# Patient Record
Sex: FEMALE | Race: WHITE | Employment: OTHER | URBAN - METROPOLITAN AREA
[De-identification: names, ages, dates, MRNs, and addresses within clinical notes are randomized per-mention and may not be internally consistent; named-entity substitution may affect disease eponyms.]

---

## 2017-01-12 ENCOUNTER — TELEPHONE (OUTPATIENT)
Dept: INTERNAL MEDICINE | Age: 77
End: 2017-01-12

## 2017-01-12 DIAGNOSIS — I05.9 MITRAL VALVE DISORDER: ICD-10-CM

## 2017-01-12 DIAGNOSIS — Z00.00 WELL ADULT EXAM: Primary | ICD-10-CM

## 2017-01-12 DIAGNOSIS — I48.91 ATRIAL FIBRILLATION, UNSPECIFIED TYPE (HCC): ICD-10-CM

## 2017-01-17 LAB
A/G RATIO: 1.8 (ref 1.1–2.2)
ALBUMIN SERPL-MCNC: 4.3 G/DL (ref 3.4–5)
ALP BLD-CCNC: 88 U/L (ref 40–129)
ALT SERPL-CCNC: 12 U/L (ref 10–40)
ANION GAP SERPL CALCULATED.3IONS-SCNC: 15 MMOL/L (ref 3–16)
AST SERPL-CCNC: 24 U/L (ref 15–37)
BASOPHILS ABSOLUTE: 0 K/UL (ref 0–0.2)
BASOPHILS RELATIVE PERCENT: 0.8 %
BILIRUB SERPL-MCNC: 1 MG/DL (ref 0–1)
BILIRUBIN URINE: NEGATIVE
BLOOD, URINE: NEGATIVE
BUN BLDV-MCNC: 11 MG/DL (ref 7–20)
CALCIUM SERPL-MCNC: 9.7 MG/DL (ref 8.3–10.6)
CHLORIDE BLD-SCNC: 96 MMOL/L (ref 99–110)
CHOLESTEROL, TOTAL: 224 MG/DL (ref 0–199)
CLARITY: CLEAR
CO2: 29 MMOL/L (ref 21–32)
COLOR: YELLOW
CREAT SERPL-MCNC: 0.5 MG/DL (ref 0.6–1.2)
EOSINOPHILS ABSOLUTE: 0.1 K/UL (ref 0–0.6)
EOSINOPHILS RELATIVE PERCENT: 1.4 %
EPITHELIAL CELLS, UA: 0 /HPF (ref 0–5)
GFR AFRICAN AMERICAN: >60
GFR NON-AFRICAN AMERICAN: >60
GLOBULIN: 2.4 G/DL
GLUCOSE BLD-MCNC: 83 MG/DL (ref 70–99)
GLUCOSE URINE: NEGATIVE MG/DL
HCT VFR BLD CALC: 42.3 % (ref 36–48)
HDLC SERPL-MCNC: 107 MG/DL (ref 40–60)
HEMOGLOBIN: 14.5 G/DL (ref 12–16)
HYALINE CASTS: 0 /HPF (ref 0–8)
INR BLD: 1.77 (ref 0.85–1.16)
KETONES, URINE: NEGATIVE MG/DL
LDL CHOLESTEROL CALCULATED: 98 MG/DL
LEUKOCYTE ESTERASE, URINE: ABNORMAL
LYMPHOCYTES ABSOLUTE: 1.4 K/UL (ref 1–5.1)
LYMPHOCYTES RELATIVE PERCENT: 24.4 %
MCH RBC QN AUTO: 32.8 PG (ref 26–34)
MCHC RBC AUTO-ENTMCNC: 34.3 G/DL (ref 31–36)
MCV RBC AUTO: 95.8 FL (ref 80–100)
MICROSCOPIC EXAMINATION: YES
MONOCYTES ABSOLUTE: 0.5 K/UL (ref 0–1.3)
MONOCYTES RELATIVE PERCENT: 8.8 %
NEUTROPHILS ABSOLUTE: 3.7 K/UL (ref 1.7–7.7)
NEUTROPHILS RELATIVE PERCENT: 64.6 %
NITRITE, URINE: NEGATIVE
PDW BLD-RTO: 14.5 % (ref 12.4–15.4)
PH UA: 7
PLATELET # BLD: 127 K/UL (ref 135–450)
PMV BLD AUTO: 10.5 FL (ref 5–10.5)
POTASSIUM SERPL-SCNC: 3.7 MMOL/L (ref 3.5–5.1)
PROTEIN UA: NEGATIVE MG/DL
PROTHROMBIN TIME: 20.4 SEC (ref 9.8–13)
RBC # BLD: 4.41 M/UL (ref 4–5.2)
RBC UA: 2 /HPF (ref 0–4)
SODIUM BLD-SCNC: 140 MMOL/L (ref 136–145)
SPECIFIC GRAVITY UA: 1.01
TOTAL PROTEIN: 6.7 G/DL (ref 6.4–8.2)
TRIGL SERPL-MCNC: 96 MG/DL (ref 0–150)
TSH SERPL DL<=0.05 MIU/L-ACNC: 2.28 UIU/ML (ref 0.27–4.2)
URINE TYPE: ABNORMAL
UROBILINOGEN, URINE: 0.2 E.U./DL
VITAMIN D 25-HYDROXY: 45.5 NG/ML
VLDLC SERPL CALC-MCNC: 19 MG/DL
WBC # BLD: 5.7 K/UL (ref 4–11)
WBC UA: 1 /HPF (ref 0–5)

## 2017-01-19 ENCOUNTER — ANTI-COAG VISIT (OUTPATIENT)
Dept: INTERNAL MEDICINE | Age: 77
End: 2017-01-19

## 2017-01-20 ENCOUNTER — TELEPHONE (OUTPATIENT)
Dept: INTERNAL MEDICINE | Age: 77
End: 2017-01-20

## 2017-01-20 RX ORDER — DOXYCYCLINE HYCLATE 100 MG
100 TABLET ORAL 2 TIMES DAILY
Qty: 14 TABLET | Refills: 0 | Status: SHIPPED | OUTPATIENT
Start: 2017-01-20 | End: 2017-01-27

## 2017-01-24 ENCOUNTER — OFFICE VISIT (OUTPATIENT)
Dept: INTERNAL MEDICINE | Age: 77
End: 2017-01-24

## 2017-01-24 VITALS
WEIGHT: 148 LBS | BODY MASS INDEX: 23.78 KG/M2 | HEIGHT: 66 IN | DIASTOLIC BLOOD PRESSURE: 82 MMHG | SYSTOLIC BLOOD PRESSURE: 120 MMHG

## 2017-01-24 DIAGNOSIS — I05.9 MITRAL VALVE DISORDER: ICD-10-CM

## 2017-01-24 DIAGNOSIS — I48.91 ATRIAL FIBRILLATION, UNSPECIFIED TYPE (HCC): ICD-10-CM

## 2017-01-24 DIAGNOSIS — F34.1 DYSTHYMIC DISORDER: ICD-10-CM

## 2017-01-24 DIAGNOSIS — E55.9 VITAMIN D DEFICIENCY: ICD-10-CM

## 2017-01-24 DIAGNOSIS — I83.90 ASYMPTOMATIC VARICOSE VEINS, UNSPECIFIED LATERALITY: ICD-10-CM

## 2017-01-24 DIAGNOSIS — E78.00 PURE HYPERCHOLESTEROLEMIA: ICD-10-CM

## 2017-01-24 DIAGNOSIS — R53.83 FATIGUE, UNSPECIFIED TYPE: ICD-10-CM

## 2017-01-24 DIAGNOSIS — Z00.00 WELL ADULT EXAM: Primary | ICD-10-CM

## 2017-01-24 PROCEDURE — 93000 ELECTROCARDIOGRAM COMPLETE: CPT | Performed by: INTERNAL MEDICINE

## 2017-01-24 PROCEDURE — G0439 PPPS, SUBSEQ VISIT: HCPCS | Performed by: INTERNAL MEDICINE

## 2017-01-24 RX ORDER — MULTIVITAMIN/IRON/FOLIC ACID 18MG-0.4MG
1 TABLET ORAL DAILY
Status: ON HOLD | COMMUNITY
End: 2020-05-13 | Stop reason: HOSPADM

## 2017-01-24 RX ORDER — LOSARTAN POTASSIUM 50 MG/1
50 TABLET ORAL DAILY
Qty: 30 TABLET | Refills: 3 | Status: SHIPPED | OUTPATIENT
Start: 2017-01-24 | End: 2017-02-27 | Stop reason: SINTOL

## 2017-01-24 RX ORDER — LUTEIN 6 MG
TABLET ORAL
COMMUNITY
End: 2018-01-26 | Stop reason: CLARIF

## 2017-01-24 ASSESSMENT — PATIENT HEALTH QUESTIONNAIRE - PHQ9
SUM OF ALL RESPONSES TO PHQ9 QUESTIONS 1 & 2: 0
1. LITTLE INTEREST OR PLEASURE IN DOING THINGS: 0
2. FEELING DOWN, DEPRESSED OR HOPELESS: 0
SUM OF ALL RESPONSES TO PHQ QUESTIONS 1-9: 0

## 2017-01-27 DIAGNOSIS — I48.91 ATRIAL FIBRILLATION, UNSPECIFIED TYPE (HCC): Primary | ICD-10-CM

## 2017-01-27 DIAGNOSIS — Z79.01 LONG-TERM (CURRENT) USE OF ANTICOAGULANTS: ICD-10-CM

## 2017-02-13 DIAGNOSIS — I48.91 ATRIAL FIBRILLATION, UNSPECIFIED TYPE (HCC): ICD-10-CM

## 2017-02-13 DIAGNOSIS — Z79.01 LONG-TERM (CURRENT) USE OF ANTICOAGULANTS: ICD-10-CM

## 2017-02-13 LAB
BASOPHILS ABSOLUTE: 0 K/UL (ref 0–0.2)
BASOPHILS RELATIVE PERCENT: 0.7 %
EOSINOPHILS ABSOLUTE: 0.1 K/UL (ref 0–0.6)
EOSINOPHILS RELATIVE PERCENT: 1.7 %
HCT VFR BLD CALC: 40.2 % (ref 36–48)
HEMOGLOBIN: 13.6 G/DL (ref 12–16)
INR BLD: 1.53 (ref 0.85–1.16)
LYMPHOCYTES ABSOLUTE: 1 K/UL (ref 1–5.1)
LYMPHOCYTES RELATIVE PERCENT: 20.8 %
MCH RBC QN AUTO: 32.4 PG (ref 26–34)
MCHC RBC AUTO-ENTMCNC: 33.9 G/DL (ref 31–36)
MCV RBC AUTO: 95.7 FL (ref 80–100)
MONOCYTES ABSOLUTE: 0.5 K/UL (ref 0–1.3)
MONOCYTES RELATIVE PERCENT: 9.1 %
NEUTROPHILS ABSOLUTE: 3.4 K/UL (ref 1.7–7.7)
NEUTROPHILS RELATIVE PERCENT: 67.7 %
PDW BLD-RTO: 14.8 % (ref 12.4–15.4)
PLATELET # BLD: 138 K/UL (ref 135–450)
PMV BLD AUTO: 10.7 FL (ref 5–10.5)
PROTHROMBIN TIME: 17.6 SEC (ref 9.8–13)
RBC # BLD: 4.2 M/UL (ref 4–5.2)
WBC # BLD: 5 K/UL (ref 4–11)

## 2017-02-17 ENCOUNTER — ANTI-COAG VISIT (OUTPATIENT)
Dept: INTERNAL MEDICINE | Age: 77
End: 2017-02-17

## 2017-02-21 RX ORDER — WARFARIN SODIUM 2.5 MG/1
TABLET ORAL
Qty: 60 TABLET | Refills: 5 | Status: SHIPPED | OUTPATIENT
Start: 2017-02-21 | End: 2017-09-27 | Stop reason: SDUPTHER

## 2017-02-24 ENCOUNTER — TELEPHONE (OUTPATIENT)
Dept: INTERNAL MEDICINE | Age: 77
End: 2017-02-24

## 2017-02-27 RX ORDER — SPIRONOLACTONE AND HYDROCHLOROTHIAZIDE 25; 25 MG/1; MG/1
TABLET ORAL
Qty: 60 TABLET | Refills: 5 | COMMUNITY
Start: 2017-02-27 | End: 2017-03-21 | Stop reason: SDUPTHER

## 2017-03-07 DIAGNOSIS — I48.91 ATRIAL FIBRILLATION, UNSPECIFIED TYPE (HCC): ICD-10-CM

## 2017-03-07 DIAGNOSIS — Z79.01 LONG-TERM (CURRENT) USE OF ANTICOAGULANTS: ICD-10-CM

## 2017-03-07 LAB
INR BLD: 2.45 (ref 0.85–1.15)
PROTHROMBIN TIME: 27.7 SEC (ref 9.6–13)

## 2017-03-09 ENCOUNTER — ANTI-COAG VISIT (OUTPATIENT)
Dept: INTERNAL MEDICINE | Age: 77
End: 2017-03-09

## 2017-03-21 RX ORDER — SPIRONOLACTONE AND HYDROCHLOROTHIAZIDE 25; 25 MG/1; MG/1
TABLET ORAL
Qty: 60 TABLET | Refills: 4 | Status: SHIPPED | OUTPATIENT
Start: 2017-03-21 | End: 2017-07-26 | Stop reason: SDUPTHER

## 2017-03-21 RX ORDER — DIGOXIN 250 MCG
TABLET ORAL
Qty: 15 TABLET | Refills: 4 | Status: SHIPPED | OUTPATIENT
Start: 2017-03-21 | End: 2017-07-26 | Stop reason: SDUPTHER

## 2017-04-14 DIAGNOSIS — Z79.01 LONG-TERM (CURRENT) USE OF ANTICOAGULANTS: ICD-10-CM

## 2017-04-14 DIAGNOSIS — I48.91 ATRIAL FIBRILLATION, UNSPECIFIED TYPE (HCC): ICD-10-CM

## 2017-04-14 LAB
INR BLD: 3.32 (ref 0.85–1.15)
PROTHROMBIN TIME: 37.5 SEC (ref 9.6–13)

## 2017-04-18 ENCOUNTER — ANTI-COAG VISIT (OUTPATIENT)
Dept: INTERNAL MEDICINE | Age: 77
End: 2017-04-18

## 2017-05-24 ENCOUNTER — TELEPHONE (OUTPATIENT)
Dept: INTERNAL MEDICINE | Age: 77
End: 2017-05-24

## 2017-05-24 ENCOUNTER — ANTI-COAG VISIT (OUTPATIENT)
Dept: INTERNAL MEDICINE | Age: 77
End: 2017-05-24

## 2017-05-24 DIAGNOSIS — Z79.01 LONG-TERM (CURRENT) USE OF ANTICOAGULANTS: ICD-10-CM

## 2017-05-24 DIAGNOSIS — I48.91 ATRIAL FIBRILLATION, UNSPECIFIED TYPE (HCC): ICD-10-CM

## 2017-05-24 LAB
INR BLD: 5.07 (ref 0.85–1.15)
PROTHROMBIN TIME: 57.3 SEC (ref 9.6–13)

## 2017-06-08 DIAGNOSIS — I48.91 ATRIAL FIBRILLATION, UNSPECIFIED TYPE (HCC): ICD-10-CM

## 2017-06-08 DIAGNOSIS — Z79.01 LONG-TERM (CURRENT) USE OF ANTICOAGULANTS: ICD-10-CM

## 2017-06-08 LAB
INR BLD: 1.09 (ref 0.85–1.15)
PROTHROMBIN TIME: 12.3 SEC (ref 9.6–13)

## 2017-06-09 ENCOUNTER — ANTI-COAG VISIT (OUTPATIENT)
Dept: INTERNAL MEDICINE | Age: 77
End: 2017-06-09

## 2017-06-21 ENCOUNTER — ANTI-COAG VISIT (OUTPATIENT)
Dept: INTERNAL MEDICINE | Age: 77
End: 2017-06-21

## 2017-06-21 DIAGNOSIS — Z79.01 LONG-TERM (CURRENT) USE OF ANTICOAGULANTS: ICD-10-CM

## 2017-06-21 DIAGNOSIS — I48.91 ATRIAL FIBRILLATION, UNSPECIFIED TYPE (HCC): ICD-10-CM

## 2017-06-21 LAB
INR BLD: 2.53 (ref 0.85–1.15)
PROTHROMBIN TIME: 28.6 SEC (ref 9.6–13)

## 2017-07-21 ENCOUNTER — ANTI-COAG VISIT (OUTPATIENT)
Dept: INTERNAL MEDICINE | Age: 77
End: 2017-07-21

## 2017-07-21 ENCOUNTER — TELEPHONE (OUTPATIENT)
Dept: INTERNAL MEDICINE | Age: 77
End: 2017-07-21

## 2017-07-21 DIAGNOSIS — I05.9 MITRAL VALVE DISORDER: ICD-10-CM

## 2017-07-21 DIAGNOSIS — F34.1 DYSTHYMIC DISORDER: ICD-10-CM

## 2017-07-21 DIAGNOSIS — E78.00 ELEVATED CHOLESTEROL: ICD-10-CM

## 2017-07-21 DIAGNOSIS — Z79.01 LONG-TERM (CURRENT) USE OF ANTICOAGULANTS: ICD-10-CM

## 2017-07-21 DIAGNOSIS — I83.90 ASYMPTOMATIC VARICOSE VEINS, UNSPECIFIED LATERALITY: ICD-10-CM

## 2017-07-21 DIAGNOSIS — I48.91 ATRIAL FIBRILLATION, UNSPECIFIED TYPE (HCC): Primary | ICD-10-CM

## 2017-07-21 LAB
INR BLD: 2.78 (ref 0.85–1.15)
PROTHROMBIN TIME: 31.4 SEC (ref 9.6–13)

## 2017-07-25 ENCOUNTER — ANTI-COAG VISIT (OUTPATIENT)
Dept: INTERNAL MEDICINE | Age: 77
End: 2017-07-25

## 2017-07-25 DIAGNOSIS — I83.90 ASYMPTOMATIC VARICOSE VEINS, UNSPECIFIED LATERALITY: ICD-10-CM

## 2017-07-25 DIAGNOSIS — I05.9 MITRAL VALVE DISORDER: ICD-10-CM

## 2017-07-25 DIAGNOSIS — Z79.01 LONG-TERM (CURRENT) USE OF ANTICOAGULANTS: ICD-10-CM

## 2017-07-25 DIAGNOSIS — I48.91 ATRIAL FIBRILLATION, UNSPECIFIED TYPE (HCC): ICD-10-CM

## 2017-07-25 DIAGNOSIS — F34.1 DYSTHYMIC DISORDER: ICD-10-CM

## 2017-07-25 DIAGNOSIS — E78.00 ELEVATED CHOLESTEROL: ICD-10-CM

## 2017-07-25 LAB
A/G RATIO: 1.6 (ref 1.1–2.2)
ALBUMIN SERPL-MCNC: 4.2 G/DL (ref 3.4–5)
ALP BLD-CCNC: 91 U/L (ref 40–129)
ALT SERPL-CCNC: 10 U/L (ref 10–40)
ANION GAP SERPL CALCULATED.3IONS-SCNC: 16 MMOL/L (ref 3–16)
AST SERPL-CCNC: 24 U/L (ref 15–37)
BASOPHILS ABSOLUTE: 0.1 K/UL (ref 0–0.2)
BASOPHILS RELATIVE PERCENT: 1.7 %
BILIRUB SERPL-MCNC: 0.7 MG/DL (ref 0–1)
BUN BLDV-MCNC: 11 MG/DL (ref 7–20)
CALCIUM SERPL-MCNC: 9.7 MG/DL (ref 8.3–10.6)
CHLORIDE BLD-SCNC: 98 MMOL/L (ref 99–110)
CHOLESTEROL, TOTAL: 237 MG/DL (ref 0–199)
CO2: 28 MMOL/L (ref 21–32)
CREAT SERPL-MCNC: 0.6 MG/DL (ref 0.6–1.2)
EOSINOPHILS ABSOLUTE: 0.1 K/UL (ref 0–0.6)
EOSINOPHILS RELATIVE PERCENT: 2.2 %
GFR AFRICAN AMERICAN: >60
GFR NON-AFRICAN AMERICAN: >60
GLOBULIN: 2.7 G/DL
GLUCOSE BLD-MCNC: 90 MG/DL (ref 70–99)
HCT VFR BLD CALC: 42.5 % (ref 36–48)
HDLC SERPL-MCNC: 111 MG/DL (ref 40–60)
HEMOGLOBIN: 14.2 G/DL (ref 12–16)
INR BLD: 2.26 (ref 0.85–1.15)
LDL CHOLESTEROL CALCULATED: 111 MG/DL
LYMPHOCYTES ABSOLUTE: 1.2 K/UL (ref 1–5.1)
LYMPHOCYTES RELATIVE PERCENT: 26.9 %
MCH RBC QN AUTO: 32.4 PG (ref 26–34)
MCHC RBC AUTO-ENTMCNC: 33.4 G/DL (ref 31–36)
MCV RBC AUTO: 97.3 FL (ref 80–100)
MONOCYTES ABSOLUTE: 0.4 K/UL (ref 0–1.3)
MONOCYTES RELATIVE PERCENT: 10.3 %
NEUTROPHILS ABSOLUTE: 2.5 K/UL (ref 1.7–7.7)
NEUTROPHILS RELATIVE PERCENT: 58.9 %
PDW BLD-RTO: 14 % (ref 12.4–15.4)
PLATELET # BLD: 158 K/UL (ref 135–450)
PMV BLD AUTO: 10.3 FL (ref 5–10.5)
POTASSIUM SERPL-SCNC: 3.4 MMOL/L (ref 3.5–5.1)
PROTHROMBIN TIME: 25.5 SEC (ref 9.6–13)
RBC # BLD: 4.37 M/UL (ref 4–5.2)
SODIUM BLD-SCNC: 142 MMOL/L (ref 136–145)
TOTAL PROTEIN: 6.9 G/DL (ref 6.4–8.2)
TRIGL SERPL-MCNC: 73 MG/DL (ref 0–150)
VLDLC SERPL CALC-MCNC: 15 MG/DL
WBC # BLD: 4.3 K/UL (ref 4–11)

## 2017-07-26 ENCOUNTER — OFFICE VISIT (OUTPATIENT)
Dept: INTERNAL MEDICINE | Age: 77
End: 2017-07-26

## 2017-07-26 VITALS
DIASTOLIC BLOOD PRESSURE: 82 MMHG | SYSTOLIC BLOOD PRESSURE: 120 MMHG | HEIGHT: 66 IN | BODY MASS INDEX: 24.43 KG/M2 | WEIGHT: 152 LBS

## 2017-07-26 DIAGNOSIS — I05.9 MITRAL VALVE DISORDER: ICD-10-CM

## 2017-07-26 DIAGNOSIS — I48.91 ATRIAL FIBRILLATION, UNSPECIFIED TYPE (HCC): ICD-10-CM

## 2017-07-26 DIAGNOSIS — I87.9 VEIN DISORDER: Primary | ICD-10-CM

## 2017-07-26 PROCEDURE — G8400 PT W/DXA NO RESULTS DOC: HCPCS | Performed by: INTERNAL MEDICINE

## 2017-07-26 PROCEDURE — G8420 CALC BMI NORM PARAMETERS: HCPCS | Performed by: INTERNAL MEDICINE

## 2017-07-26 PROCEDURE — 3017F COLORECTAL CA SCREEN DOC REV: CPT | Performed by: INTERNAL MEDICINE

## 2017-07-26 PROCEDURE — 1090F PRES/ABSN URINE INCON ASSESS: CPT | Performed by: INTERNAL MEDICINE

## 2017-07-26 PROCEDURE — 4040F PNEUMOC VAC/ADMIN/RCVD: CPT | Performed by: INTERNAL MEDICINE

## 2017-07-26 PROCEDURE — 1123F ACP DISCUSS/DSCN MKR DOCD: CPT | Performed by: INTERNAL MEDICINE

## 2017-07-26 PROCEDURE — G8427 DOCREV CUR MEDS BY ELIG CLIN: HCPCS | Performed by: INTERNAL MEDICINE

## 2017-07-26 PROCEDURE — 99213 OFFICE O/P EST LOW 20 MIN: CPT | Performed by: INTERNAL MEDICINE

## 2017-07-26 PROCEDURE — 1036F TOBACCO NON-USER: CPT | Performed by: INTERNAL MEDICINE

## 2017-07-26 RX ORDER — DIGOXIN 250 MCG
TABLET ORAL
Qty: 31 TABLET | Refills: 4 | Status: SHIPPED | OUTPATIENT
Start: 2017-07-26 | End: 2018-04-25 | Stop reason: SDUPTHER

## 2017-07-26 RX ORDER — SPIRONOLACTONE AND HYDROCHLOROTHIAZIDE 25; 25 MG/1; MG/1
TABLET ORAL
Qty: 60 TABLET | Refills: 4 | Status: SHIPPED | OUTPATIENT
Start: 2017-07-26 | End: 2017-12-22 | Stop reason: SDUPTHER

## 2017-08-24 ENCOUNTER — ANTI-COAG VISIT (OUTPATIENT)
Dept: INTERNAL MEDICINE | Age: 77
End: 2017-08-24

## 2017-08-24 DIAGNOSIS — I05.9 MITRAL VALVE DISORDER: ICD-10-CM

## 2017-08-24 DIAGNOSIS — Z79.01 LONG-TERM (CURRENT) USE OF ANTICOAGULANTS: ICD-10-CM

## 2017-08-24 DIAGNOSIS — I48.91 ATRIAL FIBRILLATION, UNSPECIFIED TYPE (HCC): ICD-10-CM

## 2017-08-24 LAB
INR BLD: 1.88 (ref 0.85–1.15)
PROTHROMBIN TIME: 21.2 SEC (ref 9.6–13)

## 2017-08-30 ENCOUNTER — NURSE ONLY (OUTPATIENT)
Dept: INTERNAL MEDICINE | Age: 77
End: 2017-08-30

## 2017-08-30 DIAGNOSIS — Z23 NEED FOR INFLUENZA VACCINATION: Primary | ICD-10-CM

## 2017-08-30 PROCEDURE — 90662 IIV NO PRSV INCREASED AG IM: CPT | Performed by: INTERNAL MEDICINE

## 2017-08-30 PROCEDURE — G0008 ADMIN INFLUENZA VIRUS VAC: HCPCS | Performed by: INTERNAL MEDICINE

## 2017-09-28 RX ORDER — WARFARIN SODIUM 2.5 MG/1
TABLET ORAL
Qty: 60 TABLET | Refills: 4 | Status: SHIPPED | OUTPATIENT
Start: 2017-09-28 | End: 2018-01-15 | Stop reason: SDUPTHER

## 2017-10-17 DIAGNOSIS — I48.91 ATRIAL FIBRILLATION, UNSPECIFIED TYPE (HCC): ICD-10-CM

## 2017-10-17 DIAGNOSIS — Z79.01 LONG-TERM (CURRENT) USE OF ANTICOAGULANTS: ICD-10-CM

## 2017-10-17 DIAGNOSIS — I05.9 MITRAL VALVE DISORDER: ICD-10-CM

## 2017-10-17 LAB
INR BLD: 3.69 (ref 0.85–1.15)
PROTHROMBIN TIME: 41.7 SEC (ref 9.6–13)

## 2017-10-18 ENCOUNTER — ANTI-COAG VISIT (OUTPATIENT)
Dept: INTERNAL MEDICINE | Age: 77
End: 2017-10-18

## 2017-10-26 ENCOUNTER — TELEPHONE (OUTPATIENT)
Dept: INTERNAL MEDICINE | Age: 77
End: 2017-10-26

## 2017-11-13 ENCOUNTER — ANTI-COAG VISIT (OUTPATIENT)
Dept: INTERNAL MEDICINE | Age: 77
End: 2017-11-13

## 2017-11-13 DIAGNOSIS — Z79.01 LONG-TERM (CURRENT) USE OF ANTICOAGULANTS: ICD-10-CM

## 2017-11-13 DIAGNOSIS — I48.91 ATRIAL FIBRILLATION, UNSPECIFIED TYPE (HCC): ICD-10-CM

## 2017-11-13 DIAGNOSIS — I05.9 MITRAL VALVE DISORDER: ICD-10-CM

## 2017-11-13 LAB
INR BLD: 3.34 (ref 0.85–1.15)
PROTHROMBIN TIME: 37.7 SEC (ref 9.6–13)

## 2017-12-14 ENCOUNTER — TELEPHONE (OUTPATIENT)
Dept: INTERNAL MEDICINE | Age: 77
End: 2017-12-14

## 2017-12-14 DIAGNOSIS — I48.91 ATRIAL FIBRILLATION, UNSPECIFIED TYPE (HCC): ICD-10-CM

## 2017-12-14 DIAGNOSIS — Z79.01 LONG-TERM (CURRENT) USE OF ANTICOAGULANTS: ICD-10-CM

## 2017-12-14 DIAGNOSIS — I05.9 MITRAL VALVE DISORDER: ICD-10-CM

## 2017-12-14 LAB
INR BLD: 5.2 (ref 0.85–1.15)
PROTHROMBIN TIME: 58.8 SEC (ref 9.6–13)

## 2017-12-14 NOTE — TELEPHONE ENCOUNTER
Anirudh from [facility] calling with a critical INR lab    INR-5.2    Fax to come as well- Please be advised

## 2017-12-21 ENCOUNTER — TELEPHONE (OUTPATIENT)
Dept: INTERNAL MEDICINE | Age: 77
End: 2017-12-21

## 2017-12-21 DIAGNOSIS — I48.91 ATRIAL FIBRILLATION, UNSPECIFIED TYPE (HCC): ICD-10-CM

## 2017-12-21 DIAGNOSIS — Z00.00 WELL ADULT EXAM: Primary | ICD-10-CM

## 2017-12-22 DIAGNOSIS — I48.91 ATRIAL FIBRILLATION, UNSPECIFIED TYPE (HCC): ICD-10-CM

## 2017-12-22 DIAGNOSIS — I05.9 MITRAL VALVE DISORDER: ICD-10-CM

## 2017-12-22 DIAGNOSIS — Z79.01 LONG-TERM (CURRENT) USE OF ANTICOAGULANTS: ICD-10-CM

## 2017-12-22 LAB
INR BLD: 1.39 (ref 0.85–1.15)
PROTHROMBIN TIME: 15.7 SEC (ref 9.6–13)

## 2017-12-26 RX ORDER — SPIRONOLACTONE AND HYDROCHLOROTHIAZIDE 25; 25 MG/1; MG/1
TABLET ORAL
Qty: 60 TABLET | Refills: 4 | Status: SHIPPED | OUTPATIENT
Start: 2017-12-26 | End: 2018-06-20 | Stop reason: SDUPTHER

## 2017-12-27 ENCOUNTER — ANTI-COAG VISIT (OUTPATIENT)
Dept: INTERNAL MEDICINE | Age: 77
End: 2017-12-27

## 2018-01-10 DIAGNOSIS — Z79.01 LONG-TERM (CURRENT) USE OF ANTICOAGULANTS: ICD-10-CM

## 2018-01-10 DIAGNOSIS — I05.9 MITRAL VALVE DISORDER: ICD-10-CM

## 2018-01-10 DIAGNOSIS — I48.91 ATRIAL FIBRILLATION, UNSPECIFIED TYPE (HCC): ICD-10-CM

## 2018-01-10 LAB
INR BLD: 2.11
INR BLD: 2.11 (ref 0.85–1.15)
PROTHROMBIN TIME: 23.8 SEC (ref 9.6–13)

## 2018-01-11 ENCOUNTER — ANTI-COAG VISIT (OUTPATIENT)
Dept: INTERNAL MEDICINE | Age: 78
End: 2018-01-11

## 2018-01-12 ENCOUNTER — TELEPHONE (OUTPATIENT)
Dept: INTERNAL MEDICINE | Age: 78
End: 2018-01-12

## 2018-01-12 NOTE — TELEPHONE ENCOUNTER
Patient has been informed of inr results. Inr results are ok. Can continue same dose. Recheck in one month.

## 2018-01-15 RX ORDER — WARFARIN SODIUM 2.5 MG/1
TABLET ORAL
Qty: 30 TABLET | Refills: 0 | Status: SHIPPED | OUTPATIENT
Start: 2018-01-15 | End: 2018-02-19 | Stop reason: SDUPTHER

## 2018-01-16 ENCOUNTER — TELEPHONE (OUTPATIENT)
Dept: INTERNAL MEDICINE | Age: 78
End: 2018-01-16

## 2018-01-22 LAB
A/G RATIO: 2.3 (ref 1.1–2.2)
ALBUMIN SERPL-MCNC: 4.3 G/DL (ref 3.4–5)
ALP BLD-CCNC: 75 U/L (ref 40–129)
ALT SERPL-CCNC: 11 U/L (ref 10–40)
ANION GAP SERPL CALCULATED.3IONS-SCNC: 17 MMOL/L (ref 3–16)
AST SERPL-CCNC: 22 U/L (ref 15–37)
BASOPHILS ABSOLUTE: 0 K/UL (ref 0–0.2)
BASOPHILS RELATIVE PERCENT: 1.1 %
BILIRUB SERPL-MCNC: 0.8 MG/DL (ref 0–1)
BILIRUBIN URINE: NEGATIVE
BLOOD, URINE: NEGATIVE
BUN BLDV-MCNC: 11 MG/DL (ref 7–20)
CALCIUM SERPL-MCNC: 9.7 MG/DL (ref 8.3–10.6)
CHLORIDE BLD-SCNC: 97 MMOL/L (ref 99–110)
CHOLESTEROL, TOTAL: 220 MG/DL (ref 0–199)
CLARITY: CLEAR
CO2: 27 MMOL/L (ref 21–32)
COLOR: YELLOW
CREAT SERPL-MCNC: 0.6 MG/DL (ref 0.6–1.2)
EOSINOPHILS ABSOLUTE: 0.1 K/UL (ref 0–0.6)
EOSINOPHILS RELATIVE PERCENT: 2 %
GFR AFRICAN AMERICAN: >60
GFR NON-AFRICAN AMERICAN: >60
GLOBULIN: 1.9 G/DL
GLUCOSE BLD-MCNC: 90 MG/DL (ref 70–99)
GLUCOSE URINE: NEGATIVE MG/DL
HCT VFR BLD CALC: 41.3 % (ref 36–48)
HDLC SERPL-MCNC: 83 MG/DL (ref 40–60)
HEMOGLOBIN: 13.9 G/DL (ref 12–16)
KETONES, URINE: NEGATIVE MG/DL
LDL CHOLESTEROL CALCULATED: 115 MG/DL
LEUKOCYTE ESTERASE, URINE: NEGATIVE
LYMPHOCYTES ABSOLUTE: 1 K/UL (ref 1–5.1)
LYMPHOCYTES RELATIVE PERCENT: 28.6 %
MCH RBC QN AUTO: 32.1 PG (ref 26–34)
MCHC RBC AUTO-ENTMCNC: 33.7 G/DL (ref 31–36)
MCV RBC AUTO: 95.3 FL (ref 80–100)
MICROSCOPIC EXAMINATION: NORMAL
MONOCYTES ABSOLUTE: 0.4 K/UL (ref 0–1.3)
MONOCYTES RELATIVE PERCENT: 11.8 %
NEUTROPHILS ABSOLUTE: 2.1 K/UL (ref 1.7–7.7)
NEUTROPHILS RELATIVE PERCENT: 56.5 %
NITRITE, URINE: NEGATIVE
PDW BLD-RTO: 14.2 % (ref 12.4–15.4)
PH UA: 6.5
PLATELET # BLD: 175 K/UL (ref 135–450)
PMV BLD AUTO: 11.1 FL (ref 5–10.5)
POTASSIUM SERPL-SCNC: 3.3 MMOL/L (ref 3.5–5.1)
PROTEIN UA: NEGATIVE MG/DL
RBC # BLD: 4.33 M/UL (ref 4–5.2)
SODIUM BLD-SCNC: 141 MMOL/L (ref 136–145)
SPECIFIC GRAVITY UA: 1.01
TOTAL PROTEIN: 6.2 G/DL (ref 6.4–8.2)
TRIGL SERPL-MCNC: 111 MG/DL (ref 0–150)
TSH SERPL DL<=0.05 MIU/L-ACNC: 3.46 UIU/ML (ref 0.27–4.2)
URINE TYPE: NORMAL
UROBILINOGEN, URINE: 0.2 E.U./DL
VITAMIN D 25-HYDROXY: 44.8 NG/ML
VLDLC SERPL CALC-MCNC: 22 MG/DL
WBC # BLD: 3.6 K/UL (ref 4–11)

## 2018-01-26 ENCOUNTER — OFFICE VISIT (OUTPATIENT)
Dept: INTERNAL MEDICINE | Age: 78
End: 2018-01-26

## 2018-01-26 VITALS
WEIGHT: 148 LBS | BODY MASS INDEX: 23.78 KG/M2 | HEIGHT: 66 IN | DIASTOLIC BLOOD PRESSURE: 80 MMHG | SYSTOLIC BLOOD PRESSURE: 128 MMHG

## 2018-01-26 DIAGNOSIS — F34.1 DYSTHYMIC DISORDER: ICD-10-CM

## 2018-01-26 DIAGNOSIS — I05.9 MITRAL VALVE DISORDER: ICD-10-CM

## 2018-01-26 DIAGNOSIS — I48.91 ATRIAL FIBRILLATION, UNSPECIFIED TYPE (HCC): ICD-10-CM

## 2018-01-26 DIAGNOSIS — Z00.00 WELL ADULT EXAM: Primary | ICD-10-CM

## 2018-01-26 PROBLEM — I87.9 VEIN DISORDER: Status: RESOLVED | Noted: 2017-07-26 | Resolved: 2018-01-26

## 2018-01-26 PROCEDURE — G0439 PPPS, SUBSEQ VISIT: HCPCS | Performed by: INTERNAL MEDICINE

## 2018-01-26 PROCEDURE — 93000 ELECTROCARDIOGRAM COMPLETE: CPT | Performed by: INTERNAL MEDICINE

## 2018-01-26 ASSESSMENT — PATIENT HEALTH QUESTIONNAIRE - PHQ9
SUM OF ALL RESPONSES TO PHQ QUESTIONS 1-9: 0
1. LITTLE INTEREST OR PLEASURE IN DOING THINGS: 0
SUM OF ALL RESPONSES TO PHQ9 QUESTIONS 1 & 2: 0
2. FEELING DOWN, DEPRESSED OR HOPELESS: 0

## 2018-02-19 RX ORDER — WARFARIN SODIUM 2.5 MG/1
TABLET ORAL
Qty: 60 TABLET | Refills: 3 | Status: SHIPPED | OUTPATIENT
Start: 2018-02-19 | End: 2018-07-24 | Stop reason: SDUPTHER

## 2018-02-26 DIAGNOSIS — I48.91 ATRIAL FIBRILLATION, UNSPECIFIED TYPE (HCC): ICD-10-CM

## 2018-02-26 DIAGNOSIS — I05.9 MITRAL VALVE DISORDER: ICD-10-CM

## 2018-02-26 DIAGNOSIS — Z79.01 LONG-TERM (CURRENT) USE OF ANTICOAGULANTS: ICD-10-CM

## 2018-02-26 LAB
INR BLD: 1.62 (ref 0.85–1.15)
PROTHROMBIN TIME: 18.3 SEC (ref 9.6–13)

## 2018-02-27 ENCOUNTER — ANTI-COAG VISIT (OUTPATIENT)
Dept: INTERNAL MEDICINE | Age: 78
End: 2018-02-27

## 2018-03-21 DIAGNOSIS — I48.91 ATRIAL FIBRILLATION, UNSPECIFIED TYPE (HCC): ICD-10-CM

## 2018-03-21 DIAGNOSIS — I05.9 MITRAL VALVE DISORDER: ICD-10-CM

## 2018-03-21 DIAGNOSIS — Z79.01 LONG-TERM (CURRENT) USE OF ANTICOAGULANTS: ICD-10-CM

## 2018-03-21 LAB
INR BLD: 2.13 (ref 0.85–1.15)
PROTHROMBIN TIME: 24.1 SEC (ref 9.6–13)

## 2018-03-22 ENCOUNTER — ANTI-COAG VISIT (OUTPATIENT)
Dept: INTERNAL MEDICINE | Age: 78
End: 2018-03-22

## 2018-04-20 DIAGNOSIS — Z79.01 LONG-TERM (CURRENT) USE OF ANTICOAGULANTS: ICD-10-CM

## 2018-04-20 DIAGNOSIS — I05.9 MITRAL VALVE DISORDER: ICD-10-CM

## 2018-04-20 DIAGNOSIS — I48.91 ATRIAL FIBRILLATION, UNSPECIFIED TYPE (HCC): ICD-10-CM

## 2018-04-20 LAB
INR BLD: 2.26 (ref 0.85–1.15)
PROTHROMBIN TIME: 25.5 SEC (ref 9.6–13)

## 2018-04-24 ENCOUNTER — ANTI-COAG VISIT (OUTPATIENT)
Dept: INTERNAL MEDICINE | Age: 78
End: 2018-04-24

## 2018-04-25 RX ORDER — DIGOXIN 250 MCG
TABLET ORAL
Qty: 31 TABLET | Refills: 2 | Status: SHIPPED | OUTPATIENT
Start: 2018-04-25 | End: 2018-12-28 | Stop reason: SDUPTHER

## 2018-06-21 RX ORDER — SPIRONOLACTONE AND HYDROCHLOROTHIAZIDE 25; 25 MG/1; MG/1
TABLET ORAL
Qty: 60 TABLET | Refills: 4 | Status: SHIPPED | OUTPATIENT
Start: 2018-06-21 | End: 2018-10-18 | Stop reason: SDUPTHER

## 2018-07-26 RX ORDER — WARFARIN SODIUM 2.5 MG/1
TABLET ORAL
Qty: 60 TABLET | Refills: 4 | Status: SHIPPED | OUTPATIENT
Start: 2018-07-26 | End: 2019-02-25 | Stop reason: SDUPTHER

## 2018-08-01 ENCOUNTER — OFFICE VISIT (OUTPATIENT)
Dept: INTERNAL MEDICINE | Age: 78
End: 2018-08-01

## 2018-08-01 VITALS
WEIGHT: 154 LBS | SYSTOLIC BLOOD PRESSURE: 120 MMHG | BODY MASS INDEX: 24.75 KG/M2 | DIASTOLIC BLOOD PRESSURE: 80 MMHG | HEIGHT: 66 IN

## 2018-08-01 DIAGNOSIS — I48.21 PERMANENT ATRIAL FIBRILLATION (HCC): Primary | ICD-10-CM

## 2018-08-01 DIAGNOSIS — I83.90 ASYMPTOMATIC VARICOSE VEINS: ICD-10-CM

## 2018-08-01 DIAGNOSIS — I48.21 PERMANENT ATRIAL FIBRILLATION (HCC): ICD-10-CM

## 2018-08-01 DIAGNOSIS — E78.00 PURE HYPERCHOLESTEROLEMIA: ICD-10-CM

## 2018-08-01 DIAGNOSIS — I05.9 MITRAL VALVE DISORDER: ICD-10-CM

## 2018-08-01 LAB
A/G RATIO: 1.9 (ref 1.1–2.2)
ALBUMIN SERPL-MCNC: 4.7 G/DL (ref 3.4–5)
ALP BLD-CCNC: 87 U/L (ref 40–129)
ALT SERPL-CCNC: 14 U/L (ref 10–40)
ANION GAP SERPL CALCULATED.3IONS-SCNC: 13 MMOL/L (ref 3–16)
AST SERPL-CCNC: 26 U/L (ref 15–37)
BASOPHILS ABSOLUTE: 0 K/UL (ref 0–0.2)
BASOPHILS RELATIVE PERCENT: 0.7 %
BILIRUB SERPL-MCNC: 0.7 MG/DL (ref 0–1)
BUN BLDV-MCNC: 16 MG/DL (ref 7–20)
CALCIUM SERPL-MCNC: 10.3 MG/DL (ref 8.3–10.6)
CHLORIDE BLD-SCNC: 98 MMOL/L (ref 99–110)
CHOLESTEROL, TOTAL: 271 MG/DL (ref 0–199)
CO2: 28 MMOL/L (ref 21–32)
CREAT SERPL-MCNC: 0.7 MG/DL (ref 0.6–1.2)
EOSINOPHILS ABSOLUTE: 0.1 K/UL (ref 0–0.6)
EOSINOPHILS RELATIVE PERCENT: 1.1 %
GFR AFRICAN AMERICAN: >60
GFR NON-AFRICAN AMERICAN: >60
GLOBULIN: 2.5 G/DL
GLUCOSE BLD-MCNC: 101 MG/DL (ref 70–99)
HCT VFR BLD CALC: 44.6 % (ref 36–48)
HDLC SERPL-MCNC: 119 MG/DL (ref 40–60)
HEMOGLOBIN: 15 G/DL (ref 12–16)
INR BLD: 1.76 (ref 0.86–1.14)
LDL CHOLESTEROL CALCULATED: 131 MG/DL
LYMPHOCYTES ABSOLUTE: 1.1 K/UL (ref 1–5.1)
LYMPHOCYTES RELATIVE PERCENT: 19.1 %
MCH RBC QN AUTO: 32.4 PG (ref 26–34)
MCHC RBC AUTO-ENTMCNC: 33.6 G/DL (ref 31–36)
MCV RBC AUTO: 96.5 FL (ref 80–100)
MONOCYTES ABSOLUTE: 0.5 K/UL (ref 0–1.3)
MONOCYTES RELATIVE PERCENT: 9.2 %
NEUTROPHILS ABSOLUTE: 3.9 K/UL (ref 1.7–7.7)
NEUTROPHILS RELATIVE PERCENT: 69.9 %
PDW BLD-RTO: 15 % (ref 12.4–15.4)
PLATELET # BLD: 177 K/UL (ref 135–450)
PMV BLD AUTO: 9.8 FL (ref 5–10.5)
POTASSIUM SERPL-SCNC: 3.6 MMOL/L (ref 3.5–5.1)
PROTHROMBIN TIME: 20.1 SEC (ref 9.8–13)
RBC # BLD: 4.62 M/UL (ref 4–5.2)
SODIUM BLD-SCNC: 139 MMOL/L (ref 136–145)
TOTAL PROTEIN: 7.2 G/DL (ref 6.4–8.2)
TRIGL SERPL-MCNC: 106 MG/DL (ref 0–150)
VLDLC SERPL CALC-MCNC: 21 MG/DL
WBC # BLD: 5.6 K/UL (ref 4–11)

## 2018-08-01 PROCEDURE — G0439 PPPS, SUBSEQ VISIT: HCPCS | Performed by: INTERNAL MEDICINE

## 2018-08-01 PROCEDURE — 4040F PNEUMOC VAC/ADMIN/RCVD: CPT | Performed by: INTERNAL MEDICINE

## 2018-08-01 ASSESSMENT — PATIENT HEALTH QUESTIONNAIRE - PHQ9: SUM OF ALL RESPONSES TO PHQ QUESTIONS 1-9: 1

## 2018-08-01 ASSESSMENT — ANXIETY QUESTIONNAIRES: GAD7 TOTAL SCORE: 3

## 2018-08-03 ENCOUNTER — ANTI-COAG VISIT (OUTPATIENT)
Dept: INTERNAL MEDICINE | Age: 78
End: 2018-08-03

## 2018-08-31 DIAGNOSIS — Z79.01 LONG TERM CURRENT USE OF ANTICOAGULANT THERAPY: Primary | ICD-10-CM

## 2018-08-31 LAB
INR BLD: 1.54 (ref 0.86–1.14)
PROTHROMBIN TIME: 17.6 SEC (ref 9.8–13)

## 2018-09-04 ENCOUNTER — ANTI-COAG VISIT (OUTPATIENT)
Dept: INTERNAL MEDICINE | Age: 78
End: 2018-09-04

## 2018-09-18 ENCOUNTER — NURSE ONLY (OUTPATIENT)
Dept: INTERNAL MEDICINE | Age: 78
End: 2018-09-18

## 2018-09-18 DIAGNOSIS — Z23 NEED FOR INFLUENZA VACCINATION: Primary | ICD-10-CM

## 2018-09-18 PROCEDURE — G0008 ADMIN INFLUENZA VIRUS VAC: HCPCS | Performed by: INTERNAL MEDICINE

## 2018-09-18 PROCEDURE — 90662 IIV NO PRSV INCREASED AG IM: CPT | Performed by: INTERNAL MEDICINE

## 2018-09-18 NOTE — PROGRESS NOTES
Vaccine Information Sheet, \"Influenza - Inactivated\"  given to Stephy Gerard, or parent/legal guardian of  Stephy Gerard and verbalized understanding. Patient responses:    Have you ever had a reaction to a flu vaccine? No  Are you able to eat eggs without adverse effects? Yes  Do you have any current illness? No  Have you ever had [place] Syndrome? No    Flu vaccine given per order. Please see immunization tab.

## 2018-09-26 ENCOUNTER — ANTI-COAG VISIT (OUTPATIENT)
Dept: INTERNAL MEDICINE CLINIC | Age: 78
End: 2018-09-26

## 2018-10-18 RX ORDER — SPIRONOLACTONE AND HYDROCHLOROTHIAZIDE 25; 25 MG/1; MG/1
TABLET ORAL
Qty: 60 TABLET | Refills: 1 | Status: SHIPPED | OUTPATIENT
Start: 2018-10-18 | End: 2018-12-28 | Stop reason: SDUPTHER

## 2018-10-26 ENCOUNTER — TELEPHONE (OUTPATIENT)
Dept: INTERNAL MEDICINE CLINIC | Age: 78
End: 2018-10-26

## 2018-10-26 DIAGNOSIS — Z79.01 LONG TERM CURRENT USE OF ANTICOAGULANT THERAPY: ICD-10-CM

## 2018-10-26 LAB
INR BLD: 5.29 (ref 0.86–1.14)
PROTHROMBIN TIME: 60.3 SEC (ref 9.8–13)

## 2018-10-29 NOTE — TELEPHONE ENCOUNTER
Spoke with patient. She is calling to follow up with Dr. Hall as instructed by Dr. Amie Ayala.  Patient awaiting call from Dr. Hall or MA

## 2018-10-30 ENCOUNTER — ANTI-COAG VISIT (OUTPATIENT)
Dept: INTERNAL MEDICINE CLINIC | Age: 78
End: 2018-10-30

## 2018-10-30 DIAGNOSIS — Z79.01 LONG TERM CURRENT USE OF ANTICOAGULANT THERAPY: ICD-10-CM

## 2018-10-30 LAB
INR BLD: 1.64 (ref 0.86–1.14)
PROTHROMBIN TIME: 18.7 SEC (ref 9.8–13)

## 2018-11-06 DIAGNOSIS — Z79.01 LONG TERM CURRENT USE OF ANTICOAGULANT THERAPY: ICD-10-CM

## 2018-11-06 LAB
INR BLD: 1.59 (ref 0.86–1.14)
PROTHROMBIN TIME: 18.1 SEC (ref 9.8–13)

## 2018-11-07 ENCOUNTER — ANTI-COAG VISIT (OUTPATIENT)
Dept: INTERNAL MEDICINE CLINIC | Age: 78
End: 2018-11-07

## 2018-11-27 DIAGNOSIS — Z79.01 LONG TERM CURRENT USE OF ANTICOAGULANT THERAPY: ICD-10-CM

## 2018-11-27 LAB
INR BLD: 2.27 (ref 0.86–1.14)
PROTHROMBIN TIME: 25.9 SEC (ref 9.8–13)

## 2018-11-30 ENCOUNTER — TELEPHONE (OUTPATIENT)
Dept: INTERNAL MEDICINE CLINIC | Age: 78
End: 2018-11-30

## 2018-11-30 ENCOUNTER — ANTI-COAG VISIT (OUTPATIENT)
Dept: INTERNAL MEDICINE CLINIC | Age: 78
End: 2018-11-30

## 2018-12-03 ENCOUNTER — TELEPHONE (OUTPATIENT)
Dept: INTERNAL MEDICINE CLINIC | Age: 78
End: 2018-12-03

## 2018-12-28 RX ORDER — SPIRONOLACTONE AND HYDROCHLOROTHIAZIDE 25; 25 MG/1; MG/1
TABLET ORAL
Qty: 60 TABLET | Refills: 0 | Status: SHIPPED | OUTPATIENT
Start: 2018-12-28 | End: 2019-01-26 | Stop reason: SDUPTHER

## 2018-12-28 RX ORDER — DIGOXIN 250 MCG
TABLET ORAL
Qty: 31 TABLET | Refills: 0 | Status: SHIPPED | OUTPATIENT
Start: 2018-12-28 | End: 2019-02-25 | Stop reason: SDUPTHER

## 2019-01-17 ENCOUNTER — TELEPHONE (OUTPATIENT)
Dept: INTERNAL MEDICINE CLINIC | Age: 79
End: 2019-01-17

## 2019-01-17 DIAGNOSIS — I05.9 MITRAL VALVE DISORDER: ICD-10-CM

## 2019-01-17 DIAGNOSIS — I83.90 ASYMPTOMATIC VARICOSE VEINS: ICD-10-CM

## 2019-01-17 DIAGNOSIS — F34.1 DYSTHYMIC DISORDER: ICD-10-CM

## 2019-01-17 DIAGNOSIS — I48.21 PERMANENT ATRIAL FIBRILLATION (HCC): Primary | ICD-10-CM

## 2019-01-17 DIAGNOSIS — E78.5 HYPERLIPIDEMIA, UNSPECIFIED HYPERLIPIDEMIA TYPE: ICD-10-CM

## 2019-01-23 DIAGNOSIS — Z79.01 LONG TERM CURRENT USE OF ANTICOAGULANT THERAPY: ICD-10-CM

## 2019-01-23 DIAGNOSIS — I83.90 ASYMPTOMATIC VARICOSE VEINS: ICD-10-CM

## 2019-01-23 DIAGNOSIS — I05.9 MITRAL VALVE DISORDER: ICD-10-CM

## 2019-01-23 DIAGNOSIS — F34.1 DYSTHYMIC DISORDER: ICD-10-CM

## 2019-01-23 DIAGNOSIS — E78.5 HYPERLIPIDEMIA, UNSPECIFIED HYPERLIPIDEMIA TYPE: ICD-10-CM

## 2019-01-23 DIAGNOSIS — I48.21 PERMANENT ATRIAL FIBRILLATION (HCC): ICD-10-CM

## 2019-01-23 LAB
A/G RATIO: 1.9 (ref 1.1–2.2)
ALBUMIN SERPL-MCNC: 4.6 G/DL (ref 3.4–5)
ALP BLD-CCNC: 76 U/L (ref 40–129)
ALT SERPL-CCNC: 13 U/L (ref 10–40)
ANION GAP SERPL CALCULATED.3IONS-SCNC: 16 MMOL/L (ref 3–16)
AST SERPL-CCNC: 27 U/L (ref 15–37)
BASOPHILS ABSOLUTE: 0 K/UL (ref 0–0.2)
BASOPHILS RELATIVE PERCENT: 0.9 %
BILIRUB SERPL-MCNC: 0.9 MG/DL (ref 0–1)
BUN BLDV-MCNC: 13 MG/DL (ref 7–20)
CALCIUM SERPL-MCNC: 9.9 MG/DL (ref 8.3–10.6)
CHLORIDE BLD-SCNC: 97 MMOL/L (ref 99–110)
CHOLESTEROL, TOTAL: 267 MG/DL (ref 0–199)
CO2: 27 MMOL/L (ref 21–32)
CREAT SERPL-MCNC: 0.7 MG/DL (ref 0.6–1.2)
EOSINOPHILS ABSOLUTE: 0.1 K/UL (ref 0–0.6)
EOSINOPHILS RELATIVE PERCENT: 1.2 %
GFR AFRICAN AMERICAN: >60
GFR NON-AFRICAN AMERICAN: >60
GLOBULIN: 2.4 G/DL
GLUCOSE BLD-MCNC: 91 MG/DL (ref 70–99)
HCT VFR BLD CALC: 42.7 % (ref 36–48)
HDLC SERPL-MCNC: 102 MG/DL (ref 40–60)
HEMOGLOBIN: 14.6 G/DL (ref 12–16)
INR BLD: 2.07 (ref 0.86–1.14)
LDL CHOLESTEROL CALCULATED: 149 MG/DL
LYMPHOCYTES ABSOLUTE: 0.9 K/UL (ref 1–5.1)
LYMPHOCYTES RELATIVE PERCENT: 19.8 %
MCH RBC QN AUTO: 32.4 PG (ref 26–34)
MCHC RBC AUTO-ENTMCNC: 34.1 G/DL (ref 31–36)
MCV RBC AUTO: 95 FL (ref 80–100)
MONOCYTES ABSOLUTE: 0.5 K/UL (ref 0–1.3)
MONOCYTES RELATIVE PERCENT: 10.1 %
NEUTROPHILS ABSOLUTE: 3.1 K/UL (ref 1.7–7.7)
NEUTROPHILS RELATIVE PERCENT: 68 %
PDW BLD-RTO: 14.7 % (ref 12.4–15.4)
PLATELET # BLD: 155 K/UL (ref 135–450)
PMV BLD AUTO: 9.8 FL (ref 5–10.5)
POTASSIUM SERPL-SCNC: 4.1 MMOL/L (ref 3.5–5.1)
PROTHROMBIN TIME: 23.6 SEC (ref 9.8–13)
RBC # BLD: 4.5 M/UL (ref 4–5.2)
SODIUM BLD-SCNC: 140 MMOL/L (ref 136–145)
TOTAL PROTEIN: 7 G/DL (ref 6.4–8.2)
TRIGL SERPL-MCNC: 82 MG/DL (ref 0–150)
TSH SERPL DL<=0.05 MIU/L-ACNC: 1.96 UIU/ML (ref 0.27–4.2)
VLDLC SERPL CALC-MCNC: 16 MG/DL
WBC # BLD: 4.5 K/UL (ref 4–11)

## 2019-01-28 ENCOUNTER — ANTI-COAG VISIT (OUTPATIENT)
Dept: INTERNAL MEDICINE CLINIC | Age: 79
End: 2019-01-28

## 2019-01-29 ENCOUNTER — OFFICE VISIT (OUTPATIENT)
Dept: INTERNAL MEDICINE CLINIC | Age: 79
End: 2019-01-29
Payer: MEDICARE

## 2019-01-29 VITALS
DIASTOLIC BLOOD PRESSURE: 76 MMHG | SYSTOLIC BLOOD PRESSURE: 132 MMHG | WEIGHT: 147 LBS | BODY MASS INDEX: 23.63 KG/M2 | HEIGHT: 66 IN

## 2019-01-29 DIAGNOSIS — I48.21 PERMANENT ATRIAL FIBRILLATION (HCC): Primary | ICD-10-CM

## 2019-01-29 DIAGNOSIS — I83.90 ASYMPTOMATIC VARICOSE VEINS: ICD-10-CM

## 2019-01-29 DIAGNOSIS — I05.9 MITRAL VALVE DISORDER: ICD-10-CM

## 2019-01-29 DIAGNOSIS — D12.6 ADENOMATOUS POLYP OF COLON, UNSPECIFIED PART OF COLON: ICD-10-CM

## 2019-01-29 PROCEDURE — 1123F ACP DISCUSS/DSCN MKR DOCD: CPT | Performed by: INTERNAL MEDICINE

## 2019-01-29 PROCEDURE — 4040F PNEUMOC VAC/ADMIN/RCVD: CPT | Performed by: INTERNAL MEDICINE

## 2019-01-29 PROCEDURE — G8400 PT W/DXA NO RESULTS DOC: HCPCS | Performed by: INTERNAL MEDICINE

## 2019-01-29 PROCEDURE — 1101F PT FALLS ASSESS-DOCD LE1/YR: CPT | Performed by: INTERNAL MEDICINE

## 2019-01-29 PROCEDURE — G8510 SCR DEP NEG, NO PLAN REQD: HCPCS | Performed by: INTERNAL MEDICINE

## 2019-01-29 PROCEDURE — 99214 OFFICE O/P EST MOD 30 MIN: CPT | Performed by: INTERNAL MEDICINE

## 2019-01-29 PROCEDURE — 93000 ELECTROCARDIOGRAM COMPLETE: CPT | Performed by: INTERNAL MEDICINE

## 2019-01-29 PROCEDURE — 1036F TOBACCO NON-USER: CPT | Performed by: INTERNAL MEDICINE

## 2019-01-29 PROCEDURE — 1090F PRES/ABSN URINE INCON ASSESS: CPT | Performed by: INTERNAL MEDICINE

## 2019-01-29 PROCEDURE — G8420 CALC BMI NORM PARAMETERS: HCPCS | Performed by: INTERNAL MEDICINE

## 2019-01-29 PROCEDURE — G8427 DOCREV CUR MEDS BY ELIG CLIN: HCPCS | Performed by: INTERNAL MEDICINE

## 2019-01-29 PROCEDURE — G8482 FLU IMMUNIZE ORDER/ADMIN: HCPCS | Performed by: INTERNAL MEDICINE

## 2019-01-29 RX ORDER — SPIRONOLACTONE AND HYDROCHLOROTHIAZIDE 25; 25 MG/1; MG/1
TABLET ORAL
Qty: 60 TABLET | Refills: 3 | Status: SHIPPED | OUTPATIENT
Start: 2019-01-29 | End: 2019-05-30 | Stop reason: SDUPTHER

## 2019-01-29 ASSESSMENT — PATIENT HEALTH QUESTIONNAIRE - PHQ9
SUM OF ALL RESPONSES TO PHQ QUESTIONS 1-9: 0
SUM OF ALL RESPONSES TO PHQ QUESTIONS 1-9: 0
SUM OF ALL RESPONSES TO PHQ9 QUESTIONS 1 & 2: 0
1. LITTLE INTEREST OR PLEASURE IN DOING THINGS: 0
2. FEELING DOWN, DEPRESSED OR HOPELESS: 0

## 2019-02-25 RX ORDER — DIGOXIN 250 MCG
TABLET ORAL
Qty: 30 TABLET | Refills: 1 | Status: SHIPPED | OUTPATIENT
Start: 2019-02-25 | End: 2019-06-12 | Stop reason: SDUPTHER

## 2019-02-25 RX ORDER — WARFARIN SODIUM 2.5 MG/1
TABLET ORAL
Qty: 60 TABLET | Refills: 3 | Status: SHIPPED | OUTPATIENT
Start: 2019-02-25 | End: 2019-07-22 | Stop reason: SDUPTHER

## 2019-02-26 DIAGNOSIS — Z79.01 LONG TERM CURRENT USE OF ANTICOAGULANT THERAPY: ICD-10-CM

## 2019-02-26 LAB
INR BLD: 2.81 (ref 0.86–1.14)
PROTHROMBIN TIME: 32 SEC (ref 9.8–13)

## 2019-03-01 ENCOUNTER — ANTI-COAG VISIT (OUTPATIENT)
Dept: INTERNAL MEDICINE CLINIC | Age: 79
End: 2019-03-01

## 2019-05-30 RX ORDER — SPIRONOLACTONE AND HYDROCHLOROTHIAZIDE 25; 25 MG/1; MG/1
TABLET ORAL
Qty: 60 TABLET | Refills: 3 | Status: SHIPPED | OUTPATIENT
Start: 2019-05-30 | End: 2019-09-30 | Stop reason: SDUPTHER

## 2019-06-12 RX ORDER — DIGOXIN 250 MCG
TABLET ORAL
Qty: 30 TABLET | Refills: 2 | Status: SHIPPED | OUTPATIENT
Start: 2019-06-12 | End: 2019-11-27 | Stop reason: SDUPTHER

## 2019-06-14 ENCOUNTER — TELEPHONE (OUTPATIENT)
Dept: INTERNAL MEDICINE CLINIC | Age: 79
End: 2019-06-14

## 2019-06-14 NOTE — TELEPHONE ENCOUNTER
Patient called to give the name of the vein specialists :      Sherry veination     Dr. Connie Murcia

## 2019-06-17 ENCOUNTER — ANTI-COAG VISIT (OUTPATIENT)
Dept: INTERNAL MEDICINE CLINIC | Age: 79
End: 2019-06-17

## 2019-06-17 ENCOUNTER — OFFICE VISIT (OUTPATIENT)
Dept: INTERNAL MEDICINE CLINIC | Age: 79
End: 2019-06-17
Payer: MEDICARE

## 2019-06-17 ENCOUNTER — TELEPHONE (OUTPATIENT)
Dept: INTERNAL MEDICINE CLINIC | Age: 79
End: 2019-06-17

## 2019-06-17 VITALS
HEART RATE: 120 BPM | WEIGHT: 156 LBS | BODY MASS INDEX: 25.18 KG/M2 | SYSTOLIC BLOOD PRESSURE: 138 MMHG | DIASTOLIC BLOOD PRESSURE: 62 MMHG | OXYGEN SATURATION: 96 %

## 2019-06-17 DIAGNOSIS — H11.32 SUBCONJUNCTIVAL HEMORRHAGE OF LEFT EYE: Primary | ICD-10-CM

## 2019-06-17 DIAGNOSIS — Z91.81 AT HIGH RISK FOR FALLS: ICD-10-CM

## 2019-06-17 PROCEDURE — 1036F TOBACCO NON-USER: CPT | Performed by: INTERNAL MEDICINE

## 2019-06-17 PROCEDURE — 99213 OFFICE O/P EST LOW 20 MIN: CPT | Performed by: INTERNAL MEDICINE

## 2019-06-17 PROCEDURE — 1123F ACP DISCUSS/DSCN MKR DOCD: CPT | Performed by: INTERNAL MEDICINE

## 2019-06-17 PROCEDURE — G8427 DOCREV CUR MEDS BY ELIG CLIN: HCPCS | Performed by: INTERNAL MEDICINE

## 2019-06-17 PROCEDURE — 1090F PRES/ABSN URINE INCON ASSESS: CPT | Performed by: INTERNAL MEDICINE

## 2019-06-17 PROCEDURE — G8400 PT W/DXA NO RESULTS DOC: HCPCS | Performed by: INTERNAL MEDICINE

## 2019-06-17 PROCEDURE — 99173 VISUAL ACUITY SCREEN: CPT | Performed by: INTERNAL MEDICINE

## 2019-06-17 PROCEDURE — 4040F PNEUMOC VAC/ADMIN/RCVD: CPT | Performed by: INTERNAL MEDICINE

## 2019-06-17 PROCEDURE — G8419 CALC BMI OUT NRM PARAM NOF/U: HCPCS | Performed by: INTERNAL MEDICINE

## 2019-06-17 ASSESSMENT — ENCOUNTER SYMPTOMS
EYE REDNESS: 1
EYE DISCHARGE: 0
EYE PAIN: 0

## 2019-06-17 NOTE — TELEPHONE ENCOUNTER
Pt called in stated her right eye is full of blood. She has questions on her medications as well. Requesting a appointment.

## 2019-06-17 NOTE — PROGRESS NOTES
Subjective:      Patient ID: Nisa Carty is a 70 y.o. female with PMHx significant for AFib on long-term AC here for redness of her eye. Redness of Eye:   - patient has felt pressure behind the eyeball the past 2-3 weeks  - two days ago, awoke with redness along medial aspect of her eye  - the day before redness appeared, had electrical stimulation on her face including her upper eyelid  - denies any vision changes, trauma to the eye or foreign body, no coughing or vomiting  - denies any prior episodes  - no recent change in diet      Review of Systems   Eyes: Positive for redness. Negative for pain, discharge and visual disturbance. Objective:   Physical Exam   Eyes: Pupils are equal, round, and reactive to light. EOM are normal. Right eye exhibits no chemosis, no discharge and no hordeolum. No foreign body present in the right eye. Left eye exhibits no chemosis and no discharge. Right conjunctiva is not injected. Right conjunctiva has a hemorrhage. Left conjunctiva is not injected. Left conjunctiva has no hemorrhage. Vision Exam: 20/20 with glasses    Assessment & Plan:        Subconjunctival Hemorrhage  - Most likely etiology is trauma from electrical stimulation vs excessive anticoagulation  - Not concerned 2/2 Malignant Hypertension as SBP is 138 today  - little concern for conjunctival abrasion or foreign body    Plan:  - PT/INR  - Patient to return should this    Gwen Unger MD  On the basis of positive falls risk screening, assessment and plan is as follows: home safety tips provided.

## 2019-06-18 ENCOUNTER — ANTI-COAG VISIT (OUTPATIENT)
Dept: INTERNAL MEDICINE CLINIC | Age: 79
End: 2019-06-18

## 2019-06-18 DIAGNOSIS — H11.32 SUBCONJUNCTIVAL HEMORRHAGE OF LEFT EYE: ICD-10-CM

## 2019-06-18 DIAGNOSIS — Z91.81 AT HIGH RISK FOR FALLS: ICD-10-CM

## 2019-06-18 LAB
INR BLD: 2.01 (ref 0.86–1.14)
PROTHROMBIN TIME: 22.9 SEC (ref 9.8–13)

## 2019-07-01 DIAGNOSIS — Z79.01 LONG TERM CURRENT USE OF ANTICOAGULANT THERAPY: ICD-10-CM

## 2019-07-01 LAB
INR BLD: 3.4 (ref 0.86–1.14)
PROTHROMBIN TIME: 38.8 SEC (ref 9.8–13)

## 2019-07-05 ENCOUNTER — ANTI-COAG VISIT (OUTPATIENT)
Dept: INTERNAL MEDICINE CLINIC | Age: 79
End: 2019-07-05

## 2019-07-22 RX ORDER — WARFARIN SODIUM 2.5 MG/1
TABLET ORAL
Qty: 60 TABLET | Refills: 1 | Status: SHIPPED | OUTPATIENT
Start: 2019-07-22 | End: 2019-09-30 | Stop reason: SDUPTHER

## 2019-07-26 DIAGNOSIS — Z79.01 LONG TERM CURRENT USE OF ANTICOAGULANT THERAPY: ICD-10-CM

## 2019-07-26 LAB
INR BLD: 2.24 (ref 0.86–1.14)
PROTHROMBIN TIME: 25.5 SEC (ref 9.8–13)

## 2019-07-29 ENCOUNTER — ANTI-COAG VISIT (OUTPATIENT)
Dept: INTERNAL MEDICINE CLINIC | Age: 79
End: 2019-07-29

## 2019-07-29 ENCOUNTER — OFFICE VISIT (OUTPATIENT)
Dept: INTERNAL MEDICINE CLINIC | Age: 79
End: 2019-07-29
Payer: MEDICARE

## 2019-07-29 VITALS
DIASTOLIC BLOOD PRESSURE: 68 MMHG | WEIGHT: 156 LBS | HEIGHT: 66 IN | BODY MASS INDEX: 25.07 KG/M2 | SYSTOLIC BLOOD PRESSURE: 128 MMHG

## 2019-07-29 DIAGNOSIS — F34.1 DYSTHYMIC DISORDER: ICD-10-CM

## 2019-07-29 DIAGNOSIS — Z00.00 PE (PHYSICAL EXAM), ANNUAL: Primary | ICD-10-CM

## 2019-07-29 DIAGNOSIS — L72.3 SEBACEOUS CYST: ICD-10-CM

## 2019-07-29 DIAGNOSIS — Z78.0 POST-MENOPAUSAL: ICD-10-CM

## 2019-07-29 DIAGNOSIS — M25.511 ACUTE PAIN OF RIGHT SHOULDER: ICD-10-CM

## 2019-07-29 DIAGNOSIS — E55.9 VITAMIN D DEFICIENCY: ICD-10-CM

## 2019-07-29 DIAGNOSIS — D12.6 ADENOMATOUS POLYP OF COLON, UNSPECIFIED PART OF COLON: ICD-10-CM

## 2019-07-29 DIAGNOSIS — I48.21 PERMANENT ATRIAL FIBRILLATION (HCC): ICD-10-CM

## 2019-07-29 DIAGNOSIS — I05.9 MITRAL VALVE DISORDER: ICD-10-CM

## 2019-07-29 PROCEDURE — 93000 ELECTROCARDIOGRAM COMPLETE: CPT | Performed by: INTERNAL MEDICINE

## 2019-07-29 PROCEDURE — G0439 PPPS, SUBSEQ VISIT: HCPCS | Performed by: INTERNAL MEDICINE

## 2019-07-29 PROCEDURE — 4040F PNEUMOC VAC/ADMIN/RCVD: CPT | Performed by: INTERNAL MEDICINE

## 2019-07-29 PROCEDURE — 1123F ACP DISCUSS/DSCN MKR DOCD: CPT | Performed by: INTERNAL MEDICINE

## 2019-07-29 ASSESSMENT — LIFESTYLE VARIABLES
HOW OFTEN DO YOU HAVE SIX OR MORE DRINKS ON ONE OCCASION: 0
HOW OFTEN DURING THE LAST YEAR HAVE YOU NEEDED AN ALCOHOLIC DRINK FIRST THING IN THE MORNING TO GET YOURSELF GOING AFTER A NIGHT OF HEAVY DRINKING: 0
HOW OFTEN DURING THE LAST YEAR HAVE YOU BEEN UNABLE TO REMEMBER WHAT HAPPENED THE NIGHT BEFORE BECAUSE YOU HAD BEEN DRINKING: 0
HOW OFTEN DURING THE LAST YEAR HAVE YOU HAD A FEELING OF GUILT OR REMORSE AFTER DRINKING: 0
HAVE YOU OR SOMEONE ELSE BEEN INJURED AS A RESULT OF YOUR DRINKING: 0
HOW MANY STANDARD DRINKS CONTAINING ALCOHOL DO YOU HAVE ON A TYPICAL DAY: 0
HOW OFTEN DO YOU HAVE SIX OR MORE DRINKS ON ONE OCCASION: 0
AUDIT-C TOTAL SCORE: 1
AUDIT TOTAL SCORE: 1
HAS A RELATIVE, FRIEND, DOCTOR, OR ANOTHER HEALTH PROFESSIONAL EXPRESSED CONCERN ABOUT YOUR DRINKING OR SUGGESTED YOU CUT DOWN: 0
HOW OFTEN DURING THE LAST YEAR HAVE YOU BEEN UNABLE TO REMEMBER WHAT HAPPENED THE NIGHT BEFORE BECAUSE YOU HAD BEEN DRINKING: 0
HAS A RELATIVE, FRIEND, DOCTOR, OR ANOTHER HEALTH PROFESSIONAL EXPRESSED CONCERN ABOUT YOUR DRINKING OR SUGGESTED YOU CUT DOWN: 0
HOW OFTEN DURING THE LAST YEAR HAVE YOU FOUND THAT YOU WERE NOT ABLE TO STOP DRINKING ONCE YOU HAD STARTED: 0
HAVE YOU OR SOMEONE ELSE BEEN INJURED AS A RESULT OF YOUR DRINKING: 0
HOW OFTEN DURING THE LAST YEAR HAVE YOU FOUND THAT YOU WERE NOT ABLE TO STOP DRINKING ONCE YOU HAD STARTED: 0
HOW OFTEN DURING THE LAST YEAR HAVE YOU HAD A FEELING OF GUILT OR REMORSE AFTER DRINKING: 0
HOW OFTEN DURING THE LAST YEAR HAVE YOU FAILED TO DO WHAT WAS NORMALLY EXPECTED FROM YOU BECAUSE OF DRINKING: 0
HOW OFTEN DO YOU HAVE A DRINK CONTAINING ALCOHOL: 1
AUDIT TOTAL SCORE: 1
HOW OFTEN DO YOU HAVE A DRINK CONTAINING ALCOHOL: 1
HOW OFTEN DURING THE LAST YEAR HAVE YOU NEEDED AN ALCOHOLIC DRINK FIRST THING IN THE MORNING TO GET YOURSELF GOING AFTER A NIGHT OF HEAVY DRINKING: 0
AUDIT-C TOTAL SCORE: 1
HOW OFTEN DURING THE LAST YEAR HAVE YOU FAILED TO DO WHAT WAS NORMALLY EXPECTED FROM YOU BECAUSE OF DRINKING: 0
HOW MANY STANDARD DRINKS CONTAINING ALCOHOL DO YOU HAVE ON A TYPICAL DAY: 0

## 2019-07-29 ASSESSMENT — PATIENT HEALTH QUESTIONNAIRE - PHQ9
SUM OF ALL RESPONSES TO PHQ QUESTIONS 1-9: 1
SUM OF ALL RESPONSES TO PHQ QUESTIONS 1-9: 0
SUM OF ALL RESPONSES TO PHQ QUESTIONS 1-9: 0
SUM OF ALL RESPONSES TO PHQ QUESTIONS 1-9: 1

## 2019-09-30 RX ORDER — WARFARIN SODIUM 2.5 MG/1
TABLET ORAL
Qty: 60 TABLET | Refills: 3 | Status: ON HOLD | OUTPATIENT
Start: 2019-09-30 | End: 2020-03-17 | Stop reason: SDUPTHER

## 2019-09-30 RX ORDER — SPIRONOLACTONE AND HYDROCHLOROTHIAZIDE 25; 25 MG/1; MG/1
TABLET ORAL
Qty: 60 TABLET | Refills: 0 | Status: SHIPPED | OUTPATIENT
Start: 2019-09-30 | End: 2019-11-04 | Stop reason: SDUPTHER

## 2019-11-07 ENCOUNTER — NURSE ONLY (OUTPATIENT)
Dept: INTERNAL MEDICINE CLINIC | Age: 79
End: 2019-11-07
Payer: MEDICARE

## 2019-11-07 DIAGNOSIS — Z23 NEED FOR INFLUENZA VACCINATION: Primary | ICD-10-CM

## 2019-11-07 PROCEDURE — G0008 ADMIN INFLUENZA VIRUS VAC: HCPCS | Performed by: INTERNAL MEDICINE

## 2019-11-07 PROCEDURE — 90653 IIV ADJUVANT VACCINE IM: CPT | Performed by: INTERNAL MEDICINE

## 2019-11-26 ENCOUNTER — TELEPHONE (OUTPATIENT)
Dept: INTERNAL MEDICINE CLINIC | Age: 79
End: 2019-11-26

## 2019-11-26 RX ORDER — SPIRONOLACTONE AND HYDROCHLOROTHIAZIDE 25; 25 MG/1; MG/1
TABLET ORAL
Qty: 60 TABLET | Refills: 0 | Status: SHIPPED | OUTPATIENT
Start: 2019-11-26 | End: 2020-01-14 | Stop reason: CLARIF

## 2019-11-27 RX ORDER — DIGOXIN 125 MCG
TABLET ORAL
Qty: 90 TABLET | Refills: 1 | Status: SHIPPED | OUTPATIENT
Start: 2019-11-27 | End: 2020-02-10

## 2019-12-16 ENCOUNTER — TELEPHONE (OUTPATIENT)
Dept: INTERNAL MEDICINE CLINIC | Age: 79
End: 2019-12-16

## 2019-12-17 ENCOUNTER — TELEPHONE (OUTPATIENT)
Dept: PHARMACY | Age: 79
End: 2019-12-17

## 2019-12-17 ENCOUNTER — OFFICE VISIT (OUTPATIENT)
Dept: INTERNAL MEDICINE CLINIC | Age: 79
End: 2019-12-17
Payer: MEDICARE

## 2019-12-17 VITALS
BODY MASS INDEX: 24.91 KG/M2 | HEIGHT: 66 IN | SYSTOLIC BLOOD PRESSURE: 126 MMHG | WEIGHT: 155 LBS | DIASTOLIC BLOOD PRESSURE: 68 MMHG
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
## 2019-12-17 DIAGNOSIS — I48.11 LONGSTANDING PERSISTENT ATRIAL FIBRILLATION (HCC): Primary | ICD-10-CM

## 2019-12-17 DIAGNOSIS — M25.552 LEFT HIP PAIN: ICD-10-CM

## 2019-12-17 DIAGNOSIS — Z79.01 LONG TERM CURRENT USE OF ANTICOAGULANT THERAPY: ICD-10-CM

## 2019-12-17 DIAGNOSIS — K59.09 OTHER CONSTIPATION: ICD-10-CM

## 2019-12-17 PROCEDURE — 99213 OFFICE O/P EST LOW 20 MIN: CPT | Performed by: INTERNAL MEDICINE

## 2019-12-17 PROCEDURE — G8400 PT W/DXA NO RESULTS DOC: HCPCS | Performed by: INTERNAL MEDICINE

## 2019-12-17 PROCEDURE — 4040F PNEUMOC VAC/ADMIN/RCVD: CPT | Performed by: INTERNAL MEDICINE

## 2019-12-17 PROCEDURE — G8417 CALC BMI ABV UP PARAM F/U: HCPCS | Performed by: INTERNAL MEDICINE

## 2019-12-17 PROCEDURE — 1036F TOBACCO NON-USER: CPT | Performed by: INTERNAL MEDICINE

## 2019-12-17 PROCEDURE — 1090F PRES/ABSN URINE INCON ASSESS: CPT | Performed by: INTERNAL MEDICINE

## 2019-12-17 PROCEDURE — G8482 FLU IMMUNIZE ORDER/ADMIN: HCPCS | Performed by: INTERNAL MEDICINE

## 2019-12-17 PROCEDURE — G8427 DOCREV CUR MEDS BY ELIG CLIN: HCPCS | Performed by: INTERNAL MEDICINE

## 2019-12-17 PROCEDURE — 1123F ACP DISCUSS/DSCN MKR DOCD: CPT | Performed by: INTERNAL MEDICINE

## 2019-12-17 RX ORDER — CELECOXIB 200 MG/1
200 CAPSULE ORAL DAILY PRN
Qty: 30 CAPSULE | Refills: 0 | Status: ON HOLD
Start: 2019-12-17 | End: 2020-05-07 | Stop reason: HOSPADM

## 2019-12-17 SDOH — ECONOMIC STABILITY: TRANSPORTATION INSECURITY
IN THE PAST 12 MONTHS, HAS THE LACK OF TRANSPORTATION KEPT YOU FROM MEDICAL APPOINTMENTS OR FROM GETTING MEDICATIONS?: NO

## 2019-12-17 SDOH — ECONOMIC STABILITY: INCOME INSECURITY: HOW HARD IS IT FOR YOU TO PAY FOR THE VERY BASICS LIKE FOOD, HOUSING, MEDICAL CARE, AND HEATING?: NOT HARD AT ALL

## 2019-12-17 SDOH — ECONOMIC STABILITY: FOOD INSECURITY: WITHIN THE PAST 12 MONTHS, YOU WORRIED THAT YOUR FOOD WOULD RUN OUT BEFORE YOU GOT MONEY TO BUY MORE.: NEVER TRUE

## 2019-12-17 SDOH — ECONOMIC STABILITY: FOOD INSECURITY: WITHIN THE PAST 12 MONTHS, THE FOOD YOU BOUGHT JUST DIDN'T LAST AND YOU DIDN'T HAVE MONEY TO GET MORE.: NEVER TRUE

## 2019-12-17 SDOH — ECONOMIC STABILITY: TRANSPORTATION INSECURITY
IN THE PAST 12 MONTHS, HAS LACK OF TRANSPORTATION KEPT YOU FROM MEETINGS, WORK, OR FROM GETTING THINGS NEEDED FOR DAILY LIVING?: NO

## 2019-12-17 NOTE — TELEPHONE ENCOUNTER
Received referral from Dr. Mario Vasquez to manage warfarin therapy. Attempted to call patient in order to schedule an appointment. Left a voicemail with instructions for patient to contact the clinic in order to schedule an appointment.     Pernell Hurley, PharmD  [facility]   [facility] Ph: [phone]  [place] Ph: [phone]  12/17/2019 4:05 PM

## 2019-12-19 NOTE — TELEPHONE ENCOUNTER
LVM #2 to enroll patient in clinic. Valerie Roberts.  Dari Peterson, PharmD, BCPS  [facility]  Ph: [phone]  12/19/2019 3:56 PM

## 2019-12-21 ENCOUNTER — HOSPITAL ENCOUNTER (EMERGENCY)
Age: 79
Discharge: HOME | End: 2019-12-21
Attending: EMERGENCY MEDICINE
Payer: MEDICARE

## 2019-12-21 ENCOUNTER — APPOINTMENT (OUTPATIENT)
Dept: CT IMAGING | Age: 79
End: 2019-12-21
Payer: MEDICARE

## 2019-12-21 VITALS
TEMPERATURE: 97.8 F | DIASTOLIC BLOOD PRESSURE: 68 MMHG | RESPIRATION RATE: 16 BRPM | WEIGHT: 155 LBS | BODY MASS INDEX: 24.91 KG/M2 | OXYGEN SATURATION: 100 % | SYSTOLIC BLOOD PRESSURE: 134 MMHG | HEART RATE: 66 BPM | HEIGHT: 66 IN

## 2019-12-21 DIAGNOSIS — R79.1 SUPRATHERAPEUTIC INR: ICD-10-CM

## 2019-12-21 DIAGNOSIS — S09.90XA INJURY OF HEAD, INITIAL ENCOUNTER: Primary | ICD-10-CM

## 2019-12-21 LAB
INR BLD: 3.09 (ref 0.86–1.14)
PROTHROMBIN TIME: 36.3 SEC (ref 10–13.2)

## 2019-12-21 PROCEDURE — 70450 CT HEAD/BRAIN W/O DYE: CPT

## 2019-12-21 PROCEDURE — 99283 EMERGENCY DEPT VISIT LOW MDM: CPT

## 2019-12-21 PROCEDURE — 85610 PROTHROMBIN TIME: CPT

## 2019-12-24 NOTE — TELEPHONE ENCOUNTER
Spoke to patient. She is overwhelmed with referrals, appts, etc, and cannot schedule a visit at this time due to the holidays. Provided pt to with phone number to the clinic and asked her to contact us asap after holiday to schedule a visit. Of note, pt in ED s/p head injury on 12/21 with INR of 3.09. No ICH. Pt was instructed by ED physician to hold warfarin x 1 then resume normal dose.      Alanis Agustin, PharmD, [facility]  Medication Management Clinic   [facility] [address] Ph: [phone]  [facility] Ph: [phone]  12/24/2019 1:07 PM

## 2020-01-10 ENCOUNTER — TELEPHONE (OUTPATIENT)
Dept: INTERNAL MEDICINE CLINIC | Age: 80
End: 2020-01-10

## 2020-01-13 ENCOUNTER — TELEPHONE (OUTPATIENT)
Dept: INTERNAL MEDICINE CLINIC | Age: 80
End: 2020-01-13

## 2020-01-14 ENCOUNTER — OFFICE VISIT (OUTPATIENT)
Dept: INTERNAL MEDICINE CLINIC | Age: 80
End: 2020-01-14
Payer: MEDICARE

## 2020-01-14 ENCOUNTER — HOSPITAL ENCOUNTER (OUTPATIENT)
Dept: GENERAL RADIOLOGY | Age: 80
Discharge: HOME | End: 2020-01-14
Payer: MEDICARE

## 2020-01-14 ENCOUNTER — HOSPITAL ENCOUNTER (OUTPATIENT)
Age: 80
Discharge: HOME | End: 2020-01-14
Payer: MEDICARE

## 2020-01-14 VITALS
HEIGHT: 66 IN | SYSTOLIC BLOOD PRESSURE: 120 MMHG | WEIGHT: 150 LBS | BODY MASS INDEX: 24.11 KG/M2 | DIASTOLIC BLOOD PRESSURE: 72 MMHG

## 2020-01-14 PROCEDURE — 99213 OFFICE O/P EST LOW 20 MIN: CPT | Performed by: INTERNAL MEDICINE

## 2020-01-14 PROCEDURE — 4040F PNEUMOC VAC/ADMIN/RCVD: CPT | Performed by: INTERNAL MEDICINE

## 2020-01-14 PROCEDURE — G8482 FLU IMMUNIZE ORDER/ADMIN: HCPCS | Performed by: INTERNAL MEDICINE

## 2020-01-14 PROCEDURE — 1036F TOBACCO NON-USER: CPT | Performed by: INTERNAL MEDICINE

## 2020-01-14 PROCEDURE — 73030 X-RAY EXAM OF SHOULDER: CPT

## 2020-01-14 PROCEDURE — G8400 PT W/DXA NO RESULTS DOC: HCPCS | Performed by: INTERNAL MEDICINE

## 2020-01-14 PROCEDURE — G8420 CALC BMI NORM PARAMETERS: HCPCS | Performed by: INTERNAL MEDICINE

## 2020-01-14 PROCEDURE — G8427 DOCREV CUR MEDS BY ELIG CLIN: HCPCS | Performed by: INTERNAL MEDICINE

## 2020-01-14 PROCEDURE — 1090F PRES/ABSN URINE INCON ASSESS: CPT | Performed by: INTERNAL MEDICINE

## 2020-01-14 PROCEDURE — 1123F ACP DISCUSS/DSCN MKR DOCD: CPT | Performed by: INTERNAL MEDICINE

## 2020-01-14 NOTE — PROGRESS NOTES
studies reviewed. (See chart for details)  X-ray of the left shoulder is pending    1. Acute pain of left shoulder  Rule out hemarthrosis will get x-ray and start physical therapy if this gets worse will send to orthopedics  - XR SHOULDER LEFT (MIN 2 VIEWS);  Future  - External Referral To Physical Therapy

## 2020-01-16 ENCOUNTER — OFFICE VISIT (OUTPATIENT)
Dept: ORTHOPEDIC SURGERY | Age: 80
End: 2020-01-16
Payer: MEDICARE

## 2020-01-16 VITALS
HEART RATE: 63 BPM | DIASTOLIC BLOOD PRESSURE: 74 MMHG | BODY MASS INDEX: 24.09 KG/M2 | HEIGHT: 66 IN | SYSTOLIC BLOOD PRESSURE: 135 MMHG | WEIGHT: 149.91 LBS

## 2020-01-16 PROCEDURE — G8482 FLU IMMUNIZE ORDER/ADMIN: HCPCS | Performed by: ORTHOPAEDIC SURGERY

## 2020-01-16 PROCEDURE — G8427 DOCREV CUR MEDS BY ELIG CLIN: HCPCS | Performed by: ORTHOPAEDIC SURGERY

## 2020-01-16 PROCEDURE — 1090F PRES/ABSN URINE INCON ASSESS: CPT | Performed by: ORTHOPAEDIC SURGERY

## 2020-01-16 PROCEDURE — 1123F ACP DISCUSS/DSCN MKR DOCD: CPT | Performed by: ORTHOPAEDIC SURGERY

## 2020-01-16 PROCEDURE — 4040F PNEUMOC VAC/ADMIN/RCVD: CPT | Performed by: ORTHOPAEDIC SURGERY

## 2020-01-16 PROCEDURE — L3670 SO ACRO/CLAV CAN WEB PRE OTS: HCPCS | Performed by: ORTHOPAEDIC SURGERY

## 2020-01-16 PROCEDURE — 99203 OFFICE O/P NEW LOW 30 MIN: CPT | Performed by: ORTHOPAEDIC SURGERY

## 2020-01-16 PROCEDURE — G8420 CALC BMI NORM PARAMETERS: HCPCS | Performed by: ORTHOPAEDIC SURGERY

## 2020-01-16 PROCEDURE — 1036F TOBACCO NON-USER: CPT | Performed by: ORTHOPAEDIC SURGERY

## 2020-01-16 PROCEDURE — G8400 PT W/DXA NO RESULTS DOC: HCPCS | Performed by: ORTHOPAEDIC SURGERY

## 2020-01-16 NOTE — PROGRESS NOTES
[address] [facility]  Office Visit  Left Shoulder Pain  Date:  2020    Name:  Rios Brooks  Address:  [address] [address]    :  1940      Age:   70 y.o.    SSN:  [id number]      Medical Record Number:  <[id number]>    Chief Complaint:    Left Shoulder Pain    HPI:   Genesis Cook is a 70 y.o. female here today for shoulder pain. Five days ago she had a trip and fall in her home and landed onto her left shoulder. She had significant pain and difficulty using her arm. She also had significant ecchymosis that accompanied. Her pain has been gradually improving. She denies numbness, tingling, fevers, chills, chest pain, shortness of breath or any neck pain or other injury. Of note she is on Coumadin for A. Fib. Pain Assessment  Location of Pain: Shoulder  Location Modifiers: Left  Severity of Pain: 0  Duration of Pain: A few minutes  Frequency of Pain: Intermittent  Aggravating Factors: (lifting, reaching)  Limiting Behavior: Yes  Relieving Factors: Nsaids, Ice  Result of Injury: Yes  Work-Related Injury: No  Are there other pain locations you wish to document?: No    Past History:  Past Medical History:   Diagnosis Date    Adenomatous polyp of colon 2019    Asymptomatic varicose veins     Atrial fibrillation (HCC)     Dysthymic disorder     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)     Screening mammogram 2009    (Left)Benign       Past Surgical History:   Procedure Laterality Date    COLONOSCOPY  2008    HERNIA REPAIR      Left Femoral       Social History     Tobacco Use    Smoking status: Never Smoker    Smokeless tobacco: Never Used   Substance Use Topics    Alcohol use: No    Drug use: Not on file        Family History:  family history includes Heart Attack in her father; Heart Disease in her father; Stroke in her mother.          Current Outpatient Medications:     celecoxib (CELEBREX) 200 MG capsule, Take 1 capsule by female with left shoulder:  1. Left distal clavicle fracture    Impression:  Encounter Diagnosis   Name Primary?  Closed displaced fracture of acromial end of left clavicle, initial encounter Yes       Office Procedures:  Orders Placed This Encounter   Procedures    Breg Sling Shot 3 Shoulder Sling     Patient was prescribed a Breg Sling Shot III Shoulder Brace. The left shoulder will require stabilization / immobilization from this orthosis. The orthosis will assist in protecting the affected area, provide functional support and facilitate healing. The device was ordered and fit on 01/16/20  . The patient was educated and fit by a healthcare professional with expert knowledge and specialization in brace application while under the direct supervision of the treating physician. Verbal and written instructions for the use of and application of this item were provided. They were instructed to contact the office immediately should the brace result in increased pain, decreased sensation, increased swelling or worsening of the condition. Plan:   Pertinent imaging was reviewed. The etiology, natural history, and treatment options for the disorder were discussed. The roles of activity modifications, medications, physical therapy, and surgical interventions were all described to the patient and questions were answered. The alignment is maintained reasonably. I think this will do very well with nonoperative management in a sling. Was provided for her today. Keep her in this full-time for couple weeks and then will start some gentle range of motion Codman's etc.  He was counseled regarding nutrition particularly vitamin D and calcium supplementation. She does follow with her primary care. Her last DEXA scan was 15 years ago but she is scheduled for another 1 currently  We discussed fall prevention around the home. All questions were answered. Pain is well controlled.   She can use over-the-counter medications as needed. We will see her back in 2 weeks with a repeat x-ray to make sure that the alignment has not changed. Janie Cherry MD  [address]     01/16/20  12:20 PM    The encounter with Laya Cotton was supervised by Dr Aislinn Huggins who personally examined the patient and reviewed the plan. This dictation was performed with a verbal recognition program (DRAGON) and it was checked for errors. It is possible that there are still dictated errors within this office note. If so, please bring any errors to my attention for an addendum. All efforts were made to ensure that this office note is accurate.   _______________  I was physically present and personally supervised the Orthopaedic Sports Medicine Fellow in the evaluation and development of a treatment plan for this patient. I personally interviewed the patient and performed a physical examination. In addition, I discussed the patient's condition and treatment options with them. I have also reviewed and agree with the past medical, family and social history unless otherwise noted. All of the patient's questions were answered. Crystal Huggins MD, PhD  1/16/2020

## 2020-01-17 ENCOUNTER — TELEPHONE (OUTPATIENT)
Dept: ORTHOPEDIC SURGERY | Age: 80
End: 2020-01-17

## 2020-01-17 ENCOUNTER — TELEPHONE (OUTPATIENT)
Dept: PHARMACY | Age: 80
End: 2020-01-17

## 2020-01-20 ENCOUNTER — TELEPHONE (OUTPATIENT)
Dept: ORTHOPEDIC SURGERY | Age: 80
End: 2020-01-20

## 2020-01-20 NOTE — TELEPHONE ENCOUNTER
Left message that we will check with dr Finesse Pickens when he is back in [facility] on weds.

## 2020-01-22 ENCOUNTER — ANTI-COAG VISIT (OUTPATIENT)
Dept: PHARMACY | Age: 80
End: 2020-01-22
Payer: MEDICARE

## 2020-01-22 LAB — INTERNATIONAL NORMALIZATION RATIO, POC: 6.4

## 2020-01-22 PROCEDURE — 85610 PROTHROMBIN TIME: CPT

## 2020-01-22 PROCEDURE — 99213 OFFICE O/P EST LOW 20 MIN: CPT

## 2020-01-22 NOTE — PROGRESS NOTES
bruise easier and small cuts may take longer to clot than usual.  Seek medical attention for any cuts that won't stop bleeding or falls involving head injury. Any blood in the urine or stool should be reported immediately. · Effect of alcohol and tobacco on INR  · Call the clinic with planned surgeries or procedures  · An information pamphlet \"A Guide to Your Warfarin Therapy\" was provided to the patient    Patient understands dosing directions and information discussed. Dosing schedule and follow up appointment given to patient. Progress note routed to referring physician's office. Patient acknowledges working in consult agreement with pharmacist as referred by his/her physician. Next Clinic Appointment:  1/27    Please call [facility]ication [facility] at [phone] with any questions. Thanks! Sidney Love.  eBau Newton, PharmD, [facility]  [facility]  Ph: [phone]  1/22/2020 2:59 PM

## 2020-01-22 NOTE — TELEPHONE ENCOUNTER
Called to r/s initial appointment and spoke with patient's . He took a message and will have patient call back to r/s.      Benny Robles.  Karyna Resendiz, PharmD, BCPS  [facility]  Ph: [phone]  1/22/2020 9:11 AM

## 2020-01-27 ENCOUNTER — ANTI-COAG VISIT (OUTPATIENT)
Dept: PHARMACY | Age: 80
End: 2020-01-27
Payer: MEDICARE

## 2020-01-27 LAB — INTERNATIONAL NORMALIZATION RATIO, POC: 2.6

## 2020-01-27 PROCEDURE — 85610 PROTHROMBIN TIME: CPT

## 2020-01-27 PROCEDURE — 99211 OFF/OP EST MAY X REQ PHY/QHP: CPT

## 2020-01-27 NOTE — PROGRESS NOTES
Management Clinic at [phone] with any questions. Thanks! Kamar Santos.  Spencer Pitt, PharmD, BCPS  [facility]  Ph: [phone]  1/27/2020 3:23 PM

## 2020-01-31 ENCOUNTER — ANTI-COAG VISIT (OUTPATIENT)
Dept: PHARMACY | Age: 80
End: 2020-01-31
Payer: MEDICARE

## 2020-01-31 LAB — INTERNATIONAL NORMALIZATION RATIO, POC: 3.1

## 2020-01-31 PROCEDURE — 85610 PROTHROMBIN TIME: CPT

## 2020-01-31 PROCEDURE — 99212 OFFICE O/P EST SF 10 MIN: CPT

## 2020-01-31 NOTE — PROGRESS NOTES
Joey Larose is a 70 y.o. female with PMHx significant for A-fib, MV disorder, HTN who presents to clinic 1/31/2020 for anticoagulation monitoring and adjustment. Patient has been taking warfarin for ~30 years.       Anticoagulation Indication(s):  Afib    Referring Physician:  Dr. Gissel Bey  Goal INR Range:  2-3  Duration of Anticoagulation Therapy:  Indefinite  Time of day dose taken:  PM  Product patient has at home:  warfarin 2.5 mg (green)    Recent INR Results:  Lab Results   Component Value Date    INR 2.6 01/27/2020    INR 6.4 01/22/2020    INR 3.09 (H) 12/21/2019    INR 2.24 (H) 07/26/2019       INR Summary                            Warfarin regimen (mg)  Date INR   A/P    Sun Mon Tue Wed Thu Fri Sat Mg/wk  1/31 3.1 Above goal, decrease  2.5 5 2.5 5 2.5 2.5 2.5 22.5  1/27 2.6 At goal, no change  2.5 5 2.5 5 2.5 5 2.5 25  1/22 6.4 Above goal, hold x 2  2.5 5 2.5 0/5 0/2.5 5 2.5 25    Last CBC:  Lab Results   Component Value Date    RBC 4.50 01/23/2019    HGB 14.6 01/23/2019    HCT 42.7 01/23/2019    MCV 95.0 01/23/2019    MCH 32.4 01/23/2019    MPV 9.8 01/23/2019    RDW 14.7 01/23/2019     01/23/2019       Patient History:  Recent hospitalizations/HC visits 12/21 [place] [address] ED for fall with head injury: head CT negative; INR=3.09   Recent medication changes Med list reviewed at first visit   Medications taken regularly that may interact with warfarin or alter INR [facility]   Warfarin dose taken as prescribed Yes - uses multiple pillboxes   Signs/symptoms of bleeding No h/o major bleeding reported   Vitamin K intake Normally has ~0 servings of green, leafy vegetables per week: likes asparagus, broccoli, brussels sprouts, kale, and noreen, but avoids mostly due to taking warfarin   Recent vomiting/diarrhea/fever, changes in weight or activity level Reports significantly decreased appetite recently    Tobacco or alcohol use Patient quit smoking in her twenties  Patient reports having

## 2020-02-04 ENCOUNTER — TELEPHONE (OUTPATIENT)
Dept: INTERNAL MEDICINE CLINIC | Age: 80
End: 2020-02-04

## 2020-02-05 ENCOUNTER — OFFICE VISIT (OUTPATIENT)
Dept: ORTHOPEDIC SURGERY | Age: 80
End: 2020-02-05
Payer: MEDICARE

## 2020-02-05 VITALS
HEIGHT: 66 IN | BODY MASS INDEX: 24.09 KG/M2 | DIASTOLIC BLOOD PRESSURE: 74 MMHG | HEART RATE: 63 BPM | WEIGHT: 149.91 LBS | SYSTOLIC BLOOD PRESSURE: 135 MMHG

## 2020-02-05 PROCEDURE — 4040F PNEUMOC VAC/ADMIN/RCVD: CPT | Performed by: ORTHOPAEDIC SURGERY

## 2020-02-05 PROCEDURE — G8427 DOCREV CUR MEDS BY ELIG CLIN: HCPCS | Performed by: ORTHOPAEDIC SURGERY

## 2020-02-05 PROCEDURE — 1090F PRES/ABSN URINE INCON ASSESS: CPT | Performed by: ORTHOPAEDIC SURGERY

## 2020-02-05 PROCEDURE — 99213 OFFICE O/P EST LOW 20 MIN: CPT | Performed by: ORTHOPAEDIC SURGERY

## 2020-02-05 PROCEDURE — G8400 PT W/DXA NO RESULTS DOC: HCPCS | Performed by: ORTHOPAEDIC SURGERY

## 2020-02-05 PROCEDURE — 1036F TOBACCO NON-USER: CPT | Performed by: ORTHOPAEDIC SURGERY

## 2020-02-05 PROCEDURE — G8482 FLU IMMUNIZE ORDER/ADMIN: HCPCS | Performed by: ORTHOPAEDIC SURGERY

## 2020-02-05 PROCEDURE — G8420 CALC BMI NORM PARAMETERS: HCPCS | Performed by: ORTHOPAEDIC SURGERY

## 2020-02-05 PROCEDURE — 1123F ACP DISCUSS/DSCN MKR DOCD: CPT | Performed by: ORTHOPAEDIC SURGERY

## 2020-02-05 NOTE — TELEPHONE ENCOUNTER
Call returned to , discussed order for standing Protime. Patient does not want to go to the coumadin clinic. Request to cancel that appointment. Order placed for standing protime.

## 2020-02-05 NOTE — PROGRESS NOTES
Chief Complaint    Follow-up (left clavicle)      History of Present Illness: Jluis Perez is a pleasant, 70 y.o., female, here today for follow up of of a distal third clavicle fracture. She is 4 months post injury. She reports no new injuries or setbacks. She has no pain at rest, less than 1/10. She has continued with shoulder shurgs and shoulder blade pinches to maintain mobility and good posture. She has no pain at night. She has no limitations with daily activities. Medical History:  Patient's medications, allergies, past medical, surgical, social and family histories were reviewed and updated as appropriate. Review of Systems    Last updated on 1/16/20    Review of Systems  A 14 point review of systems was completed by the patient on 1/16/2020 and is available in the media section of the scanned medical record and was reviewed on 2/5/2020. The review is negative with the exception of those things mentioned in the HPI and Past Medical History    Vital Signs:  Vitals:    02/05/20 1214   BP: 135/74   Pulse: 63       General/Appearance: Alert and oriented and in no apparent distress. Skin:  There are no skin lesions, cellulitis, or extreme edema. The patient has warm and well-perfused Bilateral upper extremities with brisk capillary refill. Left Shoulder Exam:  Inspection: No gross deformities, no signs of infection. Palpation: Tenderness at the distal clavicle is pretty minimal. No pain at the rotator cuff or biceps tendon. Active Range of Motion: Forward Elevation 175, Abduction 175, External Rotation 60, Internal Rotation T7    Passive Range of Motion: SAME    Strength:  External Rotation 5/5, Internal Rotation 5/5, Supraspinatus 5/5, Champagne Toast 5/5    Special Tests:  No Guille muscle deformity.     Neurovascular: Sensation to light touch is intact, no motor deficits, palpable radial pulses 2+    Radiology:     Plain radiographs of the left  Clavicle comprising 2  Views ( AP and performed with a verbal recognition program (DRAGON) and it was checked for errors. It is possible that there are still dictated errors within this office note. If so, please bring any errors to my attention for an addendum. All efforts were made to ensure that this office note is accurate.   ____________________  I was physically present and personally supervised the Orthopaedic Sports Medicine Fellow in the evaluation and development of a treatment plan for this patient. I personally interviewed the patient and performed a physical examination. In addition, I discussed the patient's condition and treatment options with them. I have also reviewed and agree with the past medical, family and social history unless otherwise noted. All of the patient's questions were answered. Crystal Cortes MD, PhD  2/5/2020

## 2020-02-05 NOTE — LETTER
Physical Therapy Rehabilitation Referral    Patient Name:  Angelia Darnell      YOB: 1940    Diagnosis:    1. Closed displaced fracture of acromial end of left clavicle, initial encounter    2. Arthritis of shoulder region, left        Precautions: Weight bearing and range of motion as tolerated. [x] Evaluate and Treat for mild gleno humeral OA, and healed distal third clavicle fracture. For a shoulder flexibility and cuff conditioning program.    Post Op Instructions:  [] Continuous passive motion (CPM) [] Elbow ROM  [x] Exercise in plane of scapula  []  Strengthening     [] Pulley and instruction   [x] Home exercise program (copy to patient)   [] Sling when arm at risk  [] Sling or brace at all times   [] AAROM: Forward elevation to  140            [] AAROM: External rotation  To  40    [] Isometric external rotator strengthening [] AAROM: internal rotation: up the back  [] Isometric abductor strengthening  [] AAROM: Internal abduction   [] Isometric internal rotator strengthening [] AAROM: cross-body adduction             Stretching:     Strengthening:  [x] Four quadrant (FE, ER, IR, CBA)  [x] Rotator cuff (ER, IR, Abd)  [] Forward Elevation    [] External Rotators     [] External Rotation    [] Internal Rotators  [] Internal Rotation: up/back   [] Abductors     [] Internal Rotation: supine in abduction  [] Sleeper Stretch    [] Flexors  [] Cross-body abduction    [] Extensors  [] Pendulum (FE, Abd/Add, cw/ccw)  [x] Scapular Stabilizers   [] Wall-walking (FE, Abd)        [x] Shoulder shrugs     [] Table slides (FE)                [x] Rhomboid pinch  [] Elbow (flex, ext, pron, sup)        [] Lat.  Pull downs     [] Medial epicondylitis program       [] Forward punch   [] Lateral epicondylitis program       [] Internal rotators     [] Progressive resistive exercises  [] Bench Press        [] Bench press plus  Activities:     [] Lateral pull-downs [] Rowing     [] Progressive two-hand supine press  [] Stepper/Exercise bike   [] Biceps: curls/supination  [] Swimming  [] Water exercises    Modalities:     Return to Sport:  [x] Of Choice      [] Plyometrics  [] Ultrasound     [] Rhythmic stabilization  [] Iontophoresis    [] Core strengthening   [] Moist heat     [] Sports specific program:   [] Massage         [x] Cryotherapy      [] Electrical stimulation     [] Paraffin  [] Whirlpool  [] TENS    [x] Home exercise program (copy to patient). Perform exercises for:   15     minutes    3      times/day  [x] Supervised physical therapy  Frequency: []  1x week  [x] 2x week  [] 3x week  [] Other:   Duration: [] 2 weeks   [] 4 weeks  [x] 6 weeks  [] Other:     Additional Instructions:       Deann Chowdhury  [address]   Email: [email or web address]  Cell: [phone]

## 2020-02-07 DIAGNOSIS — Z79.01 LONG TERM CURRENT USE OF ANTICOAGULANT THERAPY: ICD-10-CM

## 2020-02-07 DIAGNOSIS — I48.11 LONGSTANDING PERSISTENT ATRIAL FIBRILLATION (HCC): ICD-10-CM

## 2020-02-07 LAB
INR BLD: 3.56 (ref 0.86–1.14)
PROTHROMBIN TIME: 41.8 SEC (ref 10–13.2)

## 2020-02-10 ENCOUNTER — ANTI-COAG VISIT (OUTPATIENT)
Dept: INTERNAL MEDICINE CLINIC | Age: 80
End: 2020-02-10

## 2020-02-10 RX ORDER — DIGOXIN 125 MCG
TABLET ORAL
Qty: 90 TABLET | Refills: 1 | Status: ON HOLD
Start: 2020-02-10 | End: 2020-05-07 | Stop reason: HOSPADM

## 2020-02-13 ENCOUNTER — TELEPHONE (OUTPATIENT)
Dept: INTERNAL MEDICINE CLINIC | Age: 80
End: 2020-02-13

## 2020-02-13 ENCOUNTER — HOSPITAL ENCOUNTER (OUTPATIENT)
Dept: PHYSICAL THERAPY | Age: 80
Setting detail: THERAPIES SERIES
Discharge: HOME | End: 2020-02-13
Payer: MEDICARE

## 2020-02-13 PROCEDURE — 97161 PT EVAL LOW COMPLEX 20 MIN: CPT | Performed by: PHYSICAL THERAPIST

## 2020-02-13 PROCEDURE — 97110 THERAPEUTIC EXERCISES: CPT | Performed by: PHYSICAL THERAPIST

## 2020-02-13 NOTE — FLOWSHEET NOTE
activities related to improving balance, coordination, kinesthetic sense, posture, motor skill, proprioception and motor activation to allow for proper function of scapular, scapulothoracic and UE control with self care, carrying, lifting, driving/computer work. Home Exercise Program:   Tonya Pineda access code: Asaf Emanuel   Initiated 2/13/20. Printed hand out given. Pt demonstrated proper form of each exercise and expressed verbal understanding of frequency and duration.      [x] (34553) Reviewed/Progressed HEP activities related to strengthening, flexibility, endurance, ROM of scapular, scapulothoracic and UE control with self care, reaching, carrying, lifting, house/yardwork, driving/computer work  [] (07899) Reviewed/Progressed HEP activities related to improving balance, coordination, kinesthetic sense, posture, motor skill, proprioception of scapular, scapulothoracic and UE control with self care, reaching, carrying, lifting, house/yardwork, driving/computer work      Manual Treatments:    [] (11763) Provided manual therapy to mobilize soft tissue/joints of cervical/CT, scapular GHJ and UE for the purpose of modulating pain, promoting relaxation,  increasing ROM, reducing/eliminating soft tissue swelling/inflammation/restriction, improving soft tissue extensibility and allowing for proper ROM for normal function with self care, reaching, carrying, lifting, house/yardwork, driving/computer work    Modalities:  None    Charges:  Timed Code Treatment Minutes: 23   Total Treatment Minutes: 45   Time in: 2:45  Time out: 3:36    [x] EVAL (LOW) 28468 (typically 20 minutes face-to-face)  [] EVAL (MOD) 99507 (typically 30 minutes face-to-face)  [] EVAL (HIGH) 96959 (typically 45 minutes face-to-face)  [] RE-EVAL     [x] FS(72001) x 2    [] IONTO  [] NMR (72344) x     [] VASO  [] Manual (20371) x      [] Other:  [] TA x      [] Mech Traction (64095)  [] ES(attended) (26374)      [] ES (un) (82308):     GOALS:  Patient

## 2020-02-13 NOTE — PLAN OF CARE
and IADLs without relative difficulty. Pt is not taking any pain medication. She has not felt the need to ice. Pt states that she is not aware of pain often. Pt denies neck pain. She gets occasional discomfort/pain into the shoulder blade area. Pt does note that bra straps fall off her shoulder. Pt is RHD. + night pain, can wake her up, only on occasion, often with rolling over, does not last long    Goals: \"be able to happily do all the things I did before. \"    Relevant Medical History: see intake form  Functional Disability Index:  UEFI 73/80=91.25% (8.75% deficit)    Pain Scale: 1-3/10  Easing factors: rest  Provocative factors: nothing specific, feels prolonged heavier activity would probably bother her    Type: []Constant   [x]Intermittent  []Radiating []Localized []other:     Numbness/Tingling: No    Occupation/School: Retired    Living Status/Prior Level of Function: Independent with ADLs and IADLs. OBJECTIVE:     ROM PROM AROM  Comment    L R L R    Flexion   148 160    Abduction   165 180    ER   80 78    IR   38 50        Strength L R Comment   Flexion 4-/5 4/5    Abduction 4-/5 4/5    ER 4/5 4/5    IR 4/5 4/5    Upper Trap 4/5 4/5    Lower Trap 4-/5 4-/5    Mid Trap 4-/5 4-/5    Biceps 4/5 4/5    Triceps 4/5 4/5      Special Tests Results/Comment   Juvenal-Devaughn -   Neers -   Speeds -   OBriens -   Apprehension  Not assessed   Load & Shift  Not assessed   Apley's OH: T3 B  BB: T 10 B           Reflexes/Sensation:               [x]Dermatomes/Myotomes intact               []Reflexes equal and normal bilaterally, not assessed               []Other:     Joint mobility:               []Normal               [x]Hypo              []Hyper     Palpation: WNL     Functional Mobility/Transfers: Independent     Posture: WFL                       [x] Patient history, allergies, meds reviewed. Medical chart reviewed. See intake form.       Review Of Systems (ROS):  [x]Performed Review of systems (Integumentary, CardioPulmonary, Neurological) by intake and observation. Intake form has been scanned into medical record. Patient has been instructed to contact their primary care physician regarding ROS issues if not already being addressed at this time. Co-morbidities/Complexities (which will affect course of rehabilitation):   [x]None              Arthritic conditions   []Rheumatoid arthritis (M05.9)  []Osteoarthritis (M19.91)    Cardiovascular conditions   []Hypertension (I10)  []Hyperlipidemia (E78.5)  []Angina pectoris (I20)  []Atherosclerosis (I70)    Musculoskeletal conditions   []Disc pathology   []Congenital spine pathologies   []Prior surgical intervention  []Osteoporosis (M81.8)  []Osteopenia (M85.8)   Endocrine conditions   []Hypothyroid (E03.9)  []Hyperthyroid Gastrointestinal conditions   []Constipation (U61.45)    Metabolic conditions   []Morbid obesity (E66.01)  []Diabetes type 1(E10.65) or 2 (E11.65)   []Neuropathy (G60.9)      Pulmonary conditions   []Asthma (J45)  []Coughing   []COPD (J44.9)    Psychological Disorders  []Anxiety (F41.9)  []Depression (F32.9)   []Other:    []Other:            Barriers to/and or personal factors that will affect rehab potential:              []Age  []Sex              [x]Motivation/Lack of Motivation                        []Co-Morbidities              []Cognitive Function, education/learning barriers              []Environmental, home barriers              []profession/work barriers  []past PT/medical experience  []other:  Justification: Pt is motivated to get better     Falls Risk Assessment (30 days):   [x] Falls Risk assessed and no intervention required.   [] Falls Risk assessed and Patient requires intervention due to being higher risk   TUG score (>12s at risk):     [] Falls education provided, including      ASSESSMENT:   Functional Impairments              []Noted spinal or UE joint hypomobility              []Noted spinal or UE joint hypermobility dysfunction                         []Signs/symptoms consistent with Glenohumeral IR Deficit - <45 degrees              []Signs/symptoms consistent with facet dysfunction of cervical/thoracic spine                         []Signs/symptoms consistent with pathology which may benefit from Dry needling                [x]other: clavicle fracture     Prognosis/Rehab Potential:                                       []Excellent              [x]Good                 []Fair              []Poor     Tolerance of evaluation/treatment:               []Excellent              [x]Good                 []Fair              []Poor     Physical Therapy Evaluation Complexity Justification  [x] A history of present problem with:  [x] no personal factors and/or comorbidities that impact the plan of care;  []1-2 personal factors and/or comorbidities that impact the plan of care  []3 personal factors and/or comorbidities that impact the plan of care  [x] An examination of body systems using standardized tests and measures addressing any of the following: body structures and functions (impairments), activity limitations, and/or participation restrictions;:  [x] a total of 1-2 or more elements   [] a total of 3 or more elements   [] a total of 4 or more elements   [x] A clinical presentation with:  [x] stable and/or uncomplicated characteristics   [] evolving clinical presentation with changing characteristics  [] unstable and unpredictable characteristics;   [x] Clinical decision making of [x] low, [] moderate, [] high complexity using standardized patient assessment instrument and/or measurable assessment of functional outcome.      [x] EVAL (LOW) 30440 (typically 20 minutes face-to-face)  [] EVAL (MOD) 17211 (typically 30 minutes face-to-face)  [] EVAL (HIGH) 47062 (typically 45 minutes face-to-face)  [] RE-EVAL         PLAN:  Frequency/Duration:  2 days per week for 4-6 Weeks:  INTERVENTIONS:  [x] Therapeutic exercise including: strength training, ROM, for Upper extremity and core   [x]  NMR activation and proprioception for UE, scap and Core   [x] Manual therapy as indicated for shoulder, scapula and spine to include: Dry Needling/IASTM, STM, PROM, Gr I-IV mobilizations, manipulation. [x] Modalities as needed that may include: thermal agents, E-stim, Biofeedback, US, iontophoresis as indicated  [x] Patient education on joint protection, postural re-education, activity modification, progression of HEP. GOALS:   Patient stated goal: get back to normal tasks, have same ROM as R shoulder    [] Progressing: [] Met: [] Not Met: [] Adjusted    Therapist goals for Patient:   Short Term Goals: To be achieved in: 2 weeks 2/27/20  1. Independent in HEP and progression per patient tolerance, in order to prevent re-injury. [] Progressing: [] Met: [] Not Met: [] Adjusted   2. Patient will have a decrease in pain to facilitate improvement in movement, function, and ADLs as indicated by Functional Deficits. [] Progressing: [] Met: [] Not Met: [] Adjusted     Long Term Goals: To be achieved in: 4-6 weeks 3/12/20-3/26/20  1. Disability index score of 5% or less for the UEFS to assist with reaching prior level of function. [] Progressing: [] Met: [] Not Met: [] Adjusted  2. Patient will demonstrate increased AROM equal to R shoulder to allow for proper joint functioning as indicated by patients Functional Deficits. [] Progressing: [] Met: [] Not Met: [] Adjusted  3. Patient will demonstrate an increase in LUE strength to 4+/5 or greater to allow for proper functional mobility as indicated by patients Functional Deficits. [] Progressing: [] Met: [] Not Met: [] Adjusted  4. Patient will return to ADLs, IADLs and functional activities without increased symptoms or restriction. [] Progressing: [] Met: [] Not Met: [] Adjusted    Electronically signed by:  Jean Carlos Crook PT, DPT  Physical Therapist  [id number]  [email or web address]    Note:

## 2020-02-17 ENCOUNTER — ANTI-COAG VISIT (OUTPATIENT)
Dept: INTERNAL MEDICINE CLINIC | Age: 80
End: 2020-02-17

## 2020-02-17 DIAGNOSIS — Z79.01 LONG TERM CURRENT USE OF ANTICOAGULANT THERAPY: ICD-10-CM

## 2020-02-17 DIAGNOSIS — I48.11 LONGSTANDING PERSISTENT ATRIAL FIBRILLATION (HCC): ICD-10-CM

## 2020-02-17 LAB
INR BLD: 2 (ref 0.86–1.14)
PROTHROMBIN TIME: 23.4 SEC (ref 10–13.2)

## 2020-02-19 ENCOUNTER — HOSPITAL ENCOUNTER (OUTPATIENT)
Dept: PHYSICAL THERAPY | Age: 80
Setting detail: THERAPIES SERIES
Discharge: HOME | End: 2020-02-19
Payer: MEDICARE

## 2020-02-19 PROCEDURE — 97110 THERAPEUTIC EXERCISES: CPT | Performed by: PHYSICAL THERAPIST

## 2020-02-19 NOTE — FLOWSHEET NOTE
The [address] [place] [address] [facility]    Physical Therapy Daily Treatment Note  Date:  2020    Patient Name:  Angela Allen    :  1940  MRN: [id number]  Restrictions/Precautions:    Medical/Treatment Diagnosis Information:  · Diagnosis: S42.032A (ICD-10-CM) - Closed displaced fracture of acromial end of left clavicle, initial encounter  · Treatment Diagnosis: M25.612, M25.512, D92.9  Insurance/Certification information:  PT Insurance Information: PT BENEFITS  FACILITY/ MEDICARE PRIMARY/ PAYS 80%/ NO VISIT LIMIT MED [facility]/ C SECONDARY/ 20 PAG  Physician Information:  Referring Practitioner: Bita Trevino MD  Has the plan of care been signed (Y/N):        []  Yes  [x]  No     Date of Patient follow up with Physician: 3/18/20      Is this a Progress Report:     []  Yes  [x]  No        If Yes:  Date Range for reporting period:  Beginning  Ending    Progress report will be due (10 Rx or 30 days whichever is less): 3/20/08      Recertification will be due (POC Duration  / 90 days whichever is less): 3/26/20         Visit # Insurance Allowable Auth Required   2 MEDICARE []  Yes [x]  No        Functional Scale:    Date assessed:   UEFI 73/80=91.25% (8.75% deficit)  20    Latex Allergy:  [x]NO      []YES  Preferred Language for Healthcare:   [x]English       []other:    Pain level:  0/10     SUBJECTIVE:  Pt states that she only has pain at night when sleeping if she is laying on her L shoulder.     OBJECTIVE:              ROM PROM AROM  Comment     L R L R     Flexion     148 160     Abduction     165 180     ER     80 78     IR     38 50           Strength L R Comment   Flexion 4-/5 4/5     Abduction 4-/5 4/5     ER 4/5 4/5     IR 4/5 4/5     Upper Trap 4/5 4/5     Lower Trap 4-/5 4-/5     Mid Trap 4-/5 4-/5     Biceps 4/5 4/5     Triceps 4/5 4/5        Special Tests Results/Comment   Juvenal-Devaughn -   Nekatie -   Speeds -   OBriens -   Apprehension  Not assessed Load & Shift  Not assessed   Apley's OH: T3 B  BB: T 10 B               Reflexes/Sensation:               [x]? Dermatomes/Myotomes intact               []? Reflexes equal and normal bilaterally, not assessed               []? Other:     Joint mobility:               []? Normal               [x]? Hypo              []? Hyper     Palpation: WNL     Functional Mobility/Transfers: Independent     Posture: WFL    RESTRICTIONS/PRECAUTIONS:     Exercises/Interventions:   Exercises:  Exercise/Equipment Resistance/Repetitions Other comments   Stretching/PROM     Wand     Wall Slides 10x10\" flex/ABD    UE [place]     Pulleys     Pendulum          Isometrics     Retraction          Weight shift     Flexion     Abduction     External Rotation     Internal Rotation     Biceps     Triceps          PRE's     Flexion     Abduction     External Rotation 3x10 1# Sidelying   Internal Rotation     Shrugs     EXT     Reverse Flys     Serratus 3x10 0# wand    Horizontal Abd with ER     Biceps 3x10    Triceps     Retraction          Cable Column/Theraband     External Rotation     Internal Rotation 3x10 green    Shrugs     Lats     Ext 3x10 green    Flex     Scapular Retraction 3x10 blue    BIC     TRIC     PNF     W ER 3x10 green         Dynamic Stability          [place]          Manual interventions                 Plan for next session: serratus punches    Therapeutic Exercise and NMR EXR  [x] (91709) Provided verbal/tactile cueing for activities related to strengthening, flexibility, endurance, ROM  for improvements in scapular, scapulothoracic and UE control with self care, reaching, carrying, lifting, house/yardwork, driving/computer work.    [] (07867) Provided verbal/tactile cueing for activities related to improving balance, coordination, kinesthetic sense, posture, motor skill, proprioception  to assist with  scapular, scapulothoracic and UE control with self care, reaching, carrying, lifting, house/yardwork, driving/computer Poor      Patient requires continued skilled intervention: [x] Yes  [] No    Treatment/Activity Tolerance:  [x] Patient able to complete treatment  [] Patient limited by fatigue  [] Patient limited by pain     [] Patient limited by other medical complications  [] Other: Pt had difficulty with shoulder blade squeeze during TB ext, required both verbal and tactile cues to pinch shoulder blades but continued to have poor trap activation. Required VCs for proper form with TB IR to focus on humeral rotation and avoid pulling arm across body. Pt noted some mild pressure at superior shoulder following serratus punches, but otherwise pain free with full exercise program. Pt requires PT follow up to address strength and functional mobility deficits. PLAN: See eval  [x] Continue per plan of care [] Alter current plan (see comments above)  [] Plan of care initiated [] Hold pending MD visit [] Discharge    Electronically signed by:  Jean Carlos Crook, PT, DPT  Physical Therapist  [id number]  [email or web address]    Note: If patient does not return for scheduled/ recommended follow up visits, this note will serve as a discharge from care along with most recent update on progress.

## 2020-02-21 ENCOUNTER — TELEPHONE (OUTPATIENT)
Dept: INTERNAL MEDICINE CLINIC | Age: 80
End: 2020-02-21

## 2020-02-21 ENCOUNTER — HOSPITAL ENCOUNTER (OUTPATIENT)
Dept: PHYSICAL THERAPY | Age: 80
Setting detail: THERAPIES SERIES
Discharge: HOME | End: 2020-02-21
Payer: MEDICARE

## 2020-02-21 PROCEDURE — 97110 THERAPEUTIC EXERCISES: CPT | Performed by: PHYSICAL THERAPIST

## 2020-02-21 NOTE — TELEPHONE ENCOUNTER
Per spouse stated pt had her last bld draw on 2/17 and would like to know when is she due to have the bld draw done again ? This Monday? Or the following week?  He would like a call back regarding this matter pls advise

## 2020-02-21 NOTE — FLOWSHEET NOTE
The [address] [place] [address] [facility]    Physical Therapy Daily Treatment Note  Date:  2020    Patient Name:  Jazmin Trotter    :  1940  MRN: [id number]  Restrictions/Precautions:    Medical/Treatment Diagnosis Information:  · Diagnosis: S42.032A (ICD-10-CM) - Closed displaced fracture of acromial end of left clavicle, initial encounter  · Treatment Diagnosis: M25.612, M25.512, J24.9  Insurance/Certification information:  PT Insurance Information: PT BENEFITS  FACILITY/ MEDICARE PRIMARY/ PAYS 80%/ NO VISIT LIMIT MED [facility]/ [employer] SECONDARY/ 20 PAG  Physician Information:  Referring Practitioner: Denisa Beach MD  Has the plan of care been signed (Y/N):        []  Yes  [x]  No     Date of Patient follow up with Physician: 3/18/20      Is this a Progress Report:     []  Yes  [x]  No        If Yes:  Date Range for reporting period:  Beginning  Ending    Progress report will be due (10 Rx or 30 days whichever is less): 4/15/73      Recertification will be due (POC Duration  / 90 days whichever is less): 3/26/20         Visit # Insurance Allowable Auth Required   3 MEDICARE []  Yes [x]  No        Functional Scale:    Date assessed:   UEFI =91.25% (8.75% deficit)  20    Latex Allergy:  [x]NO      []YES  Preferred Language for Healthcare:   [x]English       []other:    Pain level:  0/10     SUBJECTIVE:  Pt states that she only has pain at night when sleeping if she is laying on her L shoulder.     OBJECTIVE:              ROM PROM AROM  Comment     L R L R     Flexion     148 160     Abduction     165 180     ER     80 78     IR     38 50           Strength L R Comment   Flexion 4-/5 4/5     Abduction 4-/5 4/5     ER 4/5 4/5     IR 4/5 4/5     Upper Trap 4/5 4/5     Lower Trap 4-/5 4-/5     Mid Trap 4-/5 4-/5     Biceps 4/5 4/5     Triceps 4/5 4/5        Special Tests Results/Comment   Juvenal-Devaughn -   Nekatie -   Speeds -   OBriens -   Apprehension  Not assessed IONTO  [] NMR (75284) x     [] VASO  [] Manual (25342) x      [] Other:  [] TA x      [] Mech Traction (10346)  [] ES(attended) (53453)      [] ES (un) (00976):     GOALS:  Patient stated goal: get back to normal tasks, have same ROM as R shoulder    []? Progressing: []? Met: []? Not Met: []? Adjusted     Therapist goals for Patient:   Short Term Goals: To be achieved in: 2 weeks 2/27/20  1. Independent in HEP and progression per patient tolerance, in order to prevent re-injury. []? Progressing: []? Met: []? Not Met: []? Adjusted   2. Patient will have a decrease in pain to facilitate improvement in movement, function, and ADLs as indicated by Functional Deficits. []? Progressing: []? Met: []? Not Met: []? Adjusted      Long Term Goals: To be achieved in: 4-6 weeks 3/12/20-3/26/20  1. Disability index score of 5% or less for the UEFS to assist with reaching prior level of function. []? Progressing: []? Met: []? Not Met: []? Adjusted  2. Patient will demonstrate increased AROM equal to R shoulder to allow for proper joint functioning as indicated by patients Functional Deficits. []? Progressing: []? Met: []? Not Met: []? Adjusted  3. Patient will demonstrate an increase in LUE strength to 4+/5 or greater to allow for proper functional mobility as indicated by patients Functional Deficits. []? Progressing: []? Met: []? Not Met: []? Adjusted  4. Patient will return to ADLs, IADLs and functional activities without increased symptoms or restriction. Overall Progression Towards Functional goals/ Treatment Progress Update:  [] Patient is progressing as expected towards functional goals listed. [] Progression is slowed due to complexities/Impairments listed. [] Progression has been slowed due to co-morbidities.   [x] Plan just implemented, too soon to assess goals progression <30days   [] Goals require adjustment due to lack of progress  [] Patient is not progressing as expected and requires additional follow up with physician  [] Other    Prognosis for POC: [x] Good [] Fair  [] Poor      Patient requires continued skilled intervention: [x] Yes  [] No    Treatment/Activity Tolerance:  [x] Patient able to complete treatment  [] Patient limited by fatigue  [] Patient limited by pain     [] Patient limited by other medical complications  [] Other: Pt requires reminders how reps/sets and initiation of each exercise, but she is then able to complete correctly. Requires tactile cueing every few reps during TB ext to ensure pt is getting proper scap movement/trap activation. Pt tolerated session well and without pain. Pt requires PT follow up to address strength and functional mobility deficits. PLAN: See eval  [x] Continue per plan of care [] Alter current plan (see comments above)  [] Plan of care initiated [] Hold pending MD visit [] Discharge    Electronically signed by:  Jaziel Mac PT, DPT  Physical Therapist  [id number]  [email or web address]    Note: If patient does not return for scheduled/ recommended follow up visits, this note will serve as a discharge from care along with most recent update on progress.

## 2020-02-24 ENCOUNTER — TELEPHONE (OUTPATIENT)
Dept: INTERNAL MEDICINE CLINIC | Age: 80
End: 2020-02-24

## 2020-02-24 DIAGNOSIS — I48.11 LONGSTANDING PERSISTENT ATRIAL FIBRILLATION (HCC): ICD-10-CM

## 2020-02-24 LAB
A/G RATIO: 2.1 (ref 1.1–2.2)
ALBUMIN SERPL-MCNC: 4.2 G/DL (ref 3.4–5)
ALP BLD-CCNC: 96 U/L (ref 40–129)
ALT SERPL-CCNC: 10 U/L (ref 10–40)
ANION GAP SERPL CALCULATED.3IONS-SCNC: 17 MMOL/L (ref 3–16)
AST SERPL-CCNC: 23 U/L (ref 15–37)
BASOPHILS ABSOLUTE: 0 K/UL (ref 0–0.2)
BASOPHILS RELATIVE PERCENT: 0.7 %
BILIRUB SERPL-MCNC: 0.5 MG/DL (ref 0–1)
BUN BLDV-MCNC: 14 MG/DL (ref 7–20)
CALCIUM SERPL-MCNC: 9.8 MG/DL (ref 8.3–10.6)
CHLORIDE BLD-SCNC: 93 MMOL/L (ref 99–110)
CO2: 26 MMOL/L (ref 21–32)
CREAT SERPL-MCNC: 0.7 MG/DL (ref 0.6–1.2)
EOSINOPHILS ABSOLUTE: 0.1 K/UL (ref 0–0.6)
EOSINOPHILS RELATIVE PERCENT: 1.4 %
GFR AFRICAN AMERICAN: >60
GFR NON-AFRICAN AMERICAN: >60
GLOBULIN: 2 G/DL
GLUCOSE BLD-MCNC: 93 MG/DL (ref 70–99)
HCT VFR BLD CALC: 42 % (ref 36–48)
HEMOGLOBIN: 14.3 G/DL (ref 12–16)
LYMPHOCYTES ABSOLUTE: 1 K/UL (ref 1–5.1)
LYMPHOCYTES RELATIVE PERCENT: 22 %
MCH RBC QN AUTO: 32.1 PG (ref 26–34)
MCHC RBC AUTO-ENTMCNC: 34 G/DL (ref 31–36)
MCV RBC AUTO: 94.5 FL (ref 80–100)
MONOCYTES ABSOLUTE: 0.4 K/UL (ref 0–1.3)
MONOCYTES RELATIVE PERCENT: 9.3 %
NEUTROPHILS ABSOLUTE: 3.1 K/UL (ref 1.7–7.7)
NEUTROPHILS RELATIVE PERCENT: 66.6 %
PDW BLD-RTO: 16.3 % (ref 12.4–15.4)
PLATELET # BLD: 152 K/UL (ref 135–450)
PMV BLD AUTO: 10.1 FL (ref 5–10.5)
POTASSIUM SERPL-SCNC: 3.6 MMOL/L (ref 3.5–5.1)
RBC # BLD: 4.45 M/UL (ref 4–5.2)
SODIUM BLD-SCNC: 136 MMOL/L (ref 136–145)
TOTAL PROTEIN: 6.2 G/DL (ref 6.4–8.2)
WBC # BLD: 4.6 K/UL (ref 4–11)

## 2020-02-25 ENCOUNTER — HOSPITAL ENCOUNTER (OUTPATIENT)
Dept: PHYSICAL THERAPY | Age: 80
Setting detail: THERAPIES SERIES
Discharge: HOME | End: 2020-02-25
Payer: MEDICARE

## 2020-02-25 NOTE — FLOWSHEET NOTE
The [facility]    Physical Therapy  Cancellation/No-show Note  Patient Name:  Rylee Beal  :  1940   Date:  2020  Cancelled visits to date: 1  No-shows to date: 0    For today's appointment patient:  [x]  Cancelled  []  Rescheduled appointment  []  No-show     Reason given by patient:  []  Patient ill  []  Conflicting appointment   []  No transportation    []  Conflict with work  [x]  No reason given  []  Other:     Comments:      Electronically signed by:  Flavio Ruiz PT, DPT  Physical Therapist  [id number]  [email or web address]

## 2020-02-28 ENCOUNTER — HOSPITAL ENCOUNTER (OUTPATIENT)
Dept: PHYSICAL THERAPY | Age: 80
Setting detail: THERAPIES SERIES
Discharge: HOME | End: 2020-02-28
Payer: MEDICARE

## 2020-02-28 PROCEDURE — 97110 THERAPEUTIC EXERCISES: CPT | Performed by: PHYSICAL THERAPIST

## 2020-03-02 ENCOUNTER — ANTI-COAG VISIT (OUTPATIENT)
Dept: INTERNAL MEDICINE CLINIC | Age: 80
End: 2020-03-02

## 2020-03-02 DIAGNOSIS — I48.11 LONGSTANDING PERSISTENT ATRIAL FIBRILLATION (HCC): ICD-10-CM

## 2020-03-02 DIAGNOSIS — Z79.01 LONG TERM CURRENT USE OF ANTICOAGULANT THERAPY: ICD-10-CM

## 2020-03-02 LAB
INR BLD: 1.16 (ref 0.86–1.14)
PROTHROMBIN TIME: 13.5 SEC (ref 10–13.2)

## 2020-03-03 ENCOUNTER — HOSPITAL ENCOUNTER (OUTPATIENT)
Dept: PHYSICAL THERAPY | Age: 80
Setting detail: THERAPIES SERIES
Discharge: HOME | End: 2020-03-03
Payer: MEDICARE

## 2020-03-03 PROCEDURE — 97110 THERAPEUTIC EXERCISES: CPT | Performed by: PHYSICAL THERAPIST

## 2020-03-03 NOTE — FLOWSHEET NOTE
The [address] [place] [address] [facility], Blayne Trujillo    Physical Therapy Daily Treatment Note  Date:  3/3/2020    Patient Name:  Laya Cotton    :  1940  MRN: [id number]  Restrictions/Precautions:    Medical/Treatment Diagnosis Information:  · Diagnosis: S42.032A (ICD-10-CM) - Closed displaced fracture of acromial end of left clavicle, initial encounter  · Treatment Diagnosis: M25.612, M25.512, C82.9  Insurance/Certification information:  PT Insurance Information: PT BENEFITS  FACILITY/ MEDICARE PRIMARY/ PAYS 80%/ NO VISIT LIMIT MED NEC/ [employer] SECONDARY/ 20 PAG  Physician Information:  Referring Practitioner: Chris Shi MD  Has the plan of care been signed (Y/N):        []  Yes  [x]  No     Date of Patient follow up with Physician: 3/18/20      Is this a Progress Report:     []  Yes  [x]  No        If Yes:  Date Range for reporting period:  Beginning  Ending    Progress report will be due (10 Rx or 30 days whichever is less): 3/81/02      Recertification will be due (POC Duration  / 90 days whichever is less): 3/26/20         Visit # Insurance Allowable Auth Required   5 MEDICARE []  Yes [x]  No        Functional Scale:    Date assessed:   UEFI 73/80=91.25% (8.75% deficit)  20    Latex Allergy:  [x]NO      []YES  Preferred Language for Healthcare:   [x]English       []other:    Pain level:  0/10     SUBJECTIVE:  Pt states her shoulder feels good, no complaints.      OBJECTIVE:              ROM PROM AROM  Comment     L R L R     Flexion     148 160     Abduction     165 180     ER     80 78     IR     38 50           Strength L R Comment   Flexion 4-/5 4/5     Abduction 4-/5 4/5     ER 4/5 4/5     IR 4/5 4/5     Upper Trap 4/5 4/5     Lower Trap 4-/5 4-/5     Mid Trap 4-/5 4-/5     Biceps 4/5 4/5     Triceps 4/5 4/5        Special Tests Results/Comment   Juvenal-Devaughn -   N[place] -   Speeds -   [place]ri[place] -   Apprehension  Not assessed   Load & Shift  Not assessed   Apley[place]: T3 B  BB: T 10 B               Reflexes/Sensation:               [x]? Dermatomes/Myotomes intact               []? Reflexes equal and normal bilaterally, not assessed               []? Other:     Joint mobility:               []? Normal               [x]? Hypo              []? Hyper     Palpation: WNL     Functional Mobility/Transfers: Independent     Posture: WFL    RESTRICTIONS/PRECAUTIONS:     Exercises/Interventions:   Exercises:  Exercise/Equipment Resistance/Repetitions Other comments   Stretching/PROM     Wand     Wall Slides 10x10\" flex/ABD    UE [place]     Pulleys     Pendulum          Isometrics     Retraction          Weight shift     Flexion     Abduction     External Rotation     Internal Rotation     Biceps     Triceps          PRE's     Flexion     Abduction     External Rotation 3x10 1# Sidelying   Internal Rotation     Shrugs     EXT     Reverse Flys     Serratus 3x10 2# wand    Horizontal Abd with ER     Biceps 3x10 3#    Triceps     Retraction 3x10         Cable Column/Theraband     External Rotation     Internal Rotation 3x15 green    Shrugs     Lats     Ext 3x10 green Requires tactile cueing for proper scap activation throughout exercise   Flex     Scapular Retraction 3x10 blue    BIC     TRIC 3x10 blue    PNF     W ER 3x15 green         Dynamic Stability          Plyoback          Manual interventions                 Plan for next session: progress as tolerated    Therapeutic Exercise and NMR EXR  [x] (09916) Provided verbal/tactile cueing for activities related to strengthening, flexibility, endurance, ROM  for improvements in scapular, scapulothoracic and UE control with self care, reaching, carrying, lifting, house/yardwork, driving/computer work.    [] (30722) Provided verbal/tactile cueing for activities related to improving balance, coordination, kinesthetic sense, posture, motor skill, proprioception  to assist with  scapular, scapulothoracic and UE control with self care, reaching, carrying, lifting, house/yardwork, driving/computer work. Therapeutic Activities:    [] (69594 or 45940) Provided verbal/tactile cueing for activities related to improving balance, coordination, kinesthetic sense, posture, motor skill, proprioception and motor activation to allow for proper function of scapular, scapulothoracic and UE control with self care, carrying, lifting, driving/computer work. Home Exercise Program:   [place] [facility]   Initiated 2/13/20. Printed hand out given. Pt demonstrated proper form of each exercise and expressed verbal understanding of frequency and duration. Updated 3/3/20. Printed hand out given.   [x] (19363) Reviewed/Progressed HEP activities related to strengthening, flexibility, endurance, ROM of scapular, scapulothoracic and UE control with self care, reaching, carrying, lifting, house/yardwork, driving/computer work  [] (13309) Reviewed/Progressed HEP activities related to improving balance, coordination, kinesthetic sense, posture, motor skill, proprioception of scapular, scapulothoracic and UE control with self care, reaching, carrying, lifting, house/yardwork, driving/computer work      Manual Treatments:    [] (48639) Provided manual therapy to mobilize soft tissue/joints of cervical/CT, scapular GHJ and UE for the purpose of modulating pain, promoting relaxation,  increasing ROM, reducing/eliminating soft tissue swelling/inflammation/restriction, improving soft tissue extensibility and allowing for proper ROM for normal function with self care, reaching, carrying, lifting, house/yardwork, driving/computer work    Modalities:  None    Charges:  Timed Code Treatment Minutes: 38   Total Treatment Minutes: 38   Time in: 10:30  Time out: 11:08    [] EVAL (LOW) 28220 (typically 20 minutes face-to-face)  [] EVAL (MOD) 13704 (typically 30 minutes face-to-face)  [] EVAL (HIGH) 03904 (typically 45 minutes face-to-face)  [] RE-EVAL     [x] IG(25364) x 3    []

## 2020-03-05 ENCOUNTER — HOSPITAL ENCOUNTER (OUTPATIENT)
Dept: PHYSICAL THERAPY | Age: 80
Setting detail: THERAPIES SERIES
Discharge: HOME | End: 2020-03-05
Payer: MEDICARE

## 2020-03-05 PROCEDURE — 97110 THERAPEUTIC EXERCISES: CPT | Performed by: PHYSICAL THERAPIST

## 2020-03-05 NOTE — FLOWSHEET NOTE
B  BB: T 10 B               Reflexes/Sensation:               [x]? Dermatomes/Myotomes intact               []? Reflexes equal and normal bilaterally, not assessed               []? Other:     Joint mobility:               []? Normal               [x]? Hypo              []? Hyper     Palpation: WNL     Functional Mobility/Transfers: Independent     Posture: WFL    RESTRICTIONS/PRECAUTIONS:     Exercises/Interventions:   Exercises:  Exercise/Equipment Resistance/Repetitions Other comments   Stretching/PROM     Wand     Wall Slides 10x10\" flex/ABD    UE [place]     Pulleys     Pendulum          Isometrics     Retraction          Weight shift     Flexion     Abduction     External Rotation     Internal Rotation     Biceps     Triceps          PRE's     Flexion     Abduction     External Rotation 3x10 1# Sidelying   Internal Rotation     Shrugs     EXT     Reverse Flys     Serratus 3x10 2# wand    Horizontal Abd with ER     Biceps 3x10 3#    Triceps     Retraction 3x10         Cable Column/Theraband     External Rotation     Internal Rotation 3x15 green    Shrugs     Lats     Ext 3x10 green Requires tactile cueing for proper scap activation throughout exercise   Flex     Scapular Retraction 3x15 blue    BIC     TRIC 3x10 blue    PNF     W ER 3x15 green         Dynamic Stability          Plyoback          Manual interventions                 Plan for next session: progress as tolerated    Therapeutic Exercise and NMR EXR  [x] (30385) Provided verbal/tactile cueing for activities related to strengthening, flexibility, endurance, ROM  for improvements in scapular, scapulothoracic and UE control with self care, reaching, carrying, lifting, house/yardwork, driving/computer work.    [] (22141) Provided verbal/tactile cueing for activities related to improving balance, coordination, kinesthetic sense, posture, motor skill, proprioception  to assist with  scapular, scapulothoracic and UE control with self care, reaching, carrying, lifting, house/yardwork, driving/computer work. Therapeutic Activities:    [] (98968 or 65037) Provided verbal/tactile cueing for activities related to improving balance, coordination, kinesthetic sense, posture, motor skill, proprioception and motor activation to allow for proper function of scapular, scapulothoracic and UE control with self care, carrying, lifting, driving/computer work. Home Exercise Program:   Kevin Liz access code: [facility]   Initiated 2/13/20. Printed hand out given. Pt demonstrated proper form of each exercise and expressed verbal understanding of frequency and duration. Updated 3/3/20. Printed hand out given.   [x] (15281) Reviewed/Progressed HEP activities related to strengthening, flexibility, endurance, ROM of scapular, scapulothoracic and UE control with self care, reaching, carrying, lifting, house/yardwork, driving/computer work  [] (41725) Reviewed/Progressed HEP activities related to improving balance, coordination, kinesthetic sense, posture, motor skill, proprioception of scapular, scapulothoracic and UE control with self care, reaching, carrying, lifting, house/yardwork, driving/computer work      Manual Treatments:    [] (26675) Provided manual therapy to mobilize soft tissue/joints of cervical/CT, scapular GHJ and UE for the purpose of modulating pain, promoting relaxation,  increasing ROM, reducing/eliminating soft tissue swelling/inflammation/restriction, improving soft tissue extensibility and allowing for proper ROM for normal function with self care, reaching, carrying, lifting, house/yardwork, driving/computer work    Modalities:  None    Charges:  Timed Code Treatment Minutes: 23   Total Treatment Minutes: 23   Time in: 11:00  Time out: 11:36    [] EVAL (LOW) 61676 (typically 20 minutes face-to-face)  [] EVAL (MOD) 95924 (typically 30 minutes face-to-face)  [] EVAL (HIGH) 50847 (typically 45 minutes face-to-face)  [] RE-EVAL     [x] AVILES(80628) x 2    [] IONTO  [] with physician  [] Other    Prognosis for POC: [x] Good [] Fair  [] Poor      Patient requires continued skilled intervention: [x] Yes  [] No    Treatment/Activity Tolerance:  [x] Patient able to complete treatment  [] Patient limited by fatigue  [] Patient limited by pain     [] Patient limited by other medical complications  [] Other: Pt tolerates session well and without pain, notes fatigue with exercises. Requires reminders on rep/set counts and VC for initial exercise set up. Pt requires PT follow up to address strength and functional mobility deficits. PLAN: See eval  [x] Continue per plan of care [] Alter current plan (see comments above)  [] Plan of care initiated [] Hold pending MD visit [] Discharge    Electronically signed by:  May Anderson PT, DPT  Physical Therapist  [id number]  [email or web address]    Note: If patient does not return for scheduled/ recommended follow up visits, this note will serve as a discharge from care along with most recent update on progress.

## 2020-03-09 ENCOUNTER — HOSPITAL ENCOUNTER (OUTPATIENT)
Dept: PHYSICAL THERAPY | Age: 80
Setting detail: THERAPIES SERIES
Discharge: HOME | End: 2020-03-09
Payer: MEDICARE

## 2020-03-09 ENCOUNTER — ANTI-COAG VISIT (OUTPATIENT)
Dept: INTERNAL MEDICINE CLINIC | Age: 80
End: 2020-03-09

## 2020-03-09 DIAGNOSIS — I48.11 LONGSTANDING PERSISTENT ATRIAL FIBRILLATION (HCC): ICD-10-CM

## 2020-03-09 DIAGNOSIS — Z79.01 LONG TERM CURRENT USE OF ANTICOAGULANT THERAPY: ICD-10-CM

## 2020-03-09 LAB
INR BLD: 1.98 (ref 0.86–1.14)
PROTHROMBIN TIME: 23.1 SEC (ref 10–13.2)

## 2020-03-09 PROCEDURE — 97110 THERAPEUTIC EXERCISES: CPT | Performed by: PHYSICAL THERAPIST

## 2020-03-09 NOTE — FLOWSHEET NOTE
reaching, carrying, lifting, house/yardwork, driving/computer work. Therapeutic Activities:    [] (30407 or 28333) Provided verbal/tactile cueing for activities related to improving balance, coordination, kinesthetic sense, posture, motor skill, proprioception and motor activation to allow for proper function of scapular, scapulothoracic and UE control with self care, carrying, lifting, driving/computer work. Home Exercise Program:   [facility]   Initiated 2/13/20. Printed hand out given. Pt demonstrated proper form of each exercise and expressed verbal understanding of frequency and duration. Updated 3/3/20. Printed hand out given.   [x] (68456) Reviewed/Progressed HEP activities related to strengthening, flexibility, endurance, ROM of scapular, scapulothoracic and UE control with self care, reaching, carrying, lifting, house/yardwork, driving/computer work  [] (10365) Reviewed/Progressed HEP activities related to improving balance, coordination, kinesthetic sense, posture, motor skill, proprioception of scapular, scapulothoracic and UE control with self care, reaching, carrying, lifting, house/yardwork, driving/computer work      Manual Treatments:    [] (04772) Provided manual therapy to mobilize soft tissue/joints of cervical/CT, scapular GHJ and UE for the purpose of modulating pain, promoting relaxation,  increasing ROM, reducing/eliminating soft tissue swelling/inflammation/restriction, improving soft tissue extensibility and allowing for proper ROM for normal function with self care, reaching, carrying, lifting, house/yardwork, driving/computer work    Modalities:  None    Charges:  Timed Code Treatment Minutes: 30   Total Treatment Minutes: 30   Time in: 1:00  Time out: 1:48    [] EVAL (LOW) 38837 (typically 20 minutes face-to-face)  [] EVAL (MOD) 15023 (typically 30 minutes face-to-face)  [] EVAL (HIGH) 55524 (typically 45 minutes face-to-face)  [] RE-EVAL     [x] JX(52891) x 2 [] IONTO  [] NMR (41468) x     [] VASO  [] Manual (85554) x      [] Other:  [] TA x      [] Mech Traction (99274)  [] ES(attended) (13686)      [] ES (un) (00167):     GOALS:  Patient stated goal: get back to normal tasks, have same ROM as R shoulder    []? Progressing: []? Met: []? Not Met: []? Adjusted     Therapist goals for Patient:   Short Term Goals: To be achieved in: 2 weeks 2/27/20  1. Independent in HEP and progression per patient tolerance, in order to prevent re-injury. []? Progressing: []? Met: []? Not Met: []? Adjusted   2. Patient will have a decrease in pain to facilitate improvement in movement, function, and ADLs as indicated by Functional Deficits. []? Progressing: []? Met: []? Not Met: []? Adjusted      Long Term Goals: To be achieved in: 4-6 weeks 3/12/20-3/26/20  1. Disability index score of 5% or less for the UEFS to assist with reaching prior level of function. []? Progressing: []? Met: []? Not Met: []? Adjusted  2. Patient will demonstrate increased AROM equal to R shoulder to allow for proper joint functioning as indicated by patients Functional Deficits. []? Progressing: []? Met: []? Not Met: []? Adjusted  3. Patient will demonstrate an increase in LUE strength to 4+/5 or greater to allow for proper functional mobility as indicated by patients Functional Deficits. []? Progressing: []? Met: []? Not Met: []? Adjusted  4. Patient will return to ADLs, IADLs and functional activities without increased symptoms or restriction. Overall Progression Towards Functional goals/ Treatment Progress Update:  [] Patient is progressing as expected towards functional goals listed. [] Progression is slowed due to complexities/Impairments listed. [] Progression has been slowed due to co-morbidities.   [x] Plan just implemented, too soon to assess goals progression <30days   [] Goals require adjustment due to lack of progress  [] Patient is not progressing as expected and requires additional follow up with physician  [] Other    Prognosis for POC: [x] Good [] Fair  [] Poor      Patient requires continued skilled intervention: [x] Yes  [] No    Treatment/Activity Tolerance:  [x] Patient able to complete treatment  [] Patient limited by fatigue  [] Patient limited by pain     [] Patient limited by other medical complications  [] Other: Pt tolerates session well and without pain, notes fatigue with exercises. Tolerated strengthening progressions with VC for scapular control and to maintain elbow flexion during ER exercises. Requires reminders on rep/set counts and VC for initial exercise set up. Pt requires PT follow up to address strength and functional mobility deficits. PLAN: See eval  [x] Continue per plan of care [] Alter current plan (see comments above)  [] Plan of care initiated [] Hold pending MD visit [] Discharge    Electronically signed by:  Marcelo Naik, PT, DPT      Note: If patient does not return for scheduled/ recommended follow up visits, this note will serve as a discharge from care along with most recent update on progress.

## 2020-03-10 ENCOUNTER — TELEPHONE (OUTPATIENT)
Dept: INTERNAL MEDICINE CLINIC | Age: 80
End: 2020-03-10

## 2020-03-10 NOTE — TELEPHONE ENCOUNTER
The patient's  was in to get lab results and indicated it effects her meds. Please advise if any change in meds, can leave a message.  Please advise

## 2020-03-11 ENCOUNTER — ANTI-COAG VISIT (OUTPATIENT)
Dept: INTERNAL MEDICINE CLINIC | Age: 80
End: 2020-03-11

## 2020-03-12 ENCOUNTER — OFFICE VISIT (OUTPATIENT)
Dept: INTERNAL MEDICINE CLINIC | Age: 80
End: 2020-03-12
Payer: MEDICARE

## 2020-03-12 ENCOUNTER — HOSPITAL ENCOUNTER (OUTPATIENT)
Dept: CT IMAGING | Age: 80
Discharge: HOME | DRG: 545 | End: 2020-03-12
Payer: MEDICARE

## 2020-03-12 ENCOUNTER — APPOINTMENT (OUTPATIENT)
Dept: PHYSICAL THERAPY | Age: 80
End: 2020-03-12
Payer: MEDICARE

## 2020-03-12 VITALS
BODY MASS INDEX: 23.78 KG/M2 | WEIGHT: 148 LBS | HEIGHT: 66 IN | SYSTOLIC BLOOD PRESSURE: 136 MMHG | DIASTOLIC BLOOD PRESSURE: 70 MMHG

## 2020-03-12 DIAGNOSIS — Z86.59 MENTAL STATUS CHANGE RESOLVED: ICD-10-CM

## 2020-03-12 LAB
A/G RATIO: 1.8 (ref 1.1–2.2)
ALBUMIN SERPL-MCNC: 4.4 G/DL (ref 3.4–5)
ALP BLD-CCNC: 98 U/L (ref 40–129)
ALT SERPL-CCNC: 13 U/L (ref 10–40)
AMPHETAMINE SCREEN, URINE: NORMAL
ANION GAP SERPL CALCULATED.3IONS-SCNC: 16 MMOL/L (ref 3–16)
AST SERPL-CCNC: 27 U/L (ref 15–37)
BARBITURATE SCREEN URINE: NORMAL
BASOPHILS ABSOLUTE: 0 K/UL (ref 0–0.2)
BASOPHILS RELATIVE PERCENT: 0.8 %
BENZODIAZEPINE SCREEN, URINE: NORMAL
BILIRUB SERPL-MCNC: 0.5 MG/DL (ref 0–1)
BILIRUBIN URINE: NEGATIVE
BLOOD, URINE: NEGATIVE
BUN BLDV-MCNC: 14 MG/DL (ref 7–20)
CALCIUM SERPL-MCNC: 10 MG/DL (ref 8.3–10.6)
CANNABINOID SCREEN URINE: NORMAL
CHLORIDE BLD-SCNC: 98 MMOL/L (ref 99–110)
CLARITY: CLEAR
CO2: 27 MMOL/L (ref 21–32)
COCAINE METABOLITE SCREEN URINE: NORMAL
COLOR: YELLOW
CREAT SERPL-MCNC: 0.7 MG/DL (ref 0.6–1.2)
EOSINOPHILS ABSOLUTE: 0 K/UL (ref 0–0.6)
EOSINOPHILS RELATIVE PERCENT: 0.7 %
GFR AFRICAN AMERICAN: >60
GFR NON-AFRICAN AMERICAN: >60
GLOBULIN: 2.4 G/DL
GLUCOSE BLD-MCNC: 96 MG/DL (ref 70–99)
GLUCOSE URINE: NEGATIVE MG/DL
HCT VFR BLD CALC: 42 % (ref 36–48)
HEMOGLOBIN: 14.1 G/DL (ref 12–16)
KETONES, URINE: NEGATIVE MG/DL
LEUKOCYTE ESTERASE, URINE: NEGATIVE
LYMPHOCYTES ABSOLUTE: 1.1 K/UL (ref 1–5.1)
LYMPHOCYTES RELATIVE PERCENT: 22.5 %
Lab: NORMAL
MCH RBC QN AUTO: 32.1 PG (ref 26–34)
MCHC RBC AUTO-ENTMCNC: 33.5 G/DL (ref 31–36)
MCV RBC AUTO: 95.7 FL (ref 80–100)
METHADONE SCREEN, URINE: NORMAL
MICROSCOPIC EXAMINATION: NORMAL
MONOCYTES ABSOLUTE: 0.4 K/UL (ref 0–1.3)
MONOCYTES RELATIVE PERCENT: 8.6 %
NEUTROPHILS ABSOLUTE: 3.4 K/UL (ref 1.7–7.7)
NEUTROPHILS RELATIVE PERCENT: 67.4 %
NITRITE, URINE: NEGATIVE
OPIATE SCREEN URINE: NORMAL
OXYCODONE URINE: NORMAL
PDW BLD-RTO: 15.9 % (ref 12.4–15.4)
PH UA: 6.5
PH UA: 6.5 (ref 5–8)
PHENCYCLIDINE SCREEN URINE: NORMAL
PLATELET # BLD: 174 K/UL (ref 135–450)
PMV BLD AUTO: 10.1 FL (ref 5–10.5)
POTASSIUM SERPL-SCNC: 3.6 MMOL/L (ref 3.5–5.1)
PROPOXYPHENE SCREEN: NORMAL
PROTEIN UA: NEGATIVE MG/DL
RBC # BLD: 4.39 M/UL (ref 4–5.2)
SODIUM BLD-SCNC: 141 MMOL/L (ref 136–145)
SPECIFIC GRAVITY UA: 1.01 (ref 1–1.03)
TOTAL PROTEIN: 6.8 G/DL (ref 6.4–8.2)
TSH SERPL DL<=0.05 MIU/L-ACNC: 2.51 UIU/ML (ref 0.27–4.2)
URINE TYPE: NORMAL
UROBILINOGEN, URINE: 0.2 E.U./DL
WBC # BLD: 5 K/UL (ref 4–11)

## 2020-03-12 PROCEDURE — 1090F PRES/ABSN URINE INCON ASSESS: CPT | Performed by: INTERNAL MEDICINE

## 2020-03-12 PROCEDURE — G8400 PT W/DXA NO RESULTS DOC: HCPCS | Performed by: INTERNAL MEDICINE

## 2020-03-12 PROCEDURE — 99214 OFFICE O/P EST MOD 30 MIN: CPT | Performed by: INTERNAL MEDICINE

## 2020-03-12 PROCEDURE — 1036F TOBACCO NON-USER: CPT | Performed by: INTERNAL MEDICINE

## 2020-03-12 PROCEDURE — 4040F PNEUMOC VAC/ADMIN/RCVD: CPT | Performed by: INTERNAL MEDICINE

## 2020-03-12 PROCEDURE — G8420 CALC BMI NORM PARAMETERS: HCPCS | Performed by: INTERNAL MEDICINE

## 2020-03-12 PROCEDURE — 70450 CT HEAD/BRAIN W/O DYE: CPT

## 2020-03-12 PROCEDURE — G8482 FLU IMMUNIZE ORDER/ADMIN: HCPCS | Performed by: INTERNAL MEDICINE

## 2020-03-12 PROCEDURE — 1123F ACP DISCUSS/DSCN MKR DOCD: CPT | Performed by: INTERNAL MEDICINE

## 2020-03-12 PROCEDURE — G8427 DOCREV CUR MEDS BY ELIG CLIN: HCPCS | Performed by: INTERNAL MEDICINE

## 2020-03-12 ASSESSMENT — ENCOUNTER SYMPTOMS
COUGH: 0
SHORTNESS OF BREATH: 0
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
## 2020-03-13 ENCOUNTER — HOSPITAL ENCOUNTER (OUTPATIENT)
Dept: PHYSICAL THERAPY | Age: 80
Setting detail: THERAPIES SERIES
Discharge: HOME | End: 2020-03-13
Payer: MEDICARE

## 2020-03-13 NOTE — FLOWSHEET NOTE
[facility]    Physical Therapy  Cancellation/No-show Note  Patient Name:  Eugene Leigh  :  1940   Date:  3/13/2020  Cancelled visits to date: 1  No-shows to date: 0    For today's appointment patient:  [x]  Cancelled  []  Rescheduled appointment  []  No-show     Reason given by patient:  []  Patient ill  []  Conflicting appointment   []  No transportation    []  Conflict with work  [x]  No reason given  []  Other:     Comments:      Electronically signed by:  Dereje Moraes PT

## 2020-03-15 ENCOUNTER — HOSPITAL ENCOUNTER (INPATIENT)
Age: 80
LOS: 3 days | Discharge: NURSING, REHAB OR LONG-TERM CARE | DRG: 545 | End: 2020-03-18
Attending: EMERGENCY MEDICINE | Admitting: INTERNAL MEDICINE
Payer: MEDICARE

## 2020-03-15 ENCOUNTER — APPOINTMENT (OUTPATIENT)
Dept: GENERAL RADIOLOGY | Age: 80
DRG: 545 | End: 2020-03-15
Payer: MEDICARE

## 2020-03-15 PROBLEM — R41.82 ALTERED MENTAL STATUS: Status: ACTIVE | Noted: 2020-03-15

## 2020-03-15 LAB
A/G RATIO: 1.4 (ref 1.1–2.2)
ALBUMIN SERPL-MCNC: 4.2 G/DL (ref 3.4–5)
ALP BLD-CCNC: 89 U/L (ref 40–129)
ALT SERPL-CCNC: 12 U/L (ref 10–40)
ANION GAP SERPL CALCULATED.3IONS-SCNC: 15 MMOL/L (ref 3–16)
APTT: 46.3 SEC (ref 24.2–36.2)
AST SERPL-CCNC: 38 U/L (ref 15–37)
BASOPHILS ABSOLUTE: 0.1 K/UL (ref 0–0.2)
BASOPHILS RELATIVE PERCENT: 1.2 %
BILIRUB SERPL-MCNC: 0.7 MG/DL (ref 0–1)
BILIRUBIN URINE: NEGATIVE
BLOOD, URINE: NEGATIVE
BUN BLDV-MCNC: 16 MG/DL (ref 7–20)
CALCIUM SERPL-MCNC: 10 MG/DL (ref 8.3–10.6)
CHLORIDE BLD-SCNC: 96 MMOL/L (ref 99–110)
CLARITY: CLEAR
CO2: 26 MMOL/L (ref 21–32)
COLOR: YELLOW
CREAT SERPL-MCNC: 0.6 MG/DL (ref 0.6–1.2)
DIGOXIN LEVEL: 1.3 NG/ML (ref 0.8–2)
EOSINOPHILS ABSOLUTE: 0 K/UL (ref 0–0.6)
EOSINOPHILS RELATIVE PERCENT: 0.3 %
GFR AFRICAN AMERICAN: >60
GFR NON-AFRICAN AMERICAN: >60
GLOBULIN: 3 G/DL
GLUCOSE BLD-MCNC: 109 MG/DL (ref 70–99)
GLUCOSE URINE: NEGATIVE MG/DL
HCT VFR BLD CALC: 43.2 % (ref 36–48)
HEMOGLOBIN: 14.5 G/DL (ref 12–16)
INR BLD: 4.51 (ref 0.86–1.14)
KETONES, URINE: NEGATIVE MG/DL
LEUKOCYTE ESTERASE, URINE: NEGATIVE
LYMPHOCYTES ABSOLUTE: 0.8 K/UL (ref 1–5.1)
LYMPHOCYTES RELATIVE PERCENT: 18.1 %
MCH RBC QN AUTO: 32.1 PG (ref 26–34)
MCHC RBC AUTO-ENTMCNC: 33.5 G/DL (ref 31–36)
MCV RBC AUTO: 95.9 FL (ref 80–100)
MICROSCOPIC EXAMINATION: NORMAL
MONOCYTES ABSOLUTE: 0.5 K/UL (ref 0–1.3)
MONOCYTES RELATIVE PERCENT: 10.3 %
NEUTROPHILS ABSOLUTE: 3.2 K/UL (ref 1.7–7.7)
NEUTROPHILS RELATIVE PERCENT: 70.1 %
NITRITE, URINE: NEGATIVE
PDW BLD-RTO: 15.7 % (ref 12.4–15.4)
PH UA: 7 (ref 5–8)
PLATELET # BLD: 191 K/UL (ref 135–450)
PMV BLD AUTO: 10.4 FL (ref 5–10.5)
POTASSIUM REFLEX MAGNESIUM: 3.9 MMOL/L (ref 3.5–5.1)
POTASSIUM SERPL-SCNC: 3.6 MMOL/L (ref 3.5–5.1)
PROTEIN UA: NEGATIVE MG/DL
PROTHROMBIN TIME: 53.1 SEC (ref 10–13.2)
RBC # BLD: 4.51 M/UL (ref 4–5.2)
SODIUM BLD-SCNC: 137 MMOL/L (ref 136–145)
SPECIFIC GRAVITY UA: 1.01 (ref 1–1.03)
TOTAL PROTEIN: 7.2 G/DL (ref 6.4–8.2)
URINE TYPE: NORMAL
UROBILINOGEN, URINE: 0.2 E.U./DL
WBC # BLD: 4.6 K/UL (ref 4–11)

## 2020-03-15 PROCEDURE — 87086 URINE CULTURE/COLONY COUNT: CPT

## 2020-03-15 PROCEDURE — 85730 THROMBOPLASTIN TIME PARTIAL: CPT

## 2020-03-15 PROCEDURE — 99285 EMERGENCY DEPT VISIT HI MDM: CPT

## 2020-03-15 PROCEDURE — 84132 ASSAY OF SERUM POTASSIUM: CPT

## 2020-03-15 PROCEDURE — 36415 COLL VENOUS BLD VENIPUNCTURE: CPT

## 2020-03-15 PROCEDURE — 85610 PROTHROMBIN TIME: CPT

## 2020-03-15 PROCEDURE — 81003 URINALYSIS AUTO W/O SCOPE: CPT

## 2020-03-15 PROCEDURE — 93005 ELECTROCARDIOGRAM TRACING: CPT | Performed by: PHYSICIAN ASSISTANT

## 2020-03-15 PROCEDURE — 71046 X-RAY EXAM CHEST 2 VIEWS: CPT

## 2020-03-15 PROCEDURE — 80053 COMPREHEN METABOLIC PANEL: CPT

## 2020-03-15 PROCEDURE — 80162 ASSAY OF DIGOXIN TOTAL: CPT

## 2020-03-15 PROCEDURE — 1200000000 HC SEMI PRIVATE

## 2020-03-15 PROCEDURE — 85025 COMPLETE CBC W/AUTO DIFF WBC: CPT

## 2020-03-15 PROCEDURE — 6370000000 HC RX 637 (ALT 250 FOR IP): Performed by: STUDENT IN AN ORGANIZED HEALTH CARE EDUCATION/TRAINING PROGRAM

## 2020-03-15 PROCEDURE — 2580000003 HC RX 258: Performed by: STUDENT IN AN ORGANIZED HEALTH CARE EDUCATION/TRAINING PROGRAM

## 2020-03-15 RX ORDER — PROMETHAZINE HYDROCHLORIDE 12.5 MG/1
12.5 TABLET ORAL EVERY 6 HOURS PRN
Status: DISCONTINUED | OUTPATIENT
Start: 2020-03-15 | End: 2020-03-18 | Stop reason: HOSPADM

## 2020-03-15 RX ORDER — SODIUM CHLORIDE 0.9 % (FLUSH) 0.9 %
10 SYRINGE (ML) INJECTION PRN
Status: DISCONTINUED | OUTPATIENT
Start: 2020-03-15 | End: 2020-03-18 | Stop reason: HOSPADM

## 2020-03-15 RX ORDER — ACETAMINOPHEN 325 MG/1
650 TABLET ORAL EVERY 6 HOURS PRN
Status: DISCONTINUED | OUTPATIENT
Start: 2020-03-15 | End: 2020-03-18 | Stop reason: HOSPADM

## 2020-03-15 RX ORDER — CELECOXIB 100 MG/1
200 CAPSULE ORAL DAILY PRN
Status: DISCONTINUED | OUTPATIENT
Start: 2020-03-15 | End: 2020-03-18 | Stop reason: HOSPADM

## 2020-03-15 RX ORDER — DIGOXIN 125 MCG
125 TABLET ORAL DAILY
Status: DISCONTINUED | OUTPATIENT
Start: 2020-03-16 | End: 2020-03-18 | Stop reason: HOSPADM

## 2020-03-15 RX ORDER — QUETIAPINE FUMARATE 25 MG/1
25 TABLET, FILM COATED ORAL ONCE
Status: COMPLETED | OUTPATIENT
Start: 2020-03-15 | End: 2020-03-15

## 2020-03-15 RX ORDER — HYDROCHLOROTHIAZIDE 25 MG/1
25 TABLET ORAL DAILY
Status: DISCONTINUED | OUTPATIENT
Start: 2020-03-16 | End: 2020-03-18 | Stop reason: HOSPADM

## 2020-03-15 RX ORDER — POLYETHYLENE GLYCOL 3350 17 G/17G
17 POWDER, FOR SOLUTION ORAL DAILY PRN
Status: DISCONTINUED | OUTPATIENT
Start: 2020-03-15 | End: 2020-03-18 | Stop reason: HOSPADM

## 2020-03-15 RX ORDER — ONDANSETRON 2 MG/ML
4 INJECTION INTRAMUSCULAR; INTRAVENOUS EVERY 6 HOURS PRN
Status: DISCONTINUED | OUTPATIENT
Start: 2020-03-15 | End: 2020-03-18 | Stop reason: HOSPADM

## 2020-03-15 RX ORDER — SPIRONOLACTONE AND HYDROCHLOROTHIAZIDE 25; 25 MG/1; MG/1
1 TABLET ORAL DAILY
Status: DISCONTINUED | OUTPATIENT
Start: 2020-03-15 | End: 2020-03-15 | Stop reason: CLARIF

## 2020-03-15 RX ORDER — SODIUM CHLORIDE 0.9 % (FLUSH) 0.9 %
10 SYRINGE (ML) INJECTION EVERY 12 HOURS SCHEDULED
Status: DISCONTINUED | OUTPATIENT
Start: 2020-03-15 | End: 2020-03-18 | Stop reason: HOSPADM

## 2020-03-15 RX ORDER — SPIRONOLACTONE 25 MG/1
25 TABLET ORAL DAILY
Status: DISCONTINUED | OUTPATIENT
Start: 2020-03-16 | End: 2020-03-18 | Stop reason: HOSPADM

## 2020-03-15 RX ORDER — ACETAMINOPHEN 650 MG/1
650 SUPPOSITORY RECTAL EVERY 6 HOURS PRN
Status: DISCONTINUED | OUTPATIENT
Start: 2020-03-15 | End: 2020-03-18 | Stop reason: HOSPADM

## 2020-03-15 RX ADMIN — Medication 10 ML: at 20:20

## 2020-03-15 RX ADMIN — QUETIAPINE FUMARATE 25 MG: 25 TABLET ORAL at 20:20

## 2020-03-15 ASSESSMENT — ENCOUNTER SYMPTOMS
BACK PAIN: 0
VOMITING: 0
COUGH: 0
SORE THROAT: 0
NAUSEA: 0
EYE DISCHARGE: 0
DIARRHEA: 0
SHORTNESS OF BREATH: 0
STRIDOR: 0
WHEEZING: 0
EYE REDNESS: 0

## 2020-03-15 ASSESSMENT — PAIN SCALES - GENERAL
PAINLEVEL_OUTOF10: 0

## 2020-03-15 NOTE — H&P
(CELEBREX) 200 MG capsule, Take 1 capsule by mouth daily as needed for Pain  · spironolactone-hydrochlorothiazide (ALDACTAZIDE) 25-25 MG per tablet, TAKE 2 TABLETS BY MOUTH ONCE DAILY  · warfarin (COUMADIN) 2.5 MG tablet, TAKE 2 TABLETS BY MOUTH ONCE DAILY OR AS DIRECTED BY THE PHYSICIAN. *NO ASPIRIN WITHOUT PHYSICIAN CONSENT  · Multiple Vitamins-Minerals (CENTRUM ADULTS) TABS, Take 1 tablet by mouth daily     Allergies:  Beta adrenergic blockers    Social History:   · TOBACCO:   reports that she has never smoked. She has never used smokeless tobacco.  · ETOH:   reports no history of alcohol use. · DRUGS : Denies  · Patient currently lives with   ·   Family History:       Problem Relation Age of Onset    Stroke Mother     Heart Attack Father     Heart Disease Father    ·     Review of Systems    ROS: A 10 point review of systems was conducted, significant findings as noted in HPI. Physical Exam  HENT:      Head: Normocephalic. Mouth/Throat:      Mouth: Mucous membranes are moist.   Eyes:      Extraocular Movements: Extraocular movements intact. Pupils: Pupils are equal, round, and reactive to light. Cardiovascular:      Rate and Rhythm: Normal rate and regular rhythm. Pulses: Normal pulses. Heart sounds: Normal heart sounds. Pulmonary:      Effort: Pulmonary effort is normal.      Breath sounds: Normal breath sounds. Abdominal:      General: Abdomen is flat. Bowel sounds are normal.      Palpations: Abdomen is soft. Skin:     General: Skin is warm. Neurological:      Mental Status: She is alert and oriented to person, place, and time. Psychiatric:         Mood and Affect: Mood is anxious. Affect is tearful. Speech: Speech is tangential.         Thought Content: Thought content is delusional.         Cognition and Memory: Memory is impaired. Comments: Appears emotionally distressed, tearing several times throughout the encounter.           Vitals:    03/15/20

## 2020-03-15 NOTE — PROGRESS NOTES
4 Eyes Admission Assessment     I agree as the admission nurse that 2 RN's have performed a thorough Head to Toe Skin Assessment on the patient. ALL assessment sites listed below have been assessed on admission. Areas assessed by both nurses:   [x]   Head, Face, and Ears   [x]   Shoulders, Back, and Chest  [x]   Arms, Elbows, and Hands   [x]   Coccyx, Sacrum, and Ischum  [x]   Legs, Feet, and Heels        Does the Patient have Skin Breakdown? She has blanchable redness to her buttocks and redness to her bLE.          Joseph Prevention initiated:  yes  Wound Care Orders initiated:  na      [facility] nurse consulted for Pressure Injury (Stage 3,4, Unstageable, DTI, NWPT, and Complex wounds):  NA      Nurse 1 eSignature: Electronically signed by Shayne Friend RN on 3/15/20 at 5:41 PM EDT    **SHARE this note so that the co-signing nurse is able to place an eSignature**    Nurse 2 eSignature: Electronically signed by Khalida Ruiz RN on 3/16/20 at 4:40 AM EDT
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
## 2020-03-16 ENCOUNTER — APPOINTMENT (OUTPATIENT)
Dept: MRI IMAGING | Age: 80
DRG: 545 | End: 2020-03-16
Payer: MEDICARE

## 2020-03-16 LAB
AMMONIA: 20 UMOL/L (ref 11–51)
ANION GAP SERPL CALCULATED.3IONS-SCNC: 14 MMOL/L (ref 3–16)
BUN BLDV-MCNC: 10 MG/DL (ref 7–20)
C-REACTIVE PROTEIN: 2 MG/L (ref 0–5.1)
CALCIUM SERPL-MCNC: 10.1 MG/DL (ref 8.3–10.6)
CHLORIDE BLD-SCNC: 97 MMOL/L (ref 99–110)
CO2: 27 MMOL/L (ref 21–32)
CREAT SERPL-MCNC: 0.6 MG/DL (ref 0.6–1.2)
EKG ATRIAL RATE: 72 BPM
EKG DIAGNOSIS: NORMAL
EKG Q-T INTERVAL: 370 MS
EKG QRS DURATION: 96 MS
EKG QTC CALCULATION (BAZETT): 440 MS
EKG R AXIS: -27 DEGREES
EKG T AXIS: 220 DEGREES
EKG VENTRICULAR RATE: 85 BPM
FOLATE: >20 NG/ML (ref 4.78–24.2)
GFR AFRICAN AMERICAN: >60
GFR NON-AFRICAN AMERICAN: >60
GLUCOSE BLD-MCNC: 91 MG/DL (ref 70–99)
HCT VFR BLD CALC: 44.3 % (ref 36–48)
HEMOGLOBIN: 15.2 G/DL (ref 12–16)
INR BLD: 4.82 (ref 0.86–1.14)
MAGNESIUM: 1.8 MG/DL (ref 1.8–2.4)
MCH RBC QN AUTO: 32.7 PG (ref 26–34)
MCHC RBC AUTO-ENTMCNC: 34.3 G/DL (ref 31–36)
MCV RBC AUTO: 95.2 FL (ref 80–100)
PDW BLD-RTO: 15.9 % (ref 12.4–15.4)
PLATELET # BLD: 165 K/UL (ref 135–450)
PMV BLD AUTO: 9.6 FL (ref 5–10.5)
POTASSIUM REFLEX MAGNESIUM: 3.5 MMOL/L (ref 3.5–5.1)
PROTHROMBIN TIME: 56.8 SEC (ref 10–13.2)
RBC # BLD: 4.65 M/UL (ref 4–5.2)
SEDIMENTATION RATE, ERYTHROCYTE: 11 MM/HR (ref 0–30)
SODIUM BLD-SCNC: 138 MMOL/L (ref 136–145)
URINE CULTURE, ROUTINE: NORMAL
VITAMIN B-12: 928 PG/ML (ref 211–911)
WBC # BLD: 4.1 K/UL (ref 4–11)

## 2020-03-16 PROCEDURE — 95819 EEG AWAKE AND ASLEEP: CPT

## 2020-03-16 PROCEDURE — 6370000000 HC RX 637 (ALT 250 FOR IP): Performed by: STUDENT IN AN ORGANIZED HEALTH CARE EDUCATION/TRAINING PROGRAM

## 2020-03-16 PROCEDURE — 80048 BASIC METABOLIC PNL TOTAL CA: CPT

## 2020-03-16 PROCEDURE — 36415 COLL VENOUS BLD VENIPUNCTURE: CPT

## 2020-03-16 PROCEDURE — 84425 ASSAY OF VITAMIN B-1: CPT

## 2020-03-16 PROCEDURE — 83735 ASSAY OF MAGNESIUM: CPT

## 2020-03-16 PROCEDURE — 0065U SYFLS TST NONTREPONEMAL ANTB: CPT

## 2020-03-16 PROCEDURE — 82746 ASSAY OF FOLIC ACID SERUM: CPT

## 2020-03-16 PROCEDURE — 85027 COMPLETE CBC AUTOMATED: CPT

## 2020-03-16 PROCEDURE — 86140 C-REACTIVE PROTEIN: CPT

## 2020-03-16 PROCEDURE — 82607 VITAMIN B-12: CPT

## 2020-03-16 PROCEDURE — 70553 MRI BRAIN STEM W/O & W/DYE: CPT

## 2020-03-16 PROCEDURE — 2580000003 HC RX 258: Performed by: STUDENT IN AN ORGANIZED HEALTH CARE EDUCATION/TRAINING PROGRAM

## 2020-03-16 PROCEDURE — 86780 TREPONEMA PALLIDUM: CPT

## 2020-03-16 PROCEDURE — 85652 RBC SED RATE AUTOMATED: CPT

## 2020-03-16 PROCEDURE — 82140 ASSAY OF AMMONIA: CPT

## 2020-03-16 PROCEDURE — 99222 1ST HOSP IP/OBS MODERATE 55: CPT | Performed by: INTERNAL MEDICINE

## 2020-03-16 PROCEDURE — 6360000004 HC RX CONTRAST MEDICATION: Performed by: STUDENT IN AN ORGANIZED HEALTH CARE EDUCATION/TRAINING PROGRAM

## 2020-03-16 PROCEDURE — A9576 INJ PROHANCE MULTIPACK: HCPCS | Performed by: STUDENT IN AN ORGANIZED HEALTH CARE EDUCATION/TRAINING PROGRAM

## 2020-03-16 PROCEDURE — 1200000000 HC SEMI PRIVATE

## 2020-03-16 PROCEDURE — 85610 PROTHROMBIN TIME: CPT

## 2020-03-16 RX ORDER — PHYTONADIONE 5 MG/1
2.5 TABLET ORAL ONCE
Status: COMPLETED | OUTPATIENT
Start: 2020-03-16 | End: 2020-03-16

## 2020-03-16 RX ADMIN — PHYTONADIONE 2.5 MG: 5 TABLET ORAL at 11:41

## 2020-03-16 RX ADMIN — DIGOXIN 125 MCG: 125 TABLET ORAL at 08:55

## 2020-03-16 RX ADMIN — Medication 10 ML: at 08:56

## 2020-03-16 RX ADMIN — HYDROCHLOROTHIAZIDE 25 MG: 25 TABLET ORAL at 08:55

## 2020-03-16 RX ADMIN — SPIRONOLACTONE 25 MG: 25 TABLET ORAL at 08:55

## 2020-03-16 RX ADMIN — GADOTERIDOL 13 ML: 279.3 INJECTION, SOLUTION INTRAVENOUS at 14:15

## 2020-03-16 RX ADMIN — Medication 10 ML: at 19:45

## 2020-03-16 ASSESSMENT — PAIN SCALES - GENERAL
PAINLEVEL_OUTOF10: 0
PAINLEVEL_OUTOF10: 0

## 2020-03-16 NOTE — PROCEDURES
If  seizures remain a clinical concern, then a repeat EEG may be of further  benefit. Clinical correlation is advised. An incidental note is made  of an irregular cardiac rhythm. Again, clinically correlate for  possible atrial fibrillation.         King Snyder DO    D: 03/16/2020 17:23:27       T: 03/16/2020 17:44:21     [id number]  Job#: [id number]     Doc#: [id number]    CC:

## 2020-03-16 NOTE — PROGRESS NOTES
RBC 4.65 03/16/2020    HGB 15.2 03/16/2020    HCT 44.3 03/16/2020    MCV 95.2 03/16/2020    MCH 32.7 03/16/2020    MCHC 34.3 03/16/2020    RDW 15.9 03/16/2020     03/16/2020    MPV 9.6 03/16/2020     BMP:    Lab Results   Component Value Date     03/16/2020    K 3.5 03/16/2020    CL 97 03/16/2020    CO2 27 03/16/2020    BUN 10 03/16/2020    LABALBU 4.2 03/15/2020    CREATININE 0.6 03/16/2020    CALCIUM 10.1 03/16/2020    GFRAA >60 03/16/2020    GFRAA >60 05/30/2013    LABGLOM >60 03/16/2020    GLUCOSE 91 03/16/2020       Hepatic:   Recent Labs     03/15/20  1240   AST 38*   ALT 12   BILITOT 0.7   ALKPHOS 89     INR:   Recent Labs     03/15/20  1240 03/16/20  0657   INR 4.51* 4.82*       Studies:  XR CHEST STANDARD (2 VW)   Final Result      No acute pulmonary pathology                  MRI BRAIN W WO CONTRAST    (Results Pending)             Scheduled Meds:   digoxin  125 mcg Oral Daily    sodium chloride flush  10 mL Intravenous 2 times per day    warfarin (COUMADIN) daily dosing (placeholder)   Other See Admin Instructions    spironolactone  25 mg Oral Daily    And    hydroCHLOROthiazide  25 mg Oral Daily        Impression:  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 Appropriate to consider possibility of digoxin toxicity.      Not enough evidence yet to point to inflammatory (or even neurodegenerative) disorder, but would consider. For example, Lewy body disease often features psychotic symptoms early in course.         Recommendations:   - MRI brain with and without contrast pending  - EEG pending  -Check Vitamin  B12, B1 and folate, RPR, Sed rate and CRP, ammonia   -May trial use of low dose seroquel, 25mg qHS. -Dependent on above tests may require LP for CSF analysis and would consider sending 14-3-3 protein, QuIC(quaking -induced conversion) and Phoslo, Total & Beta 42 TAU  -Psychiatry Consult pending.      A copy of this note was provided for MD Yan Bear, APRN-CNP  [address] [place] [address] [address] Neuroscience  [phone]  Evenings, weekends, and off weeks please discuss with the covering neurologist.

## 2020-03-16 NOTE — CARE COORDINATION
Case Management Assessment           Initial Evaluation                Date / Time of Evaluation: 3/16/2020 12:27 PM                 Assessment Completed by: Eliel Chaudhari    Patient Name: Naveen Buckley     YOB: 1940  Diagnosis: Altered mental status [R41.82]  Altered mental status [R41.82]     Date / Time: 3/15/2020 11:37 AM    Patient Admission Status: Inpatient    If patient is discharged prior to next notation, then this note serves as note for discharge by case management. Current PCP: Farhad Banegas MD  Clinic Patient: No    Chart Reviewed: Yes  Patient/ Family Interviewed: Yes    Initial assessment completed at bedside with:     Hospitalization in the last 30 days: No    Emergency Contacts:  Extended Emergency Contact Information  Primary Emergency Contact: Matthew Donnelly  Address: [address] [place] [address] [id number]           [facility], [address] Phone: [phone]  Mobile Phone: [phone]  Relation: Spouse  Secondary Emergency Contact: Matthew Donnelly  Address: [address] [place] [address] Phone: [phone]  Relation: Spouse    Advance Directives:   Code Status: [address]: No    Financial  Payor: Darwin Hart / Plan: MEDICARE PART A AND B / Product Type: *No Product type* /     Pre-cert required for SNF: No    Pharmacy    [address] [place] [address] [place] [address]  Phone: [phone] Fax: [phone]      Potential assistance Purchasing Medications: Potential Assistance Purchasing Medications: No  Does Patient want to participate in local refill/ meds to beds program?: No    Meds To Beds General Rules:  1. Can ONLY be done Monday- Friday between 8:30am-5pm  2. Prescription(s) must be in pharmacy by 3pm to be filled same day  3. Copy of patient's insurance/ prescription drug card and patient becomes episodically confused and cannot make decisions in the future.  sated they do not have such a document and told  that we can provide him with a packet to look over for reference and discussion with patient. Patient having many diagnostic work ups: CT, MRI, possible CSF analysis, labs,EEG, and psych eval ordered at this time. CM will continue to follow patient until discharge. Electronically signed by Allyn Winters RN on 3/16/2020 at 12:55 PM       The Plan for Transition of Care is related to the following treatment goals of Altered mental status [R41.82]  Altered mental status [R41.82]    The Patient and/or patient representative Oksana Reyes and her family were provided with a choice of provider and agrees with the discharge plan Yes    Freedom of choice list was provided with basic dialogue that supports the patient's individualized plan of care/goals and shares the quality data associated with the providers.  Yes      Care Transition patient: No    Allyn Winters, RN  [facility]  Case Management Department  Ph: [phone]

## 2020-03-16 NOTE — PROGRESS NOTES
4.82. Will order a one time dose of Vitamin K.  - Pharmacy to dose warfarin  - Continue home digoxin  - Vitamin K 5mg once     HTN - Blood pressure stable throughout admission.  - Continue home Aldactazide    Code Status:Full Code   FEN: DIET GENERAL;  PPx: Held d/t supratherapeutic INR  Dispo:  Wards    Patient will be discussed with senior resident and attending, Hal Gottron, MD    Electronically Signed:  RAMAKRISHNA Mancini  3/16/2020 8:41 AM

## 2020-03-16 NOTE — PLAN OF CARE
Problem: Falls - Risk of:  Goal: Will remain free from falls  Description: Will remain free from falls  Outcome: Ongoing  Note: Pt remains free of falls this shift. Pt oriented to self and intermittently oriented to place, time, and situation. Pt ambulates in room with steady gait but d/t recent falls she has bed alarm and dima camera in room. Pt bed is in low, locked position, side rails up x3. Will continue to monitor. Problem: Confusion - Acute:  Goal: Absence of continued neurological deterioration signs and symptoms  Description: Absence of continued neurological deterioration signs and symptoms  Outcome: Ongoing  Note: Pt oriented to self and intermittently oriented to place, time, and situation. Pt is calm, cooperative and follows commands. Will continue to monitor.

## 2020-03-17 ENCOUNTER — TELEPHONE (OUTPATIENT)
Dept: INTERNAL MEDICINE CLINIC | Age: 80
End: 2020-03-17

## 2020-03-17 LAB
ANION GAP SERPL CALCULATED.3IONS-SCNC: 14 MMOL/L (ref 3–16)
BUN BLDV-MCNC: 17 MG/DL (ref 7–20)
CALCIUM SERPL-MCNC: 10 MG/DL (ref 8.3–10.6)
CHLORIDE BLD-SCNC: 98 MMOL/L (ref 99–110)
CO2: 26 MMOL/L (ref 21–32)
CREAT SERPL-MCNC: 0.7 MG/DL (ref 0.6–1.2)
GFR AFRICAN AMERICAN: >60
GFR NON-AFRICAN AMERICAN: >60
GLUCOSE BLD-MCNC: 101 MG/DL (ref 70–99)
HCT VFR BLD CALC: 46 % (ref 36–48)
HEMOGLOBIN: 15.7 G/DL (ref 12–16)
INR BLD: 3.29 (ref 0.86–1.14)
MAGNESIUM: 1.8 MG/DL (ref 1.8–2.4)
MCH RBC QN AUTO: 32.5 PG (ref 26–34)
MCHC RBC AUTO-ENTMCNC: 34.2 G/DL (ref 31–36)
MCV RBC AUTO: 94.8 FL (ref 80–100)
PDW BLD-RTO: 15.5 % (ref 12.4–15.4)
PLATELET # BLD: 178 K/UL (ref 135–450)
PMV BLD AUTO: 9.4 FL (ref 5–10.5)
POTASSIUM REFLEX MAGNESIUM: 3.5 MMOL/L (ref 3.5–5.1)
PROTHROMBIN TIME: 38.6 SEC (ref 10–13.2)
RBC # BLD: 4.85 M/UL (ref 4–5.2)
SODIUM BLD-SCNC: 138 MMOL/L (ref 136–145)
TOTAL SYPHILLIS IGG/IGM: NORMAL
WBC # BLD: 4.6 K/UL (ref 4–11)

## 2020-03-17 PROCEDURE — 6370000000 HC RX 637 (ALT 250 FOR IP): Performed by: STUDENT IN AN ORGANIZED HEALTH CARE EDUCATION/TRAINING PROGRAM

## 2020-03-17 PROCEDURE — 83735 ASSAY OF MAGNESIUM: CPT

## 2020-03-17 PROCEDURE — 85027 COMPLETE CBC AUTOMATED: CPT

## 2020-03-17 PROCEDURE — 2580000003 HC RX 258: Performed by: STUDENT IN AN ORGANIZED HEALTH CARE EDUCATION/TRAINING PROGRAM

## 2020-03-17 PROCEDURE — 36415 COLL VENOUS BLD VENIPUNCTURE: CPT

## 2020-03-17 PROCEDURE — 85610 PROTHROMBIN TIME: CPT

## 2020-03-17 PROCEDURE — 1200000000 HC SEMI PRIVATE

## 2020-03-17 PROCEDURE — 80048 BASIC METABOLIC PNL TOTAL CA: CPT

## 2020-03-17 RX ORDER — WARFARIN SODIUM 2.5 MG/1
2.5 TABLET ORAL DAILY
Qty: 60 TABLET | Refills: 3 | Status: ON HOLD | OUTPATIENT
Start: 2020-03-19 | End: 2020-03-23 | Stop reason: SDUPTHER

## 2020-03-17 RX ORDER — QUETIAPINE FUMARATE 25 MG/1
25 TABLET, FILM COATED ORAL NIGHTLY
Qty: 120 TABLET | Refills: 2 | Status: ON HOLD | OUTPATIENT
Start: 2020-03-17 | End: 2020-03-23 | Stop reason: HOSPADM

## 2020-03-17 RX ADMIN — Medication 10 ML: at 23:08

## 2020-03-17 RX ADMIN — SPIRONOLACTONE 25 MG: 25 TABLET ORAL at 08:10

## 2020-03-17 RX ADMIN — Medication 10 ML: at 08:11

## 2020-03-17 RX ADMIN — HYDROCHLOROTHIAZIDE 25 MG: 25 TABLET ORAL at 08:10

## 2020-03-17 RX ADMIN — DIGOXIN 125 MCG: 125 TABLET ORAL at 08:10

## 2020-03-17 ASSESSMENT — PAIN SCALES - GENERAL
PAINLEVEL_OUTOF10: 0

## 2020-03-17 NOTE — CONSULTS
Clinical Pharmacy Consult Note    Admit date: 3/15/2020    Subjective/Objective:  71 yo F with PMH including HTN and Afib (on warfarin) who was sent by Dr Ilene Younger for evaluation of AMS. 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 MRI to confirm. Pharmacy is consulted to dose warfarin per Dr. Latrell Block anticoagulation regimen:  Warfarin 2.5 mg daily  Goal INR: 2 - 3    Labs:  Date INR Warfarin Dose   3/15 4.51 hold                      Recent Labs     03/15/20  1240 03/15/20  1438     --    K 3.9 3.6   CL 96*  --    CO2 26  --    BUN 16  --    CREATININE 0.6  --    GLUCOSE 109*  --        Estimated Creatinine Clearance: 74 mL/min (based on SCr of 0.6 mg/dL). Lab Results   Component Value Date    WBC 4.6 03/15/2020    HGB 14.5 03/15/2020    HCT 43.2 03/15/2020    MCV 95.9 03/15/2020     03/15/2020       Lab Results   Component Value Date    PROTIME 53.1 (H) 03/15/2020    INR 4.51 (HH) 03/15/2020       Height:  5' 6\" (167.6 cm)  Weight:  148 lb (67.1 kg)        Assessment/Plan:  1. Anticoagulation: Afib  · INR 4.51 is above therapeutic range. Per nurse Ru ROMAN, patient took more than 1 dose of warfarin today. · Will follow INR and resume home regimen when appropriate. · Medication profile reviewed for potential drug interactions. No significant drug interactions with warfarin noted. · Daily INR will be monitored and dose adjustments made as needed. Please call with any questions. Thanks for consulting pharmacy!   Jose Soliz RPh 3/15/2020, 5:10 PM
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 Brooke Wick, and not clear they can yet accept due to hospital issues. Please [facility] to [facility] would be my next choice).
posture, full range of motion. Motor: normal strength. no pronator drift. Tone: normal.   Coordination: normal.  I don't appreciate any limitation of ROM at the (?injured) L shoulder. Reflexes: normal. toes downgoing. Sensory: no abnormalities identified. Gait: not tested due to condition. Lab and Imaging Data:    CT head: atrophy, white matter disease. No acute hemorrhage. Midbrain hypointensity of uncertain significance, apparently new since Dec 2019. 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 Appropriate to consider possibility of digoxin toxicity. Not enough evidence yet to point to inflammatory (or even neurodegenerative) disorder, but would consider. For example, Lewy body disease often features psychotic symptoms early in course. Recommendations:    Agree with MRI brain with and without contrast (already ordered). Appropriate to get EEG, ordered. Check digoxin level, ordered. Agree with use of low dose seroquel, 25mg qHS. Although delirium is not apparent, would use similar precautions. Evaluation of L shoulder per appropriate clinician(s).     Based on data and clinical course, may consider LP.    ___  Maddi Atwood MD PhD

## 2020-03-17 NOTE — PROGRESS NOTES
Clinical Pharmacy Consult Note    Admit date: 3/15/2020    Subjective/Objective:  71 yo F with PMH including HTN and Afib (on warfarin) who was sent by Dr La Nena Augustin for evaluation of AMS. 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 MRI to confirm. INR was supratherapeutic on presentation (4.51). Patient stated that she forgot she took her warfarin so she took a second dose. Pharmacy is consulted to dose warfarin per Dr. Valle Adjutant anticoagulation regimen:  Warfarin 2.5 mg daily  Goal INR: 2 - 3    Labs:  Date INR Warfarin Dose   3/15 4.51 hold   3/16 4.82   Vit K 2.5 mg given Hold   3/17 3.29             Recent Labs     03/16/20  0657 03/17/20  0700    138   K 3.5 3.5   CL 97* 98*   CO2 27 26   BUN 10 17   CREATININE 0.6 0.7   GLUCOSE 91 101*       Estimated Creatinine Clearance: 63 mL/min (based on SCr of 0.7 mg/dL). Lab Results   Component Value Date    WBC 4.6 03/17/2020    HGB 15.7 03/17/2020    HCT 46.0 03/17/2020    MCV 94.8 03/17/2020     03/17/2020       Lab Results   Component Value Date    PROTIME 38.6 (H) 03/17/2020    INR 3.29 (H) 03/17/2020       Height:  5' 6\" (167.6 cm)  Weight:  148 lb (67.1 kg)        Assessment/Plan:  1. Anticoagulation: Afib  · Goal INR: 2.0-3.0  · Vitamin K 2.5 mg PO x1 given @1140  · INR today: 3.29 (Supratherapeutic)  · Continue to hold warfarin today. · Will follow INR and resume home regimen when appropriate. · Medication profile reviewed for potential drug interactions. No significant drug interactions with warfarin noted. · Daily INR will be monitored and dose adjustments made as needed. · Upon discharge, recommend restarting home regimen warfarin on 3/18 and recommend INR check in the next 2-3 days. Please call with any questions. Thanks for consulting pharmacy! Charly Barrientos PharmD.  Candidate 2020  3/17/2020 11:21 AM

## 2020-03-17 NOTE — PROGRESS NOTES
RBC 4.85 03/17/2020    HGB 15.7 03/17/2020    HCT 46.0 03/17/2020    MCV 94.8 03/17/2020    MCH 32.5 03/17/2020    MCHC 34.2 03/17/2020    RDW 15.5 03/17/2020     03/17/2020    MPV 9.4 03/17/2020     BMP:    Lab Results   Component Value Date     03/17/2020    K 3.5 03/17/2020    CL 98 03/17/2020    CO2 26 03/17/2020    BUN 17 03/17/2020    LABALBU 4.2 03/15/2020    CREATININE 0.7 03/17/2020    CALCIUM 10.0 03/17/2020    GFRAA >60 03/17/2020    GFRAA >60 05/30/2013    LABGLOM >60 03/17/2020    GLUCOSE 101 03/17/2020        Ref. Range 3/16/2020 06:57 3/16/2020 11:40   CRP Latest Ref Range: 0.0 - 5.1 mg/L 2.0    Ammonia Latest Ref Range: 11 - 51 umol/L  20      Ref. Range 3/16/2020 06:57 3/16/2020 11:40   Folate Latest Ref Range: 4.78 - 24.20 ng/mL >20.00    Vitamin B-12 Latest Ref Range: 211 - 911 pg/mL 928 (H)    Sed Rate Latest Ref Range: 0 - 30 mm/Hr  11     RPR- non reactive. Hepatic:   Recent Labs     03/15/20  1240   AST 38*   ALT 12   BILITOT 0.7   ALKPHOS 89     INR:   Recent Labs     03/15/20  1240 03/16/20  0657 03/17/20  0700   INR 4.51* 4.82* 3.29*       Studies:  MRI BRAIN W WO CONTRAST   Final Result      1. No acute intracranial abnormality. 2. Scattered numerous punctate susceptibility artifacts throughout both cerebral hemispheres consistent with amyloid angiopathy. 3. Extensive nonspecific periventricular white matter signal abnormality with area of encephalomalacia in the right temporal lobe. Findings consistent with extensive small vessel ischemic disease.          XR CHEST STANDARD (2 VW)   Final Result      No acute pulmonary pathology                            Scheduled Meds:   digoxin  125 mcg Oral Daily    sodium chloride flush  10 mL Intravenous 2 times per day    warfarin (COUMADIN) daily dosing (placeholder)   Other See Admin Instructions    spironolactone  25 mg Oral Daily    And    hydroCHLOROthiazide  25 mg Oral Daily        Impression:  Subacutely

## 2020-03-17 NOTE — TELEPHONE ENCOUNTER
Mr. Sherren Neu called stating he spoke to Dr. Anjel Oakes office to make an appointment. They stated that they need a referral for you to make and appointment and also that the next available appointment is not for 7 months and he is not sure that she should wait this long. Can you call them and see if you can get her in any sooner? Please call to advise.

## 2020-03-17 NOTE — DISCHARGE INSTR - COC
Continuity of Care Form    Patient Name: Cathy Prasad   :  1940  MRN:  [id number]    Admit date:  3/15/2020  Discharge date:  2020    Code Status Order: Full Code   Advance Directives:   [facility] Documentation     Date/Time Healthcare Directive Type of Healthcare Directive Copy in [address] Agent's Name Healthcare Agent's Phone Number    03/15/20 5280  No, patient does not have an advance directive for healthcare treatment -- -- -- -- --          Admitting Physician:  No admitting provider for patient encounter. PCP: Tavo Coronado MD    Discharging Nurse: Ramiro Foreman.   [address] Unit/Room#: 0060/8106-07  Discharging Unit Phone Number: [phone]    Emergency Contact:   Extended Emergency Contact Information  Primary Emergency Contact: Matthew Donnelly  Address: [address] [place] [address] [address], [address] Phone: [phone]  Mobile Phone: [phone]  Relation: Spouse  Secondary Emergency Contact: AndrzejMatthew  Address: [address] [place] [address] [id number]           [facility], [address] Phone: [phone]  Relation: Spouse    Past Surgical History:  Past Surgical History:   Procedure Laterality Date    COLONOSCOPY  2008    HERNIA REPAIR      Left Femoral       Immunization History:   Immunization History   Administered Date(s) Administered    Influenza 2010    Influenza Whole 2009    Influenza, High Dose (Fluzone 65 yrs and older) 10/27/2011, 10/18/2012, 2013, 10/16/2014, 10/08/2015, 2016, 2017, 2018    Influenza, Triv, inactivated, subunit, adjuvanted, IM (Fluad 65 yrs and older) 2019    Pneumococcal Conjugate 13-valent (Yaogppx96) 2016    Pneumococcal Polysaccharide (Szcersvcq92) 10/15/2007    Td, unspecified formulation 2010    Tdap (Boostrix, Adacel) 2012, 2013    Zoster Live (Zostavax) 2008 Active Problems:  Patient Active Problem List   Diagnosis Code    Atrial fibrillation (HCC) I48.91    Long term current use of anticoagulant therapy Z79.01    Mitral valve disorder I05.9    Dysthymic disorder F34.1    Asymptomatic varicose veins I83.90    Adenomatous polyp of colon D12.6    Altered mental status R41.82       Isolation/Infection:   Isolation          No Isolation        Patient Infection Status     None to display          Nurse Assessment:  Last Vital Signs: BP (!) 151/70   Pulse 94   Temp 98.6 °F (37 °C) (Oral)   Resp 15   Ht 5' 6\" (1.676 m)   Wt 148 lb (67.1 kg)   SpO2 96%   BMI 23.89 kg/m²     Last documented pain score (0-10 scale): Pain Level: 0  Last Weight:   Wt Readings from Last 1 Encounters:   03/15/20 148 lb (67.1 kg)     Mental Status:  alert    IV Access:  - None    Nursing Mobility/ADLs:  Walking   Assisted  Transfer  Assisted  Bathing  Assisted  Dressing  Assisted  Toileting  Assisted  Feeding  Assisted  Med Admin  Assisted  Med Delivery   whole    Wound Care Documentation and Therapy:        Elimination:  Continence:   · Bowel: Yes  · Bladder: Yes  Urinary Catheter: None   Colostomy/Ileostomy/Ileal Conduit: No       Date of Last BM: 03/17/2020    Intake/Output Summary (Last 24 hours) at 3/17/2020 1120  Last data filed at 3/17/2020 1014  Gross per 24 hour   Intake 480 ml   Output --   Net 480 ml     I/O last 3 completed shifts: In: 480 [P.O.:480]  Out: -     Safety Concerns:     None    Impairments/Disabilities:      None    Nutrition Therapy:  Current Nutrition Therapy:   - Oral Diet:  General    Routes of Feeding: Oral  Liquids: Thin Liquids  Daily Fluid Restriction: no  Last Modified Barium Swallow with Video (Video Swallowing Test): not done    Treatments at the Time of Hospital Discharge:   Respiratory Treatments: NA  Oxygen Therapy:  is not on home oxygen therapy.   Ventilator:    - No ventilator support    Rehab Therapies: Physical Therapy and Occupational Therapy  Weight Bearing Status/Restrictions: No weight bearing restirctions  Other Medical Equipment (for information only, NOT a DME order):  None  Other Treatments: NA    Patient's personal belongings (please select all that are sent with patient):  Glasses    RN SIGNATURE:  Electronically signed by Christian Gates RN on 3/18/20 at 1:00 PM EDT    CASE MANAGEMENT/SOCIAL WORK SECTION    Inpatient Status Date: 03/15/2020    Readmission Risk Assessment Score:  Readmission Risk              Risk of Unplanned Readmission:        7           Discharging to Facility/ Agency   · Name:   · Address:  · Phone:  · Fax:    Dialysis Facility (if applicable)   · Name:  · Address:  · Dialysis Schedule:  · Phone:  · Fax:    / signature: {Esignature:[id number]}    PHYSICIAN SECTION    Prognosis: {Prognosis:8588239695}    Condition at Discharge: [address] Patient Condition:331289791}    Rehab Potential (if transferring to Rehab): {Prognosis:9131048451}    Recommended Labs or Other Treatments After Discharge: ***    Physician Certification: I certify the above information and transfer of Dania Gibson  is necessary for the continuing treatment of the diagnosis listed and that she requires {Admit to Appropriate Level of Care:33801} for {GREATER/LESS:452102956} 30 days.      Update Admission H&P: {CHP DME Changes in SARJV:941116933}    PHYSICIAN SIGNATURE:  {Esignature:[id number]}

## 2020-03-17 NOTE — CARE COORDINATION
Update:     CM  Noted  Psych eval/rec:    CM  Will follow up with  Helder and Franco Pierre tomorrow as  Suggested:  CM spoke with  Ismael at the  [facility] and she will reach  to  [facility]  As rec :  By Dr Jamil Stock: And  After  That any other  Geripsych that may be availalbe,  Pink slip/ 72 hour hold  Form is on the chart  , needs  Completed on day of  D/C. Will follow up in AM <  CM  Spoke with Pt    Sergio Vargas, [place]:  [phone]   And aware  And agreeable to  D/c plan . Electronically signed by Antonio Jackman RN on 3/17/2020 at 4:58 PM              Case Management Assessment           Daily Note                 Date/ Time of Note: 3/17/2020 2:08 PM         Note completed by: Antonio Jackman    Patient Name: Dania Gibson  YOB: 1940    Diagnosis:Altered mental status [R41.82]  Altered mental status [R41.82]  Patient Admission Status: Inpatient    Date of Admission:3/15/2020 11:37 AM Length of Stay: 2 GLOS:        Current Plan of Care:  Pending  Psych consulted  ________________________________________________________________________________________  PT AM-PAC:   / 24 per last evaluation on: NA    OT AM-PAC:   / 24 per last evaluation on: NA    DME Needs for discharge: NA    ________________________________________________________________________________________  Discharge Plan: To Be Determined DUE TO: d/t  pending  psych recommendations     Tentative discharge date:  1-2 days    Current barriers to discharge: psych  clearance    Referrals completed: Not Applicable    Resources/ information provided: Not indicated at this time  ________________________________________________________________________________________  Case Management Notes: CM  Following for  D/C  Planning and  Disposition:   Psych  Referral made  And  Not seen yet  , Neuro consult  Made and  Rec;   Noted: per  Neuro  She remains delusional and concern for rapidly progressing dementia       Is pt Primary care  Giver  Of which she is  Fearful of:  , pt still with  Delusions and not safe to d/c home ,  will need  Psych eval   Or  MD  To note  An order that pt is medically cleared to discharge  And  Ask  The  Transfer center  [phone] to  See if  Dr Kevyn Jean   ,  On call  MD  For  Psych , Behavioral at  [facility] if  He would accept the pt . Alejandra Anderson. Chapo Del Toro and her family were provided with choice of provider; she and her family are in agreement with the discharge plan. Care Transition Patient: No    Landon Vann RN  [facility]  Case Management Department  Ph: [phone]      CM  Spoke with CM  Director  Re:  Psych  Consult  To see pt : And she  Confirmed they are  Still having to come see pt ,  Prior to D/C , not safe to d/c at this time: [facility]  To call Psychiatrist consult  Again to inform them  It is  D/C  Pending clearance :    Electronically signed by Landon Vann RN on 3/17/2020 at 2:19 PM      Dr May Anderson  To  Reach out to  Dr Chichi Echeverria  To advise. And  Refer. 72 hour  Hold  Form  On chart  Will need to be completed  If  inpt  Psych rec: For  Transport  As well.      Electronically signed by Landon Vann RN on 3/17/2020 at 2:22 PM

## 2020-03-17 NOTE — PLAN OF CARE
Problem: Falls - Risk of:  Goal: Will remain free from falls  Description: Will remain free from falls  3/17/2020 1058 by Angela Escalante RN  Outcome: Ongoing  Note: Patient is a risk for falls. Bed locked in the lowest position. Bed alarm in use. Call light and personal belongings within reach. Instructed to call for help before getting up, patient verbalizes understanding. Door to room left open. Wearing non-skid socks. Fall sign posted. Will continue to monitor.

## 2020-03-18 ENCOUNTER — HOSPITAL ENCOUNTER (INPATIENT)
Age: 80
LOS: 5 days | Discharge: HOME | DRG: 546 | End: 2020-03-23
Attending: PSYCHIATRY & NEUROLOGY | Admitting: PSYCHIATRY & NEUROLOGY
Payer: MEDICARE

## 2020-03-18 VITALS
HEIGHT: 66 IN | OXYGEN SATURATION: 97 % | BODY MASS INDEX: 23.78 KG/M2 | DIASTOLIC BLOOD PRESSURE: 76 MMHG | RESPIRATION RATE: 17 BRPM | TEMPERATURE: 97 F | HEART RATE: 99 BPM | WEIGHT: 148 LBS | SYSTOLIC BLOOD PRESSURE: 144 MMHG

## 2020-03-18 PROBLEM — F02.C18: Status: ACTIVE | Noted: 2020-03-18

## 2020-03-18 LAB
ANION GAP SERPL CALCULATED.3IONS-SCNC: 19 MMOL/L (ref 3–16)
BUN BLDV-MCNC: 18 MG/DL (ref 7–20)
CALCIUM SERPL-MCNC: 9.7 MG/DL (ref 8.3–10.6)
CHLORIDE BLD-SCNC: 95 MMOL/L (ref 99–110)
CO2: 23 MMOL/L (ref 21–32)
CREAT SERPL-MCNC: 0.7 MG/DL (ref 0.6–1.2)
GFR AFRICAN AMERICAN: >60
GFR NON-AFRICAN AMERICAN: >60
GLUCOSE BLD-MCNC: 85 MG/DL (ref 70–99)
HCT VFR BLD CALC: 45.9 % (ref 36–48)
HEMOGLOBIN: 15.6 G/DL (ref 12–16)
INR BLD: 2.63 (ref 0.86–1.14)
MAGNESIUM: 1.7 MG/DL (ref 1.8–2.4)
MCH RBC QN AUTO: 32.4 PG (ref 26–34)
MCHC RBC AUTO-ENTMCNC: 34 G/DL (ref 31–36)
MCV RBC AUTO: 95.4 FL (ref 80–100)
PDW BLD-RTO: 15.6 % (ref 12.4–15.4)
PLATELET # BLD: 180 K/UL (ref 135–450)
PMV BLD AUTO: 9.1 FL (ref 5–10.5)
POTASSIUM REFLEX MAGNESIUM: 3.3 MMOL/L (ref 3.5–5.1)
PROTHROMBIN TIME: 30.8 SEC (ref 10–13.2)
RBC # BLD: 4.81 M/UL (ref 4–5.2)
SODIUM BLD-SCNC: 137 MMOL/L (ref 136–145)
VITAMIN B1, PLASMA: 15 NMOL/L (ref 4–15)
WBC # BLD: 6.3 K/UL (ref 4–11)

## 2020-03-18 PROCEDURE — 85610 PROTHROMBIN TIME: CPT

## 2020-03-18 PROCEDURE — 6360000002 HC RX W HCPCS: Performed by: STUDENT IN AN ORGANIZED HEALTH CARE EDUCATION/TRAINING PROGRAM

## 2020-03-18 PROCEDURE — 6370000000 HC RX 637 (ALT 250 FOR IP): Performed by: STUDENT IN AN ORGANIZED HEALTH CARE EDUCATION/TRAINING PROGRAM

## 2020-03-18 PROCEDURE — 36415 COLL VENOUS BLD VENIPUNCTURE: CPT

## 2020-03-18 PROCEDURE — 6370000000 HC RX 637 (ALT 250 FOR IP): Performed by: PSYCHIATRY & NEUROLOGY

## 2020-03-18 PROCEDURE — 80048 BASIC METABOLIC PNL TOTAL CA: CPT

## 2020-03-18 PROCEDURE — 93005 ELECTROCARDIOGRAM TRACING: CPT | Performed by: PSYCHIATRY & NEUROLOGY

## 2020-03-18 PROCEDURE — 2580000003 HC RX 258: Performed by: STUDENT IN AN ORGANIZED HEALTH CARE EDUCATION/TRAINING PROGRAM

## 2020-03-18 PROCEDURE — 83735 ASSAY OF MAGNESIUM: CPT

## 2020-03-18 PROCEDURE — 1240000000 HC EMOTIONAL WELLNESS R&B

## 2020-03-18 PROCEDURE — 85027 COMPLETE CBC AUTOMATED: CPT

## 2020-03-18 PROCEDURE — 99238 HOSP IP/OBS DSCHRG MGMT 30/<: CPT | Performed by: INTERNAL MEDICINE

## 2020-03-18 RX ORDER — HALOPERIDOL 5 MG/ML
2 INJECTION INTRAMUSCULAR EVERY 6 HOURS PRN
Status: DISCONTINUED | OUTPATIENT
Start: 2020-03-18 | End: 2020-03-23 | Stop reason: HOSPADM

## 2020-03-18 RX ORDER — CELECOXIB 200 MG/1
200 CAPSULE ORAL DAILY PRN
Status: DISCONTINUED | OUTPATIENT
Start: 2020-03-18 | End: 2020-03-23 | Stop reason: HOSPADM

## 2020-03-18 RX ORDER — HYDROCHLOROTHIAZIDE 25 MG/1
50 TABLET ORAL DAILY
Status: DISCONTINUED | OUTPATIENT
Start: 2020-03-19 | End: 2020-03-23 | Stop reason: HOSPADM

## 2020-03-18 RX ORDER — LORAZEPAM 2 MG/ML
1 INJECTION INTRAMUSCULAR EVERY 6 HOURS PRN
Status: DISCONTINUED | OUTPATIENT
Start: 2020-03-18 | End: 2020-03-23 | Stop reason: HOSPADM

## 2020-03-18 RX ORDER — DIGOXIN 250 MCG
125 TABLET ORAL DAILY
Status: DISCONTINUED | OUTPATIENT
Start: 2020-03-19 | End: 2020-03-23 | Stop reason: HOSPADM

## 2020-03-18 RX ORDER — QUETIAPINE FUMARATE 25 MG/1
25 TABLET, FILM COATED ORAL NIGHTLY
Status: DISCONTINUED | OUTPATIENT
Start: 2020-03-18 | End: 2020-03-18 | Stop reason: HOSPADM

## 2020-03-18 RX ORDER — QUETIAPINE FUMARATE 25 MG/1
25 TABLET, FILM COATED ORAL EVERY 6 HOURS PRN
Status: DISCONTINUED | OUTPATIENT
Start: 2020-03-18 | End: 2020-03-18 | Stop reason: HOSPADM

## 2020-03-18 RX ORDER — LORAZEPAM 0.5 MG/1
0.5 TABLET ORAL EVERY 6 HOURS PRN
Status: DISCONTINUED | OUTPATIENT
Start: 2020-03-18 | End: 2020-03-23 | Stop reason: HOSPADM

## 2020-03-18 RX ORDER — MAGNESIUM SULFATE IN WATER 40 MG/ML
4 INJECTION, SOLUTION INTRAVENOUS ONCE
Status: DISCONTINUED | OUTPATIENT
Start: 2020-03-18 | End: 2020-03-18 | Stop reason: HOSPADM

## 2020-03-18 RX ORDER — WARFARIN SODIUM 2.5 MG/1
2.5 TABLET ORAL DAILY
Status: DISCONTINUED | OUTPATIENT
Start: 2020-03-18 | End: 2020-03-18 | Stop reason: HOSPADM

## 2020-03-18 RX ORDER — WARFARIN SODIUM 2.5 MG/1
2.5 TABLET ORAL DAILY
Status: DISCONTINUED | OUTPATIENT
Start: 2020-03-19 | End: 2020-03-19

## 2020-03-18 RX ORDER — QUETIAPINE FUMARATE 25 MG/1
25 TABLET, FILM COATED ORAL NIGHTLY
Status: DISCONTINUED | OUTPATIENT
Start: 2020-03-18 | End: 2020-03-19

## 2020-03-18 RX ORDER — BENZTROPINE MESYLATE 1 MG/ML
1 INJECTION INTRAMUSCULAR; INTRAVENOUS 2 TIMES DAILY PRN
Status: DISCONTINUED | OUTPATIENT
Start: 2020-03-18 | End: 2020-03-23 | Stop reason: HOSPADM

## 2020-03-18 RX ORDER — M-VIT,TX,IRON,MINS/CALC/FOLIC 27MG-0.4MG
1 TABLET ORAL DAILY
Status: DISCONTINUED | OUTPATIENT
Start: 2020-03-19 | End: 2020-03-23 | Stop reason: HOSPADM

## 2020-03-18 RX ORDER — MAGNESIUM HYDROXIDE/ALUMINUM HYDROXICE/SIMETHICONE 120; 1200; 1200 MG/30ML; MG/30ML; MG/30ML
30 SUSPENSION ORAL EVERY 6 HOURS PRN
Status: DISCONTINUED | OUTPATIENT
Start: 2020-03-18 | End: 2020-03-23 | Stop reason: HOSPADM

## 2020-03-18 RX ORDER — ACETAMINOPHEN 325 MG/1
650 TABLET ORAL EVERY 4 HOURS PRN
Status: DISCONTINUED | OUTPATIENT
Start: 2020-03-18 | End: 2020-03-23 | Stop reason: HOSPADM

## 2020-03-18 RX ORDER — HALOPERIDOL 1 MG/1
2 TABLET ORAL EVERY 6 HOURS PRN
Status: DISCONTINUED | OUTPATIENT
Start: 2020-03-18 | End: 2020-03-23 | Stop reason: HOSPADM

## 2020-03-18 RX ORDER — TRAZODONE HYDROCHLORIDE 50 MG/1
25 TABLET ORAL NIGHTLY PRN
Status: DISCONTINUED | OUTPATIENT
Start: 2020-03-18 | End: 2020-03-23 | Stop reason: HOSPADM

## 2020-03-18 RX ORDER — SPIRONOLACTONE 25 MG/1
50 TABLET ORAL DAILY
Status: DISCONTINUED | OUTPATIENT
Start: 2020-03-19 | End: 2020-03-23 | Stop reason: HOSPADM

## 2020-03-18 RX ADMIN — QUETIAPINE FUMARATE 25 MG: 25 TABLET ORAL at 20:33

## 2020-03-18 RX ADMIN — POTASSIUM BICARBONATE 40 MEQ: 782 TABLET, EFFERVESCENT ORAL at 11:04

## 2020-03-18 RX ADMIN — QUETIAPINE FUMARATE 25 MG: 25 TABLET ORAL at 11:04

## 2020-03-18 RX ADMIN — SPIRONOLACTONE 25 MG: 25 TABLET ORAL at 11:04

## 2020-03-18 RX ADMIN — MAGNESIUM SULFATE IN WATER 4 G: 40 INJECTION, SOLUTION INTRAVENOUS at 11:04

## 2020-03-18 RX ADMIN — Medication 10 ML: at 11:04

## 2020-03-18 RX ADMIN — HYDROCHLOROTHIAZIDE 25 MG: 25 TABLET ORAL at 11:05

## 2020-03-18 RX ADMIN — DIGOXIN 125 MCG: 125 TABLET ORAL at 11:05

## 2020-03-18 ASSESSMENT — SLEEP AND FATIGUE QUESTIONNAIRES
AVERAGE NUMBER OF SLEEP HOURS: 7
DO YOU USE A SLEEP AID: NO
DO YOU HAVE DIFFICULTY SLEEPING: NO

## 2020-03-18 ASSESSMENT — LIFESTYLE VARIABLES: HISTORY_ALCOHOL_USE: YES

## 2020-03-18 ASSESSMENT — PAIN SCALES - GENERAL
PAINLEVEL_OUTOF10: 0

## 2020-03-18 NOTE — PROGRESS NOTES
Report called to Dariel Hodgkins at [facility], per Dariel Hodgkins, 72 hour hold required. Secure message sent to Dr Sanjay Christensen, awaiting response.

## 2020-03-18 NOTE — H&P
warfarin  - daily PT/INR  - pharmacy consulted to manage dosing while inpatient.      Carol Etienne FNP-C  3/18/2020 4:32 PM

## 2020-03-18 NOTE — PROGRESS NOTES
1030  Received call from [facility] at [facility], needing Dr Mihaela Moraes number. [address] message sent to dr Alan Luong to request Dr Mihaela Moraes number    [phone] and spoke with Anuel Zuniga, gave Dr Mihaela Moraes number to forward to [facility].

## 2020-03-18 NOTE — PROGRESS NOTES
Clinical Pharmacy Consult Note    Admit date: 3/15/2020    Subjective/Objective:  71 yo F with PMH including HTN and Afib (on warfarin) who was sent by Dr Dario Wilkes for evaluation of AMS. 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 MRI to confirm. INR was supratherapeutic on presentation (4.51). Patient stated that she forgot she took her warfarin so she took a second dose. Pharmacy is consulted to dose warfarin per Dr. Kiko Alejo anticoagulation regimen:  Warfarin 2.5 mg daily  Goal INR: 2 - 3    Labs:  Date INR Warfarin Dose   3/15 4.51 hold   3/16 4.82   Vit K 2.5 mg given Hold   3/17 3.29 Held   3/18 2.63        Recent Labs     03/17/20  0700 03/18/20  0622    137   K 3.5 3.3*   CL 98* 95*   CO2 26 23   BUN 17 18   CREATININE 0.7 0.7   GLUCOSE 101* 85       Estimated Creatinine Clearance: 63 mL/min (based on SCr of 0.7 mg/dL). Lab Results   Component Value Date    WBC 6.3 03/18/2020    HGB 15.6 03/18/2020    HCT 45.9 03/18/2020    MCV 95.4 03/18/2020     03/18/2020       Lab Results   Component Value Date    PROTIME 30.8 (H) 03/18/2020    INR 2.63 (H) 03/18/2020       Height:  5' 6\" (167.6 cm)  Weight:  148 lb (67.1 kg)        Assessment/Plan:  1. Anticoagulation: Afib  · Goal INR: 2.0-3.0  · Patients home regimen: 2.5 mg daily  · Vitamin K 2.5 mg PO x1 given (3/16)  · INR today: 2.63 (therapeutic)  · Plan to restart home dose warfarin today according to home regimen above. Will dose warfarin 2.5 mg today. · Medication profile reviewed for potential drug interactions. No significant drug interactions with warfarin noted. · Daily INR will be monitored and dose adjustments made as needed. · Upon discharge, recommend continuing patients home dose warfarin, recommend INR check in the next 2-3 days. Please call with any questions. Thanks for consulting pharmacy! Jacinta CancinoD.  Candidate 2020  3/18/2020 11:21 AM

## 2020-03-18 NOTE — PLAN OF CARE
D: Remains disoriented to place, situation, & time. Sat up in chair most of evening shift and slept some overnight in bed. A: Low light, bed alarm, non skid socks, and bed alarm on for safety.  Cont to monitor during hourly rounds

## 2020-03-18 NOTE — BH NOTE
`[facility] [place]  Admission Note     Admission Type:   Admission Type:  Involuntary    Reason for admission:  Reason for Admission: forgetfulness and combativeness at home    PATIENT STRENGTHS:  Strengths: Communication    Patient Strengths and Limitations:  Limitations: External locus of control    Addictive Behavior:   Addictive Behavior  In the past 3 months, have you felt or has someone told you that you have a problem with:  : None  Do you have a history of Chemical Use?: No  Do you have a history of Alcohol Use?: Yes  Do you have a history of Street Drug Abuse?: No  Histroy of Prescripton Drug Abuse?: No    Medical Problems:   Past Medical History:   Diagnosis Date    Adenomatous polyp of colon 1/29/2019    Asymptomatic varicose veins     Atrial fibrillation (HCC)     Dysthymic disorder     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)     Screening mammogram 12/8/2009    (Left)Benign       Status EXAM:  Status and Exam  Normal: No  Facial Expression: Flat  Affect: Appropriate  Level of Consciousness: Confused  Mood:Normal: No  Mood: Ambivalent  Motor Activity:Normal: Yes  Interview Behavior: Cooperative  Preception: [place] to Person  Attention:Normal: No  Attention: Distractible, Unable to Concentrate  Thought Processes: Circumstantial  Thought Content:Normal: No  Hallucinations: None  Delusions: Yes  Delusions: Reference(believes that her  is an imposter)  Memory:Normal: No  Memory: Poor Recent, Poor Remote, Confabulation  Insight and Judgment: No  Insight and Judgment: Poor Insight  Present Suicidal Ideation: No  Present Homicidal Ideation: No    Tobacco Screening:  Practical Counseling, on admission, clara X, if applicable and completed (first 3 are required if patient doesn't refuse):            ( )  Recognizing danger situations (included triggers and roadblocks)                    ( )  Coping skills (new ways to manage stress, exercise, relaxation techniques, changing routine, distraction) ( )  Basic information about quitting (benefits of quitting, techniques in how to quit, available resources  ( ) Referral for counseling faxed to [facility]                                           ( ) Patient refused counseling  ( ) Patient has not smoked in the last 30 days    Metabolic Screening:    No results found for: LABA1C    Lab Results   Component Value Date    CHOL 267 (H) 01/23/2019    CHOL 271 (H) 08/01/2018    CHOL 220 (H) 01/22/2018    CHOL 237 (H) 07/25/2017    CHOL 224 (H) 01/17/2017    CHOL 251 (H) 01/19/2016    CHOL 210 (H) 01/16/2015    CHOL 230 (H) 01/09/2014    CHOL 232 (H) 11/22/2011     Lab Results   Component Value Date    TRIG 82 01/23/2019    TRIG 106 08/01/2018    TRIG 111 01/22/2018    TRIG 73 07/25/2017    TRIG 96 01/17/2017    TRIG 86 01/19/2016    TRIG 108 01/16/2015    TRIG 70 01/09/2014    TRIG 83 11/22/2011     Lab Results   Component Value Date     (H) 01/23/2019     (H) 08/01/2018    HDL 83 (H) 01/22/2018     (H) 07/25/2017     (H) 01/17/2017     (H) 01/19/2016    HDL 90 (H) 01/16/2015     (H) 01/09/2014     (H) 11/22/2011     No components found for: [facility] [place]  Lab Results   Component Value Date    LABVLDL 16 01/23/2019    LABVLDL 21 08/01/2018    LABVLDL 22 01/22/2018    LABVLDL 15 07/25/2017    LABVLDL 19 01/17/2017    LABVLDL 17 01/19/2016    LABVLDL 22 01/16/2015    LABVLDL 14 01/09/2014    LABVLDL 17 11/22/2011         Body mass index is 23.24 kg/m². BP Readings from Last 2 Encounters:   03/18/20 (!) 144/76   03/12/20 136/70           Pt admitted with followings belongings:  Dentures: None  Vision - Corrective Lenses: Glasses  Hearing Aid: None  Jewelry: None  Clothing: Footwear, Pants, Shirt  Were All Patient Medications Collected?: No  Other Valuables: Cell phone     Valuables sent home withn/a. Valuables placed in safe in security envelope, number:  yes.

## 2020-03-18 NOTE — BH NOTE
4 Eyes Skin Assessment     The patient is being assess for  Admission    I agree that 2 RN's have performed a thorough Head to Toe Skin Assessment on the patient. ALL assessment sites listed below have been assessed. Areas assessed by both nurses: yes  [x]   Head, Face, and Ears   [x]   Shoulders, Back, and Chest  [x]   Arms, Elbows, and Hands   [x]   Coccyx, Sacrum, and Ischum  [x]   Legs, Feet, and Heels    Bruises to bilateral arms and varicose veins to bilateral feet/legs      Does the Patient have Skin Breakdown?   No         Joseph Prevention initiated:  No   Wound Care Orders initiated:  No      [facility] nurse consulted for Pressure Injury (Stage 3,4, Unstageable, DTI, NWPT, and Complex wounds):  No      Nurse 1 eSignature: Electronically signed by Carol Galicia RN on 3/18/20 at 3:42 PM EDT    **SHARE this note so that the co-signing nurse is able to place an eSignature**    Nurse 2 eSignature: {Esignature:[id number]}

## 2020-03-19 LAB
ANION GAP SERPL CALCULATED.3IONS-SCNC: 17 MMOL/L (ref 3–16)
BUN BLDV-MCNC: 14 MG/DL (ref 7–20)
CALCIUM SERPL-MCNC: 9.9 MG/DL (ref 8.3–10.6)
CHLORIDE BLD-SCNC: 98 MMOL/L (ref 99–110)
CHOLESTEROL, TOTAL: 267 MG/DL (ref 0–199)
CO2: 25 MMOL/L (ref 21–32)
CREAT SERPL-MCNC: 0.6 MG/DL (ref 0.6–1.2)
EKG ATRIAL RATE: 100 BPM
EKG DIAGNOSIS: NORMAL
EKG Q-T INTERVAL: 368 MS
EKG QRS DURATION: 102 MS
EKG QTC CALCULATION (BAZETT): 427 MS
EKG R AXIS: -50 DEGREES
EKG T AXIS: 250 DEGREES
EKG VENTRICULAR RATE: 81 BPM
ESTIMATED AVERAGE GLUCOSE: 105.4 MG/DL
GFR AFRICAN AMERICAN: >60
GFR NON-AFRICAN AMERICAN: >60
GLUCOSE BLD-MCNC: 96 MG/DL (ref 70–99)
HBA1C MFR BLD: 5.3 %
HDLC SERPL-MCNC: 86 MG/DL (ref 40–60)
INR BLD: 3.57 (ref 0.86–1.14)
LDL CHOLESTEROL CALCULATED: 168 MG/DL
MAGNESIUM: 2.1 MG/DL (ref 1.8–2.4)
POTASSIUM SERPL-SCNC: 3.5 MMOL/L (ref 3.5–5.1)
PROTHROMBIN TIME: 41.9 SEC (ref 10–13.2)
SODIUM BLD-SCNC: 140 MMOL/L (ref 136–145)
TRIGL SERPL-MCNC: 64 MG/DL (ref 0–150)
VLDLC SERPL CALC-MCNC: 13 MG/DL

## 2020-03-19 PROCEDURE — 97535 SELF CARE MNGMENT TRAINING: CPT

## 2020-03-19 PROCEDURE — 99223 1ST HOSP IP/OBS HIGH 75: CPT | Performed by: PSYCHIATRY & NEUROLOGY

## 2020-03-19 PROCEDURE — 36415 COLL VENOUS BLD VENIPUNCTURE: CPT

## 2020-03-19 PROCEDURE — 83735 ASSAY OF MAGNESIUM: CPT

## 2020-03-19 PROCEDURE — 97530 THERAPEUTIC ACTIVITIES: CPT

## 2020-03-19 PROCEDURE — 83036 HEMOGLOBIN GLYCOSYLATED A1C: CPT

## 2020-03-19 PROCEDURE — 80048 BASIC METABOLIC PNL TOTAL CA: CPT

## 2020-03-19 PROCEDURE — 1240000000 HC EMOTIONAL WELLNESS R&B

## 2020-03-19 PROCEDURE — 85610 PROTHROMBIN TIME: CPT

## 2020-03-19 PROCEDURE — 97166 OT EVAL MOD COMPLEX 45 MIN: CPT

## 2020-03-19 PROCEDURE — 93010 ELECTROCARDIOGRAM REPORT: CPT | Performed by: INTERNAL MEDICINE

## 2020-03-19 PROCEDURE — 99221 1ST HOSP IP/OBS SF/LOW 40: CPT | Performed by: NURSE PRACTITIONER

## 2020-03-19 PROCEDURE — 6370000000 HC RX 637 (ALT 250 FOR IP): Performed by: PSYCHIATRY & NEUROLOGY

## 2020-03-19 PROCEDURE — 80061 LIPID PANEL: CPT

## 2020-03-19 RX ORDER — RISPERIDONE 0.25 MG/1
0.25 TABLET, FILM COATED ORAL 2 TIMES DAILY
Status: DISCONTINUED | OUTPATIENT
Start: 2020-03-19 | End: 2020-03-20

## 2020-03-19 RX ADMIN — RISPERIDONE 0.25 MG: 0.25 TABLET ORAL at 20:14

## 2020-03-19 RX ADMIN — DIGOXIN 125 MCG: 250 TABLET ORAL at 09:07

## 2020-03-19 RX ADMIN — RISPERIDONE 0.25 MG: 0.25 TABLET ORAL at 11:25

## 2020-03-19 RX ADMIN — SPIRONOLACTONE 50 MG: 25 TABLET ORAL at 09:06

## 2020-03-19 RX ADMIN — HYDROCHLOROTHIAZIDE 50 MG: 25 TABLET ORAL at 09:07

## 2020-03-19 RX ADMIN — MULTIPLE VITAMINS W/ MINERALS TAB 1 TABLET: TAB at 09:07

## 2020-03-19 ASSESSMENT — SLEEP AND FATIGUE QUESTIONNAIRES
DO YOU USE A SLEEP AID: NO
SLEEP PATTERN: UTA
AVERAGE NUMBER OF SLEEP HOURS: 7
DO YOU HAVE DIFFICULTY SLEEPING: NO

## 2020-03-19 ASSESSMENT — LIFESTYLE VARIABLES: HISTORY_ALCOHOL_USE: YES

## 2020-03-19 NOTE — PROGRESS NOTES
Pharmacy to Dose Warfarin    Dx: Afib  Goal INR range 2-3 or 2.5-3.5  Home Warfarin dose:2.5mg daily      Date  INR  Warfarin  3/19                  3.57               hold  Recommend holding Warfarin tonight x1. Daily INR ordered. Rx will continue to manage therapy per consult order.    Gigi Escalante Pharm D 3/19/75153:25 PM  .

## 2020-03-19 NOTE — PLAN OF CARE
Pt has remained free from falls and injury so far this shift. Med compliant (whole pills). Pt bright and social with staff and other peers. Not attending groups. Pleasant and cooperative. Denies SI/HI, AVH. No RTIS noted so far this shift. Ambulates independently with a steady gait. Continent. Appetite adequate. Makes needs known. Resting in bed at this time. Nonskid footwear on. Bed in lowest locked position. Will continue to monitor.

## 2020-03-19 NOTE — PROGRESS NOTES
[x] NA     ACLS: To be assessed  MOCA:  [place] Cognitive Assessment ([address] [place] [address], [place])   (See pt folder behind nurses station for copy of assessment while pt is admitted.)   Total Score: Sum of all subscores listed on the right-hand side. Add one point for an individual who has 12 years or fewer of formal education, for a possible maximum of 30 points. A final total score of 26 and above is considered normal.      Education Level:  College     [place] Makin/1  Visuoconstructional Skills (cube)  0/1  Cisuoconstructional Skills (clock)  1/3  Naming    2/3  Attention    3/6  Sentence Repetition    2/2  Verbal Fluency    0/1  Abstraction     2/2  Delayed Recall   0/5  Orientation     3/6  (additional point for <12 grade educations)     Total Score:         Preadmission Environment:    Pt. Lives  with family ( and daughter in a condo with elevator)  Home environment:    one story  Steps to enter first floor:     No steps        Steps to second floor NA  Bathroom:   Walk in [employer] owned: None stated    Preadmission Status / PLOF:  History of falls:   Tyrel Bardales in October at the [facility], had fractured clavicle  Pt. Able to drive:  Yes  Pt Fully independent for ADLs/IADLs? Independent  Pt. Fully independent for transfers and gait and walked with: No Device      Sleep Hygiene:  Reports she goes to sleep at different times. Reports she rarely has difficulty sleeping. 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 .  Had a psychologist for about 4 yrs., not currently  Social Network:  Has friends but does not like to burden them.    but pt reports she is not able talk to him, Daughter  Stressors:  Being alone  Coping Skills:  Doesn't really have any    Pain:  No    Pain Medicine Status:    Denies need        Cognition    A&Oriented to person, place, month, not year or date, patient appropriate and cooperative. Follows two step commands. Upper Extremity ROM:    WFL, pt able to perform all bed mobility, transfers, and gait without ROM limitation. Upper Extremity Strength:    WFL, pt able to perform all bed mobility, transfers, and gait without strength limitation. Upper Extremity Sensation:    No apparent deficits. Upper Extremity Proprioception:    No apparent deficits. Skin Integrity:  No issues noted     Coordination and Tone:   No problem noted    Balance  Static Sitting:  Good  Dynamic Sitting:  Good  Static Standing: Good  Dynamic Standing: Good    Bed mobility:  IND    Transfers:    Sit to stand: IND  Stand to sit: IND  Bed/Chair to/from toilet: IND    Self Care: Toileting: IND  Grooming: IND  Dressing: IND with donning/doffing B socks    Exercise / Activities Initiated:   Pt. Educated on role of OT. Pt. Participated in:    Assessment of Deficits:   Pt demonstrated decreased activity tolerance, decreased safety awareness, decreased strength, decreased ROM, decreased balance,  decreased bed mobility, decreased transfer skills, and decreased ADL/IADL status, decreased coping skills, decreased cognition, self isolation, limited education    Pt. Limited during evaluation by . . . At end of evaluation, pt. In room/gathering room/group. Goal(s) : To be met in 3 Visits:  1). Coping skills discussion will take place with Pt will verbalize 2 coping skills. To be met in 5 Visits:  1). Pt. To complete 1 SMART long term goal and 2 SMART short term goals. 2). Pt. To verbalize 3 new coping skills. 3). Pt. To complete interest check list.   4). Pt. To verbalize understanding of sleep hygiene education/handouts. 5). Pt. To complete wellness plan.

## 2020-03-19 NOTE — GROUP NOTE
Group Therapy Note    Date: 3/19/2020    Group Start Time: 1100  Group End Time: 1140  Group Topic: Relaxation    [address]        Group Therapy Note    Attendees: 5    Patient's Goal: to engage in listening to relaxation music and water color paint a picture to promote stress reduction, relaxation, and mood improvement. Notes: Jesse Neumann was excused from beginning of group due to meeting with NP. Jesse Neumann was encouraged to participate in group activity. Jesse Neumann reported she was not interested in group activity. Jesse Neumann observed activity and shared confabulated stories with peers. Katrin's speech presented to be tangential. Jesse Neumann was excused from group early to meet with OT. Status After Intervention:  Unchanged    Participation Level: Interactive    Participation Quality: Appropriate      Speech:   Tangential      Thought Process/Content: Flight of ideas      Affective Functioning: Constricted/Restricted      Mood: anxious      Level of consciousness:  Alert      Response to Learning: Progressing to goal      Endings: None Reported    Modes of Intervention: Education, Socialization, Exploration and Activity      Discipline Responsible: Psychoeducational Specialist      Signature:  Marcela Mercedes [place]

## 2020-03-19 NOTE — H&P
Psychiatry Initial Evaluation    Admission Date:    3/18/2020    Chief complaint / Reason for Admission:  Psychosis    HPI:   Patient is a 70 y.o. female with no known formal past psychiatric history who was admitted for new onset psychosis. Pt was transferred from [facility] yesterday. She was admitted there for assessment of subacute onset delusions and behavior change. She has been making statements that her  is not really her , that a man has come into their home who looks like her , is wearing his clothes, and doing his chores, but not really him. Pt was worked up by neurology who found evidence of cerebral amyloid angiopathy on MRI. She was seen by Dr. Alberto Holguin as a consult with recommendation for admission. Per his assessment:    started to notice the  patient having memory problems, forgetfulness, disorientation in early  01/2020, and prior to that had a couple of falls in 10/2019 through  12/2019. Associated with this, she has become paranoid towards her   believing he is some kind of imposter or a duplicate, and at  times, becomes threatening towards him, threatens divorce or threatens  to leave. She is making increasingly poor decisions and impaired  judgment, and apparently has been doing things at night of questionable  safety that she does not remember in the morning.  is becoming  increasingly overwhelmed trying to care for her as she gets more  belligerent towards him and her decision making declines. It was an  ordeal for the  to even bring her to the hospital because of her  paranoia towards him.     The patient is a poor historian. She states that she has a  that  gives a convoluted story about a  from [place] and one from  [place] and how they were somehow both involved. She goes on with  multiple stories related to this about her  and her living  situation that are clearly not based on reality.   She previously documented in HPI. PE:    /69   Pulse 86   Temp 96.8 °F (36 °C) (Oral)   Resp 16   Ht 5' 6\" (1.676 m)   Wt 144 lb (65.3 kg)   SpO2 98%   BMI 23.24 kg/m²       Motor / Gait: no abnormalities noted, normal tone, no involuntary movements, normal gait and station    Mental Status Examination:    Appearance: WF, appears stated age, wearing hospital gown, fair grooming and hygiene  Behavior/Attitude toward examiner:  cooperative, attentive, fair eye contact  Speech:  Pressured, verbose, though not rapid, needs frequent interruption, normal volume  Mood: \"Anxious\"  Affect:  Anxious and moderately labile  Thought processes:  Tangential with loose associations  Thought Content: Denies SI, denies HI, +capgras delusion  Perceptions: + AH, ? VH?,  Not actively RTIS during my interaction  Attention: Impaired  Abstraction: WNL  Cognition: Average CHETAN, Alert and oriented to person, place, but not time, orientation to situation influenced by delusions, unable to fully assess recall given prominence of delusional thinking  Insight: Poor insight   Judgment: Impaired judgment      LAB:   Admission on 03/18/2020   Component Date Value Ref Range Status    Ventricular Rate 03/18/2020 81  BPM Final    Atrial Rate 03/18/2020 100  BPM Final    QRS Duration 03/18/2020 102  ms Final    Q-T Interval 03/18/2020 368  ms Final    QTc Calculation (Bazett) 03/18/2020 427  ms Final    R Axis 03/18/2020 -50  degrees Final    T Axis 03/18/2020 250  degrees Final    Diagnosis 03/18/2020 Atrial fibrillationLeft axis deviationAnteroseptal infarct (cited on or before 18-MAR-2020)Marked ST abnormality, possible inferolateral subendocardial injuryAbnormal ECGWhen compared with ECG of 15-MAR-2020 11:52,No significant change was foundConfirmed by Ryan Altamirano MD, [address] ([id number]) on 3/19/2020 5:39:31 AM   Final    Protime 03/19/2020 41.9* 10.0 - 13.2 sec Final    Comment: Effective 11-19-19 09:00am EST  Please note reference ranges have  changed for PT and INR Testing.  INR 03/19/2020 3.57* 0.86 - 1.14 Final    Comment: Effective 11/19/19 at 09:00am EST    Normal: 0.86 - 1.14  Therapeutic: 2.0 - 3.0  Pros. Valve: 2.5 - 3.5  AMI: 2.0 - 3.0     Admission on 03/15/2020, Discharged on 03/18/2020   Component Date Value Ref Range Status    WBC 03/15/2020 4.6  4.0 - 11.0 K/uL Final    RBC 03/15/2020 4.51  4.00 - 5.20 M/uL Final    Hemoglobin 03/15/2020 14.5  12.0 - 16.0 g/dL Final    Hematocrit 03/15/2020 43.2  36.0 - 48.0 % Final    MCV 03/15/2020 95.9  80.0 - 100.0 fL Final    MCH 03/15/2020 32.1  26.0 - 34.0 pg Final    MCHC 03/15/2020 33.5  31.0 - 36.0 g/dL Final    RDW 03/15/2020 15.7* 12.4 - 15.4 % Final    Platelets 43/05/9447 191  135 - 450 K/uL Final    MPV 03/15/2020 10.4  5.0 - 10.5 fL Final    Neutrophils % 03/15/2020 70.1  % Final    Lymphocytes % 03/15/2020 18.1  % Final    Monocytes % 03/15/2020 10.3  % Final    Eosinophils % 03/15/2020 0.3  % Final    Basophils % 03/15/2020 1.2  % Final    Neutrophils Absolute 03/15/2020 3.2  1.7 - 7.7 K/uL Final    Lymphocytes Absolute 03/15/2020 0.8* 1.0 - 5.1 K/uL Final    Monocytes Absolute 03/15/2020 0.5  0.0 - 1.3 K/uL Final    Eosinophils Absolute 03/15/2020 0.0  0.0 - 0.6 K/uL Final    Basophils Absolute 03/15/2020 0.1  0.0 - 0.2 K/uL Final    Sodium 03/15/2020 137  136 - 145 mmol/L Final    Potassium reflex Magnesium 03/15/2020 3.9  3.5 - 5.1 mmol/L Final    Comment: Specimen hemolysis has exceeded the interference as defined by Roche. Value may be falsely increased. Suggest recollection if clinically  indicated.       Chloride 03/15/2020 96* 99 - 110 mmol/L Final    CO2 03/15/2020 26  21 - 32 mmol/L Final    Anion Gap 03/15/2020 15  3 - 16 Final    Glucose 03/15/2020 109* 70 - 99 mg/dL Final    BUN 03/15/2020 16  7 - 20 mg/dL Final    CREATININE 03/15/2020 0.6  0.6 - 1.2 mg/dL Final    GFR Non- 03/15/2020 >60  >60 Final    Comment: >60 mL/min/1.73m2 EGFR, calc. for ages 25 and older using the  MDRD formula (not corrected for weight), is valid for stable  renal function.  GFR  03/15/2020 >60  >60 Final    Comment: Chronic Kidney Disease: less than 60 ml/min/1.73 sq.m. Kidney Failure: less than 15 ml/min/1.73 sq.m. Results valid for patients 18 years and older.  Calcium 03/15/2020 10.0  8.3 - 10.6 mg/dL Final    Total Protein 03/15/2020 7.2  6.4 - 8.2 g/dL Final    Alb 03/15/2020 4.2  3.4 - 5.0 g/dL Final    Albumin/Globulin Ratio 03/15/2020 1.4  1.1 - 2.2 Final    Total Bilirubin 03/15/2020 0.7  0.0 - 1.0 mg/dL Final    Alkaline Phosphatase 03/15/2020 89  40 - 129 U/L Final    ALT 03/15/2020 12  10 - 40 U/L Final    Comment: Specimen hemolysis has exceeded the interference as defined by Roche. Result may be affected. Suggest recollection if clinically indicated.  AST 03/15/2020 38* 15 - 37 U/L Final    Comment: Specimen hemolysis has exceeded the interference as defined by Roche. Value may be falsely increased. Suggest recollection if clinically  indicated.  Globulin 03/15/2020 3.0  g/dL Final    Protime 03/15/2020 53.1* 10.0 - 13.2 sec Final    Comment: Effective 11-19-19 09:00am EST  Please note reference ranges have  changed for PT and INR Testing.  INR 03/15/2020 4.51* 0.86 - 1.14 Final    Comment: REVIEWED BY TECHNOLOGIST. Effective 11/19/19 at 09:00am EST    Normal: 0.86 - 1.14  Therapeutic: 2.0 - 3.0  Pros. Valve: 2.5 - 3.5  AMI: 2.0 - 3.0      aPTT 03/15/2020 46.3* 24.2 - 36.2 sec Final    Comment: Therapeutic range: 49.0 - 76.0 sec    Effective 11-19-19 9:00am EST  Please note reference ranges have  changed for PTT Testing.       Color, UA 03/15/2020 Yellow  Straw/Yellow Final    Clarity, UA 03/15/2020 Clear  Clear Final    Glucose, Ur 03/15/2020 Negative  Negative mg/dL Final    Bilirubin Urine 03/15/2020 Negative  Negative Final    Ketones, Urine 03/15/2020 Negative Negative mg/dL Final    Specific [place], UA 03/15/2020 1.015  1.005 - 1.030 Final    Blood, Urine 03/15/2020 Negative  Negative Final    pH, UA 03/15/2020 7.0  5.0 - 8.0 Final    Protein, UA 03/15/2020 Negative  Negative mg/dL Final    Urobilinogen, Urine 03/15/2020 0.2  <2.0 E.U./dL Final    Nitrite, Urine 03/15/2020 Negative  Negative Final    Leukocyte Esterase, Urine 03/15/2020 Negative  Negative Final    Microscopic Examination 03/15/2020 Not Indicated   Final    Urine Type 03/15/2020 NotGiven   Final    Urine Culture, Routine 03/15/2020 No growth at 18-36 hours   Final    Potassium 03/15/2020 3.6  3.5 - 5.1 mmol/L Final    Digoxin Lvl 03/15/2020 1.3  0.8 - 2.0 ng/mL Final    Sodium 03/16/2020 138  136 - 145 mmol/L Final    Potassium reflex Magnesium 03/16/2020 3.5  3.5 - 5.1 mmol/L Final    Chloride 03/16/2020 97* 99 - 110 mmol/L Final    CO2 03/16/2020 27  21 - 32 mmol/L Final    Anion Gap 03/16/2020 14  3 - 16 Final    Glucose 03/16/2020 91  70 - 99 mg/dL Final    BUN 03/16/2020 10  7 - 20 mg/dL Final    CREATININE 03/16/2020 0.6  0.6 - 1.2 mg/dL Final    GFR Non- 03/16/2020 >60  >60 Final    Comment: >60 mL/min/1.73m2 EGFR, calc. for ages 25 and older using the  MDRD formula (not corrected for weight), is valid for stable  renal function.  GFR  03/16/2020 >60  >60 Final    Comment: Chronic Kidney Disease: less than 60 ml/min/1.73 sq.m. Kidney Failure: less than 15 ml/min/1.73 sq.m. Results valid for patients 18 years and older.       Calcium 03/16/2020 10.1  8.3 - 10.6 mg/dL Final    WBC 03/16/2020 4.1  4.0 - 11.0 K/uL Final    RBC 03/16/2020 4.65  4.00 - 5.20 M/uL Final    Hemoglobin 03/16/2020 15.2  12.0 - 16.0 g/dL Final    Hematocrit 03/16/2020 44.3  36.0 - 48.0 % Final    MCV 03/16/2020 95.2  80.0 - 100.0 fL Final    MCH 03/16/2020 32.7  26.0 - 34.0 pg Final    MCHC 03/16/2020 34.3  31.0 - 36.0 g/dL Final    RDW 03/16/2020 Range:  0D-29D      <0.6  30D-199Y    <5.1    CRP is used in the detection and evaluation of infection, tissue injury,  inflammatory disorders and associated diseases. Increases in CRP values  are non-specific and should not be interpreted without a complete  clinical evaluation.  Sed Rate 03/16/2020 11  0 - 30 mm/Hr Final    Ammonia 03/16/2020 20  11 - 51 umol/L Final    Sodium 03/17/2020 138  136 - 145 mmol/L Final    Potassium reflex Magnesium 03/17/2020 3.5  3.5 - 5.1 mmol/L Final    Chloride 03/17/2020 98* 99 - 110 mmol/L Final    CO2 03/17/2020 26  21 - 32 mmol/L Final    Anion Gap 03/17/2020 14  3 - 16 Final    Glucose 03/17/2020 101* 70 - 99 mg/dL Final    BUN 03/17/2020 17  7 - 20 mg/dL Final    CREATININE 03/17/2020 0.7  0.6 - 1.2 mg/dL Final    GFR Non- 03/17/2020 >60  >60 Final    Comment: >60 mL/min/1.73m2 EGFR, calc. for ages 25 and older using the  MDRD formula (not corrected for weight), is valid for stable  renal function.  GFR  03/17/2020 >60  >60 Final    Comment: Chronic Kidney Disease: less than 60 ml/min/1.73 sq.m. Kidney Failure: less than 15 ml/min/1.73 sq.m. Results valid for patients 18 years and older.  Calcium 03/17/2020 10.0  8.3 - 10.6 mg/dL Final    WBC 03/17/2020 4.6  4.0 - 11.0 K/uL Final    RBC 03/17/2020 4.85  4.00 - 5.20 M/uL Final    Hemoglobin 03/17/2020 15.7  12.0 - 16.0 g/dL Final    Hematocrit 03/17/2020 46.0  36.0 - 48.0 % Final    MCV 03/17/2020 94.8  80.0 - 100.0 fL Final    MCH 03/17/2020 32.5  26.0 - 34.0 pg Final    MCHC 03/17/2020 34.2  31.0 - 36.0 g/dL Final    RDW 03/17/2020 15.5* 12.4 - 15.4 % Final    Platelets 44/13/4894 178  135 - 450 K/uL Final    MPV 03/17/2020 9.4  5.0 - 10.5 fL Final    Protime 03/17/2020 38.6* 10.0 - 13.2 sec Final    Comment: Effective 11-19-19 09:00am EST  Please note reference ranges have  changed for PT and INR Testing.      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 Tx plan: Generally: prevent self injury/aggression, stabilize mood/anxiety/psychotic/behavioral disturbance, establish/maintain aftercare, increase coping mechanisms, improve medication compliance. All conditions present on admission are being treated while pt is hospitalized. Legal Status: Involuntary. Statement of observation completed today. Primary Psychiatric Issues:  1. Unspecified psychosis; Probable vascular dementia:  Suspect that psychosis is likely secondary to vascular dementia, specifically cerebral amyloid angiopathy. However, psychiatric history is unclear. Some of the provided history today suggests chronic mood and anxiety symptoms which may represent a chronic psychiatric disorder. As such, it is possible that patient is experiencing a primary psychotic mood episode. -D/C quetiapine. This was started at [facility] but is so low potency that it is unlikely to be of benefit in this case of severe psychosis. -Start risperidone 0.25 mg BID  -Need to obtain more collateral from  regarding past psychiatric history and symptoms    Chemical Dependency Issues:  No issues. Tobacco use in long term remission. No issues with withdrawal.    Function:  -Consult occupational therapy  -Falls precautions    Medical Problems:  Internal medicine has been consulted. Appreciate recs. 1. Afib:  -Rate controlled.   Continue digoxin 125 mcg daily  -Continue warfarin 2.5 mg daily. Pharmacy consulted    2. HTN:  -Continue HCTZ 50 mg daily, aldactone 50 mg daily    Code Status: Full    Disposition:    -Housing: Own home  -Current outpatient follow-up: No known psychiatric connection    Estimated length of stay: 7-10 days    Criteria for Discharge:  Not psychotic, not homicidal, not suicidal, behavioral disturbance under control, sleeping well, mood improved/stable, eating well, aftercare arranged. Spent > 70 minutes face to face with patient of which >50% was spent counseling and providing education regarding diagnosis, treatment options, and prognosis.     Nadeem Conway MD  Staff Psychiatrist

## 2020-03-19 NOTE — BH NOTE
Hero Lynne was excused from [address], due to meeting with PRATEEK at time of invite.     Dinorah Ramsey, CTRS

## 2020-03-19 NOTE — FLOWSHEET NOTE
20 1420   Psychiatric History   Psychiatric history treatment Psychiatric admissions  (No prior psych history. )   Contact information N/A   Are there any medication issues? No  (No concerns. )   Support System   Support system Adequate   Types of Support System Spouse; Other (Comment)   Problems in support system None   Current Living Situation   Home Living Adequate   Living information Lives with others; Other (Comment)  (Lives with  in her [de-identified].)   Problems with living situation  Yes  (Feeling that her  doesnt pay attention to her.)   Lack of basic needs No   SSDI/SSI N/A   Other government assistance N/A   Problems with environment N/A   Current abuse issues N/A   Relationship problems Yes  (Pt feels that her  doesn't pay attention to her.)   Relationship problems due to    (N/A)   Contact information N/A   Medical and Self-Care Issues   Relevant medical problems Heart Trouble    Relevant self-care issues None reported. Barriers to treatment No   Family Constellation   Spouse/partner-name/age Amairani Christianson (  for 48 years)   Children-names/ages [place] ( Lives in [place])   Siblings ILIANA; due to pt's cognitive status. Contact information N/A   Support services Other(Comment)  (N/A)   Comment N/A   Childhood   Raised by Biological mother;Biological father   Biological mother . Biological father .    Relevant family history N/A   History of abuse No   Comment N/A   Legal History   Legal history No   Other relevant legal issues N/A   Comment N/A   Juvenile legal history No    Abuse Assessment   Physical Abuse Denies   Verbal Abuse Denies   Possible abuse reported to:   (N/A)   Emotional abuse Denies   Financial Abuse Denies   Sexual abuse Denies   Elder abuse No   Substance Use   Use of substances  No   Motivation for SA Treatment   Stage of engagement   (N/A)   Motivation for treatment   (N/A)   Current barriers to treatment   (N/A)   Comment   (N/A)   Education Education College graduate  (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 Current daily activity ILIANA   Social with friends/family Yes   Cultural and Spiritual   Spiritual concerns No   Cultural concerns No   Comment N/A   Collateral Contacts   Contacts Family       SW completed psychosocial & C-SSRS assessment. The pt denied SI/HI. The pt denied substance abuse. Pt was tangential during assessment. Pt frequently talked about her  and shared that she is unhappy with him.        JAEL Hampton

## 2020-03-20 LAB
INR BLD: 2.75 (ref 0.86–1.14)
PROTHROMBIN TIME: 32.2 SEC (ref 10–13.2)

## 2020-03-20 PROCEDURE — 6370000000 HC RX 637 (ALT 250 FOR IP): Performed by: PSYCHIATRY & NEUROLOGY

## 2020-03-20 PROCEDURE — 85610 PROTHROMBIN TIME: CPT

## 2020-03-20 PROCEDURE — 99233 SBSQ HOSP IP/OBS HIGH 50: CPT | Performed by: PSYCHIATRY & NEUROLOGY

## 2020-03-20 PROCEDURE — 90833 PSYTX W PT W E/M 30 MIN: CPT | Performed by: PSYCHIATRY & NEUROLOGY

## 2020-03-20 PROCEDURE — 36415 COLL VENOUS BLD VENIPUNCTURE: CPT

## 2020-03-20 PROCEDURE — 1240000000 HC EMOTIONAL WELLNESS R&B

## 2020-03-20 RX ORDER — WARFARIN SODIUM 1 MG/1
1.5 TABLET ORAL
Status: COMPLETED | OUTPATIENT
Start: 2020-03-20 | End: 2020-03-20

## 2020-03-20 RX ORDER — DONEPEZIL HYDROCHLORIDE 5 MG/1
5 TABLET, FILM COATED ORAL
Status: DISCONTINUED | OUTPATIENT
Start: 2020-03-20 | End: 2020-03-23 | Stop reason: HOSPADM

## 2020-03-20 RX ORDER — RISPERIDONE 0.25 MG/1
0.5 TABLET, FILM COATED ORAL 2 TIMES DAILY
Status: DISCONTINUED | OUTPATIENT
Start: 2020-03-20 | End: 2020-03-23 | Stop reason: HOSPADM

## 2020-03-20 RX ADMIN — RISPERIDONE 0.25 MG: 0.25 TABLET ORAL at 08:13

## 2020-03-20 RX ADMIN — HYDROCHLOROTHIAZIDE 50 MG: 25 TABLET ORAL at 08:13

## 2020-03-20 RX ADMIN — MULTIPLE VITAMINS W/ MINERALS TAB 1 TABLET: TAB at 08:13

## 2020-03-20 RX ADMIN — DIGOXIN 125 MCG: 250 TABLET ORAL at 08:13

## 2020-03-20 RX ADMIN — WARFARIN SODIUM 1.5 MG: 1 TABLET ORAL at 18:48

## 2020-03-20 RX ADMIN — SPIRONOLACTONE 50 MG: 25 TABLET ORAL at 08:13

## 2020-03-20 RX ADMIN — RISPERIDONE 0.5 MG: 0.25 TABLET ORAL at 21:00

## 2020-03-20 RX ADMIN — DONEPEZIL HYDROCHLORIDE 5 MG: 5 TABLET, FILM COATED ORAL at 14:51

## 2020-03-20 ASSESSMENT — PAIN SCALES - GENERAL: PAINLEVEL_OUTOF10: 0

## 2020-03-20 NOTE — PLAN OF CARE
Problem: Falls - Risk of:  Goal: Will remain free from falls  Description: Will remain free from falls  Outcome: Ongoing  Goal: Absence of physical injury  Description: Absence of physical injury  Outcome: Ongoing     Problem: Altered Mood, Deterioration in Function:  Goal: Ability to perform activities of daily living will improve  Description: Ability to perform activities of daily living will improve  Outcome: Ongoing  Goal: Able to verbalize reality based thinking  Description: Able to verbalize reality based thinking  Outcome: Ongoing    Pt has been pleasant and cooperative throughout this shift. Visible on the unit. Social w/ peers and staff. Alert and oriented to person and sometimes place, but will become confused. For example, this morning she stated \"I have to leave soon, I'm getting picked up at 9 to go to the gym. \" Med compliant. No mention of her delusions about her  being an imposter. Had pleasant and appropriate conversation w/ her  on the phone. Up ad immanuel w/ steady gait. Denies SI/HI/AVH. No falls or injuries have occurred at this time.

## 2020-03-20 NOTE — PROGRESS NOTES
Reassurance offered, and compliance encouraged. After meeting with patient, I spoke to her  for 30 minutes on the phone. Supportive counseling and education provided regarding diagnosis, prognosis, treatment options, and legal considerations regarding admission (hold criteria, probate, etc). Progressing overall: No change  Suicidal ideation: Denies  Homicidal ideation: Denies  Medication side effects: No    ROS: Patient has new complaints: no    Current Medications Ordered:   risperiDONE  0.5 mg Oral BID    donepezil  5 mg Oral Daily with breakfast    warfarin (COUMADIN) daily dosing (placeholder)   Other RX Placeholder    therapeutic multivitamin-minerals  1 tablet Oral Daily    digoxin  125 mcg Oral Daily    spironolactone  50 mg Oral Daily    hydroCHLOROthiazide  50 mg Oral Daily      PRN Meds: acetaminophen, LORazepam **OR** LORazepam, haloperidol lactate **OR** haloperidol, traZODone, benztropine mesylate, magnesium hydroxide, aluminum & magnesium hydroxide-simethicone, celecoxib     Objective:     PE:    /72   Pulse 98   Temp 98.5 °F (36.9 °C) (Oral)   Resp 16   Ht 5' 6\" (1.676 m)   Wt 144 lb (65.3 kg)   SpO2 98%   BMI 23.24 kg/m²       Motor / Gait: no abnormalities noted, normal tone, no involuntary movements, normal gait and station     Mental Status Examination:    Appearance: WF, appears stated age, wearing own clothing, good grooming and hygiene  Behavior/Attitude toward examiner:  cooperative, attentive, fair eye contact  Speech:  Pressured, verbose, though not rapid, needs frequent interruption, normal volume  Mood:  \"I don't belong here\"  Affect:  Anxious and moderately labile  Thought processes:  Tangential with loose associations  Thought Content: Denies SI, denies HI, +capgras delusion  Perceptions: + AH, ? VH?,  Not actively RTIS during my interaction  Attention: Impaired  Abstraction: WNL  Cognition: Average CHETAN, Alert and oriented to person, place, but not time, orientation to situation influenced by delusions, recall impaired (see MOCA below)  Insight: Poor insight   Judgment: Impaired judgment      MOCA 3/19:  Education Level:                     College     Alternating [place]/1  Visuoconstructional Skills (cube)        0/1  Cisuoconstructional Skills (clock)       1/3  Naming                                                2/3  Attention                                              3/6  Sentence Repetition                           2/2  Verbal Fluency                                    0/1  Abstraction                                          2/2  Delayed Recall                                    0/5  Orientation                                          3/6  (additional point for <12 grade educations)       Total Score:                                           LAB: Reviewed labs from last 24 hours      Assessment and Plan:     Diagnoses:   Primary Psychiatric (DSM V) Diagnosis: 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      Tx plan: Generally: prevent self injury/aggression, stabilize mood/anxiety/psychotic/behavioral disturbance, establish/maintain aftercare, increase coping mechanisms, improve medication compliance.  All conditions present on admission are being treated while pt is hospitalized.      Legal Status: Involuntary. Day 2/3.     Primary Psychiatric Issues:  1. 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    -D/C'd quetiapine on admission. This was started at [facility] but is so low potency that it is unlikely to be of benefit in this case of severe psychosis. -Increase risperidone to 0.5 mg BID  -Start donepezil 5 mg daily for cognition and behavioral disturbance     Chemical Dependency Issues:  No issues. Tobacco use in long term remission. No issues with withdrawal.     Function:  -Consult occupational therapy - appreciate recs  -Falls precautions     Medical Problems:  Internal medicine has been consulted. Appreciate recs.     1. Afib:  -Rate controlled. Continue digoxin 125 mcg daily  -Continue warfarin 2.5 mg daily. Pharmacy consulted     2. HTN:  -Continue HCTZ 50 mg daily, aldactone 50 mg daily     Code Status: Full     Disposition:    -Housing: Own home  -Current outpatient follow-up: No known psychiatric connection     Estimated length of stay: 3-5 days     Criteria for Discharge:  Not psychotic, not homicidal, not suicidal, behavioral disturbance under control, sleeping well, mood improved/stable, eating well, aftercare arranged. Total face to face time with patient was 45 minutes and more than 50 % of that time was spent counseling the patient on their symptoms, treatment and expected goals. In addition, spent 30 minutes with family engaged in counseling and education regarding diagnosis, prognosis, treatment.     Angeline Murray MD  Staff Psychiatrist

## 2020-03-20 NOTE — BH NOTE
DINORA faxed requested paperwork to Dr. Nolberto Montoya office for pt's upcoming appointment on May 4, 20 at [address] [place] [address]

## 2020-03-20 NOTE — PROGRESS NOTES
Pharmacy to Dose Warfarin    Dx: Afib  Goal INR range 2-3 or 2.5-3.5  Home Warfarin dose:2.5mg daily      Date  INR  Warfarin  3/19                  3.57               hold  3/20                  2.75               1.5mg  Recommend  Warfarin 1.5mg tonight x1. Daily INR ordered. Rx will continue to manage therapy per consult order. Devon Delgado Pharm D 3/05/995310:18 PM  .      .

## 2020-03-20 NOTE — GROUP NOTE
Group Therapy Note    Date: 3/20/2020    Group Start Time: 1430  Group End Time: 1626  Group Topic: Recreational    [address]        Group Therapy Note    Attendees: 3    Patient's Goal: to go on a virtual zoo safari to promote learning, socialization, reminiscing, and mood improvement. Notes: Susanna Esparza actively engaged in group activity, reminiscing, and positively socializing with peers. Susanna Esparza shared educational things she learned from the virtual zoo tour. Katrin speech presented to be pressured and tangential, when socializing with peers. Katrin expressed enjoyment of activity. Status After Intervention:  Improved    Participation Level:  Active Listener and Interactive    Participation Quality: Appropriate      Speech:  pressured and tangential      Thought Process/Content: Flight of ideas      Affective Functioning: Constricted/Restricted      Mood: anxious      Level of consciousness:  Preoccupied      Response to Learning: Able to change behavior and Progressing to goal      Endings: None Reported    Modes of Intervention: Education, Support, Socialization, Exploration and Activity      Discipline Responsible: Psychoeducational Specialist      Signature:  Qasim Scruggs, [address]

## 2020-03-20 NOTE — PLAN OF CARE
Problem: Falls - Risk of:  Goal: Will remain free from falls  Description: Will remain free from falls  3/19/2020 2259 by Adele Michel RN  Outcome: Ongoing    Problem: Altered Mood, Deterioration in Function:  Goal: Ability to perform activities of daily living will improve  Description: Ability to perform activities of daily living will improve  3/19/2020 2259 by Adele Michel RN  Outcome: Ongoing    Problem: Altered Mood, Deterioration in Function:  Goal: Maintenance of adequate nutrition will improve  Description: Maintenance of adequate nutrition will improve  3/19/2020 2259 by Adele Michel RN  Outcome: Ongoing    Problem: Altered Mood, Deterioration in Function:  Goal: Able to verbalize reality based thinking  Description: Able to verbalize reality based thinking  Outcome: Ongoing     Problem: Altered Mood, Deterioration in Function:  Goal: Ability to tolerate increased activity will improve  Description: Ability to tolerate increased activity will improve  3/19/2020 2259 by Adele Michel RN  Outcome: Ongoing  Patient has been visible on the unit this shift. Walking the halls and socializing with other patient. She is alert and oriented to person and place, pleasant and cooperative with care. Accepted hs medications without issues. During interview patient denies SI/HI or AVH. She denies anxiety and stated \"I have had a nice day. \" Patient shared similar story with writer again regarding reasons why she is here. Continues to endorse a 2nd gentlemen in her home that resembled her  closely and how shocking that experience was to her. Patient's spouse did call this shift to tell patient good night and she accepted call and spoke with him. Patient denies any other needs at this time, currently resting in bed.

## 2020-03-21 LAB
INR BLD: 2.44 (ref 0.86–1.14)
PROTHROMBIN TIME: 28.6 SEC (ref 10–13.2)

## 2020-03-21 PROCEDURE — 85610 PROTHROMBIN TIME: CPT

## 2020-03-21 PROCEDURE — 6370000000 HC RX 637 (ALT 250 FOR IP): Performed by: PSYCHIATRY & NEUROLOGY

## 2020-03-21 PROCEDURE — 1240000000 HC EMOTIONAL WELLNESS R&B

## 2020-03-21 PROCEDURE — 36415 COLL VENOUS BLD VENIPUNCTURE: CPT

## 2020-03-21 RX ORDER — WARFARIN SODIUM 2.5 MG/1
2.5 TABLET ORAL
Status: COMPLETED | OUTPATIENT
Start: 2020-03-21 | End: 2020-03-21

## 2020-03-21 RX ORDER — DIMETHICONE, OXYBENZONE, AND PADIMATE O 2; 2.5; 6.6 G/100G; G/100G; G/100G
STICK TOPICAL PRN
Status: DISCONTINUED | OUTPATIENT
Start: 2020-03-21 | End: 2020-03-23 | Stop reason: HOSPADM

## 2020-03-21 RX ADMIN — DIGOXIN 125 MCG: 250 TABLET ORAL at 08:38

## 2020-03-21 RX ADMIN — MULTIPLE VITAMINS W/ MINERALS TAB 1 TABLET: TAB at 08:39

## 2020-03-21 RX ADMIN — WARFARIN SODIUM 2.5 MG: 2.5 TABLET ORAL at 16:43

## 2020-03-21 RX ADMIN — RISPERIDONE 0.5 MG: 0.25 TABLET ORAL at 20:36

## 2020-03-21 RX ADMIN — HYDROCHLOROTHIAZIDE 50 MG: 25 TABLET ORAL at 08:39

## 2020-03-21 RX ADMIN — RISPERIDONE 0.5 MG: 0.25 TABLET ORAL at 08:39

## 2020-03-21 RX ADMIN — DONEPEZIL HYDROCHLORIDE 5 MG: 5 TABLET, FILM COATED ORAL at 08:39

## 2020-03-21 RX ADMIN — CELECOXIB 200 MG: 200 CAPSULE ORAL at 08:39

## 2020-03-21 RX ADMIN — Medication: at 16:43

## 2020-03-21 RX ADMIN — SPIRONOLACTONE 50 MG: 25 TABLET ORAL at 08:39

## 2020-03-21 ASSESSMENT — PAIN SCALES - GENERAL
PAINLEVEL_OUTOF10: 0

## 2020-03-21 NOTE — PLAN OF CARE
Problem: Altered Mood, Deterioration in Function:  Goal: Ability to perform activities of daily living will improve  Outcome: Ongoing  Note: Patient bright, visible and social with another female patient. Medication compliant, attended group, eating meals and taking fluids well. Patient was able to shower independently with writer as standby however, patient was independent. Denies SI/HI/AVH, pain or physical complaints. Patient has spoke to her  several times on the phone. He dropped off items for patient. Continue fall precautions and q15 minute safety checks.

## 2020-03-21 NOTE — BH NOTE
Pt A+O x2, calm, cooperative and medication compliant. Pt denies SI/HI/AVH and no RTIS observed. Pt is visible on unit, watching TV with other patients. Pt denies any other needs at this time. Will monitor.

## 2020-03-22 LAB
INR BLD: 2.12 (ref 0.86–1.14)
PROTHROMBIN TIME: 24.8 SEC (ref 10–13.2)

## 2020-03-22 PROCEDURE — 36415 COLL VENOUS BLD VENIPUNCTURE: CPT

## 2020-03-22 PROCEDURE — 99233 SBSQ HOSP IP/OBS HIGH 50: CPT | Performed by: PSYCHIATRY & NEUROLOGY

## 2020-03-22 PROCEDURE — 85610 PROTHROMBIN TIME: CPT

## 2020-03-22 PROCEDURE — 6370000000 HC RX 637 (ALT 250 FOR IP): Performed by: PSYCHIATRY & NEUROLOGY

## 2020-03-22 PROCEDURE — 1240000000 HC EMOTIONAL WELLNESS R&B

## 2020-03-22 RX ORDER — WARFARIN SODIUM 2 MG/1
4 TABLET ORAL
Status: COMPLETED | OUTPATIENT
Start: 2020-03-22 | End: 2020-03-22

## 2020-03-22 RX ADMIN — WARFARIN SODIUM 4 MG: 2 TABLET ORAL at 18:55

## 2020-03-22 RX ADMIN — RISPERIDONE 0.5 MG: 0.25 TABLET ORAL at 08:15

## 2020-03-22 RX ADMIN — DONEPEZIL HYDROCHLORIDE 5 MG: 5 TABLET, FILM COATED ORAL at 08:15

## 2020-03-22 RX ADMIN — RISPERIDONE 0.5 MG: 0.25 TABLET ORAL at 20:33

## 2020-03-22 RX ADMIN — SPIRONOLACTONE 50 MG: 25 TABLET ORAL at 08:15

## 2020-03-22 RX ADMIN — MULTIPLE VITAMINS W/ MINERALS TAB 1 TABLET: TAB at 08:14

## 2020-03-22 RX ADMIN — HYDROCHLOROTHIAZIDE 50 MG: 25 TABLET ORAL at 08:14

## 2020-03-22 NOTE — PLAN OF CARE
Problem: Falls - Risk of:  Goal: Will remain free from falls  Description: Pt free from falls to current time; offer toilet assistance, encouraged to contact me use call light if she wants any assistance, bed alarm on while in bed sleeping, wearing shoes when she is walking around. Outcome: Ongoing     Problem: Altered Mood, Deterioration in Function:  Goal: Maintenance of adequate nutrition will improve  Description: Maintenance of adequate nutrition will improve; offered her food and drinks and she frequently assures me \"I am fine, no thank you\" despite encouraging her. She may be eating earlier in the day.   Outcome: Ongoing

## 2020-03-22 NOTE — PLAN OF CARE
Patient has been relaxed, Oriented x3 but forgetful. Med compliant. Denies SI/HI/AVH. Spent most of day painting in DR. Stewart good No behavioral issues Will continue to monitor.

## 2020-03-22 NOTE — PROGRESS NOTES
conditions present on admission are being treated while pt is hospitalized.      Legal Status: Involuntary.      Primary Psychiatric Issues:  1. 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 -D/C'd quetiapine on admission. This was started at [facility] but is so low potency that it is unlikely to be of benefit in this case of severe psychosis. -Increase risperidone to 0.5 mg BID  -Start donepezil 5 mg daily for cognition and behavioral disturbance     Chemical Dependency Issues:  No issues. Tobacco use in long term remission. No issues with withdrawal.     Function:  -Consult occupational therapy - appreciate recs  -Falls precautions     Medical Problems:  Internal medicine has been consulted. Appreciate recs.     1. Afib:  -Rate controlled. Continue digoxin 125 mcg daily  -Continue warfarin 2.5 mg daily. Pharmacy consulted     2. HTN:  -Continue HCTZ 50 mg daily, aldactone 50 mg daily     Code Status: Full     Disposition:    -Housing: Own home  -Current outpatient follow-up: No known psychiatric connection     Estimated length of stay: 3-5 days     Criteria for Discharge:  Not psychotic, not homicidal, not suicidal, behavioral disturbance under control, sleeping well, mood improved/stable, eating well, aftercare arranged. Total face to face time with patient was 35 minutes and more than 50 % of that time was spent counseling the patient on their symptoms, treatment and expected goals.     Mayur Olsen MD  Staff Psychiatrist

## 2020-03-22 NOTE — GROUP NOTE
Group Therapy Note    Date: 3/22/2020    Group Start Time: 2:00 pm  Group End Time: 3:00 pm  Group Topic: Cognitive Skills    [address] [facility]        Group Therapy Note    Attendees: 3       Patient's Goal: Pt will participate in painting activity to promote relaxation and utilize creativity. Notes: Pt was alert and participated in group. Status After Intervention:  Improved    Participation Level:  Active Listener and Interactive    Participation Quality: Appropriate, Attentive, Sharing and Supportive      Speech:  normal      Thought Process/Content: Logical      Affective Functioning: Congruent      Mood: euthymic      Level of consciousness:  Alert and Oriented x4      Response to Learning: Able to verbalize current knowledge/experience, Able to verbalize/acknowledge new learning and Progressing to goal      Endings: None Reported    Modes of Intervention: Support, Socialization and Activity      Discipline Responsible: /Counselor      Signature:  LORI Bazzi

## 2020-03-22 NOTE — BH NOTE
[address] [facility]  Day 3 Interdisciplinary Treatment Plan NOTE    Review Date & Time: 3/22/20 1006    Patient was not in treatment team    Admission Type:   Admission Type: Involuntary    Reason for admission:  Reason for Admission: forgetfulness and combativeness at home  Estimated Length of Stay Update:  2-3 days  Estimated Discharge Date Update: 3/24-3/27    PATIENT STRENGTHS:  Patient Strengths Strengths: Communication, No significant Physical Illness, Positive Support, Social Skills, Motivated, Medication Compliance, Connection to output provider  Patient Strengths and Limitations:Limitations: Unrealistic self-view  Addictive Behavior:Addictive Behavior  In the past 3 months, have you felt or has someone told you that you have a problem with:  : None  Do you have a history of Chemical Use?: No  Do you have a history of Alcohol Use?: Yes(Pt goes to cocktail parties on Sunday & drinks wine at times. )  Do you have a history of Street Drug Abuse?: No  Histroy of Prescripton Drug Abuse?: No  Medical Problems:  Past Medical History:   Diagnosis Date    Adenomatous polyp of colon 1/29/2019    Asymptomatic varicose veins     Atrial fibrillation ([address]     Dysthymic disorder     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)     Screening mammogram 12/8/2009    (Left)Benign       Risk:  Fall RiskTotal: 91  Joseph Scale Joseph Scale Score: 22  BVC Total: 1  Change in scores no .  Changes to plan of Care no    Status EXAM:   Status and Exam  Normal: Yes  Facial Expression: Brightened  Affect: Appropriate, Congruent  Level of Consciousness: Alert  Mood:Normal: Yes  Mood: Ambivalent  Motor Activity:Normal: Yes  Interview Behavior: Cooperative  Preception: [place] to Person, [place] to Time, [place] to Place  Attention:Normal: Yes  Attention: Distractible  Thought Processes: Circumstantial  Thought Content:Normal: Yes  Thought Content: Poverty of Content  Hallucinations: None  Delusions: No  Delusions: Reference  Memory:Normal: No  Memory: Confabulation, Poor Recent  Insight and Judgment: No  Insight and Judgment: Poor Judgment, Poor Insight  Present Suicidal Ideation: No  Present Homicidal Ideation: No    Daily Assessment Last Entry:   Daily Sleep (WDL): Within Defined Limits         Patient Currently in Pain: Denies  Daily Nutrition (WDL): Exceptions to WDL  Appetite Change: Decreased  Barriers to Nutrition: Elderly patient  Level of Assistance: Independent/Self    Patient Monitoring:  Frequency of Checks: 4 times per hour, close    Psychiatric Symptoms:   Depression Symptoms  Depression Symptoms: No problems reported or observed. Anxiety Symptoms  Anxiety Symptoms: Generalized  Sharon Symptoms  Sharon Symptoms: No problems reported or observed. Psychosis Symptoms  Delusion Type: No problems reported or observed.     Suicide Risk CSSR-S:  1) Within the past month, have you wished you were dead or wished you could go to sleep and not wake up? : No  2) Have you actually had any thoughts of killing yourself? : No  6) Have you ever done anything, started to do anything, or prepared to do anything to end your life?: No  Change in Result no Change in Plan of care no      EDUCATION:   Learner Progress Toward Treatment Goals: Reviewed goals and plan of care    Method: Individual    Outcome: Needs reinforcement    PATIENT GOALS: n/a    PLAN/TREATMENT RECOMMENDATIONS UPDATE:Evaluate and treat    GOALS UPDATE:   Time frame for Short-Term Goals: n/a      Lynda Horner RN

## 2020-03-23 VITALS
OXYGEN SATURATION: 98 % | TEMPERATURE: 97.3 F | WEIGHT: 144 LBS | SYSTOLIC BLOOD PRESSURE: 111 MMHG | HEIGHT: 66 IN | DIASTOLIC BLOOD PRESSURE: 71 MMHG | BODY MASS INDEX: 23.14 KG/M2 | RESPIRATION RATE: 17 BRPM | HEART RATE: 77 BPM

## 2020-03-23 PROBLEM — F02.818 MAJOR NEUROCOGNITIVE DISORDER DUE TO MULTIPLE ETIOLOGIES WITH BEHAVIORAL DISTURBANCE: Status: ACTIVE | Noted: 2020-03-18

## 2020-03-23 LAB
INR BLD: 2.45 (ref 0.86–1.14)
PROTHROMBIN TIME: 28.7 SEC (ref 10–13.2)

## 2020-03-23 PROCEDURE — 36415 COLL VENOUS BLD VENIPUNCTURE: CPT

## 2020-03-23 PROCEDURE — 99239 HOSP IP/OBS DSCHRG MGMT >30: CPT | Performed by: PSYCHIATRY & NEUROLOGY

## 2020-03-23 PROCEDURE — 85610 PROTHROMBIN TIME: CPT

## 2020-03-23 PROCEDURE — 5130000000 HC BRIDGE APPOINTMENT

## 2020-03-23 PROCEDURE — 6370000000 HC RX 637 (ALT 250 FOR IP): Performed by: PSYCHIATRY & NEUROLOGY

## 2020-03-23 RX ORDER — WARFARIN SODIUM 2 MG/1
2 TABLET ORAL DAILY
Qty: 7 TABLET | Refills: 1 | Status: SHIPPED | OUTPATIENT
Start: 2020-03-23 | End: 2020-04-08 | Stop reason: SDUPTHER

## 2020-03-23 RX ORDER — DONEPEZIL HYDROCHLORIDE 5 MG/1
TABLET, FILM COATED ORAL
Qty: 90 TABLET | Refills: 0 | Status: SHIPPED | OUTPATIENT
Start: 2020-03-24 | End: 2020-04-07 | Stop reason: CLARIF

## 2020-03-23 RX ORDER — RISPERIDONE 0.5 MG/1
0.5 TABLET, FILM COATED ORAL 2 TIMES DAILY
Qty: 60 TABLET | Refills: 1 | Status: SHIPPED | OUTPATIENT
Start: 2020-03-23 | End: 2020-03-24 | Stop reason: SDUPTHER

## 2020-03-23 RX ADMIN — SPIRONOLACTONE 50 MG: 25 TABLET ORAL at 09:30

## 2020-03-23 RX ADMIN — HYDROCHLOROTHIAZIDE 50 MG: 25 TABLET ORAL at 09:30

## 2020-03-23 RX ADMIN — DIGOXIN 125 MCG: 250 TABLET ORAL at 09:29

## 2020-03-23 RX ADMIN — DONEPEZIL HYDROCHLORIDE 5 MG: 5 TABLET, FILM COATED ORAL at 09:29

## 2020-03-23 RX ADMIN — MULTIPLE VITAMINS W/ MINERALS TAB 1 TABLET: TAB at 09:29

## 2020-03-23 RX ADMIN — RISPERIDONE 0.5 MG: 0.25 TABLET ORAL at 09:29

## 2020-03-23 NOTE — BH NOTE
DINORA called the office of Dr. Sergio Herrera and spoke with Joel Underwood. DINORA requested that Dr. Sergio Herrera call medical director at his earliest convenience today regarding pt. DINORA provided medical director's cell phone number for Dr. Sergio Herrera to follow up.           Angel Hampton

## 2020-03-23 NOTE — DISCHARGE SUMMARY
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 Disposition -  Home     Discharge Meds:       Medication List      START taking these medications    donepezil 5 MG tablet  Commonly known as:  ARICEPT  Take 1 tablet by mouth daily (with breakfast) for 30 days, THEN 2 tablets daily (with breakfast). Start taking on:  March 24, 2020     risperiDONE 0.5 MG tablet  Commonly known as:  RISPERDAL  Take 1 tablet by mouth 2 times daily        CHANGE how you take these medications    warfarin 2 MG tablet  Commonly known as:  COUMADIN  Take as directed. If you are unsure how to take this medication, talk to your nurse or doctor. Original instructions:   Take 1 tablet by mouth daily  What changed:    · medication strength  · how much to take        CONTINUE taking these medications    celecoxib 200 MG capsule  Commonly known as:  CeleBREX  Take 1 capsule by mouth daily as needed for Pain     Centrum Adults Tabs     digoxin 125 MCG tablet  Commonly known as:  LANOXIN  TAKE 1 TABLET BY MOUTH ONCE DAILY FOR THE HEART     spironolactone-hydrochlorothiazide 25-25 MG per tablet  Commonly known as:  ALDACTAZIDE  TAKE 2 TABLETS BY MOUTH ONCE DAILY        STOP taking these medications    QUEtiapine 25 MG tablet  Commonly known as:  SEROquel           Where to Get Your Medications      [facility] [place] [facility] [place] - [address] [place] [address] 35, [address] [facility] [place] [address]    Phone:  [phone]   · donepezil 5 MG tablet  · risperiDONE 0.5 MG tablet  · warfarin 2 MG tablet         Multiple Neuroleptics? No    Reason for admission: Patient is an 70 y.o. 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 Patient met with and was treated by an interdisciplinary treatment team that included social work, occupational therapy, recreational therapy, nursing, and psychiatry. Patient was admitted on an involuntary basis and subsequently discharged at the conclusion of her hold. Hospital Course:    1. 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 Over the past several weeks she had developed a delusion that another man had entered her home and was wearing her 's clothing and behaving as though he were her . In reality, of course, this was in fact her  and she was misidentifying him. This was resulting in severe anxiety, mood lability, and frequent conflict with her  who was attempting to manage her on his own. At [facility]. patient was started on quetiapine 25 mg qhs for the above psychotic symptoms. On admission to our unit, this was discontinued and replaced with risperidone 0.25 mg BID as this agent is more potent and efficacious in dementia related psychosis. Pt tolerated this agent and it was ultimately increased to 0.5 mg BID. She was also started on donpezil 5 mg daily and tolerated this agent. Risks, benefits and alternatives of these medications were discussed with both patient and her HCPOA.   Over the course of admission pt did demonstrate improvement in mood lability and became generally less suspicious and easier to engage on the unit. On admission she was emotionally labile, highly anxious, very tangential and argumentative, and fixated on why and how she had been admitted to the hospital, suspecting that her  and outpatient PCP had conspired to \"put me away. \"  All of these symptoms improved over the course of admission and by the day of discharge she was not significantly labile, notably less anxious, cooperative without argumentativeness, and open and willing to follow-up with her PCP and return to her home with her . Over the course of admission she spoke with her  on the phone several times and did not maintain that he was an imposter or duplicate. Unfortunately, due to public health restrictions related to COVID-19, pt was unable to visit with her  in person on the gaston to see how they interacted. As above, pt was admitted on an involuntary hold. At no point during admission was she suicidal or homicidal, and she was consistently cooperative with care, eating, drinking appropriately, and caring for her ADLs. As such, there was no basis to file for probate. Her  was her HCPOA, yet, by the time of discharge she had improved to the point that she possessed the capacity to make a determination regarding whether or not to sign into the hospital voluntarily. Patient was able to articulate that she indeed had been diagnosed with a brain disorder, that this required follow-up with outpatient psychiatry and neurology, and continuation of her current medications. She indicated a preference for having a role in identifying her future outpatient providers rather than having the unit SW pick for her, however. She maintained frustration with having had no say in coming into the hospital and did not wish to stay in a treatment setting not of her choosing.   Given that this position was rationale, it was felt she did not lack capacity for this specific decision. The above legal considerations were discussed in detail with her  and her PCP. 2.  Supratherapeutic INR; pAfib:  Pt's INR was found to be supratherapeutic at [facility]. Pharmacy managed the dosing of her coumadin during admission and she had daily PT/INRs performed. It was recommended that her home dose of coumadin be decreased from 2.5 mg daily to 2 mg daily on discharge with a repeat PT/INR drawn in 1 week. Last INR prior to d/c was 2.45.    3.  HTN:  Patient was continued on her home aldactone and HCTZ.     PE: (reviewed) and labs (see medical H&PE)  VITALS:  /71   Pulse 77   Temp 97.3 °F (36.3 °C) (Oral)   Resp 17   Ht 5' 6\" (1.676 m)   Wt 144 lb (65.3 kg)   SpO2 98%   BMI 23.24 kg/m²     Mental Status Exam at Discharge:  Appearance: WF, appears stated age, wearing own clothing, good grooming and hygiene  Behavior/Attitude toward examiner:  cooperative, attentive, fair eye contact  Speech:  No longer pressured, no longer needing frequent interruption, normal volume, rate, amount, tone  Mood:  \"All right\"  Affect:  Congruent, constricted  Thought processes:  Circumstantial with occasional tangents, no longer loose  Thought Content: Denies SI, denies HI, not actively endorsing capgras delusion, though continues to maintain a belief that earlier experience was real  Perceptions: Denies AVH, no RTIS during my interaction, none observed in recent days  Attention: Limited  Abstraction: WNL  Cognition: Average CHETAN, Alert and oriented to person, place, situation, but not time, recall impaired (see MOCA below)  Insight: Minimal insight   Judgment: Limited judgment      MOCA 3/19:  Education Level:                     College     Alternating Trail Makin/1  Visuoconstructional Skills (cube)        0/1  Cisuoconstructional Skills (clock)       1/3  Naming                                                2/3  Attention                                              3/6  Sentence Repetition                           2/2  Verbal Fluency                                    0/1  Abstraction                                          2/2  Delayed Recall                                    0/5  Orientation                                          3/6  (additional point for <12 grade educations)       Total Score:                                       13/30    Risk factor analysis:    Patient does have several risk factors for future dangerousness from a psychiatric standpoint including: Age > 65, cognitive impairment, resolving psychosis  Protective factors include: No SI, no HI, compliance with an appropriate medication regimen, close follow-up with PCP, involved and supportive family  Stratification: Moderate, but less than imminent    Condition on DC  Mood and affect are stable and pt is not suicidal, homicidal, or psychotic. Follow Up:  See Discharge Instructions     Spent over 40 minutes with patient and staff on DC planning     In addition, spent 60 minutes today engaged with family in education and counseling regarding the following: diagnosis, treatment, prognosis, follow-up recommendations.     David Almanza MD  Staff Psychiatrist

## 2020-03-23 NOTE — PLAN OF CARE
[address] [facility]  Discharge Note    Pt discharged with followings belongings:   Dentures: None  Hearing Aid: None  Jewelry: None  Clothing: Footwear, Pants, Shirt  Were All Patient Medications Collected?: No  Other Valuables: Cell phone   Valuables sent home with patient and . Valuables retrieved from safe and returned to patient. Patient education on aftercare instructions: yes  Information faxed to yes by nurse Patient verbalize understanding of AVS:  yes.     Status EXAM upon discharge:  Status and Exam  Normal: Yes  Facial Expression: Brightened  Affect: Congruent  Level of Consciousness: Alert  Mood:Normal: Yes  Mood: (pleasant )  Motor Activity:Normal: Yes  Interview Behavior: Cooperative  Preception: [place] to Person, [place] to Place  Attention:Normal: Yes  Attention: Distractible  Thought Processes: Circumstantial  Thought Content:Normal: No  Thought Content: Poverty of Content  Hallucinations: None  Delusions: No  Delusions: Reference  Memory:Normal: No  Memory: Poor Recent  Insight and Judgment: No  Insight and Judgment: Poor Judgment, Poor Insight  Present Suicidal Ideation: No  Present Homicidal Ideation: No      Metabolic Screening:    Lab Results   Component Value Date    LABA1C 5.3 03/19/2020       Lab Results   Component Value Date    CHOL 267 (H) 03/19/2020    CHOL 267 (H) 01/23/2019    CHOL 271 (H) 08/01/2018    CHOL 220 (H) 01/22/2018    CHOL 237 (H) 07/25/2017    CHOL 224 (H) 01/17/2017    CHOL 251 (H) 01/19/2016    CHOL 210 (H) 01/16/2015    CHOL 230 (H) 01/09/2014    CHOL 232 (H) 11/22/2011     Lab Results   Component Value Date    TRIG 64 03/19/2020    TRIG 82 01/23/2019    TRIG 106 08/01/2018    TRIG 111 01/22/2018    TRIG 73 07/25/2017    TRIG 96 01/17/2017    TRIG 86 01/19/2016    TRIG 108 01/16/2015    TRIG 70 01/09/2014    TRIG 83 11/22/2011     Lab Results   Component Value Date    HDL 86 (H) 03/19/2020     (H) 01/23/2019     (H) 08/01/2018    HDL 83 (H)

## 2020-03-23 NOTE — BH NOTE
CTRS invited and encouraged Nicole Bird to attend [address] [facility]. Nicole Bird declined invite, stating \"no, I have to go make a very important decision right now. \" Nicole Bird did not attend group.     Tameka Anderson, CTRS

## 2020-03-23 NOTE — PLAN OF CARE
Pt. Is confused, alert to self and place, pleasant, bright and friendly, moves about independently, denies HI, SI, AVH. Received medications well tolerated, calm and cooperative, no s/s of distress.

## 2020-03-23 NOTE — GROUP NOTE
Group Therapy Note    Date: 3/23/2020    Group Start Time: [address] [place]  Group End Time: [address]  Group Topic: Psychoeducation    [address]        Group Therapy Note    Attendees: 5    Patient's Goal: to identify coping skills to engage in to cope with stressors and negative emotions. Notes: [facility] identified multiple positive coping skills such as; reading, going to meetings, and attending community events. Katrin speech presented to be confabulated and tangential in nature.  [facility] was excused from group early to meet with MD.    Status After Intervention:  Unchanged    Participation Level: Interactive    Participation Quality: Appropriate and Sharing      Speech:  pressured      Thought Process/Content: Flight of ideas      Affective Functioning: Constricted/Restricted      Mood: anxious      Level of consciousness:  Alert      Response to Learning: Able to change behavior and Progressing to goal      Endings: None Reported    Modes of Intervention: Education, Support, Socialization, Exploration, Clarifying, Problem-solving and Activity      Discipline Responsible: Psychoeducational Specialist      Signature:  Yamini Kolb [place]

## 2020-03-24 ENCOUNTER — OFFICE VISIT (OUTPATIENT)
Dept: INTERNAL MEDICINE CLINIC | Age: 80
End: 2020-03-24
Payer: MEDICARE

## 2020-03-24 VITALS
DIASTOLIC BLOOD PRESSURE: 80 MMHG | HEIGHT: 66 IN | SYSTOLIC BLOOD PRESSURE: 120 MMHG | BODY MASS INDEX: 22.18 KG/M2 | WEIGHT: 138 LBS

## 2020-03-24 DIAGNOSIS — I48.91 ATRIAL FIBRILLATION WITH NORMAL VENTRICULAR RATE (HCC): ICD-10-CM

## 2020-03-24 LAB
INR BLD: 2.44 (ref 0.86–1.14)
PROTHROMBIN TIME: 28.6 SEC (ref 10–13.2)

## 2020-03-24 PROCEDURE — G8400 PT W/DXA NO RESULTS DOC: HCPCS | Performed by: INTERNAL MEDICINE

## 2020-03-24 PROCEDURE — G8482 FLU IMMUNIZE ORDER/ADMIN: HCPCS | Performed by: INTERNAL MEDICINE

## 2020-03-24 PROCEDURE — G8427 DOCREV CUR MEDS BY ELIG CLIN: HCPCS | Performed by: INTERNAL MEDICINE

## 2020-03-24 PROCEDURE — 1111F DSCHRG MED/CURRENT MED MERGE: CPT | Performed by: INTERNAL MEDICINE

## 2020-03-24 PROCEDURE — 1036F TOBACCO NON-USER: CPT | Performed by: INTERNAL MEDICINE

## 2020-03-24 PROCEDURE — G8420 CALC BMI NORM PARAMETERS: HCPCS | Performed by: INTERNAL MEDICINE

## 2020-03-24 PROCEDURE — 4040F PNEUMOC VAC/ADMIN/RCVD: CPT | Performed by: INTERNAL MEDICINE

## 2020-03-24 PROCEDURE — 1090F PRES/ABSN URINE INCON ASSESS: CPT | Performed by: INTERNAL MEDICINE

## 2020-03-24 PROCEDURE — 1123F ACP DISCUSS/DSCN MKR DOCD: CPT | Performed by: INTERNAL MEDICINE

## 2020-03-24 PROCEDURE — 99214 OFFICE O/P EST MOD 30 MIN: CPT | Performed by: INTERNAL MEDICINE

## 2020-03-24 RX ORDER — DONEPEZIL HYDROCHLORIDE 5 MG/1
5 TABLET, FILM COATED ORAL NIGHTLY
Qty: 30 TABLET | Refills: 0 | Status: ON HOLD
Start: 2020-03-24 | End: 2020-05-13 | Stop reason: HOSPADM

## 2020-03-24 RX ORDER — RISPERIDONE 0.5 MG/1
0.5 TABLET, FILM COATED ORAL 2 TIMES DAILY
Qty: 60 TABLET | Refills: 1 | Status: ON HOLD
Start: 2020-03-24 | End: 2020-05-07 | Stop reason: HOSPADM

## 2020-03-24 NOTE — PROGRESS NOTES
CHIEF COMPLAINT: Jazmin Trotter is a 70 y.o. female who presents for cerebral amyloid angiopathy    HPI: Patient presented with follow-up after being in geriatric psych with a diagnosis of cerebral amyloid angiopathy she feels better now was placed on Aricept and Risperdal    Review of Systems:   Constitutional:  Denies fever or chills   Eyes:  Denies change in visual acuity   HENT:  Denies nasal congestion or sore throat   Respiratory:  Denies cough or shortness of breath   Cardiovascular:  Denies chest pain or edema   GI:  Denies abdominal pain, nausea, vomiting, bloody stools or diarrhea   :  Denies dysuria   Musculoskeletal:  Denies back pain or joint pain   Integument:  Denies rash   Neurologic:  Denies headache, focal weakness or sensory changes   Endocrine:  Denies polyuria or polydipsia   Lymphatic:  Denies swollen glands   Psychiatric:  Denies depression or anxiety     Past Medical History:        Diagnosis Date    Adenomatous polyp of colon 1/29/2019    Asymptomatic varicose veins     Atrial fibrillation ([address]     Dysthymic disorder     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)     Screening mammogram 12/8/2009    (Left)Benign       Past Surgical History:        Procedure Laterality Date    COLONOSCOPY  12/12/2008    HERNIA REPAIR      Left Femoral       Family History:  Family History   Problem Relation Age of Onset    Stroke Mother     Heart Attack Father     Heart Disease Father        Social History:  Social History     Socioeconomic History    Marital status:      Spouse name: None    Number of children: None    Years of education: None    Highest education level: None   Occupational History    None   Social Needs    Financial resource strain: Not hard at all   Jennifer-Alton insecurity     Worry: Never true     Inability: Never true    Transportation needs     Medical: No     Non-medical: No   Tobacco Use    Smoking status: Never Smoker    Smokeless tobacco: Never Used   Substance and Sexual

## 2020-03-25 ENCOUNTER — TELEPHONE (OUTPATIENT)
Dept: INTERNAL MEDICINE CLINIC | Age: 80
End: 2020-03-25

## 2020-03-25 NOTE — TELEPHONE ENCOUNTER
FYI  Pt spouse stated the following:  Pt has an appointment with   Angella Griffin  on 5/27/2020  9:45 am

## 2020-03-25 NOTE — TELEPHONE ENCOUNTER
Pt states she received a call from  office and u. Pt would like a call back to further discuss if she needs to go to the follow up.

## 2020-03-30 ENCOUNTER — TELEPHONE (OUTPATIENT)
Dept: INTERNAL MEDICINE CLINIC | Age: 80
End: 2020-03-30

## 2020-03-31 NOTE — TELEPHONE ENCOUNTER
Telephone visit    Call returned to spouse. He is unable to talk freely at this time. He wanted to let  know that Jose De Los Santos is recommending the patient to back into a facility. He does not feel that the patient would sign the papers for this and he is not sure what to do at this time. He states that he will address this with  at the patient's next visit on April 7th.

## 2020-04-01 ENCOUNTER — OFFICE VISIT (OUTPATIENT)
Dept: ORTHOPEDIC SURGERY | Age: 80
End: 2020-04-01
Payer: MEDICARE

## 2020-04-01 VITALS
SYSTOLIC BLOOD PRESSURE: 118 MMHG | HEART RATE: 60 BPM | HEIGHT: 66 IN | TEMPERATURE: 95.7 F | DIASTOLIC BLOOD PRESSURE: 75 MMHG | WEIGHT: 138.01 LBS | BODY MASS INDEX: 22.18 KG/M2

## 2020-04-01 PROCEDURE — G8427 DOCREV CUR MEDS BY ELIG CLIN: HCPCS | Performed by: ORTHOPAEDIC SURGERY

## 2020-04-01 PROCEDURE — 1111F DSCHRG MED/CURRENT MED MERGE: CPT | Performed by: ORTHOPAEDIC SURGERY

## 2020-04-01 PROCEDURE — 1123F ACP DISCUSS/DSCN MKR DOCD: CPT | Performed by: ORTHOPAEDIC SURGERY

## 2020-04-01 PROCEDURE — 1036F TOBACCO NON-USER: CPT | Performed by: ORTHOPAEDIC SURGERY

## 2020-04-01 PROCEDURE — 99213 OFFICE O/P EST LOW 20 MIN: CPT | Performed by: ORTHOPAEDIC SURGERY

## 2020-04-01 PROCEDURE — 4040F PNEUMOC VAC/ADMIN/RCVD: CPT | Performed by: ORTHOPAEDIC SURGERY

## 2020-04-01 PROCEDURE — G8400 PT W/DXA NO RESULTS DOC: HCPCS | Performed by: ORTHOPAEDIC SURGERY

## 2020-04-01 PROCEDURE — G8420 CALC BMI NORM PARAMETERS: HCPCS | Performed by: ORTHOPAEDIC SURGERY

## 2020-04-01 PROCEDURE — 1090F PRES/ABSN URINE INCON ASSESS: CPT | Performed by: ORTHOPAEDIC SURGERY

## 2020-04-01 NOTE — PROGRESS NOTES
personally supervised the Orthopaedic Sports Medicine Fellow in the evaluation and development of a treatment plan for this patient. I personally interviewed the patient and performed a physical examination. In addition, I discussed the patient's condition and treatment options with them. I have also reviewed and agree with the past medical, family and social history unless otherwise noted. All of the patient's questions were answered. Crystal Ryder MD, PhD  4/1/2020

## 2020-04-06 ENCOUNTER — TELEPHONE (OUTPATIENT)
Dept: INTERNAL MEDICINE CLINIC | Age: 80
End: 2020-04-06

## 2020-04-07 ENCOUNTER — OFFICE VISIT (OUTPATIENT)
Dept: INTERNAL MEDICINE CLINIC | Age: 80
End: 2020-04-07
Payer: MEDICARE

## 2020-04-07 VITALS — SYSTOLIC BLOOD PRESSURE: 120 MMHG | HEIGHT: 65 IN | BODY MASS INDEX: 22.97 KG/M2 | DIASTOLIC BLOOD PRESSURE: 68 MMHG

## 2020-04-07 DIAGNOSIS — R60.0 LOCALIZED EDEMA: ICD-10-CM

## 2020-04-07 LAB
A/G RATIO: 1.9 (ref 1.1–2.2)
ALBUMIN SERPL-MCNC: 3.9 G/DL (ref 3.4–5)
ALP BLD-CCNC: 107 U/L (ref 40–129)
ALT SERPL-CCNC: 19 U/L (ref 10–40)
ANION GAP SERPL CALCULATED.3IONS-SCNC: 14 MMOL/L (ref 3–16)
AST SERPL-CCNC: 28 U/L (ref 15–37)
BASOPHILS ABSOLUTE: 0 K/UL (ref 0–0.2)
BASOPHILS RELATIVE PERCENT: 0.7 %
BILIRUB SERPL-MCNC: 0.5 MG/DL (ref 0–1)
BUN BLDV-MCNC: 8 MG/DL (ref 7–20)
CALCIUM SERPL-MCNC: 9.4 MG/DL (ref 8.3–10.6)
CHLORIDE BLD-SCNC: 100 MMOL/L (ref 99–110)
CO2: 26 MMOL/L (ref 21–32)
CREAT SERPL-MCNC: 0.5 MG/DL (ref 0.6–1.2)
D DIMER: <200 NG/ML DDU (ref 0–229)
EOSINOPHILS ABSOLUTE: 0.1 K/UL (ref 0–0.6)
EOSINOPHILS RELATIVE PERCENT: 1.8 %
GFR AFRICAN AMERICAN: >60
GFR NON-AFRICAN AMERICAN: >60
GLOBULIN: 2.1 G/DL
GLUCOSE BLD-MCNC: 98 MG/DL (ref 70–99)
HCT VFR BLD CALC: 37.6 % (ref 36–48)
HEMOGLOBIN: 12.8 G/DL (ref 12–16)
INR BLD: 1.35 (ref 0.86–1.14)
LYMPHOCYTES ABSOLUTE: 0.8 K/UL (ref 1–5.1)
LYMPHOCYTES RELATIVE PERCENT: 16.2 %
MCH RBC QN AUTO: 32.6 PG (ref 26–34)
MCHC RBC AUTO-ENTMCNC: 34 G/DL (ref 31–36)
MCV RBC AUTO: 95.7 FL (ref 80–100)
MONOCYTES ABSOLUTE: 0.6 K/UL (ref 0–1.3)
MONOCYTES RELATIVE PERCENT: 10.9 %
NEUTROPHILS ABSOLUTE: 3.6 K/UL (ref 1.7–7.7)
NEUTROPHILS RELATIVE PERCENT: 70.4 %
PDW BLD-RTO: 15.7 % (ref 12.4–15.4)
PLATELET # BLD: 158 K/UL (ref 135–450)
PMV BLD AUTO: 9.6 FL (ref 5–10.5)
POTASSIUM SERPL-SCNC: 4 MMOL/L (ref 3.5–5.1)
PROTHROMBIN TIME: 15.7 SEC (ref 10–13.2)
RBC # BLD: 3.93 M/UL (ref 4–5.2)
SODIUM BLD-SCNC: 140 MMOL/L (ref 136–145)
TOTAL PROTEIN: 6 G/DL (ref 6.4–8.2)
WBC # BLD: 5.2 K/UL (ref 4–11)

## 2020-04-07 PROCEDURE — 1123F ACP DISCUSS/DSCN MKR DOCD: CPT | Performed by: INTERNAL MEDICINE

## 2020-04-07 PROCEDURE — 4040F PNEUMOC VAC/ADMIN/RCVD: CPT | Performed by: INTERNAL MEDICINE

## 2020-04-07 PROCEDURE — G8420 CALC BMI NORM PARAMETERS: HCPCS | Performed by: INTERNAL MEDICINE

## 2020-04-07 PROCEDURE — 1111F DSCHRG MED/CURRENT MED MERGE: CPT | Performed by: INTERNAL MEDICINE

## 2020-04-07 PROCEDURE — G8400 PT W/DXA NO RESULTS DOC: HCPCS | Performed by: INTERNAL MEDICINE

## 2020-04-07 PROCEDURE — 99213 OFFICE O/P EST LOW 20 MIN: CPT | Performed by: INTERNAL MEDICINE

## 2020-04-07 PROCEDURE — G8427 DOCREV CUR MEDS BY ELIG CLIN: HCPCS | Performed by: INTERNAL MEDICINE

## 2020-04-07 PROCEDURE — 1090F PRES/ABSN URINE INCON ASSESS: CPT | Performed by: INTERNAL MEDICINE

## 2020-04-07 PROCEDURE — 1036F TOBACCO NON-USER: CPT | Performed by: INTERNAL MEDICINE

## 2020-04-07 RX ORDER — FUROSEMIDE 40 MG/1
40 TABLET ORAL DAILY
Qty: 30 TABLET | Refills: 1 | Status: ON HOLD
Start: 2020-04-07 | End: 2020-05-07 | Stop reason: HOSPADM

## 2020-04-07 NOTE — PROGRESS NOTES
CHIEF COMPLAINT: Primitivo Meyers is a 70 y.o. female who presents for : Edema of the right lower extremity    HPI: Patient presented with the mother right lower extremity she also has some edema of the left lower extremity she denies any shortness of breath is got worse after she had surgery for varicose veins    Review of Systems:   Constitutional:  Denies fever or chills   Eyes:  Denies change in visual acuity   HENT:  Denies nasal congestion or sore throat   Respiratory:  Denies cough or shortness of breath   Cardiovascular:  Denies chest pain or edema   GI:  Denies abdominal pain, nausea, vomiting, bloody stools or diarrhea   :  Denies dysuria   Musculoskeletal:  Denies back pain or joint pain   Integument:  Denies rash   Neurologic:  Denies headache, focal weakness or sensory changes   Endocrine:  Denies polyuria or polydipsia   Lymphatic:  Denies swollen glands   Psychiatric:  Denies depression or anxiety     Past Medical History:        Diagnosis Date    Adenomatous polyp of colon 1/29/2019    Asymptomatic varicose veins     Atrial fibrillation (HCC)     Dysthymic disorder     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)     Screening mammogram 12/8/2009    (Left)Benign       Past Surgical History:        Procedure Laterality Date    COLONOSCOPY  12/12/2008    HERNIA REPAIR      Left Femoral       Family History:  Family History   Problem Relation Age of Onset    Stroke Mother     Heart Attack Father     Heart Disease Father        Social History:  Social History     Socioeconomic History    Marital status:      Spouse name: None    Number of children: None    Years of education: None    Highest education level: None   Occupational History    None   Social Needs    Financial resource strain: Not hard at all   Jennifer-Alton insecurity     Worry: Never true     Inability: Never true    Transportation needs     Medical: No     Non-medical: No   Tobacco Use    Smoking status: Never Smoker    Smokeless tobacco:

## 2020-04-08 ENCOUNTER — TELEPHONE (OUTPATIENT)
Dept: INTERNAL MEDICINE CLINIC | Age: 80
End: 2020-04-08

## 2020-04-08 RX ORDER — WARFARIN SODIUM 2 MG/1
TABLET ORAL
Qty: 100 TABLET | Refills: 1 | Status: ON HOLD
Start: 2020-04-08 | End: 2020-05-07 | Stop reason: HOSPADM

## 2020-04-16 ENCOUNTER — TELEPHONE (OUTPATIENT)
Dept: INTERNAL MEDICINE CLINIC | Age: 80
End: 2020-04-16

## 2020-04-23 ENCOUNTER — VIRTUAL VISIT (OUTPATIENT)
Dept: INTERNAL MEDICINE CLINIC | Age: 80
End: 2020-04-23
Payer: MEDICARE

## 2020-04-23 PROCEDURE — 99441 PR PHYS/QHP TELEPHONE EVALUATION 5-10 MIN: CPT | Performed by: INTERNAL MEDICINE

## 2020-04-23 NOTE — PROGRESS NOTES
Betty De Jesus is a 70 y.o. female evaluated via telephone on 4/23/2020. Consent:  She and/or health care decision maker is aware that that she may receive a bill for this telephone service, depending on her insurance coverage, and has provided verbal consent to proceed: Yes      Documentation:  I communicated with the patient and/or health care decision maker about falls possibly secondary to meds. Details of this discussion including any medical advice provided: To decrease risperidone to at night only and follow-up in 2 weeks in the office      I affirm this is a Patient Initiated Episode with an Established Patient who has not had a related appointment within my department in the past 7 days or scheduled within the next 24 hours.     Patient identification was verified at the start of the visit: Yes    Total Time: minutes: 5-10 minutes    Note: not billable if this call serves to triage the patient into an appointment for the relevant concern      Kaye Mccracken

## 2020-04-27 ENCOUNTER — TELEPHONE (OUTPATIENT)
Dept: INTERNAL MEDICINE CLINIC | Age: 80
End: 2020-04-27

## 2020-04-27 NOTE — TELEPHONE ENCOUNTER
The patient's  is asking for a return call regarding upcoming visit with another Dr. Glory Stock and whether the Dr. Valeriano Alvarado believes they need this visit or should they reschedule.  Please advise

## 2020-04-28 ENCOUNTER — TELEPHONE (OUTPATIENT)
Dept: INTERNAL MEDICINE CLINIC | Age: 80
End: 2020-04-28

## 2020-04-28 NOTE — TELEPHONE ENCOUNTER
Risperdal 0.5 mg at night    Aricept 10 mg at night    Patient continues to fall. Should they stop the Risperdal?    Discuss with , about concerns of falling and dose of Risperdal.    Appointment with Norris Avila is scheduled for Monday. Spouse has been notified.

## 2020-04-29 ENCOUNTER — APPOINTMENT (OUTPATIENT)
Dept: CT IMAGING | Age: 80
DRG: 228 | End: 2020-04-29
Payer: MEDICARE

## 2020-04-29 ENCOUNTER — APPOINTMENT (OUTPATIENT)
Dept: GENERAL RADIOLOGY | Age: 80
DRG: 228 | End: 2020-04-29
Payer: MEDICARE

## 2020-04-29 ENCOUNTER — HOSPITAL ENCOUNTER (INPATIENT)
Age: 80
LOS: 8 days | Discharge: NURSING, REHAB OR LONG-TERM CARE | DRG: 228 | End: 2020-05-07
Attending: EMERGENCY MEDICINE | Admitting: INTERNAL MEDICINE
Payer: MEDICARE

## 2020-04-29 PROBLEM — R29.6 FALLS FREQUENTLY: Status: ACTIVE | Noted: 2020-04-29

## 2020-04-29 LAB
ALBUMIN SERPL-MCNC: 3.7 G/DL (ref 3.4–5)
ANION GAP SERPL CALCULATED.3IONS-SCNC: 20 MMOL/L (ref 3–16)
ANION GAP SERPL CALCULATED.3IONS-SCNC: 23 MMOL/L (ref 3–16)
BANDED NEUTROPHILS RELATIVE PERCENT: 1 % (ref 0–7)
BASOPHILS ABSOLUTE: 0 K/UL (ref 0–0.2)
BASOPHILS RELATIVE PERCENT: 0 %
BUN BLDV-MCNC: 51 MG/DL (ref 7–20)
BUN BLDV-MCNC: 54 MG/DL (ref 7–20)
CALCIUM SERPL-MCNC: 10 MG/DL (ref 8.3–10.6)
CALCIUM SERPL-MCNC: 10.9 MG/DL (ref 8.3–10.6)
CHLORIDE BLD-SCNC: 67 MMOL/L (ref 99–110)
CHLORIDE BLD-SCNC: 71 MMOL/L (ref 99–110)
CO2: 34 MMOL/L (ref 21–32)
CO2: 35 MMOL/L (ref 21–32)
CREAT SERPL-MCNC: 1 MG/DL (ref 0.6–1.2)
CREAT SERPL-MCNC: 1.1 MG/DL (ref 0.6–1.2)
DIGOXIN LEVEL: 1.9 NG/ML (ref 0.8–2)
EKG ATRIAL RATE: 66 BPM
EKG DIAGNOSIS: NORMAL
EKG Q-T INTERVAL: 450 MS
EKG QRS DURATION: 108 MS
EKG QTC CALCULATION (BAZETT): 456 MS
EKG R AXIS: -69 DEGREES
EKG T AXIS: 263 DEGREES
EKG VENTRICULAR RATE: 62 BPM
EOSINOPHILS ABSOLUTE: 0 K/UL (ref 0–0.6)
EOSINOPHILS RELATIVE PERCENT: 0 %
GFR AFRICAN AMERICAN: 58
GFR AFRICAN AMERICAN: >60
GFR NON-AFRICAN AMERICAN: 48
GFR NON-AFRICAN AMERICAN: 54
GLUCOSE BLD-MCNC: 113 MG/DL (ref 70–99)
GLUCOSE BLD-MCNC: 96 MG/DL (ref 70–99)
HCT VFR BLD CALC: 53 % (ref 36–48)
HEMATOLOGY PATH CONSULT: NORMAL
HEMATOLOGY PATH CONSULT: YES
HEMOGLOBIN: 18.4 G/DL (ref 12–16)
INR BLD: 8.4 (ref 0.86–1.14)
LYMPHOCYTES ABSOLUTE: 1.1 K/UL (ref 1–5.1)
LYMPHOCYTES RELATIVE PERCENT: 7 %
MAGNESIUM: 2.7 MG/DL (ref 1.8–2.4)
MAGNESIUM: 2.8 MG/DL (ref 1.8–2.4)
MCH RBC QN AUTO: 32.1 PG (ref 26–34)
MCHC RBC AUTO-ENTMCNC: 34.7 G/DL (ref 31–36)
MCV RBC AUTO: 92.4 FL (ref 80–100)
MONOCYTES ABSOLUTE: 1.6 K/UL (ref 0–1.3)
MONOCYTES RELATIVE PERCENT: 10 %
NEUTROPHILS ABSOLUTE: 12.9 K/UL (ref 1.7–7.7)
NEUTROPHILS RELATIVE PERCENT: 82 %
PDW BLD-RTO: 13.9 % (ref 12.4–15.4)
PHOSPHORUS: 3.3 MG/DL (ref 2.5–4.9)
PLATELET # BLD: 260 K/UL (ref 135–450)
PMV BLD AUTO: 9.9 FL (ref 5–10.5)
POTASSIUM REFLEX MAGNESIUM: 2.5 MMOL/L (ref 3.5–5.1)
POTASSIUM SERPL-SCNC: 2.6 MMOL/L (ref 3.5–5.1)
PROTHROMBIN TIME: 99.6 SEC (ref 10–13.2)
RBC # BLD: 5.73 M/UL (ref 4–5.2)
SODIUM BLD-SCNC: 124 MMOL/L (ref 136–145)
SODIUM BLD-SCNC: 126 MMOL/L (ref 136–145)
TOTAL CK: 72 U/L (ref 26–192)
WBC # BLD: 15.6 K/UL (ref 4–11)

## 2020-04-29 PROCEDURE — 6370000000 HC RX 637 (ALT 250 FOR IP): Performed by: INTERNAL MEDICINE

## 2020-04-29 PROCEDURE — 71046 X-RAY EXAM CHEST 2 VIEWS: CPT

## 2020-04-29 PROCEDURE — 99285 EMERGENCY DEPT VISIT HI MDM: CPT

## 2020-04-29 PROCEDURE — 1200000000 HC SEMI PRIVATE

## 2020-04-29 PROCEDURE — 70450 CT HEAD/BRAIN W/O DYE: CPT

## 2020-04-29 PROCEDURE — 83735 ASSAY OF MAGNESIUM: CPT

## 2020-04-29 PROCEDURE — 80162 ASSAY OF DIGOXIN TOTAL: CPT

## 2020-04-29 PROCEDURE — 2580000003 HC RX 258: Performed by: EMERGENCY MEDICINE

## 2020-04-29 PROCEDURE — 36415 COLL VENOUS BLD VENIPUNCTURE: CPT

## 2020-04-29 PROCEDURE — 84295 ASSAY OF SERUM SODIUM: CPT

## 2020-04-29 PROCEDURE — 6370000000 HC RX 637 (ALT 250 FOR IP): Performed by: STUDENT IN AN ORGANIZED HEALTH CARE EDUCATION/TRAINING PROGRAM

## 2020-04-29 PROCEDURE — 93005 ELECTROCARDIOGRAM TRACING: CPT | Performed by: EMERGENCY MEDICINE

## 2020-04-29 PROCEDURE — 85610 PROTHROMBIN TIME: CPT

## 2020-04-29 PROCEDURE — 73030 X-RAY EXAM OF SHOULDER: CPT

## 2020-04-29 PROCEDURE — 80069 RENAL FUNCTION PANEL: CPT

## 2020-04-29 PROCEDURE — 85025 COMPLETE CBC W/AUTO DIFF WBC: CPT

## 2020-04-29 PROCEDURE — 2580000003 HC RX 258: Performed by: INTERNAL MEDICINE

## 2020-04-29 PROCEDURE — 6370000000 HC RX 637 (ALT 250 FOR IP): Performed by: EMERGENCY MEDICINE

## 2020-04-29 PROCEDURE — 6360000002 HC RX W HCPCS: Performed by: STUDENT IN AN ORGANIZED HEALTH CARE EDUCATION/TRAINING PROGRAM

## 2020-04-29 PROCEDURE — 82550 ASSAY OF CK (CPK): CPT

## 2020-04-29 RX ORDER — DONEPEZIL HYDROCHLORIDE 5 MG/1
5 TABLET, FILM COATED ORAL NIGHTLY
Status: DISCONTINUED | OUTPATIENT
Start: 2020-04-29 | End: 2020-05-07 | Stop reason: HOSPADM

## 2020-04-29 RX ORDER — ACETAMINOPHEN 325 MG/1
650 TABLET ORAL EVERY 6 HOURS PRN
Status: DISCONTINUED | OUTPATIENT
Start: 2020-04-29 | End: 2020-05-07 | Stop reason: HOSPADM

## 2020-04-29 RX ORDER — SODIUM CHLORIDE 0.9 % (FLUSH) 0.9 %
10 SYRINGE (ML) INJECTION PRN
Status: DISCONTINUED | OUTPATIENT
Start: 2020-04-29 | End: 2020-05-07 | Stop reason: HOSPADM

## 2020-04-29 RX ORDER — ONDANSETRON 2 MG/ML
4 INJECTION INTRAMUSCULAR; INTRAVENOUS EVERY 6 HOURS PRN
Status: DISCONTINUED | OUTPATIENT
Start: 2020-04-29 | End: 2020-05-07 | Stop reason: HOSPADM

## 2020-04-29 RX ORDER — SODIUM CHLORIDE 0.9 % (FLUSH) 0.9 %
10 SYRINGE (ML) INJECTION EVERY 12 HOURS SCHEDULED
Status: DISCONTINUED | OUTPATIENT
Start: 2020-04-29 | End: 2020-05-07 | Stop reason: HOSPADM

## 2020-04-29 RX ORDER — POLYETHYLENE GLYCOL 3350 17 G/17G
17 POWDER, FOR SOLUTION ORAL DAILY PRN
Status: DISCONTINUED | OUTPATIENT
Start: 2020-04-29 | End: 2020-05-07 | Stop reason: HOSPADM

## 2020-04-29 RX ORDER — M-VIT,TX,IRON,MINS/CALC/FOLIC 27MG-0.4MG
TABLET ORAL DAILY
Status: DISCONTINUED | OUTPATIENT
Start: 2020-04-30 | End: 2020-05-07 | Stop reason: HOSPADM

## 2020-04-29 RX ORDER — POTASSIUM CHLORIDE 7.45 MG/ML
10 INJECTION INTRAVENOUS
Status: COMPLETED | OUTPATIENT
Start: 2020-04-29 | End: 2020-04-30

## 2020-04-29 RX ORDER — SODIUM CHLORIDE 9 MG/ML
INJECTION, SOLUTION INTRAVENOUS CONTINUOUS
Status: DISCONTINUED | OUTPATIENT
Start: 2020-04-29 | End: 2020-05-01

## 2020-04-29 RX ORDER — PHYTONADIONE 5 MG/1
2.5 TABLET ORAL ONCE
Status: COMPLETED | OUTPATIENT
Start: 2020-04-29 | End: 2020-04-29

## 2020-04-29 RX ORDER — ACETAMINOPHEN 650 MG/1
650 SUPPOSITORY RECTAL EVERY 6 HOURS PRN
Status: DISCONTINUED | OUTPATIENT
Start: 2020-04-29 | End: 2020-05-07 | Stop reason: HOSPADM

## 2020-04-29 RX ORDER — PROMETHAZINE HYDROCHLORIDE 12.5 MG/1
12.5 TABLET ORAL EVERY 6 HOURS PRN
Status: DISCONTINUED | OUTPATIENT
Start: 2020-04-29 | End: 2020-05-07 | Stop reason: HOSPADM

## 2020-04-29 RX ORDER — SODIUM CHLORIDE, SODIUM LACTATE, POTASSIUM CHLORIDE, CALCIUM CHLORIDE 600; 310; 30; 20 MG/100ML; MG/100ML; MG/100ML; MG/100ML
1000 INJECTION, SOLUTION INTRAVENOUS CONTINUOUS
Status: DISPENSED | OUTPATIENT
Start: 2020-04-29 | End: 2020-04-29

## 2020-04-29 RX ORDER — RISPERIDONE 0.5 MG/1
0.5 TABLET, FILM COATED ORAL 2 TIMES DAILY
Status: DISCONTINUED | OUTPATIENT
Start: 2020-04-29 | End: 2020-05-07 | Stop reason: HOSPADM

## 2020-04-29 RX ADMIN — POTASSIUM CHLORIDE 10 MEQ: 10 INJECTION, SOLUTION INTRAVENOUS at 21:44

## 2020-04-29 RX ADMIN — PHYTONADIONE 2.5 MG: 5 TABLET ORAL at 18:11

## 2020-04-29 RX ADMIN — RISPERIDONE 0.5 MG: 0.5 TABLET ORAL at 21:32

## 2020-04-29 RX ADMIN — DONEPEZIL HYDROCHLORIDE 5 MG: 5 TABLET, FILM COATED ORAL at 21:32

## 2020-04-29 RX ADMIN — SODIUM CHLORIDE: 9 INJECTION, SOLUTION INTRAVENOUS at 19:52

## 2020-04-29 RX ADMIN — POTASSIUM BICARBONATE 60 MEQ: 782 TABLET, EFFERVESCENT ORAL at 18:12

## 2020-04-29 RX ADMIN — SODIUM CHLORIDE, POTASSIUM CHLORIDE, SODIUM LACTATE AND CALCIUM CHLORIDE 1000 ML: 600; 310; 30; 20 INJECTION, SOLUTION INTRAVENOUS at 18:13

## 2020-04-29 ASSESSMENT — PAIN DESCRIPTION - ORIENTATION: ORIENTATION: LEFT

## 2020-04-29 ASSESSMENT — PAIN DESCRIPTION - FREQUENCY: FREQUENCY: CONTINUOUS

## 2020-04-29 ASSESSMENT — PAIN SCALES - GENERAL
PAINLEVEL_OUTOF10: 5
PAINLEVEL_OUTOF10: 0

## 2020-04-29 ASSESSMENT — PAIN DESCRIPTION - PROGRESSION: CLINICAL_PROGRESSION: NOT CHANGED

## 2020-04-29 ASSESSMENT — PAIN DESCRIPTION - ONSET: ONSET: ON-GOING

## 2020-04-29 ASSESSMENT — PAIN DESCRIPTION - DESCRIPTORS: DESCRIPTORS: ACHING

## 2020-04-29 ASSESSMENT — PAIN DESCRIPTION - PAIN TYPE: TYPE: ACUTE PAIN

## 2020-04-29 ASSESSMENT — PAIN - FUNCTIONAL ASSESSMENT: PAIN_FUNCTIONAL_ASSESSMENT: PREVENTS OR INTERFERES SOME ACTIVE ACTIVITIES AND ADLS

## 2020-04-29 ASSESSMENT — PAIN DESCRIPTION - LOCATION: LOCATION: SHOULDER

## 2020-04-29 NOTE — ED PROVIDER NOTES
[address] [facility]          ATTENDING PHYSICIAN NOTE       Date of evaluation: 4/29/2020    Chief Complaint     Fall and Shoulder Pain      History of Present Illness     Santino Silverman is a 68 y.o. female with history of atrial fibrillation on warfarin, cerebral amyloid angiopathy, neurocognitive disorder presenting to the emergency department today for falls. Patient states she has had multiple falls in the past week. Today she thinks she may have had another fall. She states her  called EMS for evaluation. She is very upset by this. She notes that she is having difficulty walking on their carpet in which she states she slips and falls frequently. She does not ambulate with an assistive device. Denies any other recent symptoms including fever, cough, loss of consciousness, chest pain, difficulty breathing, abdominal pain, nausea/vomiting. States she has a chronic left shoulder/clavicular injury which has been healing without surgical intervention. Review of Systems     Pertinent positive and negative findings as documented in the HPI. Otherwise all other systems were reviewed and were negative. Physical Exam     INITIAL VITALS: BP: (!) 128/7, Temp: 99.1 °F (37.3 °C), Pulse: 65, Resp: 18, SpO2: 97 %     Nursing note and vitals reviewed. General:  Adult female, alert and appropriately interactive. In no distress. HENT: Normocephalic and atraumatic. External ears normal. Nose appears normal externally. No midline cervical spine tenderness or step-offs. Eyes: Conjunctivae normal. No scleral icterus. PERRL  Neck: Neck supple. No tracheal deviation present. CV: Normal rate. Regular rhythm. S1/S2 auscultated. No murmurs, gallops or rubs. Chest: Effort normal. Breath sounds clear to auscultation bilaterally. No wheezes. No rales or rhonchi. Abdominal: Soft. No distension. No tenderness. No rebound or guarding. No masses. No peritoneal signs. Absolute 0.0 0.0 - 0.6 K/uL    Basophils Absolute 0.0 0.0 - 0.2 K/uL    Bands Relative 1 0 - 7 %   Basic Metabolic Panel w/ Reflex to MG   Result Value Ref Range    Sodium 124 (L) 136 - 145 mmol/L    Potassium reflex Magnesium 2.5 (LL) 3.5 - 5.1 mmol/L    Chloride 67 (L) 99 - 110 mmol/L    CO2 34 (H) 21 - 32 mmol/L    Anion Gap 23 (H) 3 - 16    Glucose 113 (H) 70 - 99 mg/dL    BUN 54 (H) 7 - 20 mg/dL    CREATININE 1.1 0.6 - 1.2 mg/dL    GFR Non- 48 (A) >60    GFR  58 (A) >60    Calcium 10.9 (H) 8.3 - 10.6 mg/dL   Protime-INR   Result Value Ref Range    Protime 99.6 (H) 10.0 - 13.2 sec    INR 8.40 (HH) 0.86 - 1.14   Magnesium   Result Value Ref Range    Magnesium 2.80 (H) 1.80 - 2.40 mg/dL   Blood Smear Review   Result Value Ref Range    Path Consult Reviewed    EKG 12 Lead   Result Value Ref Range    Ventricular Rate 62 BPM    Atrial Rate 66 BPM    QRS Duration 108 ms    Q-T Interval 450 ms    QTc Calculation (Bazett) 456 ms    R Axis -69 degrees    T Axis 263 degrees    Diagnosis       EKG performed in ER and to be interpreted by ER physician. Confirmed by MD, ER ([id number]),  Baltazar Crump ([phone]) on 4/29/2020 7:16:33 PM         RECENT VITALS:  BP: 118/62,Temp: 97.8 °F (36.6 °C), Pulse: 80, Resp: 16, SpO2: 95 %     Procedures   Procedures    MEDICAL DECISION MAKING     MDM: Karina Dove is a 68 y.o. female with history of cerebral amyloid angiopathy, A. fib on warfarin, multiple other comorbidities presenting to the emergency department today for falls. On arrival, patient is in no distress, is moderately confused, vital signs reassuring. Physical examination reveals no signs of acute trauma. EKG does demonstrate rate controlled atrial fibrillation, significant ST depressions but no ST elevations, and is not overall significantly changed from her previous EKG. She denies chest pain and her clinical picture is not consistent with ACS.   Her labs are notable for supratherapeutic INR in the 8 range and hypokalemia. Her potassium was supplemented and she was given a single dose of p.o. vitamin K per protocol. CT of the head without acute findings. X-ray of the left shoulder which has previously been injured is stable from previous. Discussed with her PCP as well as her  who both agreed the patient should be admitted for further care and management as well as possible placement versus increased home care. Discussed admission with resident team as well. Admitted in stable condition for further care and management. Clinical Impression     1. Multiple falls    2. Cerebral amyloid angiopathy (CODE)    3. Hypokalemia    4. Supratherapeutic INR        Disposition     DISPOSITION Admitted 04/29/2020 06:00:49 PM        Delfino Olvera MD  8:26 PM                     Past Medical, Surgical, Family, and Social History     She has a past medical history of Adenomatous polyp of colon, Asymptomatic varicose veins, Atrial fibrillation ([address] Dysthymic disorder, 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), and Screening mammogram.  She has a past surgical history that includes Colonoscopy (12/12/2008) and hernia repair. Her family history includes Heart Attack in her father; Heart Disease in her father; Stroke in her mother. She reports that she has never smoked. She has never used smokeless tobacco. She reports that she does not drink alcohol or use drugs. Medications     Current Discharge Medication List      CONTINUE these medications which have NOT CHANGED    Details   warfarin (COUMADIN) 2 MG tablet Alternate 4 mg (2 tablets) with 2 mg(1 tablet) every other day.   Qty: 100 tablet, Refills: 1      furosemide (LASIX) 40 MG tablet Take 1 tablet by mouth daily  Qty: 30 tablet, Refills: 1      risperiDONE (RISPERDAL) 0.5 MG tablet Take 1 tablet by mouth 2 times daily  Qty: 60 tablet, Refills: 1      donepezil (ARICEPT) 5 MG tablet Take 1 tablet by mouth nightly  Qty: 30 tablet, Refills: 0      digoxin

## 2020-04-29 NOTE — H&P
Internal Medicine  PGY 1  History & Physical      CC : AMS, Fall    History Obtained From:  patient, spouse    HISTORY OF PRESENT ILLNESS:  Mrs. Bettina wSift is a 68-year-old female with past medical history of amyloid angiopathy of the brain, A. fib, hypertension, and recurrent falls who presented to the ED after being found down by her . Patient does have a history of dementia and is not able to provide any useful information. Per , he came home after being gone for about 1 to 2 hours and found his wife down in the bathroom. Patient has been following a lot recently at home but has been has been able to help break the falls. Patient's falls seem to be in the morning.  thinks it could be medication related. Pt INR > 8. Given 2.5 mg Vit K. Given 1 L LR in ED. Past Medical History:        Diagnosis Date    Adenomatous polyp of colon 1/29/2019    Asymptomatic varicose veins     Atrial fibrillation (HCC)     Dysthymic disorder     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)     Screening mammogram 12/8/2009    (Left)Benign   ·     Past Surgical History:        Procedure Laterality Date    COLONOSCOPY  12/12/2008    HERNIA REPAIR      Left Femoral   ·     Medications Priorto Admission:    · Not in a hospital admission. Allergies:  Beta adrenergic blockers    Social History:   · TOBACCO:   reports that she has never smoked. She has never used smokeless tobacco.  · ETOH:   reports no history of alcohol use. · DRUGS : denies use  · Patient currently lives with   ·   Family History:       Problem Relation Age of Onset    Stroke Mother     Heart Attack Father     Heart Disease Father    ·     Review of Systems   Unable to perform ROS: Dementia       ROS: A 10 point review of systems was conducted, significant findings as noted in HPI. Physical Exam  Constitutional:       General: She is not in acute distress. Appearance: Normal appearance.  She is not ill-appearing, toxic-appearing or diaphoretic. HENT:      Head: Normocephalic and atraumatic. Eyes:      General: No scleral icterus. Right eye: No discharge. Left eye: No discharge. Extraocular Movements: Extraocular movements intact. Conjunctiva/sclera: Conjunctivae normal.   Cardiovascular:      Rate and Rhythm: Normal rate and regular rhythm. Heart sounds: No murmur. No friction rub. No gallop. Pulmonary:      Effort: Pulmonary effort is normal. No respiratory distress. Breath sounds: No stridor. No wheezing, rhonchi or rales. Chest:      Chest wall: No tenderness. Abdominal:      General: Bowel sounds are normal. There is no distension. Palpations: Abdomen is soft. There is no mass. Tenderness: There is no abdominal tenderness. There is no guarding. Musculoskeletal:         General: No swelling or deformity. Right lower leg: No edema. Left lower leg: No edema. Skin:     General: Skin is warm and dry. Coloration: Skin is not jaundiced. Findings: No bruising. Neurological:      Mental Status: She is oriented to person, place, and time. Cranial Nerves: No cranial nerve deficit. Sensory: No sensory deficit. Motor: No weakness. Physical exam:       Vitals:    04/29/20 1609   BP:    Pulse:    Resp:    Temp:    SpO2: 97%       DATA:    Labs:  CBC:   Recent Labs     04/29/20  1618   WBC 15.6*   HGB 18.4*   HCT 53.0*          BMP:   Recent Labs     04/29/20  1617   *   K 2.5*   CL 67*   CO2 34*   BUN 54*   CREATININE 1.1   GLUCOSE 113*     LFT's: No results for input(s): AST, ALT, ALB, BILITOT, ALKPHOS in the last 72 hours. Troponin: No results for input(s): TROPONINI in the last 72 hours. BNP:No results for input(s): BNP in the last 72 hours. ABGs: No results for input(s): PHART, [id number], PO2ART in the last 72 hours.   INR:   Recent Labs     04/29/20  1618   INR 8.40*       U/A:No results for input(s): NITRITE, COLORU, PHUR, LABCAST,

## 2020-04-30 ENCOUNTER — APPOINTMENT (OUTPATIENT)
Dept: MRI IMAGING | Age: 80
DRG: 228 | End: 2020-04-30
Payer: MEDICARE

## 2020-04-30 ENCOUNTER — APPOINTMENT (OUTPATIENT)
Dept: CT IMAGING | Age: 80
DRG: 228 | End: 2020-04-30
Payer: MEDICARE

## 2020-04-30 LAB
ALBUMIN SERPL-MCNC: 3.3 G/DL (ref 3.4–5)
ALBUMIN SERPL-MCNC: 3.4 G/DL (ref 3.4–5)
AMPHETAMINE SCREEN, URINE: NORMAL
ANION GAP SERPL CALCULATED.3IONS-SCNC: 17 MMOL/L (ref 3–16)
ANION GAP SERPL CALCULATED.3IONS-SCNC: 17 MMOL/L (ref 3–16)
BACTERIA: ABNORMAL /HPF
BARBITURATE SCREEN URINE: NORMAL
BASOPHILS ABSOLUTE: 0 K/UL (ref 0–0.2)
BASOPHILS RELATIVE PERCENT: 0 %
BENZODIAZEPINE SCREEN, URINE: NORMAL
BILIRUBIN URINE: NEGATIVE
BLOOD, URINE: NEGATIVE
BUN BLDV-MCNC: 42 MG/DL (ref 7–20)
BUN BLDV-MCNC: 46 MG/DL (ref 7–20)
CALCIUM SERPL-MCNC: 9.4 MG/DL (ref 8.3–10.6)
CALCIUM SERPL-MCNC: 9.5 MG/DL (ref 8.3–10.6)
CANNABINOID SCREEN URINE: NORMAL
CHLORIDE BLD-SCNC: 76 MMOL/L (ref 99–110)
CHLORIDE BLD-SCNC: 79 MMOL/L (ref 99–110)
CLARITY: CLEAR
CO2: 32 MMOL/L (ref 21–32)
CO2: 34 MMOL/L (ref 21–32)
COCAINE METABOLITE SCREEN URINE: NORMAL
COLOR: YELLOW
CREAT SERPL-MCNC: 0.8 MG/DL (ref 0.6–1.2)
CREAT SERPL-MCNC: 0.9 MG/DL (ref 0.6–1.2)
EOSINOPHILS ABSOLUTE: 0.1 K/UL (ref 0–0.6)
EOSINOPHILS RELATIVE PERCENT: 1 %
EPITHELIAL CELLS, UA: ABNORMAL /HPF (ref 0–5)
GFR AFRICAN AMERICAN: >60
GFR AFRICAN AMERICAN: >60
GFR NON-AFRICAN AMERICAN: >60
GFR NON-AFRICAN AMERICAN: >60
GLUCOSE BLD-MCNC: 78 MG/DL (ref 70–99)
GLUCOSE BLD-MCNC: 84 MG/DL (ref 70–99)
GLUCOSE BLD-MCNC: 90 MG/DL (ref 70–99)
GLUCOSE URINE: NEGATIVE MG/DL
HCT VFR BLD CALC: 44.6 % (ref 36–48)
HEMOGLOBIN: 15.8 G/DL (ref 12–16)
INR BLD: 6.08 (ref 0.86–1.14)
KETONES, URINE: 15 MG/DL
LEUKOCYTE ESTERASE, URINE: ABNORMAL
LV EF: 55 %
LVEF MODALITY: NORMAL
LYMPHOCYTES ABSOLUTE: 1.2 K/UL (ref 1–5.1)
LYMPHOCYTES RELATIVE PERCENT: 10 %
Lab: NORMAL
MAGNESIUM: 2.5 MG/DL (ref 1.8–2.4)
MCH RBC QN AUTO: 32.5 PG (ref 26–34)
MCHC RBC AUTO-ENTMCNC: 35.5 G/DL (ref 31–36)
MCV RBC AUTO: 91.5 FL (ref 80–100)
METHADONE SCREEN, URINE: NORMAL
MICROSCOPIC EXAMINATION: YES
MONOCYTES ABSOLUTE: 0.6 K/UL (ref 0–1.3)
MONOCYTES RELATIVE PERCENT: 5 %
NEUTROPHILS ABSOLUTE: 10.3 K/UL (ref 1.7–7.7)
NEUTROPHILS RELATIVE PERCENT: 84 %
NITRITE, URINE: NEGATIVE
OPIATE SCREEN URINE: NORMAL
OSMOLALITY URINE: 501 MOSM/KG (ref 390–1070)
OXYCODONE URINE: NORMAL
PDW BLD-RTO: 14 % (ref 12.4–15.4)
PERFORMED ON: NORMAL
PH UA: 6
PH UA: 6.5 (ref 5–8)
PHENCYCLIDINE SCREEN URINE: NORMAL
PHOSPHORUS: 2 MG/DL (ref 2.5–4.9)
PHOSPHORUS: 2.2 MG/DL (ref 2.5–4.9)
PLATELET # BLD: 217 K/UL (ref 135–450)
PMV BLD AUTO: 9.9 FL (ref 5–10.5)
POTASSIUM SERPL-SCNC: 2.9 MMOL/L (ref 3.5–5.1)
POTASSIUM SERPL-SCNC: 3.2 MMOL/L (ref 3.5–5.1)
PROPOXYPHENE SCREEN: NORMAL
PROTEIN UA: NEGATIVE MG/DL
PROTHROMBIN TIME: 71.8 SEC (ref 10–13.2)
RBC # BLD: 4.88 M/UL (ref 4–5.2)
RBC UA: ABNORMAL /HPF (ref 0–4)
SODIUM BLD-SCNC: 126 MMOL/L (ref 136–145)
SODIUM BLD-SCNC: 127 MMOL/L (ref 136–145)
SODIUM BLD-SCNC: 127 MMOL/L (ref 136–145)
SODIUM BLD-SCNC: 128 MMOL/L (ref 136–145)
SODIUM BLD-SCNC: 131 MMOL/L (ref 136–145)
SODIUM BLD-SCNC: 131 MMOL/L (ref 136–145)
SODIUM URINE: <20 MMOL/L
SPECIFIC GRAVITY UA: 1.01 (ref 1–1.03)
TEAR DROP CELLS: ABNORMAL
TOTAL CK: 61 U/L (ref 26–192)
URINE REFLEX TO CULTURE: YES
URINE TYPE: ABNORMAL
UROBILINOGEN, URINE: 1 E.U./DL
WBC # BLD: 12.3 K/UL (ref 4–11)
WBC UA: ABNORMAL /HPF (ref 0–5)

## 2020-04-30 PROCEDURE — 93306 TTE W/DOPPLER COMPLETE: CPT

## 2020-04-30 PROCEDURE — 6360000002 HC RX W HCPCS: Performed by: STUDENT IN AN ORGANIZED HEALTH CARE EDUCATION/TRAINING PROGRAM

## 2020-04-30 PROCEDURE — A9579 GAD-BASE MR CONTRAST NOS,1ML: HCPCS | Performed by: NURSE PRACTITIONER

## 2020-04-30 PROCEDURE — 83935 ASSAY OF URINE OSMOLALITY: CPT

## 2020-04-30 PROCEDURE — 99222 1ST HOSP IP/OBS MODERATE 55: CPT | Performed by: INTERNAL MEDICINE

## 2020-04-30 PROCEDURE — 2580000003 HC RX 258: Performed by: STUDENT IN AN ORGANIZED HEALTH CARE EDUCATION/TRAINING PROGRAM

## 2020-04-30 PROCEDURE — 6370000000 HC RX 637 (ALT 250 FOR IP): Performed by: STUDENT IN AN ORGANIZED HEALTH CARE EDUCATION/TRAINING PROGRAM

## 2020-04-30 PROCEDURE — 84295 ASSAY OF SERUM SODIUM: CPT

## 2020-04-30 PROCEDURE — 80307 DRUG TEST PRSMV CHEM ANLYZR: CPT

## 2020-04-30 PROCEDURE — 85025 COMPLETE CBC W/AUTO DIFF WBC: CPT

## 2020-04-30 PROCEDURE — 95819 EEG AWAKE AND ASLEEP: CPT

## 2020-04-30 PROCEDURE — 82550 ASSAY OF CK (CPK): CPT

## 2020-04-30 PROCEDURE — 1200000000 HC SEMI PRIVATE

## 2020-04-30 PROCEDURE — 6370000000 HC RX 637 (ALT 250 FOR IP): Performed by: INTERNAL MEDICINE

## 2020-04-30 PROCEDURE — 82570 ASSAY OF URINE CREATININE: CPT

## 2020-04-30 PROCEDURE — 84300 ASSAY OF URINE SODIUM: CPT

## 2020-04-30 PROCEDURE — 83735 ASSAY OF MAGNESIUM: CPT

## 2020-04-30 PROCEDURE — 87086 URINE CULTURE/COLONY COUNT: CPT

## 2020-04-30 PROCEDURE — 70553 MRI BRAIN STEM W/O & W/DYE: CPT

## 2020-04-30 PROCEDURE — 85610 PROTHROMBIN TIME: CPT

## 2020-04-30 PROCEDURE — 70450 CT HEAD/BRAIN W/O DYE: CPT

## 2020-04-30 PROCEDURE — 6360000004 HC RX CONTRAST MEDICATION: Performed by: NURSE PRACTITIONER

## 2020-04-30 PROCEDURE — 36415 COLL VENOUS BLD VENIPUNCTURE: CPT

## 2020-04-30 PROCEDURE — 84540 ASSAY OF URINE/UREA-N: CPT

## 2020-04-30 PROCEDURE — 81001 URINALYSIS AUTO W/SCOPE: CPT

## 2020-04-30 PROCEDURE — 80069 RENAL FUNCTION PANEL: CPT

## 2020-04-30 RX ORDER — POTASSIUM CHLORIDE 20 MEQ/1
40 TABLET, EXTENDED RELEASE ORAL ONCE
Status: COMPLETED | OUTPATIENT
Start: 2020-04-30 | End: 2020-04-30

## 2020-04-30 RX ORDER — PHYTONADIONE 5 MG/1
2.5 TABLET ORAL ONCE
Status: DISCONTINUED | OUTPATIENT
Start: 2020-04-30 | End: 2020-04-30

## 2020-04-30 RX ORDER — DIGOXIN 125 MCG
125 TABLET ORAL DAILY
Status: DISCONTINUED | OUTPATIENT
Start: 2020-04-30 | End: 2020-05-05

## 2020-04-30 RX ADMIN — POTASSIUM CHLORIDE 40 MEQ: 20 TABLET, EXTENDED RELEASE ORAL at 03:50

## 2020-04-30 RX ADMIN — POTASSIUM CHLORIDE 10 MEQ: 10 INJECTION, SOLUTION INTRAVENOUS at 00:51

## 2020-04-30 RX ADMIN — POTASSIUM CHLORIDE 10 MEQ: 10 INJECTION, SOLUTION INTRAVENOUS at 04:56

## 2020-04-30 RX ADMIN — DONEPEZIL HYDROCHLORIDE 5 MG: 5 TABLET, FILM COATED ORAL at 20:35

## 2020-04-30 RX ADMIN — Medication 10 ML: at 09:10

## 2020-04-30 RX ADMIN — PHYTONADIONE 10 MG: 10 INJECTION, EMULSION INTRAMUSCULAR; INTRAVENOUS; SUBCUTANEOUS at 10:28

## 2020-04-30 RX ADMIN — POTASSIUM CHLORIDE 10 MEQ: 10 INJECTION, SOLUTION INTRAVENOUS at 02:51

## 2020-04-30 RX ADMIN — GADOTERIDOL 12 ML: 279.3 INJECTION, SOLUTION INTRAVENOUS at 16:10

## 2020-04-30 NOTE — CONSULTS
neurocognitive baseline. INR supratherapeutic on admission. 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 Per literature review, \"improvement can be seen in most cases with immunosuppression, although a few cases have had recurrent symptoms. \" *    Recommendations:  - Agree with STAT head CT given spell of unresponsiveness and supratherapeutic INR  - Agree with correction of INR  - Agree with telemetry and cardiac evaluation   - Will repeat MRI brain   - Need to weigh risks v. benefit of anticoagulation given high suspicion for CAA as anticoagulation in this patient population confers a very high risk of lobar ICH.   - Genetic evaluation can be considered (ApoE, beta amyloid). I attempted to contact her  to discuss this but my calls were not answered. - May ultimately consider leptomeningeal biopsy  - Pan culture for infection, will leave for primary team  - Referral has been made to outpatient neuropsychiatry - Dr. June Chavez    A copy of this note was provided for MD Tamica Saleh NP  [phone]  Evenings, weekends, and off weeks please discuss neurologist on-call    *Ramez CASTELLON, CED Cooper. The Inflammatory Form of Cerebral Amyloid Angiopathy or \"Cerebral Amyloid Angiopathy-Related Inflammation\" (CAARI). Curr Neurol Neurosci Rep. 2015;15(8):54.  Doi:[id number]

## 2020-04-30 NOTE — PLAN OF CARE
Patient is a  high fall risk and is up with assist x1 with the walker and gait belt. Patient alert and oriented x4, fall precautions in place, bed in lowest position and locked, bed alarm on for safety, call light and belongings with in reach. Will continue to monitor. Problem: Falls - Risk of:  Goal: Will remain free from falls  Description: Will remain free from falls  Outcome: Ongoing   Patient is bed with bed alarm on. Fall precautions in place. Will continue to monitor.   Problem: Safety:  Goal: Free from accidental physical injury  Description: Free from accidental physical injury  Outcome: Ongoing

## 2020-04-30 NOTE — PROGRESS NOTES
Bedside swallow done with patient. Patient able to drink water without coughing or other issues. Will continue to monitor.

## 2020-04-30 NOTE — PROGRESS NOTES
OBJECTIVE DATA:  · Nutrition-Focused Physical Findings: +BM 4/29  · Wounds None      Past Medical History:   Diagnosis Date    Adenomatous polyp of colon 1/29/2019    Asymptomatic varicose veins     Atrial fibrillation (HCC)     Dysthymic disorder     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)     Screening mammogram 12/8/2009    (Left)Benign        ANTHROPOMETRICS  Current Height: 5' 6\" (167.6 cm)  Current Weight: 130 lb 6.4 oz (59.1 kg)    Admission weight: 130 lb (59 kg)  Ideal Bodyweight 130 lb       BMI BMI (Calculated): 21.1    Wt Readings from Last 50 Encounters:   04/30/20 130 lb 6.4 oz (59.1 kg)   04/01/20 138 lb 0.1 oz (62.6 kg)   03/24/20 138 lb (62.6 kg)   03/15/20 148 lb (67.1 kg)   03/12/20 148 lb (67.1 kg)   02/05/20 149 lb 14.6 oz (68 kg)   01/16/20 149 lb 14.6 oz (68 kg)   01/14/20 150 lb (68 kg)   12/21/19 155 lb (70.3 kg)   12/17/19 155 lb (70.3 kg)   07/29/19 156 lb (70.8 kg)   06/17/19 156 lb (70.8 kg)   01/29/19 147 lb (66.7 kg)   08/01/18 154 lb (69.9 kg)   01/26/18 148 lb (67.1 kg)   01/04/18 147 lb (66.7 kg)   07/26/17 152 lb (68.9 kg)   01/24/17 148 lb (67.1 kg)   07/21/16 155 lb (70.3 kg)   01/21/16 160 lb (72.6 kg)   12/07/15 154 lb 3.2 oz (69.9 kg)   03/03/15 152 lb (68.9 kg)   01/16/14 151 lb (68.5 kg)   05/30/13 143 lb (64.9 kg)   11/30/12 143 lb (64.9 kg)   05/29/12 152 lb (68.9 kg)   11/29/11 152 lb (68.9 kg)   08/29/11 156 lb (70.8 kg)       COMPARATIVE STANDARDS  Estimated Total Kcals/Day : 25-30  Current Bodyweight (59 kg) 7469-6251 kcal    Estimated Total Protein (g/day) : 1.0-1.2 Current Bodyweight (59 kg) 59-70 g/day  Estimated Daily Total Fluid (ml/day): 1770 mL per day     Food / Nutrition-Related History  Pre-Admission / Home Diet:  Pre-Admission/Home Diet: General   Home Supplements / Herbals:    none noted  Food Restrictions / Cultural Requests:    none noted    Diet Orders / Intake / Nutrition Support  Current diet/supplement order: DIET GENERAL;     NSG Recorded PO:   PO

## 2020-04-30 NOTE — PROGRESS NOTES
Called into patient's room by Dr. Rubio Birch at [phone] as patient was unresponsive. Checked Patient's Vitals signs and they were stable and blood sugar was 90. Patient not responsive to touch, finger prick and could not hold either lower extremity up. After 3-5 minutes patient became responsive and alert, able to lift and hold lower extremities off of bed, able to move arms. Stat CT ordered. Will continue to monitor and assess.

## 2020-05-01 LAB
ALBUMIN SERPL-MCNC: 3.2 G/DL (ref 3.4–5)
ANION GAP SERPL CALCULATED.3IONS-SCNC: 10 MMOL/L (ref 3–16)
BASOPHILS ABSOLUTE: 0 K/UL (ref 0–0.2)
BASOPHILS RELATIVE PERCENT: 0.2 %
BUN BLDV-MCNC: 25 MG/DL (ref 7–20)
CALCIUM SERPL-MCNC: 8.8 MG/DL (ref 8.3–10.6)
CHLORIDE BLD-SCNC: 85 MMOL/L (ref 99–110)
CO2: 34 MMOL/L (ref 21–32)
CREAT SERPL-MCNC: 0.6 MG/DL (ref 0.6–1.2)
CREATININE URINE: 69.2 MG/DL (ref 28–259)
EOSINOPHILS ABSOLUTE: 0.1 K/UL (ref 0–0.6)
EOSINOPHILS RELATIVE PERCENT: 0.8 %
GFR AFRICAN AMERICAN: >60
GFR NON-AFRICAN AMERICAN: >60
GLUCOSE BLD-MCNC: 80 MG/DL (ref 70–99)
HCT VFR BLD CALC: 40.6 % (ref 36–48)
HEMOGLOBIN: 14.1 G/DL (ref 12–16)
INR BLD: 1.15 (ref 0.86–1.14)
LYMPHOCYTES ABSOLUTE: 1.8 K/UL (ref 1–5.1)
LYMPHOCYTES RELATIVE PERCENT: 19.3 %
MAGNESIUM: 2.2 MG/DL (ref 1.8–2.4)
MCH RBC QN AUTO: 32.4 PG (ref 26–34)
MCHC RBC AUTO-ENTMCNC: 34.6 G/DL (ref 31–36)
MCV RBC AUTO: 93.6 FL (ref 80–100)
MONOCYTES ABSOLUTE: 1 K/UL (ref 0–1.3)
MONOCYTES RELATIVE PERCENT: 10.8 %
NEUTROPHILS ABSOLUTE: 6.3 K/UL (ref 1.7–7.7)
NEUTROPHILS RELATIVE PERCENT: 68.9 %
PDW BLD-RTO: 14.2 % (ref 12.4–15.4)
PHOSPHORUS: 1.5 MG/DL (ref 2.5–4.9)
PLATELET # BLD: 180 K/UL (ref 135–450)
PMV BLD AUTO: 9.8 FL (ref 5–10.5)
POTASSIUM SERPL-SCNC: 2.5 MMOL/L (ref 3.5–5.1)
PROTHROMBIN TIME: 13.4 SEC (ref 10–13.2)
RBC # BLD: 4.34 M/UL (ref 4–5.2)
SLIDE REVIEW: NORMAL
SODIUM BLD-SCNC: 128 MMOL/L (ref 136–145)
SODIUM BLD-SCNC: 129 MMOL/L (ref 136–145)
SODIUM BLD-SCNC: 133 MMOL/L (ref 136–145)
UREA NITROGEN, UR: 1067.9 MG/DL (ref 800–1666)
URINE CULTURE, ROUTINE: NORMAL
WBC # BLD: 9.1 K/UL (ref 4–11)

## 2020-05-01 PROCEDURE — 97166 OT EVAL MOD COMPLEX 45 MIN: CPT

## 2020-05-01 PROCEDURE — 99223 1ST HOSP IP/OBS HIGH 75: CPT | Performed by: INTERNAL MEDICINE

## 2020-05-01 PROCEDURE — 80069 RENAL FUNCTION PANEL: CPT

## 2020-05-01 PROCEDURE — 83735 ASSAY OF MAGNESIUM: CPT

## 2020-05-01 PROCEDURE — 6370000000 HC RX 637 (ALT 250 FOR IP): Performed by: STUDENT IN AN ORGANIZED HEALTH CARE EDUCATION/TRAINING PROGRAM

## 2020-05-01 PROCEDURE — 6360000002 HC RX W HCPCS: Performed by: STUDENT IN AN ORGANIZED HEALTH CARE EDUCATION/TRAINING PROGRAM

## 2020-05-01 PROCEDURE — 97530 THERAPEUTIC ACTIVITIES: CPT

## 2020-05-01 PROCEDURE — 6370000000 HC RX 637 (ALT 250 FOR IP): Performed by: INTERNAL MEDICINE

## 2020-05-01 PROCEDURE — 36415 COLL VENOUS BLD VENIPUNCTURE: CPT

## 2020-05-01 PROCEDURE — 85025 COMPLETE CBC W/AUTO DIFF WBC: CPT

## 2020-05-01 PROCEDURE — 1200000000 HC SEMI PRIVATE

## 2020-05-01 PROCEDURE — 97162 PT EVAL MOD COMPLEX 30 MIN: CPT

## 2020-05-01 PROCEDURE — 99232 SBSQ HOSP IP/OBS MODERATE 35: CPT | Performed by: INTERNAL MEDICINE

## 2020-05-01 PROCEDURE — 84295 ASSAY OF SERUM SODIUM: CPT

## 2020-05-01 PROCEDURE — 97535 SELF CARE MNGMENT TRAINING: CPT

## 2020-05-01 PROCEDURE — 2580000003 HC RX 258: Performed by: STUDENT IN AN ORGANIZED HEALTH CARE EDUCATION/TRAINING PROGRAM

## 2020-05-01 PROCEDURE — 85610 PROTHROMBIN TIME: CPT

## 2020-05-01 PROCEDURE — 93005 ELECTROCARDIOGRAM TRACING: CPT | Performed by: STUDENT IN AN ORGANIZED HEALTH CARE EDUCATION/TRAINING PROGRAM

## 2020-05-01 RX ORDER — POTASSIUM CHLORIDE 20 MEQ/1
40 TABLET, EXTENDED RELEASE ORAL ONCE
Status: COMPLETED | OUTPATIENT
Start: 2020-05-01 | End: 2020-05-01

## 2020-05-01 RX ORDER — POTASSIUM CHLORIDE 7.45 MG/ML
10 INJECTION INTRAVENOUS
Status: COMPLETED | OUTPATIENT
Start: 2020-05-01 | End: 2020-05-01

## 2020-05-01 RX ADMIN — POTASSIUM CHLORIDE 10 MEQ: 10 INJECTION, SOLUTION INTRAVENOUS at 04:07

## 2020-05-01 RX ADMIN — POTASSIUM CHLORIDE 40 MEQ: 20 TABLET, EXTENDED RELEASE ORAL at 04:07

## 2020-05-01 RX ADMIN — POTASSIUM CHLORIDE 10 MEQ: 10 INJECTION, SOLUTION INTRAVENOUS at 09:05

## 2020-05-01 RX ADMIN — POTASSIUM CHLORIDE 10 MEQ: 10 INJECTION, SOLUTION INTRAVENOUS at 06:03

## 2020-05-01 RX ADMIN — POTASSIUM CHLORIDE 10 MEQ: 10 INJECTION, SOLUTION INTRAVENOUS at 07:53

## 2020-05-01 RX ADMIN — POTASSIUM CHLORIDE 10 MEQ: 10 INJECTION, SOLUTION INTRAVENOUS at 10:15

## 2020-05-01 RX ADMIN — POTASSIUM CHLORIDE 40 MEQ: 20 TABLET, EXTENDED RELEASE ORAL at 09:37

## 2020-05-01 RX ADMIN — Medication: at 09:37

## 2020-05-01 RX ADMIN — SODIUM CHLORIDE: 9 INJECTION, SOLUTION INTRAVENOUS at 06:03

## 2020-05-01 RX ADMIN — POTASSIUM CHLORIDE 10 MEQ: 10 INJECTION, SOLUTION INTRAVENOUS at 11:12

## 2020-05-01 ASSESSMENT — PAIN SCALES - GENERAL
PAINLEVEL_OUTOF10: 0
PAINLEVEL_OUTOF10: 0

## 2020-05-01 NOTE — PROGRESS NOTES
person  Social/Functional History  Social/Functional History  Lives With: Spouse  Type of Home: (condo)  Home Layout: One level  Home Access: Level entry(elevator)  Bathroom Shower/Tub: Tub/Shower unit  Bathroom Toilet: Standard  Home Equipment: (states she could lend a walker from neighbor )  ADL Assistance: Independent  Homemaking Assistance: Needs assistance(assistance from lady in the building for cleaning and  gets premade meals)  Active : No  Patient's  Info: Pt says she has not driven in a long time though she could. 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 Additional Comments: Pt is questionable historian. She denies that she falls frequently but states that her  would say that she does. Cognition        Objective     Observation/Palpation  Posture: Fair  Observation: Pt tends to lean forward on walker when sitting on toilet and counter when washing her hands. She states that she is very fatigued. AROM RLE (degrees)  RLE AROM: WFL  AROM LLE (degrees)  LLE AROM : WFL  Strength RLE  Comment: grossly weak, with decreased amplitude of movements though she is able to perform functional tasks  Strength LLE  Comment: grossly weak, with decreased amplitude of movements though she is able to perform functional tasks        Bed mobility  Supine to Sit: Stand by assistance(with HOB elevated)  Transfers  Sit to Stand: Contact guard assistance  Stand to sit: Contact guard assistance  Comment: cueing for hand placement and to back up to surface that she is transferring to  Ambulation  Ambulation?: Yes  Ambulation 1  Surface: level tile  Device: Rolling Walker  Assistance: Contact guard assistance  Quality of Gait: Pt shuffles her feet and has decreased step height and length with ambulation. Slow suad.   Distance: 10  Stairs/Curb  Stairs?: No     Balance  Posture: Fair  Sitting - Static: Good  Sitting - Dynamic: Good  Standing - Static: Fair  Standing - Dynamic:

## 2020-05-01 NOTE — PROGRESS NOTES
Occupational Therapy    Initial Assessment and Treatment  Date: 2020   Patient Name: Brendan Ann  MRN: [id number]     : 1940    Date of Service: 2020    Discharge Recommendations: Brendan Ann scored a 20/24 on the AM-PAC ADL Inpatient form. Current research shows that an AM-PAC score of 18 or greater is typically associated with a discharge to the patient's home setting. Based on the patients AM-PAC score and their current ADL deficits, it is recommended that the patient have 2-3 sessions per week of Occupational Therapy at d/c to increase the patients independence. OT Equipment Recommendations  Equipment Needed: No    Assessment   Performance deficits / Impairments: Decreased high-level IADLs;Decreased cognition;Decreased functional mobility ; Decreased endurance;Decreased ADL status; Decreased strength;Decreased balance;Decreased posture    Assessment: Pt admitted from home with . Pt is somewhat of a poor historian, often tangential with speech, and easily distractible. Currently needing CGA for all safe mobility and standing ADLs. Unclear what assist, if any, her  provides for her. She is not safe to ambulate on her own as she is a high fall risk. If pt returns home, will need 24hr assist. Will follow. Treatment Diagnosis: Impaired ADLs, cognition, mobility, safety awareness    Patient Education: Role of OT, calling for needs - pt verb understanding, will need reinforcement    REQUIRES OT FOLLOW UP: Yes    Activity Tolerance: Patient Tolerated treatment well    Safety Devices in place: Yes  Type of devices: Left in chair;Chair alarm in place;Nurse notified;Call light within reach             Treatment Diagnosis: Impaired ADLs, cognition, mobility, safety awareness      Restrictions  Position Activity Restriction  Other position/activity restrictions: up with assist    Subjective   General  Chart Reviewed:  Yes    Additional Pertinent Hx: Pt presented  s/p multiple falls at

## 2020-05-01 NOTE — PROGRESS NOTES
ill-appearing. HENT:      Head: Normocephalic. Mouth/Throat:      Mouth: Mucous membranes are moist.   Eyes:      General: No scleral icterus. Pupils: Pupils are equal, round, and reactive to light. Comments: Pupils 3->2mm   Neck:      Musculoskeletal: Normal range of motion. Cardiovascular:      Rate and Rhythm: Normal rate. Rhythm irregular. Pulses: Normal pulses. Heart sounds: Normal heart sounds. No murmur. Pulmonary:      Effort: Pulmonary effort is normal. No respiratory distress. Breath sounds: Normal breath sounds. No wheezing. Abdominal:      General: Abdomen is flat. There is no distension. Palpations: Abdomen is soft. Tenderness: There is no abdominal tenderness. There is no guarding. Musculoskeletal: Normal range of motion. Right lower leg: No edema. Left lower leg: No edema. Skin:     General: Skin is warm and dry. Findings: No rash. Neurological:      General: No focal deficit present. Mental Status: She is disoriented. Sensory: No sensory deficit. Motor: No weakness.       Coordination: Coordination normal.      Comments: Awake and conversant, pleasantly demented   Psychiatric:         Mood and Affect: Mood normal.       LABS:    CBC:   Recent Labs     04/29/20  1618 04/30/20  0543 05/01/20  0254   WBC 15.6* 12.3* 9.1   HGB 18.4* 15.8 14.1   HCT 53.0* 44.6 40.6    217 180   MCV 92.4 91.5 93.6     Renal:    Recent Labs     04/29/20  1949  04/30/20  0200  04/30/20  0543  04/30/20  2254 05/01/20  0254 05/01/20  0415   *   < > 127*   < > 128*   < > 128* 129* 133*   K 2.6*  --  2.9*  --  3.2*  --   --  2.5*  --    CL 71*  --  76*  --  79*  --   --  85*  --    CO2 35*  --  34*  --  32  --   --  34*  --    BUN 51*  --  46*  --  42*  --   --  25*  --    CREATININE 1.0  --  0.9  --  0.8  --   --  0.6  --    GLUCOSE 96  --  84  --  78  --   --  80  --    CALCIUM 10.0  --  9.4  --  9.5  --   --  8.8  --    MG 2.70*  --

## 2020-05-02 LAB
ALBUMIN SERPL-MCNC: 3.1 G/DL (ref 3.4–5)
AMMONIA: 31 UMOL/L (ref 11–51)
ANION GAP SERPL CALCULATED.3IONS-SCNC: 10 MMOL/L (ref 3–16)
BASOPHILS ABSOLUTE: 0 K/UL (ref 0–0.2)
BASOPHILS RELATIVE PERCENT: 0 %
BUN BLDV-MCNC: 19 MG/DL (ref 7–20)
CALCIUM SERPL-MCNC: 8.9 MG/DL (ref 8.3–10.6)
CHLORIDE BLD-SCNC: 93 MMOL/L (ref 99–110)
CO2: 29 MMOL/L (ref 21–32)
CREAT SERPL-MCNC: 0.8 MG/DL (ref 0.6–1.2)
DIGOXIN LEVEL: 1.1 NG/ML (ref 0.8–2)
EKG ATRIAL RATE: 288 BPM
EKG DIAGNOSIS: NORMAL
EKG Q-T INTERVAL: 400 MS
EKG QRS DURATION: 98 MS
EKG QTC CALCULATION (BAZETT): 375 MS
EKG R AXIS: -55 DEGREES
EKG T AXIS: 211 DEGREES
EKG VENTRICULAR RATE: 53 BPM
EOSINOPHILS ABSOLUTE: 0.1 K/UL (ref 0–0.6)
EOSINOPHILS RELATIVE PERCENT: 1 %
GFR AFRICAN AMERICAN: >60
GFR NON-AFRICAN AMERICAN: >60
GLUCOSE BLD-MCNC: 85 MG/DL (ref 70–99)
HCT VFR BLD CALC: 38.4 % (ref 36–48)
HEMOGLOBIN: 13.5 G/DL (ref 12–16)
INR BLD: 1.06 (ref 0.86–1.14)
LACTIC ACID: 1.3 MMOL/L (ref 0.4–2)
LYMPHOCYTES ABSOLUTE: 1.8 K/UL (ref 1–5.1)
LYMPHOCYTES RELATIVE PERCENT: 24 %
MAGNESIUM: 2.1 MG/DL (ref 1.8–2.4)
MCH RBC QN AUTO: 32.4 PG (ref 26–34)
MCHC RBC AUTO-ENTMCNC: 35.1 G/DL (ref 31–36)
MCV RBC AUTO: 92.1 FL (ref 80–100)
METAMYELOCYTES RELATIVE PERCENT: 1 %
MONOCYTES ABSOLUTE: 0.8 K/UL (ref 0–1.3)
MONOCYTES RELATIVE PERCENT: 11 %
MYELOCYTE PERCENT: 1 %
NEUTROPHILS ABSOLUTE: 4.8 K/UL (ref 1.7–7.7)
NEUTROPHILS RELATIVE PERCENT: 62 %
PDW BLD-RTO: 13.9 % (ref 12.4–15.4)
PHOSPHORUS: 1.2 MG/DL (ref 2.5–4.9)
PLATELET # BLD: 186 K/UL (ref 135–450)
PMV BLD AUTO: 9.8 FL (ref 5–10.5)
POTASSIUM SERPL-SCNC: 3.8 MMOL/L (ref 3.5–5.1)
PROTHROMBIN TIME: 12.3 SEC (ref 10–13.2)
RBC # BLD: 4.17 M/UL (ref 4–5.2)
SODIUM BLD-SCNC: 132 MMOL/L (ref 136–145)
WBC # BLD: 7.5 K/UL (ref 4–11)

## 2020-05-02 PROCEDURE — 81401 MOPATH PROCEDURE LEVEL 2: CPT

## 2020-05-02 PROCEDURE — 36415 COLL VENOUS BLD VENIPUNCTURE: CPT

## 2020-05-02 PROCEDURE — 99233 SBSQ HOSP IP/OBS HIGH 50: CPT | Performed by: INTERNAL MEDICINE

## 2020-05-02 PROCEDURE — 83735 ASSAY OF MAGNESIUM: CPT

## 2020-05-02 PROCEDURE — 87040 BLOOD CULTURE FOR BACTERIA: CPT

## 2020-05-02 PROCEDURE — 2580000003 HC RX 258: Performed by: STUDENT IN AN ORGANIZED HEALTH CARE EDUCATION/TRAINING PROGRAM

## 2020-05-02 PROCEDURE — 93010 ELECTROCARDIOGRAM REPORT: CPT | Performed by: INTERNAL MEDICINE

## 2020-05-02 PROCEDURE — 6370000000 HC RX 637 (ALT 250 FOR IP): Performed by: INTERNAL MEDICINE

## 2020-05-02 PROCEDURE — 85610 PROTHROMBIN TIME: CPT

## 2020-05-02 PROCEDURE — 1200000000 HC SEMI PRIVATE

## 2020-05-02 PROCEDURE — 85025 COMPLETE CBC W/AUTO DIFF WBC: CPT

## 2020-05-02 PROCEDURE — 99232 SBSQ HOSP IP/OBS MODERATE 35: CPT | Performed by: INTERNAL MEDICINE

## 2020-05-02 PROCEDURE — 83605 ASSAY OF LACTIC ACID: CPT

## 2020-05-02 PROCEDURE — 80162 ASSAY OF DIGOXIN TOTAL: CPT

## 2020-05-02 PROCEDURE — 82140 ASSAY OF AMMONIA: CPT

## 2020-05-02 PROCEDURE — 80069 RENAL FUNCTION PANEL: CPT

## 2020-05-02 RX ADMIN — Medication 10 ML: at 08:08

## 2020-05-02 RX ADMIN — Medication 1 TABLET: at 08:08

## 2020-05-02 RX ADMIN — DONEPEZIL HYDROCHLORIDE 5 MG: 5 TABLET, FILM COATED ORAL at 20:08

## 2020-05-02 RX ADMIN — Medication 10 ML: at 20:11

## 2020-05-02 ASSESSMENT — PAIN SCALES - GENERAL
PAINLEVEL_OUTOF10: 0

## 2020-05-02 NOTE — PROGRESS NOTES
General: Abdomen is flat. Bowel sounds are normal. There is no distension. Palpations: Abdomen is soft. Tenderness: There is no abdominal tenderness. Musculoskeletal: Normal range of motion. Right lower leg: No edema. Left lower leg: No edema. Skin:     General: Skin is warm and dry. Neurological:      General: No focal deficit present. Mental Status: She is alert. LABS:    CBC:   Recent Labs     04/30/20  0543 05/01/20 0254 05/02/20  0532   WBC 12.3* 9.1 7.5   HGB 15.8 14.1 13.5   HCT 44.6 40.6 38.4   MCV 91.5 93.6 92.1    180 186                                                                BMP:    Recent Labs     04/30/20  0543 05/01/20 0254 05/01/20 0415 05/02/20  0532   *   < > 129* 133* 132*   K 3.2*  --  2.5*  --  3.8   CL 79*  --  85*  --  93*   CO2 32  --  34*  --  29   BUN 42*  --  25*  --  19   CREATININE 0.8  --  0.6  --  0.8   GLUCOSE 78  --  80  --  85    < > = values in this interval not displayed. INR:   Recent Labs     04/30/20  0543 05/01/20 0254 05/02/20  0532   INR 6.08* 1.15* 1.06       U/A:  Recent Labs     04/30/20  1844 04/30/20  1853   COLORU  --  Yellow   PHUR  --  6.0  6.5   WBCUA 3-5  --    RBCUA None seen  --    BACTERIA 1+*  --    CLARITYU  --  Clear   SPECGRAV  --  1.010   LEUKOCYTESUR  --  TRACE*   UROBILINOGEN  --  1.0   BILIRUBINUR  --  Negative   BLOODU  --  Negative   GLUCOSEU  --  Negative      -----------------------------------------------------------------  RAD:   MRI BRAIN W WO CONTRAST   Final Result      1. 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 CT HEAD WO CONTRAST   Final Result   1. Mild atrophy. 2. Confluent areas of decreased white matter attenuation.   The findings are nonspecific, but most likely represent chronic small

## 2020-05-02 NOTE — CONSULTS
history of cardiac rhythm disturbance, syncope or sudden cardiac  death. REVIEW OF SYSTEMS:  Difficult to obtain as the patient is currently  confused and delusional.    PHYSICAL EXAMINATION:  VITAL SIGNS:  Blood pressure is 110/61, heart rate is 57 beats per  minute and irregular. GENERAL:  The patient is awake and responsive. She is oriented to  place, day and date. She currently reports that she is preparing to  attend a party tonight. HEENT:  Exam demonstrates normocephalic, atraumatic head. There is no  scleral icterus. Pupils are round and reactive. NECK:  Supple without thyromegaly. LUNGS:  Clear to auscultation. CARDIOVASCULAR:  Exam reveals an irregular rhythm. The apical impulse  is normal.  S1 and S2 are normal.  There is no audible murmur or gallop. The jugular venous pressure appears to be within normal limits. ABDOMEN:  Mildly obese, soft, nontender. EXTREMITIES:  Demonstrate no pitting pretibial dependent edema. There  is no cyanosis. There is no evidence for chronic venous stasis. SKIN:  Otherwise warm, dry without skin rash. A 12-lead ECG from 05/01/2020 demonstrates atrial fibrillation with an  average ventricular rate of 53 beats per minute. She has left axis  deviation and ST-segment abnormalities which may suggest inferolateral  myocardial ischemia. IMPRESSIONS:  1. Permanent atrial fibrillation. 2.  AV block. 3.  Confusion. 4.  Cerebral amyloid angiopathy. The patient presents with recurring falls. She has what appears to be  increasing confusion based on evaluation of her records from the last  admission. Neurology has currently recommended against therapeutic  anticoagulation for a stroke prophylaxis based on the risk of  intracranial hemorrhage with her angiopathy. Ultimately, she may be a  candidate for left atrial appendage occlusion, but could not have this  discussion with her today based on the confusion.   If medical power of   is identified,

## 2020-05-02 NOTE — PROGRESS NOTES
clubbing, cyanosis of the extremities. · No peripheral edema  · Femoral Arteries: 2+ and equal  · Pedal Pulses: 2+ and equal   ABDOMEN[de-identified]  · No masses or tenderness  · Liver/Spleen: No Abnormalities Noted  NEUROLOGICAL:  . Moves all extremities to command. Cranial nerves 2-12 are in tact. PSYCHIATRIC: alert and lucid and oriented and appropriate  SKIN: No lesions or rashes  LYMPH NODES: none enlarged      Medications:    [Held by provider] digoxin  125 mcg Oral Daily    donepezil  5 mg Oral Nightly    therapeutic multivitamin-minerals   Oral Daily    [Held by provider] risperiDONE  0.5 mg Oral BID    sodium chloride flush  10 mL Intravenous 2 times per day       sodium chloride flush, acetaminophen **OR** acetaminophen, polyethylene glycol, promethazine **OR** ondansetron    Lab Data:  CBC:   Recent Labs     04/30/20 0543 05/01/20 0254 05/02/20  0532   WBC 12.3* 9.1 7.5   HGB 15.8 14.1 13.5   HCT 44.6 40.6 38.4   MCV 91.5 93.6 92.1    180 186     BMP:   Recent Labs     04/30/20 0543 05/01/20 0254 05/01/20  0415 05/02/20  0532   *   < > 129* 133* 132*   K 3.2*  --  2.5*  --  3.8   CL 79*  --  85*  --  93*   CO2 32  --  34*  --  29   PHOS 2.0*  --  1.5*  --  1.2*   BUN 42*  --  25*  --  19   CREATININE 0.8  --  0.6  --  0.8    < > = values in this interval not displayed. Troponin:Troponin: No results found for: TROPONINI  LIVER PROFILE: No results for input(s): AST, ALT, LIPASE, BILIDIR, BILITOT, ALKPHOS in the last 72 hours. Invalid input(s): AMYLASE,  ALB  PT/INR:   Recent Labs     04/30/20 0543 05/01/20 0254 05/02/20  0532   PROTIME 71.8* 13.4* 12.3   INR 6.08* 1.15* 1.06     APTT: No results for input(s): APTT in the last 72 hours. BNP:  No results for input(s): BNP in the last 72 hours.   IMAGING: as noted    Assessment:  Patient Active Problem List    Diagnosis Date Noted    Multiple falls 04/29/2020    Cerebral amyloid angiopathy (CODE)     Hypokalemia     Major

## 2020-05-03 LAB
ALBUMIN SERPL-MCNC: 3 G/DL (ref 3.4–5)
ANION GAP SERPL CALCULATED.3IONS-SCNC: 9 MMOL/L (ref 3–16)
BASOPHILS ABSOLUTE: 0 K/UL (ref 0–0.2)
BASOPHILS RELATIVE PERCENT: 0.5 %
BUN BLDV-MCNC: 20 MG/DL (ref 7–20)
CALCIUM SERPL-MCNC: 8.7 MG/DL (ref 8.3–10.6)
CHLORIDE BLD-SCNC: 96 MMOL/L (ref 99–110)
CO2: 27 MMOL/L (ref 21–32)
CREAT SERPL-MCNC: 0.7 MG/DL (ref 0.6–1.2)
EOSINOPHILS ABSOLUTE: 0.1 K/UL (ref 0–0.6)
EOSINOPHILS RELATIVE PERCENT: 1.7 %
GFR AFRICAN AMERICAN: >60
GFR NON-AFRICAN AMERICAN: >60
GLUCOSE BLD-MCNC: 106 MG/DL (ref 70–99)
HCT VFR BLD CALC: 34.9 % (ref 36–48)
HEMOGLOBIN: 12.1 G/DL (ref 12–16)
INR BLD: 0.99 (ref 0.86–1.14)
LYMPHOCYTES ABSOLUTE: 2 K/UL (ref 1–5.1)
LYMPHOCYTES RELATIVE PERCENT: 26.2 %
MAGNESIUM: 2 MG/DL (ref 1.8–2.4)
MCH RBC QN AUTO: 32.4 PG (ref 26–34)
MCHC RBC AUTO-ENTMCNC: 34.5 G/DL (ref 31–36)
MCV RBC AUTO: 93.8 FL (ref 80–100)
MONOCYTES ABSOLUTE: 0.7 K/UL (ref 0–1.3)
MONOCYTES RELATIVE PERCENT: 9.5 %
NEUTROPHILS ABSOLUTE: 4.7 K/UL (ref 1.7–7.7)
NEUTROPHILS RELATIVE PERCENT: 62.1 %
PDW BLD-RTO: 14.4 % (ref 12.4–15.4)
PHOSPHORUS: 1.6 MG/DL (ref 2.5–4.9)
PLATELET # BLD: 145 K/UL (ref 135–450)
PMV BLD AUTO: 10.1 FL (ref 5–10.5)
POTASSIUM SERPL-SCNC: 3.5 MMOL/L (ref 3.5–5.1)
PROTHROMBIN TIME: 11.5 SEC (ref 10–13.2)
RBC # BLD: 3.72 M/UL (ref 4–5.2)
SLIDE REVIEW: ABNORMAL
SODIUM BLD-SCNC: 132 MMOL/L (ref 136–145)
WBC # BLD: 7.6 K/UL (ref 4–11)

## 2020-05-03 PROCEDURE — 85610 PROTHROMBIN TIME: CPT

## 2020-05-03 PROCEDURE — 83735 ASSAY OF MAGNESIUM: CPT

## 2020-05-03 PROCEDURE — 2580000003 HC RX 258: Performed by: STUDENT IN AN ORGANIZED HEALTH CARE EDUCATION/TRAINING PROGRAM

## 2020-05-03 PROCEDURE — 99232 SBSQ HOSP IP/OBS MODERATE 35: CPT | Performed by: INTERNAL MEDICINE

## 2020-05-03 PROCEDURE — 6370000000 HC RX 637 (ALT 250 FOR IP): Performed by: INTERNAL MEDICINE

## 2020-05-03 PROCEDURE — 1200000000 HC SEMI PRIVATE

## 2020-05-03 PROCEDURE — 36415 COLL VENOUS BLD VENIPUNCTURE: CPT

## 2020-05-03 PROCEDURE — 85025 COMPLETE CBC W/AUTO DIFF WBC: CPT

## 2020-05-03 PROCEDURE — 99233 SBSQ HOSP IP/OBS HIGH 50: CPT | Performed by: INTERNAL MEDICINE

## 2020-05-03 PROCEDURE — 80069 RENAL FUNCTION PANEL: CPT

## 2020-05-03 RX ORDER — 0.9 % SODIUM CHLORIDE 0.9 %
250 INTRAVENOUS SOLUTION INTRAVENOUS ONCE
Status: COMPLETED | OUTPATIENT
Start: 2020-05-03 | End: 2020-05-03

## 2020-05-03 RX ADMIN — Medication 1 TABLET: at 08:25

## 2020-05-03 RX ADMIN — Medication 10 ML: at 21:45

## 2020-05-03 RX ADMIN — SODIUM CHLORIDE 250 ML: 9 INJECTION, SOLUTION INTRAVENOUS at 08:25

## 2020-05-03 RX ADMIN — DONEPEZIL HYDROCHLORIDE 5 MG: 5 TABLET, FILM COATED ORAL at 21:16

## 2020-05-03 RX ADMIN — Medication 10 ML: at 08:25

## 2020-05-03 ASSESSMENT — PAIN SCALES - GENERAL
PAINLEVEL_OUTOF10: 0

## 2020-05-03 NOTE — PROGRESS NOTES
responsive to IVF  Pulmonary:      Effort: Pulmonary effort is normal. No respiratory distress. Breath sounds: Normal breath sounds. No stridor. No wheezing or rhonchi. Abdominal:      General: Abdomen is flat. Bowel sounds are normal. There is no distension. Palpations: Abdomen is soft. Tenderness: There is no abdominal tenderness. Musculoskeletal: Normal range of motion. Right lower leg: No edema. Left lower leg: No edema. Skin:     General: Skin is warm and dry. Neurological:      General: No focal deficit present. Mental Status: She is alert. LABS:    CBC:   Recent Labs     05/01/20 0254 05/02/20 0532 05/03/20  0530   WBC 9.1 7.5 7.6   HGB 14.1 13.5 12.1   HCT 40.6 38.4 34.9*   MCV 93.6 92.1 93.8    186 145                                                                BMP:    Recent Labs     05/01/20 0254 05/01/20  0415 05/02/20  0532 05/03/20  0530   * 133* 132* 132*   K 2.5*  --  3.8 3.5   CL 85*  --  93* 96*   CO2 34*  --  29 27   BUN 25*  --  19 20   CREATININE 0.6  --  0.8 0.7   GLUCOSE 80  --  85 106*        INR:   Recent Labs     05/01/20 0254 05/02/20 0532 05/03/20  0530   INR 1.15* 1.06 0.99       U/A:  Recent Labs     04/30/20  1844 04/30/20  1853   COLORU  --  Yellow   PHUR  --  6.0  6.5   WBCUA 3-5  --    RBCUA None seen  --    BACTERIA 1+*  --    CLARITYU  --  Clear   SPECGRAV  --  1.010   LEUKOCYTESUR  --  TRACE*   UROBILINOGEN  --  1.0   BILIRUBINUR  --  Negative   BLOODU  --  Negative   GLUCOSEU  --  Negative      -----------------------------------------------------------------  RAD:   MRI BRAIN W WO CONTRAST   Final Result      1. 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 CT HEAD WO CONTRAST   Final Result   1. Mild atrophy.    2. Confluent areas of decreased white matter attenuation. The findings are nonspecific, but most likely represent chronic small vessel ischemic change. 3.  No evidence of an acute intracranial process. 4. There is some encephalomalacia identified within the anterior aspects of the bilateral temporal lobes, which is unchanged from the prior study. CT Head WO Contrast   Final Result      1. No acute intracranial process. 2.  Stable severe chronic small vessel ischemic disease. XR CHEST STANDARD (2 VW)   Final Result      No acute pulmonary disease. XR SHOULDER LEFT (MIN 2 VIEWS)   Final Result   Impression:    No acute osseous injury or dislocation. Chronic distal left clavicle fracture, stable compared to 1/14/2020. Assessment/Plan:     Bradycardia  Heart rate in the 30s, with AV block. - EP consulted, digoxin held  - will continue to monitor bradycardia, if continues after 3 days of stopping digoxin, patient may need a pacemaker    Recurrent Falls Likely 2/2 amyloid angiopathy  - Avoid sedating medications, home lasix at this time  - EEG inconclusive for seizure  - Neurology following and posit this could be resultant from CAA inflammation. Per neurology, patient should follow up with Dr. Laura Porter as outpatient as he specializes in CAA and may be able to provide guidance as to whether or not Wang Boards is a diagnosis that should be considered and if so, meningeal biopsy vs. Immunosuppressive treatment would be warranted.  - PT/OT  - palliative consulted    Hypotension 2/2 Bradycardia and/or volume depletion  - 95/46 overnight and sustaining SBP in 90s, asymptomatic  - 250ml bolus PRN  - cont to monitor    Afib   - had supra therapeutic INR. INR 8.4 -> 6.1.  Given 2.5 mg Vit K in ED and 10mg in hospital  - stopping anticoagulation due to increased risk of blood with cerebral amyloid angiopathy  - EP consulted, patient might be candidate for left atrial appendage  - stopping digoxin due to bradycardia, which might be a cause for recurrent falls     Myocardial Ischemia   Based on ECG 5/1/20 that suggested ST abn with possible inferolateral MI  - cardiology following  - do not recommend aggressive cardiovascular evaluation unless patient has sx    PORTILLO:   - resolved  - holding lasix, monitor renal panel daily     Hyponatremia 2/2 volume depletion:   - Na 133, currently around baseline  - monitor Na daily    Leukocytosis reactive and/or infectious (IMPROVING)  - F/u UA w/ reflex   - Daily CBC     HTN:  in ED.   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

## 2020-05-03 NOTE — PROGRESS NOTES
is normal in amplitude and contour without delay or bruit  · Peripheral pulses are symmetrical and full  · There is no clubbing, cyanosis of the extremities. · No peripheral edema  · Femoral Arteries: 2+ and equal  · Pedal Pulses: 2+ and equal   ABDOMEN[de-identified]  · No masses or tenderness  · Liver/Spleen: No Abnormalities Noted  NEUROLOGICAL:  . Moves all extremities to command. Cranial nerves 2-12 are in tact. PSYCHIATRIC: alert and lucid and oriented and appropriate  SKIN: No lesions or rashes  LYMPH NODES: none enlarged      Medications:    [Held by provider] digoxin  125 mcg Oral Daily    donepezil  5 mg Oral Nightly    therapeutic multivitamin-minerals   Oral Daily    [Held by provider] risperiDONE  0.5 mg Oral BID    sodium chloride flush  10 mL Intravenous 2 times per day       sodium chloride flush, acetaminophen **OR** acetaminophen, polyethylene glycol, promethazine **OR** ondansetron    Lab Data:  CBC:   Recent Labs     05/01/20 0254 05/02/20  0532 05/03/20  0530   WBC 9.1 7.5 7.6   HGB 14.1 13.5 12.1   HCT 40.6 38.4 34.9*   MCV 93.6 92.1 93.8    186 145     BMP:   Recent Labs     05/01/20  0254 05/01/20  0415 05/02/20  0532 05/03/20  0530   * 133* 132* 132*   K 2.5*  --  3.8 3.5   CL 85*  --  93* 96*   CO2 34*  --  29 27   PHOS 1.5*  --  1.2* 1.6*   BUN 25*  --  19 20   CREATININE 0.6  --  0.8 0.7     Troponin:Troponin: No results found for: TROPONINI  LIVER PROFILE: No results for input(s): AST, ALT, LIPASE, BILIDIR, BILITOT, ALKPHOS in the last 72 hours. Invalid input(s): AMYLASE,  ALB  PT/INR:   Recent Labs     05/01/20 0254 05/02/20  0532 05/03/20  0530   PROTIME 13.4* 12.3 11.5   INR 1.15* 1.06 0.99     APTT: No results for input(s): APTT in the last 72 hours. BNP:  No results for input(s): BNP in the last 72 hours.   IMAGING: as noted    Assessment:  Patient Active Problem List    Diagnosis Date Noted    Multiple falls 04/29/2020    Cerebral amyloid angiopathy (CODE)    

## 2020-05-03 NOTE — PROGRESS NOTES
Patient alert and oriented x4 with intermittent confusion. Soft BP and bradycardia on tele. One time bolus administered with a resulting increase in BP. Clear breath sounds, active bowel tones. Tolerating diet well, denies n/v. Voiding without complications. Skin intact, no breakdown noted. Fall precautions in place. Bed locked in lowest position with alarm on. Call light placed within reach and patient using appropriately. Camera on for safety. Patient resting with no further needs. Will continue to monitor.

## 2020-05-04 ENCOUNTER — TELEPHONE (OUTPATIENT)
Dept: INTERNAL MEDICINE CLINIC | Age: 80
End: 2020-05-04

## 2020-05-04 LAB
ALBUMIN SERPL-MCNC: 2.8 G/DL (ref 3.4–5)
ANION GAP SERPL CALCULATED.3IONS-SCNC: 11 MMOL/L (ref 3–16)
BASOPHILS ABSOLUTE: 0.1 K/UL (ref 0–0.2)
BASOPHILS RELATIVE PERCENT: 2 %
BUN BLDV-MCNC: 13 MG/DL (ref 7–20)
CALCIUM SERPL-MCNC: 8.9 MG/DL (ref 8.3–10.6)
CHLORIDE BLD-SCNC: 98 MMOL/L (ref 99–110)
CO2: 28 MMOL/L (ref 21–32)
CREAT SERPL-MCNC: 0.6 MG/DL (ref 0.6–1.2)
DIGOXIN LEVEL: 0.8 NG/ML (ref 0.8–2)
EKG ATRIAL RATE: 288 BPM
EKG DIAGNOSIS: NORMAL
EKG Q-T INTERVAL: 554 MS
EKG QRS DURATION: 94 MS
EKG QTC CALCULATION (BAZETT): 445 MS
EKG R AXIS: -68 DEGREES
EKG T AXIS: 258 DEGREES
EKG VENTRICULAR RATE: 39 BPM
EOSINOPHILS ABSOLUTE: 0.1 K/UL (ref 0–0.6)
EOSINOPHILS RELATIVE PERCENT: 2 %
GFR AFRICAN AMERICAN: >60
GFR NON-AFRICAN AMERICAN: >60
GLUCOSE BLD-MCNC: 90 MG/DL (ref 70–99)
HCT VFR BLD CALC: 34.6 % (ref 36–48)
HEMOGLOBIN: 11.9 G/DL (ref 12–16)
INR BLD: 0.95 (ref 0.86–1.14)
LYMPHOCYTES ABSOLUTE: 2.2 K/UL (ref 1–5.1)
LYMPHOCYTES RELATIVE PERCENT: 31 %
MAGNESIUM: 2.1 MG/DL (ref 1.8–2.4)
MCH RBC QN AUTO: 32.4 PG (ref 26–34)
MCHC RBC AUTO-ENTMCNC: 34.5 G/DL (ref 31–36)
MCV RBC AUTO: 93.8 FL (ref 80–100)
METAMYELOCYTES RELATIVE PERCENT: 1 %
MONOCYTES ABSOLUTE: 0.5 K/UL (ref 0–1.3)
MONOCYTES RELATIVE PERCENT: 7 %
NEUTROPHILS ABSOLUTE: 4.1 K/UL (ref 1.7–7.7)
NEUTROPHILS RELATIVE PERCENT: 57 %
OVALOCYTES: ABNORMAL
PDW BLD-RTO: 14.1 % (ref 12.4–15.4)
PHOSPHORUS: 2 MG/DL (ref 2.5–4.9)
PLATELET # BLD: 146 K/UL (ref 135–450)
PMV BLD AUTO: 10.3 FL (ref 5–10.5)
POTASSIUM SERPL-SCNC: 3.6 MMOL/L (ref 3.5–5.1)
PROTHROMBIN TIME: 11 SEC (ref 10–13.2)
RBC # BLD: 3.69 M/UL (ref 4–5.2)
SODIUM BLD-SCNC: 137 MMOL/L (ref 136–145)
WBC # BLD: 7.1 K/UL (ref 4–11)

## 2020-05-04 PROCEDURE — 85610 PROTHROMBIN TIME: CPT

## 2020-05-04 PROCEDURE — 99232 SBSQ HOSP IP/OBS MODERATE 35: CPT | Performed by: INTERNAL MEDICINE

## 2020-05-04 PROCEDURE — 97116 GAIT TRAINING THERAPY: CPT

## 2020-05-04 PROCEDURE — 99222 1ST HOSP IP/OBS MODERATE 55: CPT | Performed by: NURSE PRACTITIONER

## 2020-05-04 PROCEDURE — 80162 ASSAY OF DIGOXIN TOTAL: CPT

## 2020-05-04 PROCEDURE — 97530 THERAPEUTIC ACTIVITIES: CPT

## 2020-05-04 PROCEDURE — 93010 ELECTROCARDIOGRAM REPORT: CPT | Performed by: INTERNAL MEDICINE

## 2020-05-04 PROCEDURE — 1200000000 HC SEMI PRIVATE

## 2020-05-04 PROCEDURE — 85025 COMPLETE CBC W/AUTO DIFF WBC: CPT

## 2020-05-04 PROCEDURE — 2580000003 HC RX 258: Performed by: STUDENT IN AN ORGANIZED HEALTH CARE EDUCATION/TRAINING PROGRAM

## 2020-05-04 PROCEDURE — 6370000000 HC RX 637 (ALT 250 FOR IP): Performed by: INTERNAL MEDICINE

## 2020-05-04 PROCEDURE — 93005 ELECTROCARDIOGRAM TRACING: CPT | Performed by: INTERNAL MEDICINE

## 2020-05-04 PROCEDURE — 99232 SBSQ HOSP IP/OBS MODERATE 35: CPT | Performed by: NURSE PRACTITIONER

## 2020-05-04 PROCEDURE — 36415 COLL VENOUS BLD VENIPUNCTURE: CPT

## 2020-05-04 PROCEDURE — 80069 RENAL FUNCTION PANEL: CPT

## 2020-05-04 PROCEDURE — 99233 SBSQ HOSP IP/OBS HIGH 50: CPT | Performed by: INTERNAL MEDICINE

## 2020-05-04 PROCEDURE — 83735 ASSAY OF MAGNESIUM: CPT

## 2020-05-04 RX ORDER — PROMETHAZINE HYDROCHLORIDE 12.5 MG/1
12.5 TABLET ORAL EVERY 6 HOURS PRN
Status: CANCELLED | OUTPATIENT
Start: 2020-05-04

## 2020-05-04 RX ORDER — M-VIT,TX,IRON,MINS/CALC/FOLIC 27MG-0.4MG
1 TABLET ORAL DAILY
Status: CANCELLED | OUTPATIENT
Start: 2020-05-05

## 2020-05-04 RX ORDER — DONEPEZIL HYDROCHLORIDE 5 MG/1
5 TABLET, FILM COATED ORAL NIGHTLY
Status: CANCELLED | OUTPATIENT
Start: 2020-05-04

## 2020-05-04 RX ORDER — ONDANSETRON 2 MG/ML
4 INJECTION INTRAMUSCULAR; INTRAVENOUS EVERY 6 HOURS PRN
Status: CANCELLED | OUTPATIENT
Start: 2020-05-04

## 2020-05-04 RX ORDER — POLYETHYLENE GLYCOL 3350 17 G/17G
17 POWDER, FOR SOLUTION ORAL DAILY PRN
Status: CANCELLED | OUTPATIENT
Start: 2020-05-04

## 2020-05-04 RX ORDER — 0.9 % SODIUM CHLORIDE 0.9 %
250 INTRAVENOUS SOLUTION INTRAVENOUS ONCE
Status: COMPLETED | OUTPATIENT
Start: 2020-05-04 | End: 2020-05-04

## 2020-05-04 RX ORDER — ACETAMINOPHEN 325 MG/1
650 TABLET ORAL EVERY 4 HOURS PRN
Status: CANCELLED | OUTPATIENT
Start: 2020-05-04

## 2020-05-04 RX ADMIN — Medication 10 ML: at 16:12

## 2020-05-04 RX ADMIN — SODIUM CHLORIDE 250 ML: 9 INJECTION, SOLUTION INTRAVENOUS at 15:43

## 2020-05-04 RX ADMIN — DONEPEZIL HYDROCHLORIDE 5 MG: 5 TABLET, FILM COATED ORAL at 21:17

## 2020-05-04 RX ADMIN — Medication 1 TABLET: at 08:47

## 2020-05-04 NOTE — ACP (ADVANCE CARE PLANNING)
Jasvir Miser Johnie Mcardle) is pt's health care agent and states pt has completed advance directives. We do not have them on file. Discussed code status today and he says pt would not want resuscitation. Changed to DNR-CCA.     Brianna Martines, NP  [address]

## 2020-05-04 NOTE — PROGRESS NOTES
225' in hallways meeting OT for handoff  Comments: patient with short seated rest break between rounds of ambulation  Stairs/Curb  Stairs?: No     Balance  Sitting - Static: Good  Standing - Static: Fair;+(SBA to stand at sink and perform grooming x 10 minutes)  Standing - Dynamic: Fair(CGA to ambulate with FWW)                         OutComes Score                                                     AM-PAC Score  AM-PAC Inpatient Mobility Raw Score : 19 (05/04/20 1458)  AM-PAC Inpatient T-Scale Score : 45.44 (05/04/20 1458)  Mobility Inpatient CMS 0-100% Score: 41.77 (05/04/20 1458)  Mobility Inpatient CMS G-Code Modifier : CK (05/04/20 1458)          Goals  Short term goals  Time Frame for Short term goals: By discharge  Short term goal 1: Pt will transfer with mod I and walker safely. - ongoing 5/4  Short term goal 2: Pt will ambulate with walker x 150 ft with supervision. - ongoing 5/4  Short term goal 3: Pt will perform bed mobility mod I with HOB flat. - ongoing 5/4    Plan    Plan  Times per week: 2-5  Times per day: Daily  Current Treatment Recommendations: Strengthening, Transfer Training, Endurance Training, Neuromuscular Re-education, Balance Training, Functional Mobility Training, Gait Training, Home Exercise Program, Safety Education & Training  Safety Devices  Type of devices: Nurse notified, Call light within reach(left ambulating in hallways with OT)     Therapy Time   Individual Concurrent Group Co-treatment   Time In 1410         Time Out 1436         Minutes 26         Timed Code Treatment Minutes: 26 Minutes     If patient discharges prior to next session this note will serve as a discharge summary. Please see below for the latest assessment towards goals.    Mandi SHAIKH [address]

## 2020-05-04 NOTE — CONSULTS
% injection 10 mL, 10 mL, Intravenous, PRN  acetaminophen (TYLENOL) tablet 650 mg, 650 mg, Oral, Q6H PRN **OR** acetaminophen (TYLENOL) suppository 650 mg, 650 mg, Rectal, Q6H PRN  polyethylene glycol (GLYCOLAX) packet 17 g, 17 g, Oral, Daily PRN  promethazine (PHENERGAN) tablet 12.5 mg, 12.5 mg, Oral, Q6H PRN **OR** ondansetron (ZOFRAN) injection 4 mg, 4 mg, Intravenous, Q6H PRN    Allergies:  Beta adrenergic blockers    Social History:    MARITAL STATUS:    Patient currently lives with family - spouse    Review of Systems - obtained from pt and   General ROS: positive for - fatigue, sleeps often  Psychological ROS: positive for - memory difficulties  ENT ROS: negative  Hematological and Lymphatic ROS: negative  Respiratory ROS: no cough, shortness of breath, or wheezing  Cardiovascular ROS: no chest pain or dyspnea on exertion  Gastrointestinal ROS: no abdominal pain, change in bowel habits, or black or bloody stools  Genito-Urinary ROS: no dysuria, trouble voiding, or hematuria  Musculoskeletal ROS: positive for - muscular weakness  Neurological ROS: positive for - gait disturbance    Objective:        PHYSICAL EXAM:    General appearance: alert, appears stated age and cooperative  Lungs: clear to auscultation bilaterally  Heart: bradycardic, regular rhythm  Abdomen: soft, non-tender; bowel sounds normal; no masses,  no organomegaly  Extremities: extremities normal, atraumatic, no cyanosis or edema  Skin: Skin color, texture, turgor normal. No rashes or lesions  Neurologic: Mental status: alertness: alert, orientation: time, person, place    Palliative Performance Scale:  60% ? Ambulation reduced; Significant disease; Can't do hobbies/housework; intake normal   or reduced; occasional assist; LOC full/confusion  50% ? Mainly sit/lie; Extensive disease; Can't do any work; Considerable assist; intake normal  Or reduced; LOC full/confusion  40% ?  Mainly in bed; Extensive disease; Mainly assist; intake normal or reduced; occasional assist; LOC full/confusion  30% ? Bed Bound; Extensive disease; Total care; intake reduced; LOC full/confusion  20% ? Bed Bound; Extensive disease; Total care; intake minimal; Drowsy/coma  10% ? Bed Bound; Extensive disease; Total care; Mouth care only; Drowsy/coma  0 ? Death    PPS: 50%    Vitals:    BP (!) 92/51   Pulse (!) 38   Temp 97.4 °F (36.3 °C) (Oral)   Resp 16   Ht 5' 6\" (1.676 m)   Wt 133 lb 9.6 oz (60.6 kg)   SpO2 95%   BMI 21.56 kg/m²     DATA:    CBC with Differential:    Lab Results   Component Value Date    WBC 7.1 05/04/2020    RBC 3.69 05/04/2020    HGB 11.9 05/04/2020    HCT 34.6 05/04/2020     05/04/2020    MCV 93.8 05/04/2020    MCH 32.4 05/04/2020    MCHC 34.5 05/04/2020    RDW 14.1 05/04/2020    SEGSPCT 64.0 05/30/2013    BANDSPCT 1 04/29/2020    METASPCT 1 05/04/2020    LYMPHOPCT 31.0 05/04/2020    MONOPCT 7.0 05/04/2020    MYELOPCT 1 05/02/2020    EOSPCT 1.0 08/29/2011    BASOPCT 2.0 05/04/2020    MONOSABS 0.5 05/04/2020    LYMPHSABS 2.2 05/04/2020    EOSABS 0.1 05/04/2020    BASOSABS 0.1 05/04/2020    DIFFTYPE Auto-K 05/30/2013     BMP:    Lab Results   Component Value Date     05/04/2020    K 3.6 05/04/2020    K 2.5 04/29/2020    CL 98 05/04/2020    CO2 28 05/04/2020    BUN 13 05/04/2020    LABALBU 2.8 05/04/2020    CREATININE 0.6 05/04/2020    CALCIUM 8.9 05/04/2020    GFRAA >60 05/04/2020    GFRAA >60 05/30/2013    LABGLOM >60 05/04/2020    GLUCOSE 90 05/04/2020     Albumin:    Lab Results   Component Value Date    LABALBU 2.8 05/04/2020         Conclusion/Time spent:         Recommendations see above    Pt [id number], Family [id number]  Time spent with patient and/or family: 36  Time reviewing records: 10  Time communicating with staff: 10    A total of 60 minutes spent with the patient and family on unit greater than 50% in counseling regarding palliative care and in goals of care for the patient.     Thank you to Dr. Valeriano Alvarado for this

## 2020-05-04 NOTE — PROGRESS NOTES
no AV mellisa agents  - On no OAC d/t risk of ICH with CAA  - Ambulate in hallway and record HR  - GXT to assess for chronotropic incompetence  - Outpatient monitor       Latrell Quijaon [address]

## 2020-05-04 NOTE — PLAN OF CARE
Problem: Falls - Risk of:  Goal: Will remain free from falls  Description: Will remain free from falls  Outcome: Ongoing  Note: Patient will remain free from falls. Patient alert and orientated and uses call light appropriately. Patient up x1 with walker and gait belt, tolerates ambulation well. Fall precautions in place. Bed locked and in lowest position, bed alarm on, nonskid socks on. Call light within reach. Problem: Skin Integrity:  Goal: Skin integrity will stabilize  Description: Skin integrity will stabilize  Outcome: Ongoing  Note: Skin integrity remains intact. Patient able to turn self in bed and is frequently up to use the bathroom. Will continue to assess and monitor.

## 2020-05-04 NOTE — PROGRESS NOTES
Occupational Therapy  Facility/Department: [facility] 5T ORTHO/NEURO  Daily Treatment Note  NAME: Naina Niño  : 1940  MRN: [id number]    Date of Service: 2020    Discharge Recommendations: Naina Niño scored a 20/24 on the AM-PAC ADL Inpatient form. Current research shows that an AM-PAC score of 18 or greater is typically associated with a discharge to the patient's home setting. Based on the patients AM-PAC score and their current ADL deficits, it is recommended that the patient have 2-3 sessions per week of Occupational Therapy at d/c to increase the patients independence. If patient discharges prior to next session this note will serve as a discharge summary. Please see below for the latest assessment towards goals. Assessment   Assessment: Patient demonstrating the ability to complete most ADLs/mobility with supervision due to ongoing deficits in cognition. OT recommending 24hr supervision upon discharge due to cognition and recurring falls. Treatment Diagnosis: Impaired ADLs, cognition, mobility, safety awareness  Prognosis: Good  OT Education: OT Role;ADL Adaptive Strategies; Plan of Care;Transfer Training  Patient Education: Role of OT, calling for needs - pt verb understanding, will need reinforcement  REQUIRES OT FOLLOW UP: Yes  Activity Tolerance  Activity Tolerance: Patient Tolerated treatment well;Treatment limited secondary to decreased cognition  Safety Devices  Safety Devices in place: Yes  Type of devices: Left in chair;Chair alarm in place;Nurse notified;Call light within reach; All fall risk precautions in place         Patient Diagnosis(es): The primary encounter diagnosis was Multiple falls. Diagnoses of Cerebral amyloid angiopathy (CODE), Hypokalemia, and Supratherapeutic INR were also pertinent to this visit.       has a past medical history of Adenomatous polyp of colon, Asymptomatic varicose veins, Atrial fibrillation ([address] Dysthymic disorder, 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), and Screening mammogram.   has a past surgical history that includes Colonoscopy (12/12/2008) and hernia repair. Restrictions  Position Activity Restriction  Other position/activity restrictions: up with assist  Subjective   General  Chart Reviewed: Yes  Patient assessed for rehabilitation services?: Yes  Additional Pertinent Hx: Pt presented 4/29 s/p multiple falls at home. Admitted for Recurrent Falls 2/2 medications vs amyloid angiopathy, supratherapeutic INR, hypokalemia. CT head (-); XR L shoulder: Chronic distal left clavicle fracture, stable. MRI brain appeared stable compared to 3/15/20. PMHx: afib, cerebral amyloid angiopathy, neurocognitive disorder   Family / Caregiver Present: No  Referring Practitioner: Chuck Aaron MD  Diagnosis: recurrent falls  Subjective  Subjective: Patient up ambulating in hallway with PT. Pt agreeable to OT. General Comment  Comments: RN juan david for therapy       Orientation  Orientation  Overall Orientation Status: Impaired  Orientation Level: Disoriented to place; Disoriented to situation;Oriented to time;Oriented to person  Objective    ADL  Grooming: Supervision(hand hygiene)  LE Dressing: Supervision(brief management)  Additional Comments: declined the need for additional ADLs        Balance  Sitting Balance: Independent  Standing Balance: Supervision  Standing Balance  Time: 5-7  Activity: ambualtion in hallway; ADLs  Functional Mobility  Functional - Mobility Device: Rolling Walker  Activity: To/from bathroom  Assist Level: Supervision  Functional Mobility Comments:  With RW     Transfers  Sit to stand: Supervision  Stand to sit: Supervision         Cognition  Overall Cognitive Status: Exceptions  Attention Span: Difficulty dividing attention  Safety Judgement: Decreased awareness of need for safety  Initiation: Requires cues for some  Sequencing: Requires cues for some         Plan   Plan  Times per week: 2-5x  Times per day: Daily           AM-PAC

## 2020-05-04 NOTE — PROGRESS NOTES
Neurology Progress Note    Updates:  Stable neurologically    Exam:  -Mental status: Alert and oriented to person, place, month, and year pleasant & appropriate  -Speech & Language: no aphasia; no dysarthria  -Cranial nerves: pupils symmetric; no notable dysconjugate gaze; eyes midline; no facial asymmetry  -Motor: moving all extremities symmetrically and fully  -Other: no adventitious movements noted  Other Systems  -General Appearance: well-developed, well-nourished, no apparent distress  -Neck: supple  -Lungs: breathing unlabored, regular, no audible wheezes  -CV: pulses strong x 4 extremities  -Abd: flat    Labs:  NH3 31 umol/L  ApoE pending    Studies:  EEG: generalized slowing; triphasic waves     MRI brain independently reviewed and stable from March 2020    Impression:  Karlee Hoff is a 68 y.o. female with probable CAA and atrial fibrillation who presented with falls and spell of unresponsiveness concerning for seizure vs. other spell. Coumadin d/c'd by primary team given high risk for ICH. EEG with triphasic waves which in the absence of hepatic or renal abnormalities indicates potential for recent hypoxic event and arrhythmia should be considered. Episodic bradycardia overnight- cardiology following. No epis on EEG but seizure not excluded (and she is has some risk for this given cortical dementia). Stable neurologically over the weekend. Recommendations:   - F/u on ApoE testing   - Given baseline cognitive exam feel empiric steroids would be too risky in comparison with potential benefit. She should follow up with Dr. Mukund Palomo as outpatient as he specializes in CAA and may be able to provide guidance as to whether or not Ijeoma Patron is a diagnosis that should be considered and if so, meningeal biopsy vs. immunosuppressive treatment would be warranted. Will sign off. No opposition to discharge when medically ready.  Please call with questions, concerns, or change in exam    A copy of this

## 2020-05-04 NOTE — CONSULTS
Inability: Never true    Transportation needs     Medical: No     Non-medical: No   Tobacco Use    Smoking status: Never Smoker    Smokeless tobacco: Never Used   Substance and Sexual Activity    Alcohol use: No    Drug use: Never    Sexual activity: None   Lifestyle    Physical activity     Days per week: None     Minutes per session: None    Stress: None   Relationships    Social connections     Talks on phone: None     Gets together: None     Attends [employer] service: None     Active member of club or organization: None     Attends meetings of clubs or organizations: None     Relationship status: None    Intimate partner violence     Fear of current or ex partner: None     Emotionally abused: None     Physically abused: None     Forced sexual activity: None   Other Topics Concern    None   Social History Narrative    None           REVIEW OF SYSTEMS:   CONSTITUTIONAL: negative for fevers, chills, diaphoresis, appetite change, night sweats, unexpected weight change, fatigue  EYES: negative for blurred vision, eye discharge, visual disturbance and icterus  HEENT: negative for hearing loss, tinnitus, ear drainage, sinus pressure, nasal congestion, epistaxis and snoring  RESPIRATORY: Negative for hemoptysis, cough, sputum production  CARDIOVASCULAR: negative for chest pain, palpitations, exertional chest pressure/discomfort, syncope, edema  GASTROINTESTINAL: negative for nausea, vomiting, diarrhea, blood in stool, abdominal pain, constipation  GENITOURINARY: negative for frequency, dysuria, urinary incontinence, decreased urine volume, and hematuria  HEMATOLOGIC/LYMPHATIC: negative for easy bruising, bleeding and lymphadenopathy  ALLERGIC/IMMUNOLOGIC: negative for recurrent infections, angioedema, anaphylaxis and drug reactions  ENDOCRINE: negative for weight changes and diabetic symptoms including polyuria, polydipsia and polyphagia  MUSCULOSKELETAL: negative for pain, joint swelling, decreased range of 11.5 05/03/2020    PROTIME 12.3 05/02/2020     Lab Results   Component Value Date    CREATININE 0.6 05/04/2020    BUN 13 05/04/2020     05/04/2020    K 3.6 05/04/2020    CL 98 (L) 05/04/2020    CO2 28 05/04/2020     Lab Results   Component Value Date    ALT 19 04/07/2020    AST 28 04/07/2020    GGT 19 05/29/2012    ALKPHOS 107 04/07/2020    BILITOT 0.5 04/07/2020     MRI BRAIN W WO CONTRAST   Final Result      1. 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 CT HEAD WO CONTRAST   Final Result   1. Mild atrophy. 2. Confluent areas of decreased white matter attenuation. The findings are nonspecific, but most likely represent chronic small vessel ischemic change. 3.  No evidence of an acute intracranial process. 4. There is some encephalomalacia identified within the anterior aspects of the bilateral temporal lobes, which is unchanged from the prior study. CT Head WO Contrast   Final Result      1. No acute intracranial process. 2.  Stable severe chronic small vessel ischemic disease. XR CHEST STANDARD (2 VW)   Final Result      No acute pulmonary disease. XR SHOULDER LEFT (MIN 2 VIEWS)   Final Result   Impression:    No acute osseous injury or dislocation. Chronic distal left clavicle fracture, stable compared to 1/14/2020. Assessment:    Recurrent Falls Likely 2/2 amyloid angiopathy  - Neurology following and posit this could be resultant from CAA inflammation.  Per neurology, patient should follow up with Dr. Nohelia Hannah as outpatient as he specializes in CAA and may be able to provide guidance as to whether or not Carol Zamoraura is a diagnosis that should be considered and if so, meningeal biopsy vs. Immunosuppressive treatment would be warranted.  - PT/OT in ARU setting    Bradycardia  Heart rate in the 30s, with AV block. 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 Demonstrates ability to tolerate 3 hours therapy/day. She is a good candidate for acute inpatient rehab when medically appropriate. Thank you for this consult. Please contact me with any questions or concerns.      Mabel Graham D.O. M.P.H  PM&R  5/4/2020  12:31 PM

## 2020-05-04 NOTE — PROGRESS NOTES
Resident Progress Note  PGY-1    Admit Date: 2020    PCP: Martha Corbett MD                  : 1940  MRN: [id number]    Subjective: Interval History: Patient seen at bedside this morning. Resting comfortably without complaint. Remains somewhat confused. EP evaluating regarding need for pacemaker and recommend Graded Exercise Test (GXT) for further evaluation. More pressing is her disposition, as she has high functional status, but likely cannot return home as  cannot provide adequate care. Pt is confused and suspicious of , thinking he is someone else. She will likely need Assisted Living, or more appropriately a dementia unit for continued care. ARU is an option, but less likely to be approved. Palliative Care saw the patient today and CODE STATUS was changed to [facility]. The patient denies fevers, chills, chest pain, shortness of breath, nausea, vomiting, diarrhea, or constipation. Diet: DIET GENERAL;    Data:     Scheduled Meds:   [Held by provider] digoxin  125 mcg Oral Daily    donepezil  5 mg Oral Nightly    therapeutic multivitamin-minerals   Oral Daily    [Held by provider] risperiDONE  0.5 mg Oral BID    sodium chloride flush  10 mL Intravenous 2 times per day     Continuous Infusions:  PRN Meds:sodium chloride flush, acetaminophen **OR** acetaminophen, polyethylene glycol, promethazine **OR** ondansetron  I/O last 3 completed shifts: In: 5204 [P.O.:1040]  Out: 2150 [Urine:2150]  No intake/output data recorded. Intake/Output Summary (Last 24 hours) at 2020 1343  Last data filed at 5/3/2020 2130  Gross per 24 hour   Intake 800 ml   Output 1250 ml   Net -450 ml       Objective:     Vitals: /70   Pulse 54   Temp 97.8 °F (36.6 °C) (Oral)   Resp 16   Ht 5' 6\" (1.676 m)   Wt 133 lb 9.6 oz (60.6 kg)   SpO2 93%   BMI 21.56 kg/m²     Physical Exam  Constitutional:       Appearance: Normal appearance. HENT:      Head: Normocephalic.       Nose: Nose

## 2020-05-05 ENCOUNTER — APPOINTMENT (OUTPATIENT)
Dept: CARDIAC CATH/INVASIVE PROCEDURES | Age: 80
DRG: 228 | End: 2020-05-05
Payer: MEDICARE

## 2020-05-05 ENCOUNTER — TELEPHONE (OUTPATIENT)
Dept: INTERNAL MEDICINE CLINIC | Age: 80
End: 2020-05-05

## 2020-05-05 PROBLEM — Z95.0 PACEMAKER: Status: ACTIVE | Noted: 2020-05-05

## 2020-05-05 LAB
ALBUMIN SERPL-MCNC: 3 G/DL (ref 3.4–5)
ANION GAP SERPL CALCULATED.3IONS-SCNC: 9 MMOL/L (ref 3–16)
BASOPHILS ABSOLUTE: 0 K/UL (ref 0–0.2)
BASOPHILS RELATIVE PERCENT: 0 %
BUN BLDV-MCNC: 14 MG/DL (ref 7–20)
CALCIUM SERPL-MCNC: 8.8 MG/DL (ref 8.3–10.6)
CHLORIDE BLD-SCNC: 99 MMOL/L (ref 99–110)
CO2: 27 MMOL/L (ref 21–32)
CREAT SERPL-MCNC: 0.7 MG/DL (ref 0.6–1.2)
EOSINOPHILS ABSOLUTE: 0.1 K/UL (ref 0–0.6)
EOSINOPHILS RELATIVE PERCENT: 2 %
GFR AFRICAN AMERICAN: >60
GFR NON-AFRICAN AMERICAN: >60
GLUCOSE BLD-MCNC: 93 MG/DL (ref 70–99)
HCT VFR BLD CALC: 34.1 % (ref 36–48)
HEMOGLOBIN: 12 G/DL (ref 12–16)
INR BLD: 0.96 (ref 0.86–1.14)
LYMPHOCYTES ABSOLUTE: 1.6 K/UL (ref 1–5.1)
LYMPHOCYTES RELATIVE PERCENT: 24 %
MAGNESIUM: 2 MG/DL (ref 1.8–2.4)
MCH RBC QN AUTO: 32.9 PG (ref 26–34)
MCHC RBC AUTO-ENTMCNC: 35.2 G/DL (ref 31–36)
MCV RBC AUTO: 93.3 FL (ref 80–100)
METAMYELOCYTES RELATIVE PERCENT: 1 %
MONOCYTES ABSOLUTE: 0.7 K/UL (ref 0–1.3)
MONOCYTES RELATIVE PERCENT: 10 %
MYELOCYTE PERCENT: 1 %
NEUTROPHILS ABSOLUTE: 4.2 K/UL (ref 1.7–7.7)
NEUTROPHILS RELATIVE PERCENT: 62 %
PDW BLD-RTO: 14.3 % (ref 12.4–15.4)
PHOSPHORUS: 2.2 MG/DL (ref 2.5–4.9)
PLATELET # BLD: 144 K/UL (ref 135–450)
PMV BLD AUTO: 10.2 FL (ref 5–10.5)
POTASSIUM SERPL-SCNC: 3.7 MMOL/L (ref 3.5–5.1)
PROTHROMBIN TIME: 11.1 SEC (ref 10–13.2)
RBC # BLD: 3.66 M/UL (ref 4–5.2)
SODIUM BLD-SCNC: 135 MMOL/L (ref 136–145)
WBC # BLD: 6.5 K/UL (ref 4–11)

## 2020-05-05 PROCEDURE — 85610 PROTHROMBIN TIME: CPT

## 2020-05-05 PROCEDURE — 36415 COLL VENOUS BLD VENIPUNCTURE: CPT

## 2020-05-05 PROCEDURE — 83735 ASSAY OF MAGNESIUM: CPT

## 2020-05-05 PROCEDURE — 1200000000 HC SEMI PRIVATE

## 2020-05-05 PROCEDURE — 99232 SBSQ HOSP IP/OBS MODERATE 35: CPT | Performed by: NURSE PRACTITIONER

## 2020-05-05 PROCEDURE — 97110 THERAPEUTIC EXERCISES: CPT

## 2020-05-05 PROCEDURE — 2580000003 HC RX 258: Performed by: STUDENT IN AN ORGANIZED HEALTH CARE EDUCATION/TRAINING PROGRAM

## 2020-05-05 PROCEDURE — 97116 GAIT TRAINING THERAPY: CPT

## 2020-05-05 PROCEDURE — 6370000000 HC RX 637 (ALT 250 FOR IP): Performed by: INTERNAL MEDICINE

## 2020-05-05 PROCEDURE — 80069 RENAL FUNCTION PANEL: CPT

## 2020-05-05 PROCEDURE — 85025 COMPLETE CBC W/AUTO DIFF WBC: CPT

## 2020-05-05 PROCEDURE — 99232 SBSQ HOSP IP/OBS MODERATE 35: CPT | Performed by: INTERNAL MEDICINE

## 2020-05-05 RX ADMIN — Medication 10 ML: at 20:48

## 2020-05-05 RX ADMIN — Medication 1 TABLET: at 08:19

## 2020-05-05 RX ADMIN — DONEPEZIL HYDROCHLORIDE 5 MG: 5 TABLET, FILM COATED ORAL at 20:47

## 2020-05-05 RX ADMIN — Medication 10 ML: at 08:19

## 2020-05-05 NOTE — PLAN OF CARE
Problem: Falls - Risk of:  Goal: Will remain free from falls  Description: Will remain free from falls  5/4/2020 2341 by Deanna Keating RN  Outcome: Ongoing  Note: Patient will remain free from falls. Patient alert and orientated and uses call light appropriately. Patient up x1 with walker and gait belt, tolerates ambulation well. Fall precautions in place. Bed locked and in lowest position, bed alarm on, nonskid socks on. Call light within reach. Problem: Daily Care:  Goal: Daily care needs are met  Description: Daily care needs are met  5/4/2020 2341 by Deanna Keating RN  Outcome: Ongoing  Note: Patients daily needs were met. Patient currently resting comfortably in bed and denies any needs at this time. Will continue to assess and monitor.

## 2020-05-05 NOTE — PROGRESS NOTES
NUTRITION ASSESSMENT  Admission Date: 4/29/2020     Type and Reason for Visit: Reassess    NUTRITION RECOMMENDATIONS:   · Monitor ability to advance diet s/p procedure   · Order Ensure Enlive BID and monitor acceptance     NUTRITION ASSESSMENT:  Pt is at nutritional compromise r/t wt loss upon admission and reported hx of poor PO intake. Pt improving from nutritional standpoint, was tolerating general diet w/ meals mostly % of meals when documented however currently NPO. Spoke w/ RN who reports pt did consume 100% of breakfast tray today then made NPO for possible procedure. RN reports pt is pleasantly confused. Monitor ability to advance diet to General after procedure and add Ensure Enlive BID trial w/ diet adv r/t wt loss PTA. MALNUTRITION ASSESSMENT  Context: Acute illness or injury   Malnutrition Status:  At risk for malnutrition  Findings of the 6 clinical characteristics of malnutrition (Minimum of 2 out of 6 clinical characteristics is required to make the diagnosis of moderate or severe Protein Calorie Malnutrition based on AND/ASPEN Guidelines):  Energy Intake %: Greater than 75% of estimated energy requirement  Interpretation of Weight Loss %: 10% loss or greater  Interpretation of Weight Loss Time: (x4m)  Body Fat Status: Unable to assess  Muscle Mass Status: Unable to assess  Fluid Accumulation Status: No significant fluid accumulation  Reduced  Strength: Not measured    NUTRITION DIAGNOSIS   Problem: Inadequate Oral Intake  Etiology: Planned procedure  Signs & Symptoms: NPO status due to medical condition    NUTRITION INTERVENTION   Food and/or Nutrient Delivery:Start Oral Diet  or Start ONS   Once clinically appropriate s/p procedure   Nutrition education/counseling/coordination of care: Continue Inpatient Monitoring     NUTRITION MONITORING & EVALUATION:  Evaluation:Modified current goal   Goals: Patient will tolerate diet and consume > 75% of meals and ONS offered   Monitoring: Mental

## 2020-05-05 NOTE — TELEPHONE ENCOUNTER
Mr. Ami Meneses called just to let you know that patient surgery, pacemaker, has been postponed until this afternoon. Apparently, they brought her breakfast this morning and she did eat it, so it had to be postponed. No call back needed.      Thanks

## 2020-05-05 NOTE — PLAN OF CARE
Problem: Nutrition  Goal: Optimal nutrition therapy  Outcome: Ongoing   Nutrition Problem: Inadequate Oral Intake   Intervention: Start Oral Diet  or Start ONS  once clinically appropriate s/p procedure    Nutrition Goals: Patient will tolerate diet and consume > 75% of meals and ONS offered

## 2020-05-05 NOTE — PROGRESS NOTES
T-Scale Score : 45.44 (05/05/20 1145)  Mobility Inpatient CMS 0-100% Score: 41.77 (05/05/20 1145)  Mobility Inpatient CMS G-Code Modifier : CK (05/05/20 1145)          Goals  Short term goals  Time Frame for Short term goals: By discharge  Short term goal 1: Pt will transfer with mod I and walker safely. - ongoing 5/5  Short term goal 2: Pt will ambulate with walker x 150 ft with supervision. - ongoing 5/5  Short term goal 3: Pt will perform bed mobility mod I with HOB flat. - ongoing 5/5    Plan    Plan  Times per week: 2-5  Times per day: Daily  Current Treatment Recommendations: Strengthening, Transfer Training, Endurance Training, Neuromuscular Re-education, Balance Training, Functional Mobility Training, Gait Training, Home Exercise Program, Safety Education & Training  Safety Devices  Type of devices: Nurse notified, Call light within reach, Chair alarm in place, Left in chair, Gait belt     Therapy Time   Individual Concurrent Group Co-treatment   Time In 1132         Time Out 1212         Minutes 40                 Timed Code Treatment Minutes:  40    Total Treatment Minutes:  40    If patient is discharged prior to next treatment, this note will serve as the discharge summary.   Mariana Noss, PT, DPT  [id number]

## 2020-05-05 NOTE — PROGRESS NOTES
Resident Progress Note  PGY-1    Admit Date: 2020    PCP: Tavo Coronado MD                  : 1940  MRN: [id number]    Subjective: Interval History: Pt seen and examined this morning. Resting comfortably with no new complaints. She mistakenly had breakfast this morning, which has delayed her leadless pacemaker placement until tmro morning. NPO after midnight verified. She has been approved for ARU on discharge, likely tmro afternoon. The patient denies fevers, chills, chest pain, shortness of breath, nausea, vomiting, diarrhea, or constipation. Diet: Diet NPO, After Midnight  DIET GENERAL;    Data:     Scheduled Meds:   [START ON 2020] ceFAZolin  1 g Intravenous On Call to OR    donepezil  5 mg Oral Nightly    therapeutic multivitamin-minerals   Oral Daily    [Held by provider] risperiDONE  0.5 mg Oral BID    sodium chloride flush  10 mL Intravenous 2 times per day     Continuous Infusions:  PRN Meds:sodium chloride flush, acetaminophen **OR** acetaminophen, polyethylene glycol, promethazine **OR** ondansetron  I/O last 3 completed shifts: In: 120 [P.O.:120]  Out: -   I/O this shift:  In: 120 [P.O.:120]  Out: -     Intake/Output Summary (Last 24 hours) at 2020 1325  Last data filed at 2020 0815  Gross per 24 hour   Intake 240 ml   Output --   Net 240 ml       Objective:     Vitals: BP (!) 96/44   Pulse (!) 46   Temp 98.2 °F (36.8 °C) (Oral)   Resp 16   Ht 5' 6\" (1.676 m)   Wt 133 lb 9.6 oz (60.6 kg)   SpO2 96%   BMI 21.56 kg/m²     Physical Exam  Constitutional:       Appearance: Normal appearance. HENT:      Head: Normocephalic. Nose: Nose normal.      Mouth/Throat:      Mouth: Mucous membranes are moist.   Eyes:      Pupils: Pupils are equal, round, and reactive to light. Cardiovascular:      Rate and Rhythm: Regular rhythm. Bradycardia present. Pulses: Normal pulses. Heart sounds: No murmur.    Pulmonary:      Effort: Pulmonary effort is normal. No respiratory distress. Breath sounds: Normal breath sounds. No stridor. No wheezing or rhonchi. Abdominal:      General: Abdomen is flat. Bowel sounds are normal. There is no distension. Palpations: Abdomen is soft. Tenderness: There is no abdominal tenderness. Musculoskeletal: Normal range of motion. Right lower leg: No edema. Left lower leg: No edema. Skin:     General: Skin is warm and dry. Neurological:      General: No focal deficit present. Mental Status: She is alert. LABS:    CBC:   Recent Labs     05/03/20 0530 05/04/20 0439 05/05/20 0459   WBC 7.6 7.1 6.5   HGB 12.1 11.9* 12.0   HCT 34.9* 34.6* 34.1*   MCV 93.8 93.8 93.3    146 144                                                                BMP:    Recent Labs     05/03/20 0530 05/04/20 0439 05/05/20 0459   * 137 135*   K 3.5 3.6 3.7   CL 96* 98* 99   CO2 27 28 27   BUN 20 13 14   CREATININE 0.7 0.6 0.7   GLUCOSE 106* 90 93        INR:   Recent Labs     05/03/20 0530 05/04/20 0440 05/05/20 0459   INR 0.99 0.95 0.96       U/A:  No results for input(s): NITRITE, COLORU, PHUR, LABCAST, WBCUA, RBCUA, MUCUS, TRICHOMONAS, YEAST, BACTERIA, CLARITYU, SPECGRAV, LEUKOCYTESUR, UROBILINOGEN, BILIRUBINUR, BLOODU, GLUCOSEU, AMORPHOUS in the last 72 hours. Invalid input(s): Rocael Caldwell   -----------------------------------------------------------------  RAD:   MRI BRAIN W WO CONTRAST   Final Result      1. 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 CT HEAD WO CONTRAST   Final Result   1. Mild atrophy. 2. Confluent areas of decreased white matter attenuation. The findings are nonspecific, but most likely represent chronic small vessel ischemic change. 3.  No evidence of an acute intracranial process.     4. There is some encephalomalacia identified within the anterior aspects of the bilateral temporal lobes, which is unchanged from the prior study. CT Head WO Contrast   Final Result      1. No acute intracranial process. 2.  Stable severe chronic small vessel ischemic disease. XR CHEST STANDARD (2 VW)   Final Result      No acute pulmonary disease. XR SHOULDER LEFT (MIN 2 VIEWS)   Final Result   Impression:    No acute osseous injury or dislocation. Chronic distal left clavicle fracture, stable compared to 1/14/2020. Assessment/Plan:     Bradycardia  Heart rate in the 30s, with AV block. - EP consulted, digoxin held, dig level 0.8  - will continue to monitor bradycardia, if continues after 3 days of stopping digoxin, patient will need a pacemaker  - NPO after midnight for pacer placement in AM    Recurrent Falls Likely 2/2 amyloid angiopathy  - Avoid sedating medications, home lasix at this time  - EEG inconclusive for seizure  - Neurology following and posit this could be resultant from CAA inflammation.  Per neurology, patient should follow up with Dr. Brigid Lema as outpatient as he specializes in CAA and may be able to provide guidance as to whether or not Ynaez China is a diagnosis that should be considered and if so, meningeal biopsy vs. Immunosuppressive treatment would be warranted.  - PT/OT  - palliative consulted    Acute Metabolic Encephalopathy 2/2 amyloid angiopathy and/or  bradycardia   - as noted on presentation   - continues to refuse certain treatments and remains confused at intervals  - this issue complicates her discharge as she will be unable to return to home, she does not recognize her  sometimes    Hypotension 2/2 Bradycardia and/or volume depletion  - cont to monitor  - responsive to fluid boluses    Afib   - stopping digoxin due to bradycardia, which might be a cause for recurrent falls  - EP consulted regarding possible intervention  - GXT per EP     Myocardial

## 2020-05-05 NOTE — PLAN OF CARE
Problem: Falls - Risk of:  Goal: Will remain free from falls  Description: Will remain free from falls  Outcome: Met This Shift  Goal: Absence of physical injury  Description: Absence of physical injury  Outcome: Met This Shift     Problem: Safety:  Goal: Free from accidental physical injury  Description: Free from accidental physical injury  Outcome: Met This Shift  Goal: Free from intentional harm  Description: Free from intentional harm  Outcome: Met This Shift     Problem: Daily Care:  Goal: Daily care needs are met  Description: Daily care needs are met  Outcome: Met This Shift     Problem: Skin Integrity:  Goal: Skin integrity will stabilize  Description: Skin integrity will stabilize  Outcome: Met This Shift     Problem: Nutrition  Goal: Optimal nutrition therapy  5/5/2020 1819 by Carlie Angelucci, RN  Outcome: Met This Shift  5/5/2020 0942 by Yonas Aguirre RD, LD  Outcome: Ongoing

## 2020-05-05 NOTE — PROGRESS NOTES
Electrophysiology - PROGRESS NOTE    Admit Date: 4/29/2020     Chief Complaint: AF     Interval History:   Patient seen and examined and notes reviewed. Patient is being followed for persistent AF and bradycardia, patient with multiple falls at home which she denies. Appears to be oriented to person and place, redirects questions if she does not have the answer. Patient has been in AF with HRs as low as the 30's overnight, CHB seen on EKG. Patient states she has no s/s of dizziness or lightheadedness, off warfarin d/t high risk for ICH with CAA and frequent falls.     In: 240 [P.O.:240]  Out: -    Wt Readings from Last 2 Encounters:   05/01/20 133 lb 9.6 oz (60.6 kg)   04/01/20 138 lb 0.1 oz (62.6 kg)       Data:   Scheduled Meds:   Scheduled Meds:   [START ON 5/6/2020] ceFAZolin  1 g Intravenous On Call to OR    donepezil  5 mg Oral Nightly    therapeutic multivitamin-minerals   Oral Daily    [Held by provider] risperiDONE  0.5 mg Oral BID    sodium chloride flush  10 mL Intravenous 2 times per day     Continuous Infusions:  PRN Meds:.sodium chloride flush, acetaminophen **OR** acetaminophen, polyethylene glycol, promethazine **OR** ondansetron  Continuous Infusions:    Intake/Output Summary (Last 24 hours) at 5/5/2020 1218  Last data filed at 5/5/2020 0815  Gross per 24 hour   Intake 240 ml   Output --   Net 240 ml       CBC:   Lab Results   Component Value Date    WBC 6.5 05/05/2020    HGB 12.0 05/05/2020     05/05/2020     BMP:  Lab Results   Component Value Date     05/05/2020    K 3.7 05/05/2020    K 2.5 04/29/2020    CL 99 05/05/2020    CO2 27 05/05/2020    BUN 14 05/05/2020    CREATININE 0.7 05/05/2020    GLUCOSE 93 05/05/2020     INR:   Lab Results   Component Value Date    INR 0.96 05/05/2020    INR 0.95 05/04/2020    INR 0.99 05/03/2020        CARDIAC LABS  ENZYMES:No results for input(s): CKMB, CKMBINDEX, TROPONINI in the last 72 55%, LAE, 4.3, vol 103  - Avoid AV mellisa agents  - On no OAC d/t risk of ICH with CAA  - HR with ambulation - 70 bpm max with evidence of chronotropic incompetence      Yakelin Nelson [address]

## 2020-05-06 ENCOUNTER — APPOINTMENT (OUTPATIENT)
Dept: CARDIAC CATH/INVASIVE PROCEDURES | Age: 80
DRG: 228 | End: 2020-05-06
Payer: MEDICARE

## 2020-05-06 ENCOUNTER — PROCEDURE VISIT (OUTPATIENT)
Dept: CARDIOLOGY CLINIC | Age: 80
End: 2020-05-06

## 2020-05-06 ENCOUNTER — NURSE ONLY (OUTPATIENT)
Dept: CARDIOLOGY CLINIC | Age: 80
End: 2020-05-06

## 2020-05-06 LAB
ALBUMIN SERPL-MCNC: 2.9 G/DL (ref 3.4–5)
ANION GAP SERPL CALCULATED.3IONS-SCNC: 12 MMOL/L (ref 3–16)
BASOPHILS ABSOLUTE: 0 K/UL (ref 0–0.2)
BASOPHILS RELATIVE PERCENT: 0 %
BLOOD CULTURE, ROUTINE: NORMAL
BUN BLDV-MCNC: 13 MG/DL (ref 7–20)
CALCIUM SERPL-MCNC: 8.9 MG/DL (ref 8.3–10.6)
CHLORIDE BLD-SCNC: 101 MMOL/L (ref 99–110)
CO2: 26 MMOL/L (ref 21–32)
CREAT SERPL-MCNC: 0.6 MG/DL (ref 0.6–1.2)
CULTURE, BLOOD 2: NORMAL
EKG ATRIAL RATE: 80 BPM
EKG DIAGNOSIS: NORMAL
EKG Q-T INTERVAL: 484 MS
EKG QRS DURATION: 166 MS
EKG QTC CALCULATION (BAZETT): 484 MS
EKG R AXIS: 268 DEGREES
EKG T AXIS: 70 DEGREES
EKG VENTRICULAR RATE: 60 BPM
EOSINOPHILS ABSOLUTE: 0.1 K/UL (ref 0–0.6)
EOSINOPHILS RELATIVE PERCENT: 2 %
GFR AFRICAN AMERICAN: >60
GFR NON-AFRICAN AMERICAN: >60
GLUCOSE BLD-MCNC: 92 MG/DL (ref 70–99)
HCT VFR BLD CALC: 33.1 % (ref 36–48)
HEMOGLOBIN: 11.3 G/DL (ref 12–16)
INR BLD: 1 (ref 0.86–1.14)
LYMPHOCYTES ABSOLUTE: 1.9 K/UL (ref 1–5.1)
LYMPHOCYTES RELATIVE PERCENT: 30 %
MAGNESIUM: 2.1 MG/DL (ref 1.8–2.4)
MCH RBC QN AUTO: 32.5 PG (ref 26–34)
MCHC RBC AUTO-ENTMCNC: 34.2 G/DL (ref 31–36)
MCV RBC AUTO: 95.2 FL (ref 80–100)
MONOCYTES ABSOLUTE: 0.3 K/UL (ref 0–1.3)
MONOCYTES RELATIVE PERCENT: 5 %
NEUTROPHILS ABSOLUTE: 4 K/UL (ref 1.7–7.7)
NEUTROPHILS RELATIVE PERCENT: 63 %
PDW BLD-RTO: 14.4 % (ref 12.4–15.4)
PHOSPHORUS: 2.5 MG/DL (ref 2.5–4.9)
PLATELET # BLD: 146 K/UL (ref 135–450)
PMV BLD AUTO: 10.2 FL (ref 5–10.5)
POTASSIUM SERPL-SCNC: 3.5 MMOL/L (ref 3.5–5.1)
PROTHROMBIN TIME: 11.6 SEC (ref 10–13.2)
RBC # BLD: 3.47 M/UL (ref 4–5.2)
SODIUM BLD-SCNC: 139 MMOL/L (ref 136–145)
WBC # BLD: 6.3 K/UL (ref 4–11)

## 2020-05-06 PROCEDURE — 6370000000 HC RX 637 (ALT 250 FOR IP): Performed by: INTERNAL MEDICINE

## 2020-05-06 PROCEDURE — 1200000000 HC SEMI PRIVATE

## 2020-05-06 PROCEDURE — 85025 COMPLETE CBC W/AUTO DIFF WBC: CPT

## 2020-05-06 PROCEDURE — 6360000002 HC RX W HCPCS: Performed by: INTERNAL MEDICINE

## 2020-05-06 PROCEDURE — 2709999900 HC NON-CHARGEABLE SUPPLY

## 2020-05-06 PROCEDURE — 93010 ELECTROCARDIOGRAM REPORT: CPT | Performed by: INTERNAL MEDICINE

## 2020-05-06 PROCEDURE — 2060000000 HC ICU INTERMEDIATE R&B

## 2020-05-06 PROCEDURE — 99152 MOD SED SAME PHYS/QHP 5/>YRS: CPT

## 2020-05-06 PROCEDURE — 33274 TCAT INSJ/RPL PERM LDLS PM: CPT

## 2020-05-06 PROCEDURE — 99232 SBSQ HOSP IP/OBS MODERATE 35: CPT | Performed by: INTERNAL MEDICINE

## 2020-05-06 PROCEDURE — C1786 PMKR, SINGLE, RATE-RESP: HCPCS

## 2020-05-06 PROCEDURE — C1894 INTRO/SHEATH, NON-LASER: HCPCS

## 2020-05-06 PROCEDURE — 83735 ASSAY OF MAGNESIUM: CPT

## 2020-05-06 PROCEDURE — 33274 TCAT INSJ/RPL PERM LDLS PM: CPT | Performed by: INTERNAL MEDICINE

## 2020-05-06 PROCEDURE — 2580000003 HC RX 258: Performed by: INTERNAL MEDICINE

## 2020-05-06 PROCEDURE — 80069 RENAL FUNCTION PANEL: CPT

## 2020-05-06 PROCEDURE — 99153 MOD SED SAME PHYS/QHP EA: CPT

## 2020-05-06 PROCEDURE — 93005 ELECTROCARDIOGRAM TRACING: CPT | Performed by: INTERNAL MEDICINE

## 2020-05-06 PROCEDURE — 36415 COLL VENOUS BLD VENIPUNCTURE: CPT

## 2020-05-06 PROCEDURE — 85610 PROTHROMBIN TIME: CPT

## 2020-05-06 PROCEDURE — 02HK3NZ INSERTION OF INTRACARDIAC PACEMAKER INTO RIGHT VENTRICLE, PERCUTANEOUS APPROACH: ICD-10-PCS | Performed by: INTERNAL MEDICINE

## 2020-05-06 PROCEDURE — C1769 GUIDE WIRE: HCPCS

## 2020-05-06 RX ORDER — SODIUM CHLORIDE 0.9 % (FLUSH) 0.9 %
10 SYRINGE (ML) INJECTION EVERY 12 HOURS SCHEDULED
Status: DISCONTINUED | OUTPATIENT
Start: 2020-05-06 | End: 2020-05-07 | Stop reason: HOSPADM

## 2020-05-06 RX ORDER — ACETAMINOPHEN 325 MG/1
650 TABLET ORAL EVERY 4 HOURS PRN
Status: DISCONTINUED | OUTPATIENT
Start: 2020-05-06 | End: 2020-05-06 | Stop reason: SDUPTHER

## 2020-05-06 RX ORDER — SENNA PLUS 8.6 MG/1
2 TABLET ORAL NIGHTLY
Status: DISCONTINUED | OUTPATIENT
Start: 2020-05-06 | End: 2020-05-06

## 2020-05-06 RX ORDER — SODIUM CHLORIDE 0.9 % (FLUSH) 0.9 %
10 SYRINGE (ML) INJECTION PRN
Status: DISCONTINUED | OUTPATIENT
Start: 2020-05-06 | End: 2020-05-07 | Stop reason: HOSPADM

## 2020-05-06 RX ADMIN — CEFAZOLIN SODIUM 1 G: 1 POWDER, FOR SOLUTION INTRAMUSCULAR; INTRAVENOUS at 09:00

## 2020-05-06 RX ADMIN — Medication 10 ML: at 21:37

## 2020-05-06 RX ADMIN — Medication 10 ML: at 09:00

## 2020-05-06 RX ADMIN — DONEPEZIL HYDROCHLORIDE 5 MG: 5 TABLET, FILM COATED ORAL at 21:37

## 2020-05-06 ASSESSMENT — PAIN SCALES - GENERAL
PAINLEVEL_OUTOF10: 0

## 2020-05-06 NOTE — PROGRESS NOTES
4 Eyes Admission Assessment     I agree as the admission nurse that 2 RN's have performed a thorough Head to Toe Skin Assessment on the patient. ALL assessment sites listed below have been assessed on admission. Areas assessed by both nurses:   [x]   Head, Face, and Ears   [x]   Shoulders, Back, and Chest  [x]   Arms, Elbows, and Hands   [x]   Coccyx, Sacrum, and Ischum  [x]   Legs, Feet, and Heels        Does the Patient have Skin Breakdown? No scattered bruising, bruise to LUE, Rt hip and small bruise to Lft hip and Rt flank side, cyst to left scapula.         Joseph Prevention initiated:  No   Wound Care Orders initiated:  No      WOC nurse consulted for Pressure Injury (Stage 3,4, Unstageable, DTI, NWPT, and Complex wounds):  No      Nurse 1 eSignature: Electronically signed by Patria Morales RN on 5/6/20 at 3:17 PM EDT    **SHARE this note so that the co-signing nurse is able to place an eSignature**    Nurse 2 eSignature: Electronically signed by Vaishnavi Cardona RN on 5/6/20 at 3:40 PM EDT

## 2020-05-06 NOTE — PROGRESS NOTES
Initial set up of [employer] completed and transmission received. Follow up as scheduled. Micra pacer implanted 5/6/20. OBSERVATIONS (0)  · No observations based on current interrogation.  87.9%. See PACEART report under Cardiology tab.

## 2020-05-06 NOTE — PROGRESS NOTES
Resident Progress Note  PGY-1    Admit Date: 2020    PCP: Demetrio Welsh MD                  : 1940  MRN: [id number]    Subjective: Interval History: Pt seen and examined this morning. Resting comfortably with no new complaints. She has maintained NPO overnight. To undergo leadless pacemaker placement this AM. Likely to discharge to ARU this afternoon or tmro morning depending on EP recommendations. The patient denies fevers, chills, chest pain, shortness of breath, nausea, vomiting, diarrhea, or constipation. Diet: Diet NPO, After Midnight, Can resume General Diet after procedure    Data:     Scheduled Meds:   ceFAZolin  1 g Intravenous On Call to OR    donepezil  5 mg Oral Nightly    therapeutic multivitamin-minerals   Oral Daily    [Held by provider] risperiDONE  0.5 mg Oral BID    sodium chloride flush  10 mL Intravenous 2 times per day     Continuous Infusions:  PRN Meds:sodium chloride flush, acetaminophen **OR** acetaminophen, polyethylene glycol, promethazine **OR** ondansetron  I/O last 3 completed shifts: In: 480 [P.O.:480]  Out: 0   No intake/output data recorded. Intake/Output Summary (Last 24 hours) at 2020 0841  Last data filed at 2020 0439  Gross per 24 hour   Intake 360 ml   Output 0 ml   Net 360 ml       Objective:     Vitals: BP (!) 98/48   Pulse (!) 43   Temp 98 °F (36.7 °C) (Oral)   Resp 16   Ht 5' 6\" (1.676 m)   Wt 138 lb 14.2 oz (63 kg)   SpO2 96%   BMI 22.42 kg/m²     Physical Exam  Constitutional:       Appearance: Normal appearance. HENT:      Head: Normocephalic. Nose: Nose normal.      Mouth/Throat:      Mouth: Mucous membranes are moist.   Eyes:      Pupils: Pupils are equal, round, and reactive to light. Cardiovascular:      Rate and Rhythm: Normal rate and regular rhythm. Pulses: Normal pulses. Heart sounds: No murmur. Pulmonary:      Effort: Pulmonary effort is normal. No respiratory distress.       Breath sounds: Normal breath sounds. No stridor. No wheezing or rhonchi. Abdominal:      General: Abdomen is flat. Bowel sounds are normal. There is no distension. Palpations: Abdomen is soft. Tenderness: There is no abdominal tenderness. Musculoskeletal: Normal range of motion. Right lower leg: No edema. Left lower leg: No edema. Skin:     General: Skin is warm and dry. Neurological:      General: No focal deficit present. Mental Status: She is alert. Psychiatric:      Comments: Lacks insight into disease process and need for long term care        LABS:    CBC:   Recent Labs     05/04/20 0439 05/05/20 0459 05/06/20 0426   WBC 7.1 6.5 6.3   HGB 11.9* 12.0 11.3*   HCT 34.6* 34.1* 33.1*   MCV 93.8 93.3 95.2    144 146                                                                BMP:    Recent Labs     05/04/20 0439 05/05/20 0459 05/06/20 0426    135* 139   K 3.6 3.7 3.5   CL 98* 99 101   CO2 28 27 26   BUN 13 14 13   CREATININE 0.6 0.7 0.6   GLUCOSE 90 93 92        INR:   Recent Labs     05/04/20 0440 05/05/20 0459 05/06/20  0425   INR 0.95 0.96 1.00       U/A:  No results for input(s): NITRITE, COLORU, PHUR, LABCAST, WBCUA, RBCUA, MUCUS, TRICHOMONAS, YEAST, BACTERIA, CLARITYU, SPECGRAV, LEUKOCYTESUR, UROBILINOGEN, BILIRUBINUR, BLOODU, GLUCOSEU, AMORPHOUS in the last 72 hours. Invalid input(s): Dany Kelly   -----------------------------------------------------------------  RAD:   MRI BRAIN W WO CONTRAST   Final Result      1. 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 CT HEAD WO CONTRAST   Final Result   1. Mild atrophy. 2. Confluent areas of decreased white matter attenuation. The findings are nonspecific, but most likely represent chronic small vessel ischemic change.    3.  No evidence of an acute intracranial process. 4. There is some encephalomalacia identified within the anterior aspects of the bilateral temporal lobes, which is unchanged from the prior study. CT Head WO Contrast   Final Result      1. No acute intracranial process. 2.  Stable severe chronic small vessel ischemic disease. XR CHEST STANDARD (2 VW)   Final Result      No acute pulmonary disease. XR SHOULDER LEFT (MIN 2 VIEWS)   Final Result   Impression:    No acute osseous injury or dislocation. Chronic distal left clavicle fracture, stable compared to 1/14/2020. Assessment/Plan:     Bradycardia with heart block  Heart rate in the 30s, with AV block. - EP consulted, digoxin held, dig level 0.8 on 5/4  - Leadless pacemaker to be placed 5/5  - NPO 5/5 AM for above    Recurrent Falls Likely 2/2 amyloid angiopathy  - Avoid sedating medications, home lasix at this time  - EEG inconclusive for seizure  - Neurology following and posit this could be resultant from CAA inflammation.  Per neurology, patient should follow up with Dr. Harish Shelley as outpatient as he specializes in CAA and may be able to provide guidance as to whether or not Eleni Mei is a diagnosis that should be considered and if so, meningeal biopsy vs. Immunosuppressive treatment would be warranted.  - PT/OT  - palliative following    Acute Metabolic Encephalopathy 2/2 amyloid angiopathy and/or bradycardia   - as noted on presentation, she has poor insight into the severity of her disease  - continues to refuse certain treatments and remains confused at intervals  - this issue complicates her discharge as she will be unable to return to home, she does not recognize her  sometimes    Hypotension 2/2 Bradycardia and/or volume depletion  - cont to monitor  - responsive to fluid boluses    Afib   - stopped digoxin due to bradycardia, which might be a cause for recurrent falls  - EP to place pacemaker as above     Myocardial Ischemia

## 2020-05-07 ENCOUNTER — APPOINTMENT (OUTPATIENT)
Dept: GENERAL RADIOLOGY | Age: 80
DRG: 228 | End: 2020-05-07
Payer: MEDICARE

## 2020-05-07 ENCOUNTER — TELEPHONE (OUTPATIENT)
Dept: INTERNAL MEDICINE CLINIC | Age: 80
End: 2020-05-07

## 2020-05-07 ENCOUNTER — HOSPITAL ENCOUNTER (INPATIENT)
Age: 80
LOS: 7 days | Discharge: HOME HEALTH | DRG: 948 | End: 2020-05-14
Attending: PHYSICAL MEDICINE & REHABILITATION | Admitting: PHYSICAL MEDICINE & REHABILITATION
Payer: MEDICARE

## 2020-05-07 VITALS
RESPIRATION RATE: 18 BRPM | SYSTOLIC BLOOD PRESSURE: 124 MMHG | HEART RATE: 61 BPM | DIASTOLIC BLOOD PRESSURE: 43 MMHG | BODY MASS INDEX: 22.32 KG/M2 | HEIGHT: 66 IN | OXYGEN SATURATION: 95 % | TEMPERATURE: 98.2 F | WEIGHT: 138.89 LBS

## 2020-05-07 PROBLEM — R53.81 DEBILITY: Status: ACTIVE | Noted: 2020-05-07

## 2020-05-07 LAB
ALBUMIN SERPL-MCNC: 2.9 G/DL (ref 3.4–5)
ANION GAP SERPL CALCULATED.3IONS-SCNC: 12 MMOL/L (ref 3–16)
ANION GAP SERPL CALCULATED.3IONS-SCNC: 12 MMOL/L (ref 3–16)
BASOPHILS ABSOLUTE: 0 K/UL (ref 0–0.2)
BASOPHILS RELATIVE PERCENT: 0.6 %
BUN BLDV-MCNC: 11 MG/DL (ref 7–20)
BUN BLDV-MCNC: 6 MG/DL (ref 7–20)
CALCIUM SERPL-MCNC: 8.8 MG/DL (ref 8.3–10.6)
CALCIUM SERPL-MCNC: 9.3 MG/DL (ref 8.3–10.6)
CHLORIDE BLD-SCNC: 102 MMOL/L (ref 99–110)
CHLORIDE BLD-SCNC: 98 MMOL/L (ref 99–110)
CO2: 23 MMOL/L (ref 21–32)
CO2: 25 MMOL/L (ref 21–32)
CREAT SERPL-MCNC: 0.6 MG/DL (ref 0.6–1.2)
CREAT SERPL-MCNC: <0.5 MG/DL (ref 0.6–1.2)
EOSINOPHILS ABSOLUTE: 0.1 K/UL (ref 0–0.6)
EOSINOPHILS RELATIVE PERCENT: 1.8 %
GFR AFRICAN AMERICAN: >60
GFR AFRICAN AMERICAN: >60
GFR NON-AFRICAN AMERICAN: >60
GFR NON-AFRICAN AMERICAN: >60
GLUCOSE BLD-MCNC: 85 MG/DL (ref 70–99)
GLUCOSE BLD-MCNC: 99 MG/DL (ref 70–99)
HCT VFR BLD CALC: 33.5 % (ref 36–48)
HEMOGLOBIN: 11.7 G/DL (ref 12–16)
INR BLD: 1.02 (ref 0.86–1.14)
LYMPHOCYTES ABSOLUTE: 1 K/UL (ref 1–5.1)
LYMPHOCYTES RELATIVE PERCENT: 18.6 %
MAGNESIUM: 1.8 MG/DL (ref 1.8–2.4)
MCH RBC QN AUTO: 33.1 PG (ref 26–34)
MCHC RBC AUTO-ENTMCNC: 34.8 G/DL (ref 31–36)
MCV RBC AUTO: 95.1 FL (ref 80–100)
MONOCYTES ABSOLUTE: 0.6 K/UL (ref 0–1.3)
MONOCYTES RELATIVE PERCENT: 11.6 %
NEUTROPHILS ABSOLUTE: 3.6 K/UL (ref 1.7–7.7)
NEUTROPHILS RELATIVE PERCENT: 67.4 %
PDW BLD-RTO: 14.5 % (ref 12.4–15.4)
PHOSPHORUS: 2.5 MG/DL (ref 2.5–4.9)
PLATELET # BLD: 146 K/UL (ref 135–450)
PMV BLD AUTO: 9.6 FL (ref 5–10.5)
POTASSIUM SERPL-SCNC: 3.6 MMOL/L (ref 3.5–5.1)
POTASSIUM SERPL-SCNC: 3.9 MMOL/L (ref 3.5–5.1)
PROTHROMBIN TIME: 11.8 SEC (ref 10–13.2)
RBC # BLD: 3.52 M/UL (ref 4–5.2)
SODIUM BLD-SCNC: 135 MMOL/L (ref 136–145)
SODIUM BLD-SCNC: 137 MMOL/L (ref 136–145)
WBC # BLD: 5.3 K/UL (ref 4–11)

## 2020-05-07 PROCEDURE — 97535 SELF CARE MNGMENT TRAINING: CPT

## 2020-05-07 PROCEDURE — 36415 COLL VENOUS BLD VENIPUNCTURE: CPT

## 2020-05-07 PROCEDURE — 99238 HOSP IP/OBS DSCHRG MGMT 30/<: CPT | Performed by: INTERNAL MEDICINE

## 2020-05-07 PROCEDURE — 85610 PROTHROMBIN TIME: CPT

## 2020-05-07 PROCEDURE — 85025 COMPLETE CBC W/AUTO DIFF WBC: CPT

## 2020-05-07 PROCEDURE — 6370000000 HC RX 637 (ALT 250 FOR IP): Performed by: PHYSICAL MEDICINE & REHABILITATION

## 2020-05-07 PROCEDURE — 80069 RENAL FUNCTION PANEL: CPT

## 2020-05-07 PROCEDURE — 71046 X-RAY EXAM CHEST 2 VIEWS: CPT

## 2020-05-07 PROCEDURE — 6370000000 HC RX 637 (ALT 250 FOR IP): Performed by: INTERNAL MEDICINE

## 2020-05-07 PROCEDURE — 93279 PRGRMG DEV EVAL PM/LDLS PM: CPT | Performed by: INTERNAL MEDICINE

## 2020-05-07 PROCEDURE — 97530 THERAPEUTIC ACTIVITIES: CPT

## 2020-05-07 PROCEDURE — 2580000003 HC RX 258: Performed by: INTERNAL MEDICINE

## 2020-05-07 PROCEDURE — 83735 ASSAY OF MAGNESIUM: CPT

## 2020-05-07 PROCEDURE — 99232 SBSQ HOSP IP/OBS MODERATE 35: CPT | Performed by: NURSE PRACTITIONER

## 2020-05-07 PROCEDURE — 1280000000 HC REHAB R&B

## 2020-05-07 RX ORDER — M-VIT,TX,IRON,MINS/CALC/FOLIC 27MG-0.4MG
1 TABLET ORAL DAILY
Status: DISCONTINUED | OUTPATIENT
Start: 2020-05-08 | End: 2020-05-14 | Stop reason: HOSPADM

## 2020-05-07 RX ORDER — POLYETHYLENE GLYCOL 3350 17 G/17G
17 POWDER, FOR SOLUTION ORAL DAILY PRN
Status: DISCONTINUED | OUTPATIENT
Start: 2020-05-07 | End: 2020-05-07 | Stop reason: SDUPTHER

## 2020-05-07 RX ORDER — PROMETHAZINE HYDROCHLORIDE 25 MG/1
12.5 TABLET ORAL EVERY 6 HOURS PRN
Status: DISCONTINUED | OUTPATIENT
Start: 2020-05-07 | End: 2020-05-14 | Stop reason: HOSPADM

## 2020-05-07 RX ORDER — POTASSIUM CHLORIDE 750 MG/1
10 TABLET, EXTENDED RELEASE ORAL DAILY
Status: CANCELLED | OUTPATIENT
Start: 2020-05-07

## 2020-05-07 RX ORDER — ONDANSETRON 2 MG/ML
4 INJECTION INTRAMUSCULAR; INTRAVENOUS EVERY 6 HOURS PRN
Status: DISCONTINUED | OUTPATIENT
Start: 2020-05-07 | End: 2020-05-14 | Stop reason: HOSPADM

## 2020-05-07 RX ORDER — CELECOXIB 100 MG/1
1 CAPSULE ORAL DAILY
Status: ON HOLD | COMMUNITY
Start: 2019-12-17 | End: 2020-05-13 | Stop reason: HOSPADM

## 2020-05-07 RX ORDER — POTASSIUM CHLORIDE 750 MG/1
10 TABLET, EXTENDED RELEASE ORAL DAILY
Status: DISCONTINUED | OUTPATIENT
Start: 2020-05-07 | End: 2020-05-11

## 2020-05-07 RX ORDER — ACETAMINOPHEN 325 MG/1
650 TABLET ORAL EVERY 4 HOURS PRN
Status: DISCONTINUED | OUTPATIENT
Start: 2020-05-07 | End: 2020-05-07 | Stop reason: CLARIF

## 2020-05-07 RX ORDER — POLYETHYLENE GLYCOL 3350 17 G/17G
17 POWDER, FOR SOLUTION ORAL DAILY PRN
Status: CANCELLED | OUTPATIENT
Start: 2020-05-07

## 2020-05-07 RX ORDER — QUETIAPINE FUMARATE 25 MG/1
1 TABLET, FILM COATED ORAL DAILY
Status: ON HOLD | COMMUNITY
Start: 2020-03-15 | End: 2020-05-13 | Stop reason: HOSPADM

## 2020-05-07 RX ORDER — ACETAMINOPHEN 325 MG/1
650 TABLET ORAL EVERY 4 HOURS PRN
Status: DISCONTINUED | OUTPATIENT
Start: 2020-05-07 | End: 2020-05-14 | Stop reason: HOSPADM

## 2020-05-07 RX ORDER — POLYETHYLENE GLYCOL 3350 17 G/17G
17 POWDER, FOR SOLUTION ORAL DAILY PRN
Status: DISCONTINUED | OUTPATIENT
Start: 2020-05-07 | End: 2020-05-14 | Stop reason: HOSPADM

## 2020-05-07 RX ORDER — ACETAMINOPHEN 325 MG/1
650 TABLET ORAL EVERY 4 HOURS PRN
Status: CANCELLED | OUTPATIENT
Start: 2020-05-07

## 2020-05-07 RX ORDER — ACETAMINOPHEN 500 MG
650 TABLET ORAL 3 TIMES DAILY PRN
COMMUNITY
Start: 2020-03-15

## 2020-05-07 RX ORDER — DONEPEZIL HYDROCHLORIDE 5 MG/1
5 TABLET, FILM COATED ORAL NIGHTLY
Status: DISCONTINUED | OUTPATIENT
Start: 2020-05-07 | End: 2020-05-11

## 2020-05-07 RX ADMIN — Medication: at 10:12

## 2020-05-07 RX ADMIN — Medication 10 ML: at 10:11

## 2020-05-07 RX ADMIN — DONEPEZIL HYDROCHLORIDE 5 MG: 10 TABLET, FILM COATED ORAL at 21:25

## 2020-05-07 RX ADMIN — POTASSIUM CHLORIDE 10 MEQ: 750 TABLET, EXTENDED RELEASE ORAL at 17:07

## 2020-05-07 RX ADMIN — BISACODYL 5 MG: 5 TABLET, COATED ORAL at 17:07

## 2020-05-07 ASSESSMENT — PAIN SCALES - GENERAL
PAINLEVEL_OUTOF10: 0

## 2020-05-07 NOTE — PROGRESS NOTES
Electrophysiology - PROGRESS NOTE    Admit Date: 4/29/2020     Chief Complaint: AF     Interval History:   Patient seen and examined and notes reviewed. Patient is being followed for persistent AF and bradycardia, patient with multiple falls at home which she denies. Appears to be oriented to person and place, redirects questions if she does not have the answer. Patient has been in AF with HRs as low as the 30's overnight, CHB seen on EKG. Patient states she has no s/s of dizziness or lightheadedness, off warfarin d/t high risk for ICH with CAA and frequent falls. S/p Micra leadless PPM on 5/6/20, R groin with small hematoma, figure 8 suture removed and pressure held for 15 minutes.      In: -   Out: 850    Wt Readings from Last 2 Encounters:   05/06/20 138 lb 14.2 oz (63 kg)   04/01/20 138 lb 0.1 oz (62.6 kg)       Data:   Scheduled Meds:   Scheduled Meds:   sodium chloride flush  10 mL Intravenous 2 times per day    donepezil  5 mg Oral Nightly    therapeutic multivitamin-minerals   Oral Daily    [Held by provider] risperiDONE  0.5 mg Oral BID    sodium chloride flush  10 mL Intravenous 2 times per day     Continuous Infusions:  PRN Meds:.sodium chloride flush, sodium chloride flush, acetaminophen **OR** acetaminophen, polyethylene glycol, promethazine **OR** ondansetron  Continuous Infusions:    Intake/Output Summary (Last 24 hours) at 5/7/2020 1015  Last data filed at 5/7/2020 0904  Gross per 24 hour   Intake 0 ml   Output 850 ml   Net -850 ml       CBC:   Lab Results   Component Value Date    WBC 5.3 05/07/2020    HGB 11.7 05/07/2020     05/07/2020     BMP:  Lab Results   Component Value Date     05/07/2020    K 3.6 05/07/2020    K 2.5 04/29/2020     05/07/2020    CO2 23 05/07/2020    BUN 6 05/07/2020    CREATININE <0.5 05/07/2020    GLUCOSE 85 05/07/2020     INR:   Lab Results   Component Value Date    INR 1.02 05/07/2020    INR 4.    AF/bradycardia/CHB  - In persistent AF  - Bradycardic and intermittent CHB in AF with HRs in the 30's  - S/p Micra placement  - CXR shows stable PPM placement  - Device check showed 96.9% , other parameters are stable  - Echo - EF 55%, LAE, 4.3, vol 103  - On no OAC d/t risk of ICH with CAA  - Figure 8 suture removed form the R groin with small hematoma, pressure held, BR x 2 hours then ambulate in hallway with frequent groin assessment  - If tolerates ambulation then may be d/c'd later this afternoon if groin stable  - F/u in office in 1 week - apmts made      Lakia Ford [address]

## 2020-05-07 NOTE — PROGRESS NOTES
Patient off bedrest and ambulated in room then back to the bed via stand by assist. Groin site to left groin with no redness, swelling, bleeding or s/s of hematoma, no c/o pain voiced. Pt tolerated well and is now sitting up in recliner with call light within reach. Will continue to ambulate as tolerated.

## 2020-05-07 NOTE — PROGRESS NOTES
Occupational Therapy  Facility/Department: [address]  Daily Treatment Note  NAME: Yordan Pittman  : 1940  MRN: [id number]    Date of Service: 2020    Discharge Recommendations: Yordan Pittman scored a 21/24 on the AM-PAC ADL Inpatient form. Current research shows that an AM-PAC score of 18 or greater is typically associated with a discharge to the patient's home setting. Based on the patients AM-PAC score and their current ADL deficits, it is recommended that the patient have 2-3 sessions per week of Occupational Therapy at d/c to increase the patients independence. If patient discharges prior to next session this note will serve as a discharge summary. Please see below for the latest assessment towards goals. OT Equipment Recommendations  Equipment Needed: No    Assessment   Performance deficits / Impairments: Decreased high-level IADLs;Decreased cognition;Decreased functional mobility ; Decreased endurance;Decreased ADL status; Decreased strength;Decreased balance;Decreased posture  Assessment: Pt demo mobility and ADLs at [facility]. Not limited by fatigue. Pt has dementia with decreased safety awareness/insight. Recommend 24hr assist upon d/c  REQUIRES OT FOLLOW UP: Yes  Activity Tolerance  Activity Tolerance: Patient Tolerated treatment well  Safety Devices  Safety Devices in place: Yes  Type of devices: Left in chair;Chair alarm in place;Nurse notified;Call light within reach         Restrictions  Position Activity Restriction  Other position/activity restrictions: up with assist     Subjective   General  Chart Reviewed: Yes  Patient assessed for rehabilitation services?: Yes  Additional Pertinent Hx: Pt presented  s/p multiple falls at home. Admitted for Recurrent Falls 2/2 medications vs amyloid angiopathy, supratherapeutic INR, hypokalemia. CT head (-); XR L shoulder: Chronic distal left clavicle fracture, stable. MRI brain appeared stable compared to 3/15/20.  Underwent including item retrieval, with spvn - not met   Patient Goals   Patient goals :  To return home        Therapy Time   Individual Concurrent Group Co-treatment   Time In 1348         Time Out 1418         Minutes 30         Timed Code Treatment Minutes:   30  Total Treatment Minutes:  [address] [facility] OTR/L, 5504

## 2020-05-07 NOTE — PROGRESS NOTES
Needs    Financial resource strain: Not hard at all   [email or web address] insecurity     Worry: Never true     Inability: Never true    Transportation needs     Medical: No     Non-medical: No   Tobacco Use    Smoking status: Never Smoker    Smokeless tobacco: Never Used   Substance and Sexual Activity    Alcohol use: No    Drug use: Never    Sexual activity: None   Lifestyle    Physical activity     Days per week: None     Minutes per session: None    Stress: None   Relationships    Social connections     Talks on phone: None     Gets together: None     Attends Zoroastrianism service: None     Active member of club or organization: None     Attends meetings of clubs or organizations: None     Relationship status: None    Intimate partner violence     Fear of current or ex partner: None     Emotionally abused: None     Physically abused: None     Forced sexual activity: None   Other Topics Concern    None   Social History Narrative    None           REVIEW OF SYSTEMS:   CONSTITUTIONAL: negative for fevers, chills, diaphoresis, appetite change, night sweats, unexpected weight change, fatigue  EYES: negative for blurred vision, eye discharge, visual disturbance and icterus  HEENT: negative for hearing loss, tinnitus, ear drainage, sinus pressure, nasal congestion, epistaxis and snoring  RESPIRATORY: Negative for hemoptysis, cough, sputum production  CARDIOVASCULAR: negative for chest pain, palpitations, exertional chest pressure/discomfort, syncope, edema  GASTROINTESTINAL: negative for nausea, vomiting, diarrhea, blood in stool, abdominal pain, constipation  GENITOURINARY: negative for frequency, dysuria, urinary incontinence, decreased urine volume, and hematuria  HEMATOLOGIC/LYMPHATIC: negative for easy bruising, bleeding and lymphadenopathy  ALLERGIC/IMMUNOLOGIC: negative for recurrent infections, angioedema, anaphylaxis and drug reactions  ENDOCRINE: negative for weight changes and diabetic symptoms including

## 2020-05-07 NOTE — DISCHARGE SUMMARY
INTERNAL MEDICINE    RESIDENT DISCHARGE SUMMARY   Discharge Summaries      Patient ID: Brendan nAn   Gender: female      :  1940  AGE: 68 y.o. MRN:  [id number]  Code Status: Full Code   PCP: Juan Antonio Leon MD    Admit date:  2020      Discharge date:  20     Admitting Physician:  Juan Antonio Leon MD    Discharge Physician: Vincent Peguero DO     Admission Condition:  fair  Discharged Condition:  good Stable    Discharge Diagnoses: Active Hospital Problems    Diagnosis Date Noted    Multiple falls [R29.6] 2020    Cerebral amyloid angiopathy (CODE) [I68.0]     Hypokalemia [E87.6]     Atrial fibrillation ([address] [I48.91] 2010       The patient was seen and examined on day of discharge and this discharge summary is in conjunction with any daily progress note from day of discharge. Hospital Course:   Mrs. Clair Vizcarra was admitted after being found down by her . There was concern for heart block and symptomatic bradycardia vs seizure initially. She was seen by Cardiology and Neurology. No seizure activity detected on EEG. Neurology advised against continued anticoagulation for Afib in setting of Cerebral Amyloid Angiopathy and warfarin was discontinued. She did have notable bradycardia and hypotension to a SBP in the 80s-90s which was otherwise asymptomatic while she lay in bed. EP evaluated the patient and determined she was undergoing transient complete heart block with a junctional escape rhythm in the 30s. It was decided she would be a good candidate for a leadless pacemaker. It was placed on 6/3/8717 without complication. Device interrogation and CXR confirm proper function and placement of the device. She was discharged to the ARU in stable condition.      Disposition:  ARU    Physical Exam Performed:     BP (!) 112/55   Pulse 60   Temp 97.5 °F (36.4 °C) (Oral)   Resp 18   Ht 5' 6\" (1.676 m)   Wt 138 lb 14.2 oz (63 kg)   SpO2 93%   BMI 22.42 kg/m² CT HEAD WO CONTRAST   Final Result   1. Mild atrophy. 2. Confluent areas of decreased white matter attenuation. The findings are nonspecific, but most likely represent chronic small vessel ischemic change. 3.  No evidence of an acute intracranial process. 4. There is some encephalomalacia identified within the anterior aspects of the bilateral temporal lobes, which is unchanged from the prior study. CT Head WO Contrast   Final Result      1. No acute intracranial process. 2.  Stable severe chronic small vessel ischemic disease. XR CHEST STANDARD (2 VW)   Final Result      No acute pulmonary disease. XR SHOULDER LEFT (MIN 2 VIEWS)   Final Result   Impression:    No acute osseous injury or dislocation. Chronic distal left clavicle fracture, stable compared to 1/14/2020. Consults:     IP CONSULT TO PRIMARY CARE PROVIDER  IP CONSULT TO NEUROLOGY  IP CONSULT TO CARDIOLOGY  IP CONSULT TO PALLIATIVE CARE  IP CONSULT TO PHYSICAL MEDICINE REHAB  IP CONSULT TO CASE MANAGEMENT      Discharge Instructions/Follow-up:  Continue Aricept. Stop anticoagulation. Stop diuretics. Stop NSAIDs. Work with PT and OT in [place] [id number]. Follow up with Dr. Adrienne Anglin in office in two weeks. Activity: activity as tolerated    Diet: regular diet    Discharge Medications:     Current Discharge Medication List           Details   donepezil (ARICEPT) 5 MG tablet Take 1 tablet by mouth nightly  Qty: 30 tablet, Refills: 0      Multiple Vitamins-Minerals (CENTRUM ADULTS) TABS Take 1 tablet by mouth daily              Time Spent on discharge is more than 30 minutes in the examination, evaluation, counseling and review of medications and discharge plan. Signed:    Luis Florence DO   Internal Medicine Resident, PGY-1  5/7/2020      Thank you Demetrio Welsh MD for the opportunity to be involved in this patient's care. If you have any questions or concerns please feel free to contact me.

## 2020-05-07 NOTE — PROGRESS NOTES
Report given to [facility], on ARU for patient to be transferred to room 3105.

## 2020-05-07 NOTE — PROGRESS NOTES
medical assistive devices   [] medication education   [] O2 saturation management   [] energy conservation   [] stress management techniques   [] fall prevention   [x] alarms protocol   [] seating and positioning   [] skin/wound care   [x] pressure relief instruction   [] dressing changes     [] infection protection   [] DVT prophylaxis  [x] assistance with in room safety with transfers to bed, toilet, wheelchair, shower   [x] bathroom activities and hygiene  [] Other:    Patient/Caregiver Education for:  [] Disease/sustained injury/management     [] Medication Use  [] Surgical intervention  [] Safety  [] Body mechanics and or joint protection  [] Health maintenance     [] Other:     PHYSICAL THERAPY:  Goals:                  Short term goals  Time Frame for Short term goals: STG=LTG            Long term goals  Time Frame for Long term goals : 7-10 days  Long term goal 1: Pt will ambulate 400 ft without AD independently  Long term goal 2: Pt will transfer independently sit <> stand   Long term goal 3: Pt will ascend and descend 12 steps independently  Long term goal 4: Pt will be low fall risk on TUG   These goals were reviewed with this patient at the time of assessment and Misty Anderson is in agreement.      Plan of Care: Pt to be seen 5 out of 7 days per week per ARU protocol (90 minutes with PT)                  Current Treatment Recommendations: Strengthening, Transfer Training, Endurance Training, Neuromuscular Re-education, Balance Training, Functional Mobility Training, Gait Training, Home Exercise Program, Safety Education & Training, IADL Training, Stair training    OCCUPATIONAL THERAPY:  Goals:             Short term goals  Time Frame for Short term goals: STG=LTG :  Long term goals  Time Frame for Long term goals : 5-7 days  Long term goal 1: Complete full body bathing in shower mod I   Long term goal 2: Complete full body dressing mod I   Long term goal 3: Maintain static and dynamic stance mod I during Discharge Goal: Independent   Shower/Bathe Self  CARE Score: 4 / Discharge Goal: Independent   Rolling Left and Right CARE Score: 4 / Discharge Goal: Independent   Sit to Lying CARE Score: 4 / Discharge Goal: Independent   Lying to Sitting on Bedside CARE Score: 4 / Discharge Goal: Independent   Sit to Stand CARE Score: 4 / Discharge Goal: Independent   Chair/Bed to Chair Transfer CARE Score: 4 / Discharge Goal: Independent   Car Transfers CARE Score: 10 / Discharge Goal: Independent   Walk 10 Feet CARE Score: 4 / Discharge Goal: Independent   Walk 50 Feet with Two Turns CARE Score: 4 / Discharge Goal: Independent   Walk 150 Feet CARE Score: 4 / Discharge Goal: Independent   Walk 10 Feet on Uneven Surfaces CARE Score: 4 / Discharge Goal: Independent   1 Step (Curb) CARE Score: 4 / Discharge Goal: Independent   4 Steps CARE Score: 4 / Discharge Goal: Independent   12 Steps CARE Score: 4 / Discharge Goal: Independent   Picking up Object from Floor CARE Score: 4 / Discharge Goal: Independent   Wheel 50 Feet with 2 Turns   /     Type         [] Manual        [] Motorized        [] N/A   Wheel 150 Feet   /     Type         [] Manual        [] Motorized        [] N/A        Betty De Jesus will be seen a minimum of 3 hours of therapy per day, a minimum of 5 out of 7 days per week (please see above for specific treatment plan per PT/OT/SLP). [] In this rare instance due to the nature of this patient's medical involvement, this patient will be seen 15 hours per week (900 minutes within a 7day period). In addition, dietician/nutritionist may monitor calorie count as well as intake and collaboratively work with SLP on dietary upgrades. Neuropsychology/Psychology may evaluate and provide necessary support.     Medical issues being managed closely and that require 24hour availability of a physician:  [] Swallowing Precautions  [] Bowel/Bladder Fx  [] Weight bearing precautions  [] Wound Care    [x] Pain Mgmt   [] Infection

## 2020-05-07 NOTE — CARE COORDINATION
Patient is from home with her spouse. Pt was down for testing and I was not able to speak with her but will try to see her tomorrow.      Electronically signed by Portia Carlisle RN on 4/30/2020 at 4:48 PM  [phone]
the discharge plan Not Indicated    Freedom of choice list was provided with basic dialogue that supports the patient's individualized plan of care/goals and shares the quality data associated with the providers. Not Indicated    Care Transitions patient:  Yes    Michael Smith RN  [facility]  Case Management Department  Ph: [phone]

## 2020-05-07 NOTE — PROGRESS NOTES
Pt doing well at this time. She stated understanding of education completed upon admission to [facility] room. She denies needs at this time. Denied pain. Able to ambulate with gait belt, one person stand by assist with no concerns. Slight confusion noted upon completing admission assessment with introduction of random topics at inappropriate times during the conversation. Call light is within reach and pt has stated understanding of use. Chair alarm is on at this time.  Electronically signed by Joe Fisher RN on 5/7/2020 at 5:42 PM

## 2020-05-07 NOTE — DISCHARGE INSTR - COC
current use of anticoagulant therapy Z79.01    Mitral valve disorder I05.9    Dysthymic disorder F34.1    Asymptomatic varicose veins I83.90    Adenomatous polyp of colon D12.6    Altered mental status R41.82    Major neurocognitive disorder due to multiple etiologies with behavioral disturbance (HCC) F02.81    Hypokalemia E87.6    Cerebral amyloid angiopathy (CODE) I68.0    Multiple falls R29.6    Pacemaker Z95.0       Isolation/Infection:   Isolation          No Isolation        Patient Infection Status     None to display          Nurse Assessment:  Last Vital Signs: BP (!) 124/43   Pulse 61   Temp 98.2 °F (36.8 °C) (Oral)   Resp 18   Ht 5' 6\" (1.676 m)   Wt 138 lb 14.2 oz (63 kg)   SpO2 95%   BMI 22.42 kg/m²     Last documented pain score (0-10 scale): Pain Level: 0  Last Weight:   Wt Readings from Last 1 Encounters:   05/06/20 138 lb 14.2 oz (63 kg)     Mental Status:  Alert with forgetfulness    IV Access:  - Peripheral IV - site  R Upper Arm, insertion date: 5/02/2020    Nursing Mobility/ADLs:  Walking   Assisted  Transfer  Assisted  Bathing  Assisted  Dressing  Assisted  Toileting  Independent  Feeding  Independent  Med Admin  Independent  Med Delivery   whole    Wound Care Documentation and Therapy:        Elimination:  Continence:   · Bowel: Yes  · Bladder: Yes  Urinary Catheter: None   Colostomy/Ileostomy/Ileal Conduit: No       Date of Last BM: 5/7/2020    Intake/Output Summary (Last 24 hours) at 5/7/2020 1353  Last data filed at 5/7/2020 0904  Gross per 24 hour   Intake 0 ml   Output 850 ml   Net -850 ml     I/O last 3 completed shifts: In: 0   Out: 150 [Urine:150]    Safety Concerns: At Risk for Falls    Impairments/Disabilities:      None    Nutrition Therapy:  Current Nutrition Therapy:   - Oral Diet:  Cardiac    Routes of Feeding: Oral  Liquids:  Thin Liquids  Daily Fluid Restriction: no  Last Modified Barium Swallow with Video (Video Swallowing Test): not done    Treatments at

## 2020-05-08 PROBLEM — I44.30 AVB (ATRIOVENTRICULAR BLOCK): Status: ACTIVE | Noted: 2020-05-08

## 2020-05-08 PROBLEM — Z95.0 S/P PLACEMENT OF CARDIAC PACEMAKER: Status: ACTIVE | Noted: 2020-05-08

## 2020-05-08 LAB
ANION GAP SERPL CALCULATED.3IONS-SCNC: 11 MMOL/L (ref 3–16)
BANDED NEUTROPHILS RELATIVE PERCENT: 1 % (ref 0–7)
BASOPHILS ABSOLUTE: 0 K/UL (ref 0–0.2)
BASOPHILS RELATIVE PERCENT: 0 %
BUN BLDV-MCNC: 9 MG/DL (ref 7–20)
CALCIUM SERPL-MCNC: 9.2 MG/DL (ref 8.3–10.6)
CHLORIDE BLD-SCNC: 104 MMOL/L (ref 99–110)
CO2: 25 MMOL/L (ref 21–32)
CREAT SERPL-MCNC: 0.6 MG/DL (ref 0.6–1.2)
EOSINOPHILS ABSOLUTE: 0 K/UL (ref 0–0.6)
EOSINOPHILS RELATIVE PERCENT: 0 %
GFR AFRICAN AMERICAN: >60
GFR NON-AFRICAN AMERICAN: >60
GLUCOSE BLD-MCNC: 94 MG/DL (ref 70–99)
HCT VFR BLD CALC: 35 % (ref 36–48)
HEMOGLOBIN: 12.1 G/DL (ref 12–16)
LYMPHOCYTES ABSOLUTE: 0.7 K/UL (ref 1–5.1)
LYMPHOCYTES RELATIVE PERCENT: 13 %
MACROCYTES: ABNORMAL
MCH RBC QN AUTO: 32.9 PG (ref 26–34)
MCHC RBC AUTO-ENTMCNC: 34.5 G/DL (ref 31–36)
MCV RBC AUTO: 95.3 FL (ref 80–100)
MISCELLANEOUS LAB TEST ORDER: ABNORMAL
MONOCYTES ABSOLUTE: 0.3 K/UL (ref 0–1.3)
MONOCYTES RELATIVE PERCENT: 5 %
NEUTROPHILS ABSOLUTE: 4.5 K/UL (ref 1.7–7.7)
NEUTROPHILS RELATIVE PERCENT: 81 %
PDW BLD-RTO: 14.7 % (ref 12.4–15.4)
PLATELET # BLD: 152 K/UL (ref 135–450)
PLATELET SLIDE REVIEW: ADEQUATE
PMV BLD AUTO: 9.6 FL (ref 5–10.5)
POTASSIUM REFLEX MAGNESIUM: 3.7 MMOL/L (ref 3.5–5.1)
RBC # BLD: 3.68 M/UL (ref 4–5.2)
SODIUM BLD-SCNC: 140 MMOL/L (ref 136–145)
WBC # BLD: 5.5 K/UL (ref 4–11)

## 2020-05-08 PROCEDURE — 97116 GAIT TRAINING THERAPY: CPT

## 2020-05-08 PROCEDURE — 97110 THERAPEUTIC EXERCISES: CPT

## 2020-05-08 PROCEDURE — 36415 COLL VENOUS BLD VENIPUNCTURE: CPT

## 2020-05-08 PROCEDURE — 97535 SELF CARE MNGMENT TRAINING: CPT

## 2020-05-08 PROCEDURE — 1280000000 HC REHAB R&B

## 2020-05-08 PROCEDURE — 97162 PT EVAL MOD COMPLEX 30 MIN: CPT

## 2020-05-08 PROCEDURE — 97530 THERAPEUTIC ACTIVITIES: CPT

## 2020-05-08 PROCEDURE — 99232 SBSQ HOSP IP/OBS MODERATE 35: CPT | Performed by: INTERNAL MEDICINE

## 2020-05-08 PROCEDURE — 85025 COMPLETE CBC W/AUTO DIFF WBC: CPT

## 2020-05-08 PROCEDURE — 80048 BASIC METABOLIC PNL TOTAL CA: CPT

## 2020-05-08 PROCEDURE — 6370000000 HC RX 637 (ALT 250 FOR IP): Performed by: PHYSICAL MEDICINE & REHABILITATION

## 2020-05-08 PROCEDURE — 97166 OT EVAL MOD COMPLEX 45 MIN: CPT

## 2020-05-08 RX ORDER — PANTOPRAZOLE SODIUM 40 MG/1
40 TABLET, DELAYED RELEASE ORAL
Status: DISCONTINUED | OUTPATIENT
Start: 2020-05-09 | End: 2020-05-14 | Stop reason: HOSPADM

## 2020-05-08 RX ADMIN — POTASSIUM CHLORIDE 10 MEQ: 750 TABLET, EXTENDED RELEASE ORAL at 09:25

## 2020-05-08 RX ADMIN — DONEPEZIL HYDROCHLORIDE 5 MG: 10 TABLET, FILM COATED ORAL at 20:01

## 2020-05-08 RX ADMIN — MULTIPLE VITAMINS W/ MINERALS TAB 1 TABLET: TAB at 09:24

## 2020-05-08 ASSESSMENT — PAIN SCALES - GENERAL
PAINLEVEL_OUTOF10: 0

## 2020-05-08 NOTE — PROGRESS NOTES
Patient alert and oriented. VSS Patient denies any pain . PO med taken without any difficulties. Assessment done. see flowsheet. Patient in bed lowest position , alarm activated. Call light and bedside table within reach. All needs are met at this time. Patient aware to call if any help needed. Will continue to monitor  /75   Pulse 62   Temp 98.2 °F (36.8 °C) (Oral)   Resp 16   SpO2 96%

## 2020-05-08 NOTE — H&P
Department of Physical Medicine & Rehabilitation  History & Physical      Patient Identification:  Lilliana Lazo  : 1940  Admit date: 2020   Attending provider: Katrina Dickerson DO        Primary care provider: Lisset Peres MD     Chief Complaint: Recurrent falls     History of Present Illness/Hospital Course: This is a 32-year-old female with past medical history of amyloid angiopathy of the brain, A. fib, hypertension, and recurrent falls who presented to the ED after being found down by her . Lorrie Mcintyre was confused on presentation. Per , he came home after being gone for about 1 to 2 hours and found his wife down in the bathroom. Abdulaziz Sousa has been having recurrent falls at home but has been has been able to help break the falls.  Patient's falls seem to be in the morning.  On evaluation She was found to have bradycardia, hypotension, afib, dehydration and hyponatremia. She denies any pain or fatigue now but has been recovering well on medsurg for the past few days. She would like to come to the ARU for further treatment and therapy.         Pacemaker procedure went very well. No new complaints and she is ready to come to the rehab unit.      Prior Level of Function:  Independent for mobility, ADLs, and IADLs    Current Level of Function:  Mod assist     Pertinent Social History:  Support:  at home      Past Medical History:   Diagnosis Date    Adenomatous polyp of colon 2019    Asymptomatic varicose veins     Atrial fibrillation (HCC)     Dysthymic disorder     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)     Screening mammogram 2009    (Left)Benign     Fall in past year: Yes    Past Surgical History:   Procedure Laterality Date    COLONOSCOPY  2008    HERNIA REPAIR      Left Femoral     Major Surgery in past 100 days: yes    Family History   Problem Relation Age of Onset    Stroke Mother     Heart Attack Father     Heart Disease Father        Social History     Socioeconomic History    Marital status:      Spouse name: Not on file    Number of children: Not on file    Years of education: Not on file    Highest education level: Not on file   Occupational History    Not on file   Social Needs    Financial resource strain: Not hard at all    Food insecurity     Worry: Never true     Inability: Never true   [employer] needs     Medical: No     Non-medical: No   Tobacco Use    Smoking status: Never Smoker    Smokeless tobacco: Never Used   Substance and Sexual Activity    Alcohol use: No    Drug use: Never    Sexual activity: Not on file   Lifestyle    Physical activity     Days per week: Not on file     Minutes per session: Not on file    Stress: Not on file   Relationships    Social connections     Talks on phone: Not on file     Gets together: Not on file     Attends Sikhism service: Not on file     Active member of club or organization: Not on file     Attends meetings of clubs or organizations: Not on file     Relationship status: Not on file    Intimate partner violence     Fear of current or ex partner: Not on file     Emotionally abused: Not on file     Physically abused: Not on file     Forced sexual activity: Not on file   Other Topics Concern    Not on file   Social History Narrative    Not on file       Allergies   Allergen Reactions    Beta Adrenergic Blockers          Current Facility-Administered Medications   Medication Dose Route Frequency Provider Last Rate Last Dose    acetaminophen (TYLENOL) tablet 650 mg  650 mg Oral Q4H PRN Sebastián Fry, DO        bisacodyl (DULCOLAX) EC tablet 5 mg  5 mg Oral Daily Sebastián Fry DO   5 mg at 05/07/20 1707    magnesium hydroxide (MILK OF MAGNESIA) 400 MG/5ML suspension 30 mL  30 mL Oral Daily PRN Sebastián Fry, DO        polyethylene glycol (GLYCOLAX) packet 17 g  17 g Oral Daily PRN Sebastián Fry DO        potassium chloride (KLOR-CON M) extended release tablet 10 mEq  10 mEq Oral Daily

## 2020-05-08 NOTE — PLAN OF CARE
Problem: Nutrition  Goal: Optimal nutrition therapy  Outcome: Ongoing   Nutrition Problem: Predicted suboptimal energy intake  Intervention: Food and/or Nutrient Delivery: Continue current diet, Start ONS  Nutritional Goals: Pt will consume >/=50% of meals and ONS this admission

## 2020-05-08 NOTE — PROGRESS NOTES
amyloid angiopathy, neurocognitive disorder   Family / Caregiver Present: No  Referring Practitioner: Lisandra  Diagnosis: debility  Subjective  Subjective: Pt in chair on entry. Very pleasant and cooperative. Referencing large print therapy schedule on tray, but states \"I think I've already done this one. \"   Pain Assessment  Pain Assessment: 0-10  Pain Level: 0  Patient's Stated Pain Goal: No pain  Social/Functional History  Social/Functional History  Lives With: Spouse  Type of Home: (condo)  Home Layout: One level  Home Access: Level entry, Elevator(second floor, has and can take the steps but usually uses elevator)  Bathroom Shower/Tub: Walk-in shower, Tub/Shower unit(both; sometimes takes baths, usually stands to shower; one walk-in has a built in seat but pt does not use this one)  Bathroom Toilet: Standard  Bathroom Equipment: (no bathroom DME)  Home Equipment: (has access to a neighbor's  's canes, walkers which are in storage)  ADL Assistance: Independent(reports falling once getting out of the tub/shower)  Homemaking Assistance: Needs assistance  Meal Prep: Other (comment)(pt reports doing the cooking,  also cooks, previous report indicated  just gets pre-made meals)  Laundry: Other (comment)(pt reports doing laundry, in [employer])  Cleaning: Other (comment)(previous report indicated )  Active : No  Occupation: Retired  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         Objective   Vision: Within Functional Limits(with glasses)  Hearing: Within functional limits    Orientation  Overall Orientation Status: Impaired  Orientation Level: Oriented to person;Disoriented to situation;Disoriented to time;Disoriented to place(aware she is in a medical facility, aware of pieces of situation but not fully)     Balance  Sitting Balance: Independent(static and dynamic)  Standing Balance: Stand by assistance(dynamic; spvn static)  Standing Balance  Time: 5 min + 5 min + 5 min   Activity: functional mobility in room and bathroom for ADLs  Comment: Pt stated she does not need the walker (used during PT evaluation). Required SBA for slow, guarded gait. SBA for dynamic stance such as when pulling pants up/down. Spvn static stance, such as when brushing teeth at sink. SBA item retrieval from floor. Toilet Transfers  Toilet - Technique: Ambulating  Equipment Used: Standard toilet  Toilet Transfer: Supervision(with grab bar)  Shower Transfers  Shower - Transfer Type: To and From  Shower - Transfer To: Standing  Shower - Technique: Ambulating  Shower Transfers: Stand by assistance  Shower Transfers Comments: cues to use grab bar  ADL  Feeding: Independent  Grooming: Modified independent ;Setup  UE Bathing: Supervision;Setup;Verbal cueing  LE Bathing: Supervision;Setup;Verbal cueing  UE Dressing: Supervision;Setup;Verbal cueing; Increased time to complete(difficulty donning bra, increased time and repeated tries; no difficulty with shirt)  LE Dressing: Stand by assistance;Setup  Toileting: Stand by assistance;Setup  Additional Comments: Pt completed shower using grab bar, hand held shower and shower chair. She required cues for sequencing, stating she was finished after washing only her mauricio-area. Also required cues to sit for lower body tasks for fall prevention. Transfers  Sit to stand: Supervision  Stand to sit: Supervision     Cognition  Overall Cognitive Status: Exceptions  Cognition Comment: baseline dementia. Alert, oriented to self and parts of situation. Well-spoken in conversation, but tangential and confabulates.  Impaired memory, insight, problem solving, sequencing, requiring cues

## 2020-05-08 NOTE — PROGRESS NOTES
Hospital Medicine  Progress Note    Chief Complaint: Atrial fibrillation s/p pacemaker for bradycardia    SUBJECTIVE: Patient is improved. There are not new complaints. Mental status stable    OBJECTIVE      Medications    Infusion Medications   Scheduled Medications    bisacodyl  5 mg Oral Daily    potassium chloride  10 mEq Oral Daily    donepezil  5 mg Oral Nightly    therapeutic multivitamin-minerals  1 tablet Oral Daily       Physical   Temperature:  Current - Temp: 98.3 °F (36.8 °C); Max - Temp  Av °F (36.7 °C)  Min: 97.4 °F (36.3 °C)  Max: 98.3 °F (36.8 °C)    Respiratory Rate : Resp  Av  Min: 16  Max: 18    Pulse Range: Pulse  Av  Min: 61  Max: 65    Blood Presuure Range:  Systolic (83NHR), IKT:008 , Min:96 , UUQ:040   ; Diastolic (91GKS), SJL:17, Min:43, Max:84      Pulse ox Range: SpO2  Av.3 %  Min: 95 %  Max: 96 %    24hr I & O:      Intake/Output Summary (Last 24 hours) at 2020 0955  Last data filed at 2020 0919  Gross per 24 hour   Intake 240 ml   Output --   Net 240 ml       Constitutional:  awake, alert, cooperative, no apparent distress, and appears stated age  Eyes:  pupils equal, round and reactive to light  ENT:  normocepalic, without obvious abnormality  NECK:  supple, symmetrical, trachea midline  HEMATOLOGIC/LYMPHATICS:  no cervical lymphadenopathy  LUNGS:  No increased work of breathing, good air exchange, clear to auscultation bilaterally, no crackles or wheezing  CARDIOVASCULAR: regular rate and rhythm, S1, S2 normal, no murmur, click, rub or gallop  ABDOMEN:  soft, non-tender, without masses or organomegaly  MUSCULOSKELETAL:  There is no redness, warmth, or swelling of the joints. Full range of motion noted. Motor strength is 5 out of 5 all extremities bilaterally.   Tone is normal.  SKIN:  normal skin color, texture, turgor    Data      CBC:   Lab Results   Component Value Date    WBC 5.5 2020    RBC 3.68 2020    HGB 12.1 2020    HCT 35.0

## 2020-05-08 NOTE — PROGRESS NOTES
consume >/=50% of meals and ONS this admission  Monitoring: Meal Intake , Mental Status/Confusion , Supplement Intake  or Weight      OBJECTIVE DATA:  · Nutrition-Focused Physical Findings: BM 5/8; pleasantly confused, +2 BLE edema   · Wounds None      Past Medical History:   Diagnosis Date    Adenomatous polyp of colon 1/29/2019    Asymptomatic varicose veins     Atrial fibrillation (HCC)     Dysthymic disorder     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)     Screening mammogram 12/8/2009    (Left)Benign        ANTHROPOMETRICS  Current  5'6\"  Current    Lack of data  Admission weight:   bedscale   Ideal Bodyweight 130 lb   UBW ~150-150 lb per EMR review       BMI  ILIANA    Wt Readings from Last 50 Encounters:   05/01/20 133 lb 9.6 oz (60.6 kg)   04/01/20 138 lb 0.1 oz (62.6 kg)   03/24/20 138 lb (62.6 kg)   03/15/20 148 lb (67.1 kg)   03/12/20 148 lb (67.1 kg)   02/05/20 149 lb 14.6 oz (68 kg)   01/16/20 149 lb 14.6 oz (68 kg)   01/14/20 150 lb (68 kg)   12/21/19 155 lb (70.3 kg)   12/17/19 155 lb (70.3 kg)   07/29/19 156 lb (70.8 kg)   06/17/19 156 lb (70.8 kg)           COMPARATIVE STANDARDS  Estimated Total Kcals/Day : 25-30  Ideal Bodyweight  (59 kg) 7453-6307 kcal    Estimated Total Protein (g/day) : 1.0-1.2 Ideal Bodyweight  (59 kg) 59-70 g/day  Estimated Daily Total Fluid (ml/day): 1 mL/kcal per day     Food / Nutrition-Related History  Pre-Admission / Home Diet:  Pre-Admission/Home Diet: General   Home Supplements / Herbals:    none noted  Food Restrictions / Cultural Requests:    none noted    Diet Orders / Intake / Nutrition Support  Current diet/supplement order: DIET CARDIAC;     NSG Recorded PO:   PO Fluids P.O.: 120 mL  PO Intake: %   ONS: None ordered     NUTRITION RISK LEVEL: Risk Level:  Moderate    Nalini Aldana LD  [place]:  [phone]  Office:  [phone]

## 2020-05-08 NOTE — PLAN OF CARE
Problem: Pain:  Goal: Pain level will decrease  Description: Pain level will decrease  5/8/2020 0940 by Hay Ewing RN  Outcome: Met This Shift  Note: Denies pain     Problem: Falls - Risk of:  Goal: Will remain free from falls  Description: Will remain free from falls  5/8/2020 0940 by Hay Ewing RN  Outcome: Ongoing  Note: Patient has recent history of falls at home. No falls since admission. Patient A/Ox4. Fall precautions in place. Call light in reach. Calls out for assistance. Chair alarm activated. Non skid footwear on. Problem: Infection - Surgical Site:  Goal: Will show no infection signs and symptoms  Description: Will show no infection signs and symptoms  5/8/2020 0940 by Hay Ewing RN  Outcome: Ongoing  Note: Site is clean/dry/intact.

## 2020-05-08 NOTE — CARE COORDINATION
Case Management Assessment           Initial Evaluation         Date / Time of Evaluation: 5/8/2020 2:42 PM  Assessment Completed by: Doe Wells    Patient is a 69 y/o female admitted with DX of Debility. [facility] - with [facility]. Patient has never used home health care and does not own a rolling walker - team suggests she will need one prior to d/c. Patient was engaging when SW reviewed discharge planning and when asked if she lives with her  (to confirm who she lives with) the patient stated \" [place] SW then asked [place] located. \" Patient replied, \"I don't know where the mountain is, but he goes there every day. \" When SW asked if her  was in another state or city the patient then said, \"no. \" SW asked if her  lives in the [facility] where she lives, and she stated \"oh, yes, he sleeps there every night. \" SW was able to confirm that her description [place] \"[place]\" is her  leaves to go to work and that he is a builder, per her description. When SW read the phone number listed on demographic sheet to verify the patient's information, the patient picked up a pencil and when SW read off the phone number listed as her home number, she wrote it down on a piece of paper and then looked up at SW and nodded affirmative that the number was correct. As if writing it down helped her process this information. SW will continue to follow to update patient and  and assist with discharge planning. Patient Name: Naina Niño     YOB: 1940  Diagnosis: Debility [R53.81]  Debility [R53.81]     Date / Time: 5/7/2020  3:24 PM    Patient Admission Status: Inpatient    If patient is discharged prior to next notation, then this note serves as note for discharge by case management.      Current PCP: Kamala Wu MD  Clinic Patient: No    Chart Reviewed: Yes  Patient/ Family Interviewed: Yes    Initial their way out of the hospital at discharge, or pharmacy can deliver to the bedside if staff is available. (payment due at time of pick-up or delivery - cash, check, or card accepted)     Able to afford home medications/ co-pay costs: Yes    ADLS  Support Systems: Family Members, Spouse/Significant Other    PT AM-PAC:  19 /24  OT AM-PAC:   20/24    HOUSING  Home Environment: Lives with  in condo  Steps: None    Plans to RETURN to current housing: Yes  Barriers to RETURNING to current housing: None    Home Care Information  Currently ACTIVE with [address]: No    DISCHARGE PLAN:  Disposition: Home with [address]: TBD     Transportation PLAN for discharge: family     Factors facilitating achievement of predicted outcomes: Family support and Motivated    Barriers to discharge: Cognitive deficit and unstable ambulation/balance    Additional Case Management Notes: Patient plans to return home with . The Plan for Transition of Care is related to the following treatment goals of Debility [R53.81]  Debility [R53.81]    The Patient and/or patient representative Eugene German and her family were provided with a choice of provider and agrees with the discharge plan Yes    Freedom of choice list was provided with basic dialogue that supports the patient's individualized plan of care/goals and shares the quality data associated with the providers.  Yes    Care Transition patient: Yes    FAUSTO Neal, [facility]  Case Management Department  Ph: [phone]  Fax: [phone]

## 2020-05-08 NOTE — PROGRESS NOTES
Shift assessment complete. VSS. A/Ox4. Fall precautions in place. Denies pain or discomforts. Call light in reach. Will continue to monitor.      Vitals:    05/08/20 0923   BP: 96/62   Pulse: 65   Resp: 16   Temp: 98.3 °F (36.8 °C)   SpO2: 95%

## 2020-05-08 NOTE — PROGRESS NOTES
Physical Therapy    Facility/Department: [facility] ACUTE REHAB UNIT  Initial Assessment    NAME: Raad Aquino  : 1940  MRN: [id number]    Date of Service: 2020    Discharge Recommendations:      PT Equipment Recommendations  Equipment Needed: Yes  Mobility Devices: Jenean [place]: Rolling    Assessment   Body structures, Functions, Activity limitations: Decreased functional mobility ; Decreased safe awareness;Decreased strength;Decreased endurance;Decreased balance;Decreased cognition  Assessment: Pt presents from home with spouse and frequent falls. s/p pacemaker placement  . PMH afib, cerebral amyloid angiopathy, neurocognitive disorder. Pt tolerated treament well and is supervision for all mobility with use of RW. Pt is a high fall risk due to balance and cognition. Pt sigificantly below baseline and requires continued skilled PT services in order to incerase balance, functional mobility and facilitate safe dc home. Treatment Diagnosis: decreased independence with functional mobility  Prognosis: Good  Decision Making: Medium Complexity  PT Education: Goals;PT Role;Plan of Care;Transfer Training;Orientation;Gait Training;Functional Mobility Training; Adaptive Device Training;General Safety  Patient Education: patient verbalized understanding. REQUIRES PT FOLLOW UP: Yes  Activity Tolerance  Activity Tolerance: Patient Tolerated treatment well       Patient Diagnosis(es): There were no encounter diagnoses. has a past medical history of Adenomatous polyp of colon, Asymptomatic varicose veins, Atrial fibrillation ([address] Dysthymic disorder, 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), and Screening mammogram.   has a past surgical history that includes Colonoscopy (2008) and hernia repair.     Restrictions  Restrictions/Precautions  Required Braces or Orthoses?: No  Position Activity Restriction  Other position/activity restrictions: up with assist, pt may shower  Vision/Hearing  Vision: Within Functional week: 5 days a week 90 min / day   Current Treatment Recommendations: Strengthening, Transfer Training, Endurance Training, Neuromuscular Re-education, Balance Training, Functional Mobility Training, Gait Training, Home Exercise Program, Safety Education & Training, IADL Training, Stair training  Safety Devices  Type of devices: Nurse notified, Call light within reach, Chair alarm in place, Left in chair, Gait belt    G-Code    Second Session :Pt seated in chair and agreeable to therapy this date . Pt ambulated 2x 200 ft with no AD and CGA. Pt with cues for posture and step length. Pt with 1 LOB pt able to self correct. Pt performed dynamic balance reaching outside RAFFAELE to catch and throw ball x 5 mins with no LOB. Pt then performed dynamic balance catching and throwing ball with lateral stepping bilaterally with CGA. 1 LOB min A to correct. Pt left seated in chair , call light in reach and bed alarm on .                          AM-PAC Score             Goals  Short term goals  Time Frame for Short term goals: STG=LTG  Long term goals  Time Frame for Long term goals : 7-10 days  Long term goal 1: Pt will ambulate 400 ft without AD independently  Long term goal 2: Pt will transfer independently sit <> stand   Long term goal 3: Pt will ascend and descend 12 steps independently  Long term goal 4: Pt will be low fall risk on TUG        Therapy Time   Individual Concurrent Group Co-treatment   Time In 0730         Time Out 0830         Minutes 60           Second Session Therapy Time:   Individual Concurrent Group Co-treatment   Time In 1115         Time Out 1145         Minutes 30           Timed Code Treatment Minutes:  60+30  Total Treatment Minutes:  [address],

## 2020-05-09 LAB
BASOPHILS ABSOLUTE: 0 K/UL (ref 0–0.2)
BASOPHILS RELATIVE PERCENT: 0 %
EOSINOPHILS ABSOLUTE: 0.1 K/UL (ref 0–0.6)
EOSINOPHILS RELATIVE PERCENT: 3 %
HCT VFR BLD CALC: 32.6 % (ref 36–48)
HEMOGLOBIN: 11.3 G/DL (ref 12–16)
LYMPHOCYTES ABSOLUTE: 1.2 K/UL (ref 1–5.1)
LYMPHOCYTES RELATIVE PERCENT: 28 %
MCH RBC QN AUTO: 32.9 PG (ref 26–34)
MCHC RBC AUTO-ENTMCNC: 34.7 G/DL (ref 31–36)
MCV RBC AUTO: 94.6 FL (ref 80–100)
MONOCYTES ABSOLUTE: 0.5 K/UL (ref 0–1.3)
MONOCYTES RELATIVE PERCENT: 11 %
NEUTROPHILS ABSOLUTE: 2.5 K/UL (ref 1.7–7.7)
NEUTROPHILS RELATIVE PERCENT: 58 %
PDW BLD-RTO: 14.9 % (ref 12.4–15.4)
PLATELET # BLD: 150 K/UL (ref 135–450)
PMV BLD AUTO: 9.2 FL (ref 5–10.5)
RBC # BLD: 3.45 M/UL (ref 4–5.2)
WBC # BLD: 4.3 K/UL (ref 4–11)

## 2020-05-09 PROCEDURE — 85025 COMPLETE CBC W/AUTO DIFF WBC: CPT

## 2020-05-09 PROCEDURE — 36415 COLL VENOUS BLD VENIPUNCTURE: CPT

## 2020-05-09 PROCEDURE — 97116 GAIT TRAINING THERAPY: CPT

## 2020-05-09 PROCEDURE — 97112 NEUROMUSCULAR REEDUCATION: CPT

## 2020-05-09 PROCEDURE — 1280000000 HC REHAB R&B

## 2020-05-09 PROCEDURE — 6370000000 HC RX 637 (ALT 250 FOR IP): Performed by: PHYSICAL MEDICINE & REHABILITATION

## 2020-05-09 PROCEDURE — 97530 THERAPEUTIC ACTIVITIES: CPT

## 2020-05-09 PROCEDURE — 97110 THERAPEUTIC EXERCISES: CPT

## 2020-05-09 PROCEDURE — 97535 SELF CARE MNGMENT TRAINING: CPT

## 2020-05-09 RX ADMIN — MULTIPLE VITAMINS W/ MINERALS TAB 1 TABLET: TAB at 08:51

## 2020-05-09 RX ADMIN — DONEPEZIL HYDROCHLORIDE 5 MG: 10 TABLET, FILM COATED ORAL at 20:16

## 2020-05-09 RX ADMIN — POTASSIUM CHLORIDE 10 MEQ: 750 TABLET, EXTENDED RELEASE ORAL at 08:51

## 2020-05-09 RX ADMIN — PANTOPRAZOLE SODIUM 40 MG: 40 TABLET, DELAYED RELEASE ORAL at 06:24

## 2020-05-09 ASSESSMENT — PAIN SCALES - GENERAL
PAINLEVEL_OUTOF10: 0

## 2020-05-09 NOTE — PROGRESS NOTES
sitting on swiss ball for balance/stability and strengthening x 20 each bilaterally. 2nd session: Pt was found sitting in a chair and willing to work with therapist.  She stated that she wanted to go to the restroom and was supervision for mobility to the restroom and did not require assist for toileting tasks. She was then directed to ambulate in winn x 200' focusing on improved heel strike and foot clearance with supervision and repetitive cueing to remember her gait training cues. She was directed in taking the stairs without HHA to work on balance which she did well with only 1 LOB SBA x 10 bilaterally. She was also given gait training in stepping over cones with cueing to hit with her heel and get close to the cone before stepping over as she tended to take too large of a step. Therapist directed her in side stepping and grapevining steps for balance with pt SBA without LOB. At end of session pt left in chair with all needs met and call light within reach, chair alarm on. Goals  Short term goals  Time Frame for Short term goals: STG=LTG  Short term goal 1: Pt will transfer with mod I and walker safely. - ongoing 5/9  Short term goal 2: Pt will ambulate with walker x 150 ft with supervision. - ongoing 5/9  Short term goal 3: Pt will perform bed mobility mod I with HOB flat.  - ongoing 5/9  Long term goals  Time Frame for Long term goals : 7-10 days  Long term goal 1: Pt will ambulate 400 ft without AD independently  Long term goal 2: Pt will transfer independently sit <> stand   Long term goal 3: Pt will ascend and descend 12 steps independently  Long term goal 4: Pt will be low fall risk on TUG     Plan    Plan  Times per week: 5 days a week 90 min / day   Times per day: Daily  Current Treatment Recommendations: Strengthening, Transfer Training, Endurance Training, Neuromuscular Re-education, Balance Training, Functional Mobility Training, Gait Training, Home Exercise Program, Safety Education & Training, IADL Training, Stair training  Safety Devices  Type of devices: Call light within reach, Chair alarm in place, Left in chair     Therapy Time   Individual Concurrent Group Co-treatment   Time In 0945         Time Out 1045         Minutes 60         Timed Code Treatment Minutes: 60 [address] Time:   [facility]   Time In [facility]         Minutes 30           Timed Code Treatment Minutes:  90    Total Treatment Minutes:  [facility] [place], PT

## 2020-05-09 NOTE — PROGRESS NOTES
General  Chart Reviewed: Yes  Patient assessed for rehabilitation services?: Yes  Additional Pertinent Hx: 68 y.o. F admit to ARU 5/7. Pt presented to [facility] 4/29 s/p multiple falls at home. Admitted for Recurrent Falls 2/2 medications vs amyloid angiopathy, supratherapeutic INR, hypokalemia. CT head (-); XR L shoulder: Chronic distal left clavicle fracture, stable. MRI brain appeared stable compared to 3/15/20. Underwent pacemaker placement 5/6 PMHx: afib, cerebral amyloid angiopathy, neurocognitive disorder   Family / Caregiver Present: No  Referring Practitioner: Lisandra  Diagnosis: debility  Subjective  Subjective: Pt seated in recliner chair upon OT arrival. Pt very pleasant and agreeable to therapy. Pt stated \"I think I should use the restroom. Oh yesterday was disastrous. They gave me a pill that just cleaned me right out. \"  Vital Signs  Patient Currently in Pain: No     Orientation  Orientation  Overall Orientation Status: Impaired  Orientation Level: Oriented to situation;Oriented to person;Disoriented to place; Disoriented to time     Objective    ADL  Feeding: Independent(Breakfast tray delivered at end of session. Pt demo'd ability to open various containers)  Grooming: Supervision(Pt completed oral care in stance at sink w/spvn)  UE Bathing: Supervision;Setup;Verbal cueing(Pt washed all components of UE bathing seated on TTB. Cues for thoroughness.)  LE Bathing: Supervision;Setup;Verbal cueing(Pt washed all components of LE bathing seated on TTB. Pt weight shifted L and R to wash mauricio area. Cues for thoroughness. )  UE Dressing: Supervision;Verbal cueing; Increased time to complete(Pt retrieved shirt and bra from closet and donned seated on TTB. Pt required increased time to don bra and cues for easier front hooking technique.)  LE Dressing: Stand by assistance;Setup(Pt retrieved pants and underwear from closet, threaded while seated on TTB, and pulled up in stance w/SBA.  Pt donned tenso shapes and non slip Frame for Short term goals: STG=LTG  Long term goals  Time Frame for Long term goals : 5-7 days  Long term goal 1: Complete full body bathing in shower mod I   Long term goal 2: Complete full body dressing mod I   Long term goal 3: Maintain static and dynamic stance mod I during functional tasks  Long term goal 4: Transfer to/from toilet, tub/shower, and walk-in shower mod I   Long term goal 5:  Independently sit to complete lower body tasks for fall prevention  Patient Goals   Patient goals : no goal stated       Therapy Time   Individual Concurrent Group Co-treatment   Time In 0730         Time Out 0830         Minutes 60         Timed Code Treatment Minutes: 60 Minutes         Second Session Therapy Time:   Individual Concurrent Group Co-treatment   Time In  1100        Time Out  1130         Minutes  30           Timed Code Treatment Minutes:  30 minutes    Total Treatment Minutes:  60 + 30 = 90 minutes total      Shell Carmona

## 2020-05-09 NOTE — PLAN OF CARE
Problem: Falls - Risk of:  Goal: Will remain free from falls  Description: Will remain free from falls  5/8/2020 2126 by Bethany Almeida RN  Note: Patient remains free from falls, fall prevention measures in place, chair/bed alarm engaged call bell and bedside table in reach. Pt re-educated on stay with me and ringing for assistance getting up. Will continue to monitor. For patient safety, Visual Surveillance is in place, reviewed with patient/family, verbalized understanding. Problem: Infection - Surgical Site:  Goal: Will show no infection signs and symptoms  Description: Will show no infection signs and symptoms  5/8/2020 2126 by Bethany Almeida RN  Note: NO s/s infection at surgical site, site glued and intact, some bruising. VS stable. Will continue to monitor.

## 2020-05-09 NOTE — PROGRESS NOTES
Micra leadless pacer implanted 5/6/20. Device interrogation by company representative @ [facility] 5/7/20, next day post op check. See interrogation for further details. Device functioning as programmed.  96.9%. hx persistent AF and transient AVB w/ junctional escape rate in the low 30's. No observations based on current interrogation. Follow  Up  As scheduled in office/carelink. See PACEART report under Cardiology tab.

## 2020-05-10 PROCEDURE — 1280000000 HC REHAB R&B

## 2020-05-10 PROCEDURE — 6370000000 HC RX 637 (ALT 250 FOR IP): Performed by: PHYSICAL MEDICINE & REHABILITATION

## 2020-05-10 RX ORDER — LANOLIN ALCOHOL/MO/W.PET/CERES
CREAM (GRAM) TOPICAL 2 TIMES DAILY
Status: DISCONTINUED | OUTPATIENT
Start: 2020-05-10 | End: 2020-05-14 | Stop reason: HOSPADM

## 2020-05-10 RX ADMIN — DONEPEZIL HYDROCHLORIDE 5 MG: 10 TABLET, FILM COATED ORAL at 20:48

## 2020-05-10 RX ADMIN — Medication: at 20:49

## 2020-05-10 RX ADMIN — POTASSIUM CHLORIDE 10 MEQ: 750 TABLET, EXTENDED RELEASE ORAL at 08:33

## 2020-05-10 RX ADMIN — PANTOPRAZOLE SODIUM 40 MG: 40 TABLET, DELAYED RELEASE ORAL at 06:13

## 2020-05-10 RX ADMIN — Medication: at 08:33

## 2020-05-10 RX ADMIN — MULTIPLE VITAMINS W/ MINERALS TAB 1 TABLET: TAB at 08:33

## 2020-05-10 ASSESSMENT — PAIN SCALES - GENERAL: PAINLEVEL_OUTOF10: 0

## 2020-05-10 NOTE — PLAN OF CARE
Problem: Falls - Risk of:  Goal: Will remain free from falls  Description: Will remain free from falls  5/9/2020 2232 by Zoltan Razo RN  Note: Patient remains free from falls, fall prevention measures in place, bed/chair alarm engaged, call bell, phone and bedside table in reach. Patient thus far has rung appropriately for assistance with ambulation to BR. Will continue to monitor. For patient safety, Visual Surveillance is in place, reviewed with patient/family, verbalized understanding. Problem: Infection - Surgical Site:  Intervention: Infection risk assessment  Note: No s/s infection at R groin incision site. Incision is approximated. Bruising present. VS stable. Will continue to monitor.

## 2020-05-11 ENCOUNTER — TELEPHONE (OUTPATIENT)
Dept: INTERNAL MEDICINE CLINIC | Age: 80
End: 2020-05-11

## 2020-05-11 ENCOUNTER — NURSE ONLY (OUTPATIENT)
Dept: CARDIOLOGY CLINIC | Age: 80
End: 2020-05-11
Payer: MEDICARE

## 2020-05-11 LAB
ANION GAP SERPL CALCULATED.3IONS-SCNC: 14 MMOL/L (ref 3–16)
BASOPHILS ABSOLUTE: 0 K/UL (ref 0–0.2)
BASOPHILS RELATIVE PERCENT: 0.6 %
BUN BLDV-MCNC: 5 MG/DL (ref 7–20)
CALCIUM SERPL-MCNC: 9.4 MG/DL (ref 8.3–10.6)
CHLORIDE BLD-SCNC: 103 MMOL/L (ref 99–110)
CO2: 23 MMOL/L (ref 21–32)
CREAT SERPL-MCNC: <0.5 MG/DL (ref 0.6–1.2)
EOSINOPHILS ABSOLUTE: 0.1 K/UL (ref 0–0.6)
EOSINOPHILS RELATIVE PERCENT: 1.6 %
GFR AFRICAN AMERICAN: >60
GFR NON-AFRICAN AMERICAN: >60
GLUCOSE BLD-MCNC: 86 MG/DL (ref 70–99)
HCT VFR BLD CALC: 35 % (ref 36–48)
HEMOGLOBIN: 11.9 G/DL (ref 12–16)
LYMPHOCYTES ABSOLUTE: 1 K/UL (ref 1–5.1)
LYMPHOCYTES RELATIVE PERCENT: 17.9 %
MAGNESIUM: 1.9 MG/DL (ref 1.8–2.4)
MCH RBC QN AUTO: 32.7 PG (ref 26–34)
MCHC RBC AUTO-ENTMCNC: 34.1 G/DL (ref 31–36)
MCV RBC AUTO: 95.8 FL (ref 80–100)
MONOCYTES ABSOLUTE: 0.5 K/UL (ref 0–1.3)
MONOCYTES RELATIVE PERCENT: 9.4 %
NEUTROPHILS ABSOLUTE: 3.7 K/UL (ref 1.7–7.7)
NEUTROPHILS RELATIVE PERCENT: 70.5 %
PDW BLD-RTO: 15.2 % (ref 12.4–15.4)
PLATELET # BLD: 166 K/UL (ref 135–450)
PMV BLD AUTO: 9.1 FL (ref 5–10.5)
POTASSIUM REFLEX MAGNESIUM: 3.4 MMOL/L (ref 3.5–5.1)
RBC # BLD: 3.65 M/UL (ref 4–5.2)
SODIUM BLD-SCNC: 140 MMOL/L (ref 136–145)
WBC # BLD: 5.3 K/UL (ref 4–11)

## 2020-05-11 PROCEDURE — 6370000000 HC RX 637 (ALT 250 FOR IP): Performed by: INTERNAL MEDICINE

## 2020-05-11 PROCEDURE — 36415 COLL VENOUS BLD VENIPUNCTURE: CPT

## 2020-05-11 PROCEDURE — 97535 SELF CARE MNGMENT TRAINING: CPT

## 2020-05-11 PROCEDURE — 97116 GAIT TRAINING THERAPY: CPT

## 2020-05-11 PROCEDURE — 97530 THERAPEUTIC ACTIVITIES: CPT

## 2020-05-11 PROCEDURE — 97110 THERAPEUTIC EXERCISES: CPT

## 2020-05-11 PROCEDURE — 99232 SBSQ HOSP IP/OBS MODERATE 35: CPT | Performed by: INTERNAL MEDICINE

## 2020-05-11 PROCEDURE — 83735 ASSAY OF MAGNESIUM: CPT

## 2020-05-11 PROCEDURE — 80048 BASIC METABOLIC PNL TOTAL CA: CPT

## 2020-05-11 PROCEDURE — 6370000000 HC RX 637 (ALT 250 FOR IP): Performed by: PHYSICAL MEDICINE & REHABILITATION

## 2020-05-11 PROCEDURE — 85025 COMPLETE CBC W/AUTO DIFF WBC: CPT

## 2020-05-11 PROCEDURE — 1280000000 HC REHAB R&B

## 2020-05-11 RX ORDER — MEMANTINE HYDROCHLORIDE 10 MG/1
10 TABLET ORAL 2 TIMES DAILY
Status: DISCONTINUED | OUTPATIENT
Start: 2020-05-11 | End: 2020-05-14 | Stop reason: HOSPADM

## 2020-05-11 RX ORDER — POTASSIUM CHLORIDE 20 MEQ/1
20 TABLET, EXTENDED RELEASE ORAL DAILY
Status: DISCONTINUED | OUTPATIENT
Start: 2020-05-11 | End: 2020-05-14 | Stop reason: HOSPADM

## 2020-05-11 RX ORDER — DONEPEZIL HYDROCHLORIDE 10 MG/1
10 TABLET, FILM COATED ORAL NIGHTLY
Status: DISCONTINUED | OUTPATIENT
Start: 2020-05-11 | End: 2020-05-14 | Stop reason: HOSPADM

## 2020-05-11 RX ADMIN — POTASSIUM CHLORIDE 20 MEQ: 20 TABLET, EXTENDED RELEASE ORAL at 08:59

## 2020-05-11 RX ADMIN — MEMANTINE HYDROCHLORIDE 10 MG: 10 TABLET ORAL at 22:47

## 2020-05-11 RX ADMIN — MEMANTINE HYDROCHLORIDE 10 MG: 10 TABLET ORAL at 09:09

## 2020-05-11 RX ADMIN — DONEPEZIL HYDROCHLORIDE 10 MG: 10 TABLET, FILM COATED ORAL at 22:46

## 2020-05-11 RX ADMIN — PANTOPRAZOLE SODIUM 40 MG: 40 TABLET, DELAYED RELEASE ORAL at 06:12

## 2020-05-11 RX ADMIN — MULTIPLE VITAMINS W/ MINERALS TAB 1 TABLET: TAB at 09:00

## 2020-05-11 RX ADMIN — Medication: at 09:01

## 2020-05-11 RX ADMIN — Medication: at 22:48

## 2020-05-11 ASSESSMENT — PAIN SCALES - GENERAL
PAINLEVEL_OUTOF10: 0

## 2020-05-11 NOTE — PATIENT CARE CONFERENCE
Inpatient Rehabilitation  Weekly Team Conference Note  The [address]  [phone]  Patient Name: Sharan Isaacs        MRN: [id number]    : 1940  (68 y.o.)  Gender: female   Referring Practitioner: Hies  Diagnosis: Falls frequently sp pacemaker     The team conference for this patient was held on 2020 at 10:30am by:  Pipe Stokes. DO Lisandra    PHYSICAL THERAPY:  Bed Mobility: Scooting: Supervision    Transfers:  Sit to Stand: Supervision(Pt sit to stand with supervision and no AD from recliner , chair, scifit, and bar height chair. )  Stand to sit: Supervision  Bed to Chair: Supervision(with Shawn GONGORA)  Comment: Dynamic balance ambulation without AD 5x50 ft ea backward, lateral ( right and left), and grape vine ( right and left ) with supervision and cues for righting reactions. Pt with 1 LOB able to self correct via stepping righting reaction. Dynamic balance on uneven surface ( blue foam) x 2 activities . Activity one holsing exercise ball in BUE and writting ABCs in air with CGA. No lob. Activity two alternating boucing/ catch and toss /catch with exercises ball with CGA. One LOB min A to correct. LE stremgth sit to stand no AD 2x5 reps with supervision. Ambulation 1  Surface: level tile, ramp  Device: No Device  Assistance: Supervision  Quality of Gait: cues for posture, heel strike and stpe length. Gait Deviations: Decreased step length, Decreased step height  Distance: 600 ft including ramp, 134 ft   Comments: inital cues for posture, 1 LOB with turn to right pt self corrected     Stairs  # Steps : 12  Stairs Height: 6\"  Rails: Right ascending  Device: No Device  Assistance: Supervision  Comment: cues to ensure foot clearance with stairs.     QM:  Roll Left and Right  Assistance Needed: Independent  CARE Score: 6  Discharge Goal: Independent  Sit to Lying  Assistance Needed: Supervision or touching assistance  CARE Score: 4  Discharge Goal: Independent  Lying to Sitting on Side of Bed  Assistance Needed: Supervision or touching assistance  CARE Score: 4  Discharge Goal: Independent  Sit to Stand  Assistance Needed: Supervision or touching assistance  CARE Score: 4  Discharge Goal: Independent  Chair/Bed-to-Chair Transfer  Assistance Needed: Supervision or touching assistance  CARE Score: 4  Discharge Goal: Independent  Car Transfer  Reason if not Attempted: Not attempted due to environmental limitations  CARE Score: 10  Discharge Goal: Independent  Walk 10 Feet  Assistance Needed: Supervision or touching assistance  CARE Score: 4  Discharge Goal: Independent  Walk 50 Feet with Two Turns  Assistance Needed: Supervision or touching assistance  CARE Score: 4  Discharge Goal: Independent  Walk 150 Feet  Assistance Needed: Supervision or touching assistance  CARE Score: 4  Discharge Goal: Independent  Walking 10 Feet on Uneven Surfaces  Assistance Needed: Supervision or touching assistance  CARE Score: 4  Discharge Goal: Independent  1 Step (Curb)  Assistance Needed: Supervision or touching assistance  CARE Score: 4  Discharge Goal: Independent  4 Steps  Assistance Needed: Supervision or touching assistance  CARE Score: 4  Discharge Goal: Independent  12 Steps  Assistance Needed: Supervision or touching assistance  CARE Score: 4  Discharge Goal: Independent  Picking Up Object  Assistance Needed: Supervision or touching assistance  CARE Score: 4  Discharge Goal: Independent  Wheelchair Ability  Uses a Wheelchair and/or Scooter?: No    SPEECH THERAPY:  Diet Level:DIET CARDIAC; Dietary Nutrition Supplements: Standard High Calorie Oral Supplement    OCCUPATIONAL THERAPY:  ADL  Feeding: Independent(Breakfast tray delivered at end of session. Pt demo'd ability to open various containers)  Grooming: Supervision(oral care in stance at sink)  UE Bathing: Supervision, Setup, Verbal cueing(Pt washed all components of UE bathing seated on TTB.  Cues for thoroughness.)  LE Bathing: Supervision, Setup, Verbal cueing(Pt washed all components of LE bathing seated on TTB. Pt weight shifted L and R to wash mauricio area. Cues for thoroughness. )  UE Dressing: Supervision, Verbal cueing, Increased time to complete(Pt retrieved shirt and bra from closet and donned seated on TTB. Pt required increased time to don bra and cues for easier front hooking technique.)  LE Dressing: Supervision, Setup(doffed and donned pants seated on recliner; in stance to manage over hips; pt doffed and donned socks seated with figure 4 i'ly)  Toileting: Supervision(continent of bladder; pericare hygiene completed seated)  Additional Comments: Increased BLE observed. OT provided pt with tenso shapes and instructed pt on how to don in morning and remove at night. Toilet Transfers: Toilet Transfers  Toilet - Technique: Ambulating  Equipment Used: Standard toilet  Toilet Transfer: Supervision    Tub/ShowerTransfers:  Shower Transfers  Shower - Transfer Type: To and From  Shower - Transfer To:  Transfer tub bench  Shower - Technique: Ambulating  Shower Transfers: Supervision  Shower Transfers Comments: + grab bars    QM:  Eating  Assistance Needed: Independent  CARE Score: 6  Discharge Goal: Independent  Oral Hygiene  Assistance Needed: Independent  CARE Score: 6  Discharge Goal: Independent  Toileting Hygiene  Assistance Needed: Supervision or touching assistance  CARE Score: 4  Discharge Goal: Independent  Toilet Transfer  Assistance Needed: Supervision or touching assistance  CARE Score: 4  Discharge Goal: Independent  Shower/Bathe Self  Assistance Needed: Supervision or touching assistance  CARE Score: 4  Discharge Goal: Independent  Upper Body Dressing  Assistance Needed: Setup or clean-up assistance  CARE Score: 5  Discharge Goal: Independent  Lower Body Dressing  Assistance Needed: Supervision or touching assistance  CARE Score: 4  Discharge Goal: Independent  Putting On/Taking Off that the number was correct. As if writing it down helped her process this information. SW will continue to follow to update patient and  and assist with discharge planning. TEAM SUMMARY: Will continue with current poc & goals until anticipated d/c date of 5/14/2020. Discharge Plan:  Risk for Readmission:  Moderate  Critical Items: If High Risk, consider the following recommendations:Patient not at high risk  Estimated Length of Stay:10 days  Destination: home health  Services at Discharge: [facility], Physical Therapy and Occupational Therapy 2x week  Equipment at Discharge: TBD  Community Resources:   Factors facilitating achievement of predicted outcomes: Family support, Cooperative and Pleasant  Barriers to the achievement of predicted outcomes: Confusion, Cognitive deficit and Limited safety awareness    Interdisciplinary Individualized Plan of Care Review:    · Continue Current Plan of Care: Yes    · Modifications:_____________________________     Special Needs in the Upcoming Week:    [x] Family/Caregiver Education  [] Home visit  []Therapeutic Pass   [] Consults:_______   [] Family Training  [] Other;_______     Patient Goals for Rehab stay:  1.  increase balance      Rehab Team Goals for patient for the Upcoming Week:  1. independent with mobility no ADL's  2. increase independence with ADLs    Rehab Team Members in attendance for Team Conference:  :  Frank Bingham RN    Therapy Manager:  Ruel Wilder, PT, DPT    Social Work:  Ross Martin, MSW, LSW    Nursing:  Elisa Paredes RN    Therapy:  Shree Leon, PT, DPT  Scarlett Rosales, PT, DPT    Trung Gusman, OTR/L  Ariadna Guerrier, OTR/L  Alyse, OTR/L    Kati Parada MA/CCC-SLP    Nutrition:  Petey Loja, RD LD    I approve the established interdisciplinary plan of care as documented within the medical record of [facility].     MD Jocelin Wahl, GONZÁLEZ. M.P.H  PM&R  5/13/2020  11:24 AM

## 2020-05-11 NOTE — PROGRESS NOTES
Pt A&Ox4. VSS. CGA w/ gait belt. Bed side table and call light within reach. Fall precautions in place. Bed in lowest position. No further needs at this time. Will continue to monitor.

## 2020-05-11 NOTE — PROGRESS NOTES
General  Chart Reviewed: Yes  Patient assessed for rehabilitation services?: Yes  Additional Pertinent Hx: 68 y.o. F admit to ARU 5/7. Pt presented to [facility] 4/29 s/p multiple falls at home. Admitted for Recurrent Falls 2/2 medications vs amyloid angiopathy, supratherapeutic INR, hypokalemia. CT head (-); XR L shoulder: Chronic distal left clavicle fracture, stable. MRI brain appeared stable compared to 3/15/20. Underwent pacemaker placement 5/6 PMHx: afib, cerebral amyloid angiopathy, neurocognitive disorder   Family / Caregiver Present: No  Referring Practitioner: Lisandra  Diagnosis: debility  Subjective  Subjective: Pt seated in recliner chair upon OT arrival. Pt very pleasant and agreeable to therapy. Pt requesting to change pants d/t the current ones being too tight.   Vital Signs  Patient Currently in Pain: Denies     Orientation  Orientation  Overall Orientation Status: Within Functional Limits  Objective    ADL  Grooming: Supervision(oral care in stance at sink)  LE Dressing: Supervision;Setup(doffed and donned pants seated on recliner; in stance to manage over hips; pt doffed and donned socks seated with figure 4 i'ly)  Toileting: Supervision(continent of bladder; pericare hygiene completed seated)        Balance  Standing Balance: Stand by assistance(progressing to spvn)  Standing Balance  Time: ~25 minutes total  Activity: functional mobility to/from bathroom; in stance for ADLs, functional mobility to/from therapy gym; dynamic standing activities: 1) x 20 bounce and catch red therapy swiss ball with dual tasking to name items from a provided category with CGA-SBA; 2) x 20 underhand throw and catch ith dual tasking to name items from a provided category with CGA-SBA 3) x 30 throwing red therapy swiss ball at rebounder with SBA and no overt LOB; completion of static stance on airex for standing tolerance and self righting to play card game involved in word recall and short term memory which pt required VCs and

## 2020-05-11 NOTE — PROGRESS NOTES
3.65 05/11/2020    HGB 11.9 05/11/2020    HCT 35.0 05/11/2020    MCV 95.8 05/11/2020    MCH 32.7 05/11/2020    MCHC 34.1 05/11/2020    RDW 15.2 05/11/2020     05/11/2020    MPV 9.1 05/11/2020     CMP:    Lab Results   Component Value Date     05/11/2020    K 3.4 05/11/2020     05/11/2020    CO2 23 05/11/2020    BUN 5 05/11/2020    CREATININE <0.5 05/11/2020    GFRAA >60 05/11/2020    GFRAA >60 05/30/2013    AGRATIO 1.9 04/07/2020    LABGLOM >60 05/11/2020    GLUCOSE 86 05/11/2020    PROT 6.0 04/07/2020    PROT 6.8 05/29/2012    LABALBU 2.9 05/07/2020    CALCIUM 9.4 05/11/2020    BILITOT 0.5 04/07/2020    ALKPHOS 107 04/07/2020    AST 28 04/07/2020    ALT 19 04/07/2020         ASSESSMENT AND PLAN      Principal Problem:    Debility  Plan: Stable to continue rehab  Active Problems:    Atrial fibrillation ([address]  Plan: Now paced and stable    Cerebral amyloid angiopathy (CODE)  Plan:  Will increase aricept and start namenda    S/P placement of cardiac pacemaker  Plan: stable and functioning well

## 2020-05-11 NOTE — PROGRESS NOTES
Physical Therapy  Facility/Department: [facility] ACUTE REHAB UNIT  Daily Treatment Note  NAME: Karlee Hoff  : 1940  MRN: [id number]    Date of Service: 2020    Discharge Recommendations:  24 hour supervision or assist, Home with Home health PT        Assessment   Body structures, Functions, Activity limitations: Decreased functional mobility ; Decreased safe awareness;Decreased strength;Decreased endurance;Decreased balance;Decreased cognition  Assessment: Pt tolerated treatment well. Pt will increased dynamic balance and activity tolerance this date. Pt no longer requires the use of RW for gait and transfers. Pt continues to has some confusion but this may be the patient's baseline. Pt contiues to be below baseline and requires continued skilled PT services in order to increase balance , functional mobility and maximize rehab potential.   Treatment Diagnosis: decreased independence with functional mobility  Prognosis: Good  Decision Making: Medium Complexity  PT Education: Goals;PT Role;Plan of Care;Transfer Training;Orientation;Gait Training;Functional Mobility Training; Adaptive Device Training;General Safety  Patient Education: patient verbalized understanding and needs reinforcement for heel strike with ambulation. REQUIRES PT FOLLOW UP: Yes  Activity Tolerance  Activity Tolerance: Patient Tolerated treatment well     Patient Diagnosis(es): There were no encounter diagnoses. has a past medical history of Adenomatous polyp of colon, Asymptomatic varicose veins, Atrial fibrillation ([address] Dysthymic disorder, 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), and Screening mammogram.   has a past surgical history that includes Colonoscopy (2008) and hernia repair.     Restrictions  Restrictions/Precautions  Restrictions/Precautions: Cardiac, Seizure, Fall Risk  Required Braces or Orthoses?: No  Position Activity Restriction  Other position/activity restrictions: up with assist, pt may shower  Subjective

## 2020-05-11 NOTE — PROGRESS NOTES
Department of Physical Medicine & Rehabilitation  Progress Note    Patient Identification:  Sharan Isaacs  [id number]  : 1940  Admit date: 2020    Chief Complaint: Debility    Subjective:   No new complaints overnight. Doing exteremly well in therapy today. Plan for discharge later this week. ROS: No f/c, n/v, cp     Objective:  Patient Vitals for the past 24 hrs:   BP Temp Temp src Pulse Resp SpO2 Weight   20 0856 102/64 96.8 °F (36 °C) Axillary 72 16 97 % --   20 0500 -- -- -- -- -- -- 141 lb 5 oz (64.1 kg)   05/10/20 2000 124/68 98.2 °F (36.8 °C) Oral 71 16 94 % --     Const: Alert. No distress, pleasant. HEENT: Normocephalic, atraumatic. Normal sclera/conjunctiva. MMM. CV: Regular rate and rhythm. Resp: No respiratory distress. Lungs CTAB. Abd: Soft, nontender, nondistended, NABS+   Ext: No edema. Neuro: Alert, oriented, appropriately interactive. Psych: Cooperative, appropriate mood and affect    Laboratory data: Available via EMR.    Last 24 hour lab  Recent Results (from the past 24 hour(s))   Basic Metabolic Panel w/ Reflex to MG    Collection Time: 20  6:36 AM   Result Value Ref Range    Sodium 140 136 - 145 mmol/L    Potassium reflex Magnesium 3.4 (L) 3.5 - 5.1 mmol/L    Chloride 103 99 - 110 mmol/L    CO2 23 21 - 32 mmol/L    Anion Gap 14 3 - 16    Glucose 86 70 - 99 mg/dL    BUN 5 (L) 7 - 20 mg/dL    CREATININE <0.5 (L) 0.6 - 1.2 mg/dL    GFR Non-African American >60 >60    GFR African American >60 >60    Calcium 9.4 8.3 - 10.6 mg/dL   CBC auto differential    Collection Time: 20  6:36 AM   Result Value Ref Range    WBC 5.3 4.0 - 11.0 K/uL    RBC 3.65 (L) 4.00 - 5.20 M/uL    Hemoglobin 11.9 (L) 12.0 - 16.0 g/dL    Hematocrit 35.0 (L) 36.0 - 48.0 %    MCV 95.8 80.0 - 100.0 fL    MCH 32.7 26.0 - 34.0 pg    MCHC 34.1 31.0 - 36.0 g/dL    RDW 15.2 12.4 - 15.4 %    Platelets 735 628 - 510 K/uL    MPV 9.1 5.0 - 10.5 fL    Neutrophils % 70.5 %    Lymphocytes %

## 2020-05-12 ENCOUNTER — TELEPHONE (OUTPATIENT)
Dept: INTERNAL MEDICINE CLINIC | Age: 80
End: 2020-05-12

## 2020-05-12 PROBLEM — I87.2 CHRONIC VENOUS INSUFFICIENCY OF LOWER EXTREMITY: Status: ACTIVE | Noted: 2020-05-12

## 2020-05-12 PROCEDURE — 97530 THERAPEUTIC ACTIVITIES: CPT

## 2020-05-12 PROCEDURE — 6370000000 HC RX 637 (ALT 250 FOR IP): Performed by: PHYSICAL MEDICINE & REHABILITATION

## 2020-05-12 PROCEDURE — 6370000000 HC RX 637 (ALT 250 FOR IP): Performed by: INTERNAL MEDICINE

## 2020-05-12 PROCEDURE — 99232 SBSQ HOSP IP/OBS MODERATE 35: CPT | Performed by: INTERNAL MEDICINE

## 2020-05-12 PROCEDURE — 97116 GAIT TRAINING THERAPY: CPT

## 2020-05-12 PROCEDURE — 97535 SELF CARE MNGMENT TRAINING: CPT

## 2020-05-12 PROCEDURE — 97110 THERAPEUTIC EXERCISES: CPT

## 2020-05-12 PROCEDURE — 1280000000 HC REHAB R&B

## 2020-05-12 RX ORDER — HYDROCHLOROTHIAZIDE 25 MG/1
25 TABLET ORAL DAILY
Status: DISCONTINUED | OUTPATIENT
Start: 2020-05-12 | End: 2020-05-14 | Stop reason: HOSPADM

## 2020-05-12 RX ORDER — SPIRONOLACTONE 25 MG/1
25 TABLET ORAL DAILY
Status: DISCONTINUED | OUTPATIENT
Start: 2020-05-12 | End: 2020-05-14 | Stop reason: HOSPADM

## 2020-05-12 RX ADMIN — Medication: at 21:20

## 2020-05-12 RX ADMIN — MEMANTINE HYDROCHLORIDE 10 MG: 10 TABLET ORAL at 08:41

## 2020-05-12 RX ADMIN — POTASSIUM CHLORIDE 20 MEQ: 20 TABLET, EXTENDED RELEASE ORAL at 08:40

## 2020-05-12 RX ADMIN — Medication: at 08:42

## 2020-05-12 RX ADMIN — MEMANTINE HYDROCHLORIDE 10 MG: 10 TABLET ORAL at 21:19

## 2020-05-12 RX ADMIN — SPIRONOLACTONE 25 MG: 25 TABLET, FILM COATED ORAL at 11:14

## 2020-05-12 RX ADMIN — MULTIPLE VITAMINS W/ MINERALS TAB 1 TABLET: TAB at 08:40

## 2020-05-12 RX ADMIN — PANTOPRAZOLE SODIUM 40 MG: 40 TABLET, DELAYED RELEASE ORAL at 06:39

## 2020-05-12 RX ADMIN — DONEPEZIL HYDROCHLORIDE 10 MG: 10 TABLET, FILM COATED ORAL at 21:19

## 2020-05-12 ASSESSMENT — PAIN SCALES - GENERAL
PAINLEVEL_OUTOF10: 0
PAINLEVEL_OUTOF10: 0

## 2020-05-12 ASSESSMENT — PAIN DESCRIPTION - PROGRESSION
CLINICAL_PROGRESSION: NOT CHANGED
CLINICAL_PROGRESSION: NOT CHANGED

## 2020-05-12 NOTE — PROGRESS NOTES
Yes  Additional Pertinent Hx: Pt presents from home with spouse and frequent falls. s/p pacemaker placement 5/6 . PMH afib, cerebral amyloid angiopathy, neurocognitive disorder  Family / Caregiver Present: No  Referring Practitioner: Leeanna  Subjective  Subjective: Pt seated in chair and reports no pain   General Comment  Comments: Pt alert and oriented x 4 but continues to have tangetial speech and conversation that does not match the activity. Orientation  Orientation  Overall Orientation Status: Within Functional Limits(A&O x 4, min confusion per conversation - tangential conversation with conflicting information at times )  Orientation Level: Oriented X4  Cognition      Objective   Bed mobility  Bridging: Independent  Rolling to Left: Independent  Rolling to Right: Independent  Supine to Sit: Independent  Sit to Supine: Independent  Scooting: Independent  Comment: bed mobility on flat mat   Transfers  Sit to Stand: Modified independent(Pt sit to stand with mod I and no AD  from recliner, mat and chair )  Stand to sit: Modified independent  Comment: Cues to stay on task   Ambulation  Ambulation?: Yes  Ambulation 1  Surface: level tile;ramp  Device: No Device  Assistance: Modified Independent  Quality of Gait: reciprocal , no lob   Gait Deviations: Decreased step height  Distance: 500 ft including ramp ( 75 ft ascend and descend ) , 150 ft, 120ft   Comments: pt tolerated well with no LOB   Stairs  # Steps : 12  Stairs Height: 6\"  Rails: Right ascending  Assistance: Supervision  Comment: inital cues for set up, reciprocal gait                   Other Activities: Other (see comment)  Comment: Dynamic balance ambulation without AD 5x50 ft ea backward, lateral ( right and left), and grape vine ( right and left ) with supervision and cues for righting reactions. Pt with no LOB  thoughout. Pt SCIFIT x 10 mins level 3.5 to increase activity tolerance.  Increased rest break post.       Second Session : Pt seated in Time Out 1330         Minutes 30           Timed Code Treatment Minutes:  60+30    Total Treatment Minutes:  [address], PT

## 2020-05-12 NOTE — TELEPHONE ENCOUNTER
The patient's  is requesting a return call, he won't tell me why other than he is asking for a return call.  Please advise

## 2020-05-12 NOTE — DISCHARGE INSTR - COC
PO  Liquids: thin  Daily Fluid Restriction: na  Last Modified Barium Swallow with Video (Video Swallowing Test):     Treatments at the Time of Hospital Discharge:   Respiratory Treatments: ***  Oxygen Therapy:  na  Ventilator:    [address] Vent LIXD:[id number]}    Rehab Therapies:  Weight Bearing Status/Restrictions: full weight bearing  Other Medical Equipment (for information only, NOT a DME order):  {EQUIPMENT:[id number]}  Other Treatments: ***    Patient's personal belongings (please select all that are sent with patient):      RN SIGNATURE:  Electronically signed by Davide Shetty RN on 5/14/20 at 12:19 PM EDT    CASE MANAGEMENT/SOCIAL WORK SECTION    Inpatient Status Date: 5/7/20    Readmission Risk Assessment Score:  Readmission Risk              Risk of Unplanned Readmission:        14           Discharging to Facility/ Agency   · Name: [facility]  · Phone: [phone]  · Fax: [phone]      / signature: Electronically signed by FAUSTO Cottrell, VIRAJW on 5/14/20 at 8:00 AM EDT    PHYSICIAN SECTION    Prognosis: Fair    Condition at Discharge: Stable    Rehab Potential (if transferring to Rehab): Good    Recommended Labs or Other Treatments After Discharge: PT/OT    Physician Certification: I certify the above information and transfer of Edita Ford  is necessary for the continuing treatment of the diagnosis listed and that she requires Home Care for greater 30 days.      Update Admission H&P: No change in H&P    PHYSICIAN SIGNATURE:  Electronically signed by Donna Gan DO on 5/12/20 at 11:23 AM EDT

## 2020-05-12 NOTE — PROGRESS NOTES
Pt alert and oriented. VSS. Pt calls out appropriately and walks with a steady gait. Call light within reach. Denies pain.  Will continue to monitor

## 2020-05-12 NOTE — PROGRESS NOTES
task; in stance for ADLs; functional mobility to/from therapy gym  Functional Mobility  Functional - Mobility Device: No device  Activity: To/from bathroom; Other; To/From therapy gym(to/from dining room)  Assist Level: Supervision  Functional Mobility Comments: short step length, no LOB observed, pt kept hands clasped behind back stating, \"It helps my posture\"  Toilet Transfers  Toilet - Technique: Ambulating  Equipment Used: Standard toilet  Toilet Transfer: Supervision(+ GB on R for stance)  Toilet Transfers Comments: Pt completed toilet transfer in therapy gym at lower surface, unable to complete stance without use of GB to simulate home environment in which pt does not have GB. With GB pt spvn for STS from low surface. Tub Transfers  Tub - Transfer From: Other  Tub - Transfer Type: To and From  Tub - Transfer To: Transfer tub bench  Tub - Technique: Ambulating(sit<>swivel tech)  Tub Transfers: Supervision  Tub Transfers Comments: demonstration and VCs for step by step sequencing     Transfers  Sit to stand: Independent(from recliner and bar height stool)  Stand to sit: Independent(to recliner and bar height stool)  Transfer Comments: Pt completed x 8 STS from EOM without use of BUEs (crossed at chest) with spvn to increase independence with toilet transfer at home which is lower surface but does not have GB                       Cognition  Overall Cognitive Status: Exceptions  Memory: Decreased short term memory;Decreased recall of recent events  Safety Judgement: Decreased awareness of need for safety  Problem Solving: Assistance required to generate solutions;Decreased awareness of errors  Insights: Decreased awareness of deficits  Initiation: Requires cues for some  Sequencing: Requires cues for some  Cognition Comment: Baseline dementia. Well-spoken in conversation, but tangential and confabulates.                      Type of ROM/Therapeutic Exercise  Type of ROM/Therapeutic Exercise: Resistive

## 2020-05-12 NOTE — TELEPHONE ENCOUNTER
Mr. Jaquelin Urbina called and has confirmed that patient is leaving Thursday,05/14/20 to come home.

## 2020-05-12 NOTE — PLAN OF CARE
Problem: Falls - Risk of:  Goal: Will remain free from falls  Description: Will remain free from falls  5/12/2020 1350 by Delisa Tan RN  Outcome: Ongoing  Note: Pt educated to use her call light when she needs assistance. Bed alarm on when Pt in bed and chair alarm on when Pt up in chair. Non skid socks on and call light placed within reach. RN will continue to monitor Pt.    5/12/2020 0359 by Ross Mann RN  Note: Pt remains free of falls. Pt calls out appropriately and has made no attempts to get out of bed without assistance. Bed alarm is on.  Will continue to monitor

## 2020-05-12 NOTE — TELEPHONE ENCOUNTER
Patient spouse states he called Dr. Lisa Alberts and had to leave a message and wanted to let Dr. Linette No know this. Patient spouse states he was advised by patient that she will be coming home on Thursday and patient spouse would like details about treatment once she comes home. Patient spouse also would like to know if there is a better way to get in contact with Dr. Lisa Alberts since he has called and left several messages with no response. Please advise.

## 2020-05-12 NOTE — CARE COORDINATION
SW spoke with patient to update her and informed her that the team recommends discharge on 5/14 to home with  and [facility] PT/OT/RN. Patient said she will review the AQ list [facility] and will let SW know her choice for SW to make referral.    Patient will call her  to inform him of discharge date.   Electronically signed by FAUSTO Guallpa, LORI on 5/12/2020 at 11:17 AM

## 2020-05-13 ENCOUNTER — TELEPHONE (OUTPATIENT)
Dept: INTERNAL MEDICINE CLINIC | Age: 80
End: 2020-05-13

## 2020-05-13 ENCOUNTER — TELEPHONE (OUTPATIENT)
Dept: CARDIOLOGY CLINIC | Age: 80
End: 2020-05-13

## 2020-05-13 LAB
ALBUMIN SERPL-MCNC: 3.6 G/DL (ref 3.4–5)
ANION GAP SERPL CALCULATED.3IONS-SCNC: 10 MMOL/L (ref 3–16)
BASOPHILS ABSOLUTE: 0 K/UL (ref 0–0.2)
BASOPHILS RELATIVE PERCENT: 0.6 %
BUN BLDV-MCNC: 9 MG/DL (ref 7–20)
CALCIUM SERPL-MCNC: 9.3 MG/DL (ref 8.3–10.6)
CHLORIDE BLD-SCNC: 104 MMOL/L (ref 99–110)
CO2: 25 MMOL/L (ref 21–32)
CREAT SERPL-MCNC: 0.5 MG/DL (ref 0.6–1.2)
EOSINOPHILS ABSOLUTE: 0.1 K/UL (ref 0–0.6)
EOSINOPHILS RELATIVE PERCENT: 1.1 %
GFR AFRICAN AMERICAN: >60
GFR NON-AFRICAN AMERICAN: >60
GLUCOSE BLD-MCNC: 99 MG/DL (ref 70–99)
HCT VFR BLD CALC: 32.9 % (ref 36–48)
HEMOGLOBIN: 11.3 G/DL (ref 12–16)
LYMPHOCYTES ABSOLUTE: 0.8 K/UL (ref 1–5.1)
LYMPHOCYTES RELATIVE PERCENT: 13.1 %
MCH RBC QN AUTO: 32.8 PG (ref 26–34)
MCHC RBC AUTO-ENTMCNC: 34.2 G/DL (ref 31–36)
MCV RBC AUTO: 96 FL (ref 80–100)
MONOCYTES ABSOLUTE: 0.5 K/UL (ref 0–1.3)
MONOCYTES RELATIVE PERCENT: 8.7 %
NEUTROPHILS ABSOLUTE: 4.5 K/UL (ref 1.7–7.7)
NEUTROPHILS RELATIVE PERCENT: 76.5 %
PDW BLD-RTO: 15.4 % (ref 12.4–15.4)
PHOSPHORUS: 3.1 MG/DL (ref 2.5–4.9)
PLATELET # BLD: 140 K/UL (ref 135–450)
PMV BLD AUTO: 9.2 FL (ref 5–10.5)
POTASSIUM REFLEX MAGNESIUM: 3.7 MMOL/L (ref 3.5–5.1)
RBC # BLD: 3.43 M/UL (ref 4–5.2)
SODIUM BLD-SCNC: 139 MMOL/L (ref 136–145)
WBC # BLD: 5.8 K/UL (ref 4–11)

## 2020-05-13 PROCEDURE — 36415 COLL VENOUS BLD VENIPUNCTURE: CPT

## 2020-05-13 PROCEDURE — 6370000000 HC RX 637 (ALT 250 FOR IP): Performed by: PHYSICAL MEDICINE & REHABILITATION

## 2020-05-13 PROCEDURE — 97530 THERAPEUTIC ACTIVITIES: CPT

## 2020-05-13 PROCEDURE — 84100 ASSAY OF PHOSPHORUS: CPT

## 2020-05-13 PROCEDURE — 85025 COMPLETE CBC W/AUTO DIFF WBC: CPT

## 2020-05-13 PROCEDURE — 97110 THERAPEUTIC EXERCISES: CPT

## 2020-05-13 PROCEDURE — 97116 GAIT TRAINING THERAPY: CPT

## 2020-05-13 PROCEDURE — 80048 BASIC METABOLIC PNL TOTAL CA: CPT

## 2020-05-13 PROCEDURE — 1280000000 HC REHAB R&B

## 2020-05-13 PROCEDURE — 82040 ASSAY OF SERUM ALBUMIN: CPT

## 2020-05-13 PROCEDURE — 97535 SELF CARE MNGMENT TRAINING: CPT

## 2020-05-13 PROCEDURE — 6370000000 HC RX 637 (ALT 250 FOR IP): Performed by: INTERNAL MEDICINE

## 2020-05-13 RX ORDER — DONEPEZIL HYDROCHLORIDE 10 MG/1
10 TABLET, FILM COATED ORAL NIGHTLY
Qty: 30 TABLET | Refills: 3 | Status: ON HOLD | OUTPATIENT
Start: 2020-05-13 | End: 2020-10-09 | Stop reason: HOSPADM

## 2020-05-13 RX ORDER — LANOLIN ALCOHOL/MO/W.PET/CERES
CREAM (GRAM) TOPICAL 2 TIMES DAILY
Qty: 2 CONTAINER | Refills: 0 | Status: SHIPPED | OUTPATIENT
Start: 2020-05-13

## 2020-05-13 RX ORDER — PANTOPRAZOLE SODIUM 40 MG/1
40 TABLET, DELAYED RELEASE ORAL
Qty: 30 TABLET | Refills: 3 | Status: SHIPPED | OUTPATIENT
Start: 2020-05-14 | End: 2021-01-29 | Stop reason: SDUPTHER

## 2020-05-13 RX ORDER — HYDROCHLOROTHIAZIDE 25 MG/1
25 TABLET ORAL DAILY
Qty: 30 TABLET | Refills: 3 | Status: SHIPPED | OUTPATIENT
Start: 2020-05-14 | End: 2020-09-18 | Stop reason: SDUPTHER

## 2020-05-13 RX ORDER — MEMANTINE HYDROCHLORIDE 10 MG/1
10 TABLET ORAL 2 TIMES DAILY
Qty: 60 TABLET | Refills: 3 | Status: SHIPPED | OUTPATIENT
Start: 2020-05-13 | End: 2021-01-13

## 2020-05-13 RX ORDER — SPIRONOLACTONE 25 MG/1
25 TABLET ORAL DAILY
Qty: 30 TABLET | Refills: 3 | Status: SHIPPED | OUTPATIENT
Start: 2020-05-14 | End: 2020-09-09 | Stop reason: SDUPTHER

## 2020-05-13 RX ORDER — POTASSIUM CHLORIDE 20 MEQ/1
20 TABLET, EXTENDED RELEASE ORAL DAILY
Qty: 60 TABLET | Refills: 3 | Status: ON HOLD | OUTPATIENT
Start: 2020-05-14 | End: 2020-10-09 | Stop reason: HOSPADM

## 2020-05-13 RX ORDER — M-VIT,TX,IRON,MINS/CALC/FOLIC 27MG-0.4MG
1 TABLET ORAL DAILY
Qty: 30 TABLET | Refills: 1 | Status: SHIPPED | OUTPATIENT
Start: 2020-05-14

## 2020-05-13 RX ADMIN — HYDROCHLOROTHIAZIDE 25 MG: 25 TABLET ORAL at 07:42

## 2020-05-13 RX ADMIN — MEMANTINE HYDROCHLORIDE 10 MG: 10 TABLET ORAL at 20:22

## 2020-05-13 RX ADMIN — PANTOPRAZOLE SODIUM 40 MG: 40 TABLET, DELAYED RELEASE ORAL at 06:16

## 2020-05-13 RX ADMIN — Medication: at 20:24

## 2020-05-13 RX ADMIN — MULTIPLE VITAMINS W/ MINERALS TAB 1 TABLET: TAB at 07:42

## 2020-05-13 RX ADMIN — DONEPEZIL HYDROCHLORIDE 10 MG: 10 TABLET, FILM COATED ORAL at 20:22

## 2020-05-13 RX ADMIN — MEMANTINE HYDROCHLORIDE 10 MG: 10 TABLET ORAL at 07:42

## 2020-05-13 RX ADMIN — POTASSIUM CHLORIDE 20 MEQ: 20 TABLET, EXTENDED RELEASE ORAL at 07:42

## 2020-05-13 RX ADMIN — SPIRONOLACTONE 25 MG: 25 TABLET, FILM COATED ORAL at 07:42

## 2020-05-13 ASSESSMENT — PAIN SCALES - GENERAL
PAINLEVEL_OUTOF10: 0

## 2020-05-13 NOTE — PROGRESS NOTES
disorder  Family / Caregiver Present: No  Referring Practitioner: Leeanna  Subjective  Subjective: Pt seated in chair and reports no pain   General Comment  Comments: Pt alert and oriented x 4 but continues to have tangetial speech and conversation that does not match the activity. Pain Assessment  Pain Assessment: 0-10  Pain Level: 0  Patient's Stated Pain Goal: No pain       Orientation  Orientation  Overall Orientation Status: Within Functional Limits(A&O x 4, min confusion per conversation - tangential conversation with conflicting information at times )  Orientation Level: Oriented X4  Cognition      Objective   Bed mobility  Bridging: Independent  Rolling to Left: Independent  Rolling to Right: Independent  Supine to Sit: Independent  Sit to Supine: Independent  Scooting: Independent  Comment: bed mobility performed on flat bed without use of rail  Transfers  Sit to Stand: Independent(Pt sit to stand I and no AD  from recline and chair )  Stand to sit: Independent  Ambulation  Ambulation?: Yes  More Ambulation?: No  Ambulation 1  Surface: level tile  Device: No Device  Assistance: Modified Independent  Quality of Gait: reciprocal , no lob   Distance: 1x 500 ft , 1 x 110 ft   Comments: pt tolerated well with no LOB   Stairs/Curb  Stairs?: Yes  Stairs  # Steps : 12  Rails: Right ascending  Curbs: 6\"  Assistance: Modified independent   Comment: inital cues for set up, reciprocal gait                   Other Activities: Other (see comment)  Comment: Dynamic balance on jolene disc 3x30 ea BLE, SLS RLE , and SLS LLE. Pt CGA throughout and 3 LOB min A to correct. TUG balance assessment 10 s with no AD. Pt low fall risk. Second Session: Pt performed toilet transfer independent and mauricio care independent with no AD . Pt ambulated x 500 ft including uneven surface x 150 ft  with no AD and mod I walking forward, backward , and lateral. Pt with no LOB throughout.  Pt performed Scifit x 10 mins level 5 with O2 and HR monitored throughout to increase activity tolerance. Pt O2 96-98% throughout and HR 60-93 bpm . Pt performed dynamic balance 2x5 mins on uneven surface ( jolene disc under each LE) and reaching outside RAFFAELE to catch and throw ball with CGA and 3 LOB min A to correct. Cues for righting reactions. BLE exercises 2x10 BLE with 3 lb weight supine SLR, supine hip abd/add, bridges, single leg bridges, seated LAQ, and standing HS curl. Multiple rest breaks to complete and verbal and visual cues for form. Pt left seated in chair , call light in reach, and chair alarm on. G-Code     OutComes Score                                                     AM-PAC Score             Goals  Short term goals  Time Frame for Short term goals: STG=LTG  Short term goal 1: Pt will transfer with mod I and walker safely. - goal met  Short term goal 2: Pt will ambulate with walker x 150 ft with supervision. - goal met   Short term goal 3: Pt will perform bed mobility mod I with HOB flat.  - goal met   Long term goals  Time Frame for Long term goals : 7-10 days  Long term goal 1: Pt will ambulate 400 ft without AD independently-- goal met  Long term goal 2: Pt will transfer independently sit <> stand -- goal met  Long term goal 3: Pt will ascend and descend 12 steps independently-- goal met   Long term goal 4: Pt will be low fall risk on TUG -- goal met     Plan    Plan  Times per week: 5 days a week 90 min / day   Times per day: Daily  Current Treatment Recommendations: Strengthening, Transfer Training, Endurance Training, Neuromuscular Re-education, Balance Training, Functional Mobility Training, Gait Training, Home Exercise Program, Safety Education & Training, IADL Training, Stair training  Safety Devices  Type of devices: Call light within reach, Chair alarm in place, Left in chair     Therapy Time   Individual Concurrent Group Co-treatment   Time In 1010         Time Out 1040         Minutes 30              Second Session Therapy Time:

## 2020-05-13 NOTE — PROGRESS NOTES
Education, & procurement, Self-Care / ADL, Home Management Training          Goals  Short term goals  Time Frame for Short term goals: STG=LTG  Long term goals  Time Frame for Long term goals : 5-7 days  Long term goal 1: Complete full body bathing in shower mod I- not met 2/2 cognition 5/13  Long term goal 2: Complete full body dressing mod I-goal met 5/13  Long term goal 3: Maintain static and dynamic stance mod I during functional tasks-goal met 5/12  Long term goal 4: Transfer to/from toilet, tub/shower, and walk-in shower mod I-goal met 5/13  Long term goal 5: Independently sit to complete lower body tasks for fall prevention-ongoing, continue  Patient Goals   Patient goals : no goal stated       Therapy Time   Individual Concurrent Group Co-treatment   Time In 0730         Time Out 0830         Minutes 60         Timed Code Treatment Minutes: 60 Minutes     Second Session Therapy Time:   Individual Concurrent Group Co-treatment   Time In 5832         Time Out 4092         Minutes 30           Timed Code Treatment Minutes:  37+15    Total Treatment Minutes:  [address] [facility].  [address] [facility], OTR/L [id number]

## 2020-05-13 NOTE — PROGRESS NOTES
Patient alert and oriented. VSS Patient denies any pain or nausea. PO meds taken without any difficulties. Assessment done. see flowsheet. Patient in bed lowest position, alarm activated. Call light and bedside table within reach. All needs are met at this time. Patient aware to call if any help needed. Will continue to monitor  /65   Pulse 68   Temp 98.2 °F (36.8 °C) (Oral)   Resp 18   Ht 5' 5\" (1.651 m)   Wt 141 lb 5 oz (64.1 kg)   SpO2 97%   BMI 23.52 kg/m²

## 2020-05-13 NOTE — PROGRESS NOTES
Absolute 4.5 1.7 - 7.7 K/uL    Lymphocytes Absolute 0.8 (L) 1.0 - 5.1 K/uL    Monocytes Absolute 0.5 0.0 - 1.3 K/uL    Eosinophils Absolute 0.1 0.0 - 0.6 K/uL    Basophils Absolute 0.0 0.0 - 0.2 K/uL   Phosphorus    Collection Time: 05/13/20  6:32 AM   Result Value Ref Range    Phosphorus 3.1 2.5 - 4.9 mg/dL   Albumin    Collection Time: 05/13/20  6:32 AM   Result Value Ref Range    Alb 3.6 3.4 - 5.0 g/dL       Therapy progress:  PT  Position Activity Restriction  Other position/activity restrictions: up with assist, pt may shower  Objective     Sit to Stand: Modified independent(Pt sit to stand with mod I and no AD  from recliner, mat and chair )  Stand to sit: Modified independent  Bed to Chair: Supervision(with Shawn GONGORA)  Device: No Device  Assistance: Modified Independent  Distance: 500 ft including ramp ( 75 ft ascend and descend ) , 150 ft, 120ft   OT  PT Equipment Recommendations  Equipment Needed: No  Mobility Devices: Anay Grit: Rolling  Toilet - Technique: Ambulating  Equipment Used: Standard toilet  Toilet Transfers Comments: Pt completed toilet transfer in therapy gym at lower surface, unable to complete stance without use of GB to simulate home environment in which pt does not have GB. With GB pt spvn for STS from low surface. Assessment        SLP          Body mass index is 23.52 kg/m². Assessment and Plan:  Recurrent Falls Likely 2/2 amyloid angiopathy  - Neurology following and posit this could be resultant from CAA inflammation. Per neurology, patient should follow up with Dr. Flavio Goins as outpatient as he specializes in CAA and may be able to provide guidance as to whether or not Danka Huslia is a diagnosis that should be considered and if so, meningeal biopsy vs. Immunosuppressive treatment would be warranted.  - PT/OT in ARU setting     Bradycardia  Heart rate in the 30s, with AV block. 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 M.P.H  PM&R  5/13/2020  11:24 AM

## 2020-05-14 VITALS
RESPIRATION RATE: 16 BRPM | DIASTOLIC BLOOD PRESSURE: 52 MMHG | TEMPERATURE: 98.4 F | SYSTOLIC BLOOD PRESSURE: 115 MMHG | OXYGEN SATURATION: 100 % | BODY MASS INDEX: 23.54 KG/M2 | HEIGHT: 65 IN | HEART RATE: 64 BPM | WEIGHT: 141.31 LBS

## 2020-05-14 PROCEDURE — 6370000000 HC RX 637 (ALT 250 FOR IP): Performed by: INTERNAL MEDICINE

## 2020-05-14 PROCEDURE — 99232 SBSQ HOSP IP/OBS MODERATE 35: CPT | Performed by: INTERNAL MEDICINE

## 2020-05-14 PROCEDURE — 99232 SBSQ HOSP IP/OBS MODERATE 35: CPT | Performed by: NURSE PRACTITIONER

## 2020-05-14 PROCEDURE — 6370000000 HC RX 637 (ALT 250 FOR IP): Performed by: PHYSICAL MEDICINE & REHABILITATION

## 2020-05-14 RX ADMIN — BISACODYL 5 MG: 5 TABLET, COATED ORAL at 09:33

## 2020-05-14 RX ADMIN — SPIRONOLACTONE 25 MG: 25 TABLET, FILM COATED ORAL at 09:33

## 2020-05-14 RX ADMIN — MEMANTINE HYDROCHLORIDE 10 MG: 10 TABLET ORAL at 09:33

## 2020-05-14 RX ADMIN — PANTOPRAZOLE SODIUM 40 MG: 40 TABLET, DELAYED RELEASE ORAL at 07:42

## 2020-05-14 RX ADMIN — MULTIPLE VITAMINS W/ MINERALS TAB 1 TABLET: TAB at 09:33

## 2020-05-14 RX ADMIN — POTASSIUM CHLORIDE 20 MEQ: 20 TABLET, EXTENDED RELEASE ORAL at 09:33

## 2020-05-14 RX ADMIN — Medication: at 09:35

## 2020-05-14 RX ADMIN — HYDROCHLOROTHIAZIDE 25 MG: 25 TABLET ORAL at 09:33

## 2020-05-14 ASSESSMENT — PAIN SCALES - GENERAL: PAINLEVEL_OUTOF10: 0

## 2020-05-14 NOTE — PROGRESS NOTES
Hospital Medicine  Progress Note    Chief Complaint: A fib, Dementia    SUBJECTIVE: Patient is improved. There are not new complaints. Edema resolved    OBJECTIVE      Medications    Infusion Medications   Scheduled Medications    spironolactone  25 mg Oral Daily    hydroCHLOROthiazide  25 mg Oral Daily    potassium chloride  20 mEq Oral Daily    donepezil  10 mg Oral Nightly    memantine  10 mg Oral BID    Hydrocerin   Topical BID    pantoprazole  40 mg Oral QAM AC    bisacodyl  5 mg Oral Daily    therapeutic multivitamin-minerals  1 tablet Oral Daily       Physical   Temperature:  Current - Temp: 98.4 °F (36.9 °C); Max - Temp  Av.4 °F (36.9 °C)  Min: 98.4 °F (36.9 °C)  Max: 98.4 °F (36.9 °C)    Respiratory Rate : Resp  Av  Min: 16  Max: 16    Pulse Range: Pulse  Av  Min: 69  Max: 69    Blood Presuure Range:  Systolic (49PUT), PHP:302 , Min:109 , BCK:081   ; Diastolic (26RIY), COOKIE:77, Min:51, Max:51      Pulse ox Range: SpO2  Av %  Min: 97 %  Max: 97 %    24hr I & O:      Intake/Output Summary (Last 24 hours) at 2020 0913  Last data filed at 2020 1817  Gross per 24 hour   Intake 360 ml   Output --   Net 360 ml       Constitutional:  awake, alert, cooperative, no apparent distress, and appears stated age  Eyes:  pupils equal, round and reactive to light  ENT:  normocepalic, without obvious abnormality  NECK:  supple, symmetrical, trachea midline  HEMATOLOGIC/LYMPHATICS:  no cervical lymphadenopathy  LUNGS:  No increased work of breathing, good air exchange, clear to auscultation bilaterally, no crackles or wheezing  CARDIOVASCULAR: regular rate and rhythm, S1, S2 normal, no murmur, click, rub or gallop  ABDOMEN:  soft, non-tender, without masses or organomegaly  MUSCULOSKELETAL:  There is no redness, warmth, or swelling of the joints. Full range of motion noted. Motor strength is 5 out of 5 all extremities bilaterally.   Tone is normal.  SKIN:  normal skin color, texture,

## 2020-05-14 NOTE — PROGRESS NOTES
Pt up to chair, watching television. No complaints. Vitals and assessment completed. IV to right wrist flush. Nighttime medication given. With standby assistance, pt ambulated into bathroom voided and brushed teeth. Pt tolerated well. Reminded pt to call for assistance with any needs. Call light within reach. Safety measures in place. No additional needs at this time.

## 2020-05-14 NOTE — PROGRESS NOTES
Patient received discharge orders. Patient independent with bath, dressing and packing personal supplies. Walking with standby assist.  Reviewed discharge instructions with  and patient. [facility] to follow up. Wheelchair transport to main entrance, where  arrived with car. Phone number given if has follow up questions.

## 2020-05-14 NOTE — CARE COORDINATION
[facility]     Patient aware and agreeable to services. Faxed orders to [facility].     Porsche Roberts LPN  Care Transition Nurse  [address]  [phone]

## 2020-05-14 NOTE — DISCHARGE SUMMARY
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 fib, hypertension, and recurrent falls who presented to the ED after being found down by her . Rob Valladares was confused on presentation. Per , he came home after being gone for about 1 to 2 hours and found his wife down in the bathroom. Yarelis Fenton has been having recurrent falls at home but has been has been able to help break the falls.  Patient's falls seem to be in the morning.  On evaluation She was found to have bradycardia, hypotension, afib, dehydration and hyponatremia. She denies any pain or fatigue now but has been recovering well on medsurg for the past few days. She would like to come to the ARU for further treatment and therapy.     Inpatient Rehabilitation Course: Sharan Isaacs is a 68 y.o. female admitted to inpatient rehabilitation on 2020 with Debility.   The patient participated in an aggressive multidisciplinary inpatient

## 2020-05-14 NOTE — PROGRESS NOTES
admission, found to have CHB, s/p Micra PPM.   [id number] 4. TSH 2.51 (3/12/20).  HAS-BLED 4.     AF/bradycardia/CHB  - In persistent AF  - Bradycardic and intermittent CHB in AF with HRs in the 30's  - S/p Micra placement 5/6/20, Dr. Nicole Quick  - R groin CDI, some ecchymosis present  - Device check showed 96.9% , other parameters are stable  - Echo - EF 55%, LAE, 4.3, vol 103  - On no OAC d/t risk of ICH with CAA  - F/u in office in 4 weeks - apmts made  - remote monitor at bedside  - Reviewed with patient and nurse to make sure she takes home with her      Rob Michelle [address] [facility]

## 2020-05-15 ENCOUNTER — CARE COORDINATION (OUTPATIENT)
Dept: CASE MANAGEMENT | Age: 80
End: 2020-05-15

## 2020-05-26 ENCOUNTER — OFFICE VISIT (OUTPATIENT)
Dept: INTERNAL MEDICINE CLINIC | Age: 80
End: 2020-05-26
Payer: MEDICARE

## 2020-05-26 VITALS
TEMPERATURE: 97.4 F | WEIGHT: 135 LBS | BODY MASS INDEX: 22.47 KG/M2 | DIASTOLIC BLOOD PRESSURE: 59 MMHG | HEART RATE: 81 BPM | SYSTOLIC BLOOD PRESSURE: 106 MMHG

## 2020-05-26 PROCEDURE — G8427 DOCREV CUR MEDS BY ELIG CLIN: HCPCS | Performed by: INTERNAL MEDICINE

## 2020-05-26 PROCEDURE — 1036F TOBACCO NON-USER: CPT | Performed by: INTERNAL MEDICINE

## 2020-05-26 PROCEDURE — 1123F ACP DISCUSS/DSCN MKR DOCD: CPT | Performed by: INTERNAL MEDICINE

## 2020-05-26 PROCEDURE — 99213 OFFICE O/P EST LOW 20 MIN: CPT | Performed by: INTERNAL MEDICINE

## 2020-05-26 PROCEDURE — 1090F PRES/ABSN URINE INCON ASSESS: CPT | Performed by: INTERNAL MEDICINE

## 2020-05-26 PROCEDURE — 4040F PNEUMOC VAC/ADMIN/RCVD: CPT | Performed by: INTERNAL MEDICINE

## 2020-05-26 PROCEDURE — G8400 PT W/DXA NO RESULTS DOC: HCPCS | Performed by: INTERNAL MEDICINE

## 2020-05-26 PROCEDURE — G8420 CALC BMI NORM PARAMETERS: HCPCS | Performed by: INTERNAL MEDICINE

## 2020-05-26 PROCEDURE — 1111F DSCHRG MED/CURRENT MED MERGE: CPT | Performed by: INTERNAL MEDICINE

## 2020-05-26 NOTE — PROGRESS NOTES
CHIEF COMPLAINT: Lori Covarrubias is a 68 y.o. female who presents for : Follow-up hospitalization    HPI: Patient presented with feeling much better following he had a pacemaker placed that seems to be working fine    Review of Systems:   Constitutional:  Denies fever or chills   Eyes:  Denies change in visual acuity   HENT:  Denies nasal congestion or sore throat   Respiratory:  Denies cough or shortness of breath   Cardiovascular:  Denies chest pain or edema   GI:  Denies abdominal pain, nausea, vomiting, bloody stools or diarrhea   :  Denies dysuria   Musculoskeletal:  Denies back pain or joint pain   Integument:  Denies rash   Neurologic:  Denies headache, focal weakness or sensory changes   Endocrine:  Denies polyuria or polydipsia   Lymphatic:  Denies swollen glands   Psychiatric:  Denies depression or anxiety     Past Medical History:        Diagnosis Date    Adenomatous polyp of colon 1/29/2019    Asymptomatic varicose veins     Atrial fibrillation ([address]     Dysthymic disorder     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)     Screening mammogram 12/8/2009    (Left)Benign       Past Surgical History:        Procedure Laterality Date    COLONOSCOPY  12/12/2008    HERNIA REPAIR      Left Femoral       Family History:  Family History   Problem Relation Age of Onset    Stroke Mother     Heart Attack Father     Heart Disease Father        Social History:  Social History     Socioeconomic History    Marital status:      Spouse name: None    Number of children: None    Years of education: None    Highest education level: None   Occupational History    None   Social Needs    Financial resource strain: Not hard at all   [place] insecurity     Worry: Never true     Inability: Never true    Transportation needs     Medical: No     Non-medical: No   Tobacco Use    Smoking status: Never Smoker    Smokeless tobacco: Never Used   Substance and Sexual Activity    Alcohol use: No    Drug use: Never    Sexual activity: None   Lifestyle    Physical activity     Days per week: None     Minutes per session: None    Stress: None   Relationships    Social connections     Talks on phone: None     Gets together: None     Attends Orthodox service: None     Active member of club or organization: None     Attends meetings of clubs or organizations: None     Relationship status: None    Intimate partner violence     Fear of current or ex partner: None     Emotionally abused: None     Physically abused: None     Forced sexual activity: None   Other Topics Concern    None   Social History Narrative    None         Allergies:  Beta adrenergic blockers    Current Medications:    Prior to Admission medications    Medication Sig Start Date End Date Taking? Authorizing Provider   Skin Protectants, Misc.  (HYDROCERIN) CREA cream Apply topically 2 times daily 5/13/20  Yes Sebastián Fry DO   hydroCHLOROthiazide (HYDRODIURIL) 25 MG tablet Take 1 tablet by mouth daily 5/14/20  Yes Sebastián Fry DO   spironolactone (ALDACTONE) 25 MG tablet Take 1 tablet by mouth daily 5/14/20  Yes Sebastián Fry DO   potassium chloride (KLOR-CON M) 20 MEQ extended release tablet Take 1 tablet by mouth daily 5/14/20  Yes Sebastián Fry DO   donepezil (ARICEPT) 10 MG tablet Take 1 tablet by mouth nightly 5/13/20  Yes Sebastián Fry DO   memantine (NAMENDA) 10 MG tablet Take 1 tablet by mouth 2 times daily 5/13/20  Yes Leroy Fry DO   Multiple Vitamins-Minerals (THERAPEUTIC MULTIVITAMIN-MINERALS) tablet Take 1 tablet by mouth daily 5/14/20  Yes Sebastián Fry DO   pantoprazole (PROTONIX) 40 MG tablet Take 1 tablet by mouth every morning (before breakfast) 5/14/20  Yes Sebastián Fry DO   acetaminophen (TYLENOL) 500 MG tablet Take 650 mg by mouth 3 times daily as needed 3/15/20  Yes Historical Provider, MD       Physical Exam:  Vital Signs: BP (!) 106/59   Pulse 81   Temp 97.4 °F (36.3 °C)   Wt 135 lb (61.2 kg)   BMI 22.47 kg/m²   General:

## 2020-06-01 NOTE — PROGRESS NOTES
MULTIVITAMIN-MINERALS) tablet Take 1 tablet by mouth daily 30 tablet 1    pantoprazole (PROTONIX) 40 MG tablet Take 1 tablet by mouth every morning (before breakfast) 30 tablet 3    acetaminophen (TYLENOL) 500 MG tablet Take 650 mg by mouth 3 times daily as needed       No current facility-administered medications on file prior to visit. Past Medical History:   Diagnosis Date    Adenomatous polyp of colon 1/29/2019    Asymptomatic varicose veins     Atrial fibrillation (HCC)     Dysthymic disorder     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)     Screening mammogram 12/8/2009    (Left)Benign        Past Surgical History:   Procedure Laterality Date    COLONOSCOPY  12/12/2008    HERNIA REPAIR      Left Femoral       Allergies   Allergen Reactions    Beta Adrenergic Blockers        Social History:  Reviewed. reports that she has never smoked. She has never used smokeless tobacco. She reports that she does not drink alcohol or use drugs. Family History:  Reviewed. family history includes Heart Attack in her father; Heart Disease in her father; Stroke in her mother. Review of System:    · Constitutional: No fevers, chills. · Eyes: No visual changes or diplopia. No scleral icterus. · ENT: No Headaches. No mouth sores or sore throat. · Cardiovascular: No for chest pain, No for dyspnea on exertion, No for palpitations or No for loss of consciousness. No cough, hemoptysis, No for pleuritic pain, or phlebitis. · Respiratory: No for cough or wheezing. No hematemesis. · Gastrointestinal: No abdominal pain, blood in stools. · Genitourinary: No dysuria, or hematuria. · Musculoskeletal: No gait disturbance,    · Integumentary: No rash or pruritis. · Neurological: No headache, change in muscle strength, numbness or tingling. · Psychiatric: No anxiety, or depression. · Endocrine: No temperature intolerance. No excessive thirst, fluid intake, or urination.    · Hem/Lymph: No abnormal bruising or

## 2020-06-02 ENCOUNTER — TELEPHONE (OUTPATIENT)
Dept: INTERNAL MEDICINE CLINIC | Age: 80
End: 2020-06-02

## 2020-06-08 ENCOUNTER — OFFICE VISIT (OUTPATIENT)
Dept: CARDIOLOGY CLINIC | Age: 80
End: 2020-06-08
Payer: MEDICARE

## 2020-06-08 ENCOUNTER — NURSE ONLY (OUTPATIENT)
Dept: CARDIOLOGY CLINIC | Age: 80
End: 2020-06-08
Payer: MEDICARE

## 2020-06-08 ENCOUNTER — TELEPHONE (OUTPATIENT)
Dept: INTERNAL MEDICINE CLINIC | Age: 80
End: 2020-06-08

## 2020-06-08 ENCOUNTER — OFFICE VISIT (OUTPATIENT)
Dept: INTERNAL MEDICINE CLINIC | Age: 80
End: 2020-06-08
Payer: MEDICARE

## 2020-06-08 VITALS — BODY MASS INDEX: 21.21 KG/M2 | WEIGHT: 132 LBS | TEMPERATURE: 96.8 F | HEIGHT: 66 IN

## 2020-06-08 VITALS — TEMPERATURE: 99 F | HEIGHT: 66 IN | BODY MASS INDEX: 21.05 KG/M2 | WEIGHT: 131 LBS

## 2020-06-08 PROCEDURE — 99213 OFFICE O/P EST LOW 20 MIN: CPT | Performed by: INTERNAL MEDICINE

## 2020-06-08 PROCEDURE — 4040F PNEUMOC VAC/ADMIN/RCVD: CPT | Performed by: INTERNAL MEDICINE

## 2020-06-08 PROCEDURE — G8400 PT W/DXA NO RESULTS DOC: HCPCS | Performed by: NURSE PRACTITIONER

## 2020-06-08 PROCEDURE — G8420 CALC BMI NORM PARAMETERS: HCPCS | Performed by: NURSE PRACTITIONER

## 2020-06-08 PROCEDURE — 4040F PNEUMOC VAC/ADMIN/RCVD: CPT | Performed by: NURSE PRACTITIONER

## 2020-06-08 PROCEDURE — 1090F PRES/ABSN URINE INCON ASSESS: CPT | Performed by: INTERNAL MEDICINE

## 2020-06-08 PROCEDURE — G8427 DOCREV CUR MEDS BY ELIG CLIN: HCPCS | Performed by: INTERNAL MEDICINE

## 2020-06-08 PROCEDURE — 1036F TOBACCO NON-USER: CPT | Performed by: NURSE PRACTITIONER

## 2020-06-08 PROCEDURE — 93279 PRGRMG DEV EVAL PM/LDLS PM: CPT | Performed by: INTERNAL MEDICINE

## 2020-06-08 PROCEDURE — G8427 DOCREV CUR MEDS BY ELIG CLIN: HCPCS | Performed by: NURSE PRACTITIONER

## 2020-06-08 PROCEDURE — 1123F ACP DISCUSS/DSCN MKR DOCD: CPT | Performed by: NURSE PRACTITIONER

## 2020-06-08 PROCEDURE — 1123F ACP DISCUSS/DSCN MKR DOCD: CPT | Performed by: INTERNAL MEDICINE

## 2020-06-08 PROCEDURE — 1090F PRES/ABSN URINE INCON ASSESS: CPT | Performed by: NURSE PRACTITIONER

## 2020-06-08 PROCEDURE — G8400 PT W/DXA NO RESULTS DOC: HCPCS | Performed by: INTERNAL MEDICINE

## 2020-06-08 PROCEDURE — 1111F DSCHRG MED/CURRENT MED MERGE: CPT | Performed by: INTERNAL MEDICINE

## 2020-06-08 PROCEDURE — 99214 OFFICE O/P EST MOD 30 MIN: CPT | Performed by: NURSE PRACTITIONER

## 2020-06-08 PROCEDURE — 1111F DSCHRG MED/CURRENT MED MERGE: CPT | Performed by: NURSE PRACTITIONER

## 2020-06-08 PROCEDURE — 93000 ELECTROCARDIOGRAM COMPLETE: CPT | Performed by: NURSE PRACTITIONER

## 2020-06-08 PROCEDURE — G8420 CALC BMI NORM PARAMETERS: HCPCS | Performed by: INTERNAL MEDICINE

## 2020-06-08 PROCEDURE — 1036F TOBACCO NON-USER: CPT | Performed by: INTERNAL MEDICINE

## 2020-06-08 RX ORDER — VALACYCLOVIR HYDROCHLORIDE 1 G/1
1000 TABLET, FILM COATED ORAL 3 TIMES DAILY
Qty: 21 TABLET | Refills: 0 | Status: SHIPPED | OUTPATIENT
Start: 2020-06-08 | End: 2020-06-15

## 2020-06-08 NOTE — PROGRESS NOTES
 pantoprazole (PROTONIX) 40 MG tablet Take 1 tablet by mouth every morning (before breakfast) 30 tablet 3    acetaminophen (TYLENOL) 500 MG tablet Take 650 mg by mouth 3 times daily as needed       No current facility-administered medications for this visit. Physical Exam   Temp 99 °F (37.2 °C) (Oral)   Ht 5' 6\" (1.676 m)   Wt 131 lb (59.4 kg)   BMI 21.14 kg/m²     Physical Exam  Nursing note reviewed. Constitutional:       Appearance: Normal appearance. She is well-developed. Chest:      Breasts:         Right: No mass or tenderness. Left: No mass or tenderness. Musculoskeletal: Normal range of motion. General: Swelling present. Comments: There is significant swelling along the right foot however patient refuses to be examined. Skin:     General: Skin is warm and dry. Findings: Rash present. Comments: Shingles rash is present along the right L3 dermatome on the right. The vesicles appear healing at this stage. Neurological:      Mental Status: She is alert and oriented to person, place, and time. Cranial Nerves: No cranial nerve deficit. Sensory: No sensory deficit. Deep Tendon Reflexes: Reflexes are normal and symmetric. Psychiatric:         Speech: Speech normal.      Comments: Patient is quite aggitated and refused a thorough physical. Speech has anger which makes it difficult to reason at this point. Likely dementia with aggitation. Assessment/ plan: Clement Naranjo was seen today for rash. Diagnoses and all orders for this visit:    Dementia associated with other underlying disease with behavioral disturbance ([facility] [place] [address]  -She is quite aggitated at this point. She will be following with  Neurology for dementia and psychological symptoms associated with it. Herpes zoster without complication  -     valACYclovir (VALTREX) 1 g tablet;  Take 1 tablet by mouth 3 times daily for 7 days        - most of her lesions are in stage of healing    Cerebral amyloid angiopathy (CODE)    - follow with [facility]          Jaxson Jones, [address]

## 2020-06-11 ENCOUNTER — TELEPHONE (OUTPATIENT)
Dept: INTERNAL MEDICINE CLINIC | Age: 80
End: 2020-06-11

## 2020-06-15 RX ORDER — VALACYCLOVIR HYDROCHLORIDE 1 G/1
1000 TABLET, FILM COATED ORAL 3 TIMES DAILY
Qty: 21 TABLET | Refills: 1 | OUTPATIENT
Start: 2020-06-15 | End: 2020-06-22

## 2020-06-25 ENCOUNTER — TELEPHONE (OUTPATIENT)
Dept: INTERNAL MEDICINE CLINIC | Age: 80
End: 2020-06-25

## 2020-06-25 NOTE — TELEPHONE ENCOUNTER
Is it ok with patient only taking the two medication, if not how would you change that? It is ok because none of the medications can change the course of the disease.

## 2020-06-26 ENCOUNTER — TELEPHONE (OUTPATIENT)
Dept: INTERNAL MEDICINE CLINIC | Age: 80
End: 2020-06-26

## 2020-07-06 ENCOUNTER — TELEPHONE (OUTPATIENT)
Dept: INTERNAL MEDICINE CLINIC | Age: 80
End: 2020-07-06

## 2020-07-06 NOTE — TELEPHONE ENCOUNTER
The patient's  is requesting a return call about his wife disease, and questions about her meds and her driving

## 2020-07-07 NOTE — TELEPHONE ENCOUNTER
Call returned. Medications that she is taking will need to be refilled. Advised to call pharmacy and request refills on ones that she is taking because she has stopped taking most medications.  does not want you to drive.
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
## 2020-07-10 ENCOUNTER — TELEPHONE (OUTPATIENT)
Dept: INTERNAL MEDICINE CLINIC | Age: 80
End: 2020-07-10

## 2020-07-10 NOTE — TELEPHONE ENCOUNTER
Pt  would like to ask dr Genesis Bosch regarding pt pacemaker surgery. Pt  would like to know if dr knows if pt sign a form to do the pacemaker surgery. Pt  states pt thinks she did not sign any form to do surgery.  Pt  would like to know if someone can produce this this form with pt signature on it or confirm that she did sign a form

## 2020-07-10 NOTE — TELEPHONE ENCOUNTER
Yanique or Dr. Hernandez Slider. Pt's  called and stated \"please do not call me back, I will have the wife in the car with me, I will call the office back later.  Thank you:      Please advise

## 2020-07-20 ENCOUNTER — TELEPHONE (OUTPATIENT)
Dept: INTERNAL MEDICINE CLINIC | Age: 80
End: 2020-07-20

## 2020-07-20 NOTE — TELEPHONE ENCOUNTER
Call returned to Garry Morris. No answer. Scheduled appointment    Left message for spouse to call back if he does not want to keep appointment.

## 2020-07-20 NOTE — TELEPHONE ENCOUNTER
Pt  would like to meet with Dr. Alcides Sandoval to talk about pt. Pt  would like to stop by tomorrow around 1. Pt  requesting a prescription for water pill.        Please be advise

## 2020-07-23 ENCOUNTER — TELEPHONE (OUTPATIENT)
Dept: INTERNAL MEDICINE CLINIC | Age: 80
End: 2020-07-23

## 2020-07-23 NOTE — TELEPHONE ENCOUNTER
Patient  has questions and how many times a day the patient should take  potassium chloride (KLOR-CON M) 20 MEQ extended release tablet         They would like to take am and pm -2 a day.   Plz advise

## 2020-08-05 NOTE — PROGRESS NOTES
[address]   Cardiac Consultation    Referring Provider:  Arcelia Roblero MD     HPI:  Maria Victoria Spencer is a 68 y.o. 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 Neurology recommended against anticoagulation based on the angiopathy. The patient is here for follow up of arrhythmias and pacing system. Device check on 8/6/20 shows 70.7% VS, 29.3% , no arrhythmias, other parameters stable with 8 years of battery life left. She is in AF today with a rate of 93. No AC due to cerebral amyloid angiopathy. The patient states that she if feeling well today. She is here with her  today. Past Medical History:   has a past medical history of Adenomatous polyp of colon, Asymptomatic varicose veins, Atrial fibrillation ([address] Dysthymic disorder, 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), and Screening mammogram.    Surgical History:   has a past surgical history that includes Colonoscopy (12/12/2008) and hernia repair. Social History:   reports that she has never smoked. She has never used smokeless tobacco. She reports that she does not drink alcohol or use drugs. Family History:  family history includes Heart Attack in her father; Heart Disease in her father; Stroke in her mother. Home Medications:  Outpatient Encounter Medications as of 8/6/2020   Medication Sig Dispense Refill    spironolactone (ALDACTONE) 25 MG tablet Take 1 tablet by mouth daily 30 tablet 3    Skin Protectants, Misc.  (HYDROCERIN) CREA cream Apply topically 2 times daily (Patient not taking: Reported on 8/6/2020) 2 Container 0    hydroCHLOROthiazide (HYDRODIURIL) 25 MG tablet Take 1 tablet by mouth daily (Patient not taking: Reported on 8/6/2020) 30 tablet 3    potassium chloride (KLOR-CON M) 20 MEQ extended release tablet Take 1 tablet by mouth daily (Patient not taking: Reported on 8/6/2020) 60 tablet 3    donepezil (ARICEPT) 10 MG tablet Take 1 tablet by mouth nightly (Patient not taking: Reported on 8/6/2020) 30 tablet 3    memantine (NAMENDA) 10 MG tablet Take 1 tablet by mouth 2 times daily (Patient not taking: Reported on 8/6/2020) 60 tablet 3    Multiple Vitamins-Minerals (THERAPEUTIC MULTIVITAMIN-MINERALS) tablet Take 1 tablet by mouth daily (Patient not taking: Reported on 8/6/2020) 30 tablet 1    pantoprazole (PROTONIX) 40 MG tablet Take 1 tablet by mouth every morning (before breakfast) (Patient not taking: Reported on 8/6/2020) 30 tablet 3    acetaminophen (TYLENOL) 500 MG tablet Take 650 mg by mouth 3 times daily as needed       No facility-administered encounter medications on file as of 8/6/2020. Allergies:  Beta adrenergic blockers     Review of Systems   Constitutional: Negative. HENT: Negative. Eyes: Negative. Respiratory: Negative. Cardiovascular: Negative. Gastrointestinal: Negative. Genitourinary: Negative. Musculoskeletal: Negative. Skin: Negative. Neurological: Negative. Hematological: Negative. Psychiatric/Behavioral: Negative. /60   Pulse 86   Temp 97.7 °F (36.5 °C)   Wt 141 lb (64 kg)   BMI 22.76 kg/m²      EKG 8/6/20 Personally reviewed. AF 93    4/29/20 ECHO  Summary   Normal left ventricular size. Normal left ventricular wall thickness. The inferoseptum is poorly visualized but is probably normal. Overall LVEF   is roughly 55%. Indeterminate diastolic function. Biatrial enlargement. The aortic valve appears mildly sclerotic but opens well. Moderate tricuspid regurgitation. Estimated pulmonary artery systolic pressure is normal at 31 mmHg assuming a   right atrial pressure of 3 mmHg. Objective:  Physical Exam   Constitutional: She is oriented to person, place, and time. She appears well-developed and well-nourished. HENT:   Head: Normocephalic and atraumatic.    Eyes: Pupils are equal, round, and reactive to light. Neck: Normal range of motion. Cardiovascular: Normal rate, regular rhythm and normal heart sounds. Pulmonary/Chest: Effort normal and breath sounds normal.   Abdominal: Soft. No tenderness. Musculoskeletal: Normal range of motion. She exhibits no edema. Neurological: She is alert and oriented to person, place, and time. Skin: Skin is warm and dry. Psychiatric: She has a normal mood and affect. Assessment:  1. Atrial fibrillation, unspecified type ([address] - We discussed AC alternatives including Watchman and the risks and benefits were discussed including the need for 6 weeks of coumadin for post op period. The procedure was discussed and  and pt verbalized understanding. If neurology states that she would be an acceptable risk for 6 weeks of AC post op she wishes to talk to Dr. Russell Hernandez first.   The patient is unhappy with the care she was given during her hospital stay and has accused myself and other medical staff of lying to her. She stated that I told her that \"you are going to die the next day. \" I tried to assure her that all discussions with patient and family were well documented and forthcoming. It was mutually agreed that if they decide to continue care with me they may do so and if she wishes to find another doctor we can help her do so. At this time the patient does not wish to continue care with the [facility] system. 2.     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 3.     Cerebral amyloid angiopathy  4.     dementia     Plan:  1. The patient will follow up with a cardiologist outside the [facility] system. We explained to the pt and her  that we can forward her records to the cardiologist of her choice and I am happy to speak with them personally. Radha Farias RN, am scribing for and in the presence of Dr. Mattie Blackwell.  08/06/20   10:45 AM  Steve Robins RN    I, Dr. Mattie Blackwell, personally performed the services

## 2020-08-06 ENCOUNTER — NURSE ONLY (OUTPATIENT)
Dept: CARDIOLOGY CLINIC | Age: 80
End: 2020-08-06
Payer: MEDICARE

## 2020-08-06 ENCOUNTER — OFFICE VISIT (OUTPATIENT)
Dept: CARDIOLOGY CLINIC | Age: 80
End: 2020-08-06
Payer: MEDICARE

## 2020-08-06 ENCOUNTER — TELEPHONE (OUTPATIENT)
Dept: INTERNAL MEDICINE CLINIC | Age: 80
End: 2020-08-06

## 2020-08-06 VITALS
TEMPERATURE: 97.7 F | BODY MASS INDEX: 22.76 KG/M2 | HEART RATE: 86 BPM | SYSTOLIC BLOOD PRESSURE: 125 MMHG | DIASTOLIC BLOOD PRESSURE: 60 MMHG | WEIGHT: 141 LBS

## 2020-08-06 PROCEDURE — 4040F PNEUMOC VAC/ADMIN/RCVD: CPT | Performed by: INTERNAL MEDICINE

## 2020-08-06 PROCEDURE — 93000 ELECTROCARDIOGRAM COMPLETE: CPT | Performed by: INTERNAL MEDICINE

## 2020-08-06 PROCEDURE — 1036F TOBACCO NON-USER: CPT | Performed by: INTERNAL MEDICINE

## 2020-08-06 PROCEDURE — G8400 PT W/DXA NO RESULTS DOC: HCPCS | Performed by: INTERNAL MEDICINE

## 2020-08-06 PROCEDURE — 93279 PRGRMG DEV EVAL PM/LDLS PM: CPT | Performed by: INTERNAL MEDICINE

## 2020-08-06 PROCEDURE — 99214 OFFICE O/P EST MOD 30 MIN: CPT | Performed by: INTERNAL MEDICINE

## 2020-08-06 PROCEDURE — G8420 CALC BMI NORM PARAMETERS: HCPCS | Performed by: INTERNAL MEDICINE

## 2020-08-06 PROCEDURE — 1123F ACP DISCUSS/DSCN MKR DOCD: CPT | Performed by: INTERNAL MEDICINE

## 2020-08-06 PROCEDURE — G8427 DOCREV CUR MEDS BY ELIG CLIN: HCPCS | Performed by: INTERNAL MEDICINE

## 2020-08-06 PROCEDURE — 1090F PRES/ABSN URINE INCON ASSESS: CPT | Performed by: INTERNAL MEDICINE

## 2020-08-06 NOTE — TELEPHONE ENCOUNTER
Patients  Pierce Colindres is wanting to speak with Dr. Lupe Quiñones about the Cardiology appointment that the patient was seen for today. Patients  is just wanting a little information on what occurred at that appointment and any plans going forward. Please call advise.

## 2020-08-06 NOTE — PROGRESS NOTES
Device interrogation by company representative. See interrogation for further details. Patient to see Dr. Ashley Martinez today. Micra pacer. Pacing (% of Time Since 08-Jun-2020)  VS 70.7 %   29.3 %. No observations. Follow up in 3 months via carelink. See PACEART report under Cardiology tab.

## 2020-08-11 ENCOUNTER — TELEPHONE (OUTPATIENT)
Dept: INTERNAL MEDICINE CLINIC | Age: 80
End: 2020-08-11

## 2020-08-11 NOTE — TELEPHONE ENCOUNTER
Pt  would like to speak Dr. Anthony Garcia regarding pt pacemaker visit with dr. Jessy Lagos. Pt  would like to discuss some issues regarding this visit.  Pt  states pt would like to switch cardiologist.       Please be advised

## 2020-08-11 NOTE — TELEPHONE ENCOUNTER
Physician does not recommend procedure. If you are wanting a referral to another cardiologist Delfina Jackson recommends [address] at [facility]. Message has been left for the patient's spouse.

## 2020-08-17 ENCOUNTER — TELEPHONE (OUTPATIENT)
Dept: INTERNAL MEDICINE CLINIC | Age: 80
End: 2020-08-17

## 2020-08-17 NOTE — TELEPHONE ENCOUNTER
Dr. Jerry Muse suggested patient see Dr. Rosalina Cardoza [phone] for patients  Pacemaker.  states once he called they stated they do not deal with pacemakers. Dr. Jerry Muse would need to send them something ( a referral? Alternate reason? ) so that the patient can be seen for her pacemaker. Please Advise.

## 2020-08-20 ENCOUNTER — TELEPHONE (OUTPATIENT)
Dept: INTERNAL MEDICINE CLINIC | Age: 80
End: 2020-08-20

## 2020-08-20 NOTE — TELEPHONE ENCOUNTER
Yanique,    Pts  called to state that the pt now has an appt with Dr. Tayla Barrientos on Sept 2nd, @ 315 pm.      Does she still need to keep the August 27 appt to see Dr. Gaynell Cooks or should they reschedule until after the Sept 2, appt with Dr. Farheen Roldan to see what he has to say? Please call and let them know.

## 2020-09-08 ENCOUNTER — TELEPHONE (OUTPATIENT)
Dept: INTERNAL MEDICINE CLINIC | Age: 80
End: 2020-09-08

## 2020-09-09 ENCOUNTER — TELEPHONE (OUTPATIENT)
Dept: INTERNAL MEDICINE CLINIC | Age: 80
End: 2020-09-09

## 2020-09-09 RX ORDER — SPIRONOLACTONE 25 MG/1
25 TABLET ORAL DAILY
Qty: 30 TABLET | Refills: 3 | Status: ON HOLD
Start: 2020-09-09 | End: 2020-10-09 | Stop reason: HOSPADM

## 2020-09-09 NOTE — TELEPHONE ENCOUNTER
Killian Aguilar with [address] called to request a refill request of spironolactone (ALDACTONE) 25 MG tablet    [facility], [address]     Please advise

## 2020-09-11 ENCOUNTER — NURSE ONLY (OUTPATIENT)
Dept: INTERNAL MEDICINE CLINIC | Age: 80
End: 2020-09-11
Payer: MEDICARE

## 2020-09-11 ENCOUNTER — TELEPHONE (OUTPATIENT)
Dept: INTERNAL MEDICINE CLINIC | Age: 80
End: 2020-09-11

## 2020-09-11 VITALS — TEMPERATURE: 98 F

## 2020-09-11 PROCEDURE — G0008 ADMIN INFLUENZA VIRUS VAC: HCPCS | Performed by: INTERNAL MEDICINE

## 2020-09-11 PROCEDURE — 90694 VACC AIIV4 NO PRSRV 0.5ML IM: CPT | Performed by: INTERNAL MEDICINE

## 2020-09-15 ENCOUNTER — TELEPHONE (OUTPATIENT)
Dept: INTERNAL MEDICINE CLINIC | Age: 80
End: 2020-09-15

## 2020-09-15 NOTE — TELEPHONE ENCOUNTER
Patient spouse called to advise an appointment has been rescheduled with Dr.Donald Huff on 10/7/20. Patient spouse states he would like to discuss next steps for treatment.

## 2020-09-18 ENCOUNTER — TELEPHONE (OUTPATIENT)
Dept: INTERNAL MEDICINE CLINIC | Age: 80
End: 2020-09-18

## 2020-09-18 RX ORDER — HYDROCHLOROTHIAZIDE 25 MG/1
25 TABLET ORAL DAILY
Qty: 30 TABLET | Refills: 3 | Status: SHIPPED | OUTPATIENT
Start: 2020-09-18 | End: 2020-10-16

## 2020-09-18 NOTE — TELEPHONE ENCOUNTER
Pharmacy is requesting to have hydroCHLOROthiazide (HYDRODIURIL) 25 MG tablet refilled for the patient. [address]     Please advise.

## 2020-09-21 NOTE — TELEPHONE ENCOUNTER
Wants to discuss his wife's care. Call returned to mobile number listed. Message has been left for the patient's spouse. Can call office to schedule appointment.

## 2020-09-29 ENCOUNTER — HOSPITAL ENCOUNTER (EMERGENCY)
Age: 80
Discharge: PSYCHIATRIC OR SUBSTANCE USE FACILITY | End: 2020-09-30
Attending: EMERGENCY MEDICINE
Payer: MEDICARE

## 2020-09-29 LAB
ALBUMIN SERPL-MCNC: 4.7 G/DL (ref 3.4–5)
ALP BLD-CCNC: 96 U/L (ref 40–129)
ALT SERPL-CCNC: 16 U/L (ref 10–40)
ANION GAP SERPL CALCULATED.3IONS-SCNC: 10 MMOL/L (ref 3–16)
AST SERPL-CCNC: 35 U/L (ref 15–37)
BASOPHILS ABSOLUTE: 0.1 K/UL (ref 0–0.2)
BASOPHILS RELATIVE PERCENT: 1 %
BILIRUB SERPL-MCNC: 0.8 MG/DL (ref 0–1)
BILIRUBIN DIRECT: <0.2 MG/DL (ref 0–0.3)
BILIRUBIN, INDIRECT: NORMAL MG/DL (ref 0–1)
BUN BLDV-MCNC: 12 MG/DL (ref 7–20)
CALCIUM SERPL-MCNC: 10.2 MG/DL (ref 8.3–10.6)
CHLORIDE BLD-SCNC: 101 MMOL/L (ref 99–110)
CO2: 29 MMOL/L (ref 21–32)
CREAT SERPL-MCNC: 0.7 MG/DL (ref 0.6–1.2)
EKG ATRIAL RATE: 117 BPM
EKG DIAGNOSIS: NORMAL
EKG Q-T INTERVAL: 346 MS
EKG QRS DURATION: 100 MS
EKG QTC CALCULATION (BAZETT): 430 MS
EKG R AXIS: -25 DEGREES
EKG T AXIS: 128 DEGREES
EKG VENTRICULAR RATE: 93 BPM
EOSINOPHILS ABSOLUTE: 0 K/UL (ref 0–0.6)
EOSINOPHILS RELATIVE PERCENT: 0.7 %
GFR AFRICAN AMERICAN: >60
GFR NON-AFRICAN AMERICAN: >60
GLUCOSE BLD-MCNC: 91 MG/DL (ref 70–99)
HCT VFR BLD CALC: 43.4 % (ref 36–48)
HEMOGLOBIN: 14.5 G/DL (ref 12–16)
LIPASE: 21 U/L (ref 13–60)
LYMPHOCYTES ABSOLUTE: 1.2 K/UL (ref 1–5.1)
LYMPHOCYTES RELATIVE PERCENT: 17.5 %
MCH RBC QN AUTO: 32.1 PG (ref 26–34)
MCHC RBC AUTO-ENTMCNC: 33.3 G/DL (ref 31–36)
MCV RBC AUTO: 96.3 FL (ref 80–100)
MONOCYTES ABSOLUTE: 0.7 K/UL (ref 0–1.3)
MONOCYTES RELATIVE PERCENT: 10.6 %
NEUTROPHILS ABSOLUTE: 5 K/UL (ref 1.7–7.7)
NEUTROPHILS RELATIVE PERCENT: 70.2 %
PDW BLD-RTO: 15.1 % (ref 12.4–15.4)
PLATELET # BLD: 168 K/UL (ref 135–450)
PMV BLD AUTO: 9.6 FL (ref 5–10.5)
POTASSIUM REFLEX MAGNESIUM: 4.3 MMOL/L (ref 3.5–5.1)
RBC # BLD: 4.5 M/UL (ref 4–5.2)
SODIUM BLD-SCNC: 140 MMOL/L (ref 136–145)
TOTAL PROTEIN: 7.5 G/DL (ref 6.4–8.2)
WBC # BLD: 7.1 K/UL (ref 4–11)

## 2020-09-29 PROCEDURE — 99284 EMERGENCY DEPT VISIT MOD MDM: CPT

## 2020-09-29 PROCEDURE — 80048 BASIC METABOLIC PNL TOTAL CA: CPT

## 2020-09-29 PROCEDURE — 2500000003 HC RX 250 WO HCPCS: Performed by: EMERGENCY MEDICINE

## 2020-09-29 PROCEDURE — 96372 THER/PROPH/DIAG INJ SC/IM: CPT

## 2020-09-29 PROCEDURE — 80076 HEPATIC FUNCTION PANEL: CPT

## 2020-09-29 PROCEDURE — 85025 COMPLETE CBC W/AUTO DIFF WBC: CPT

## 2020-09-29 PROCEDURE — 93005 ELECTROCARDIOGRAM TRACING: CPT | Performed by: STUDENT IN AN ORGANIZED HEALTH CARE EDUCATION/TRAINING PROGRAM

## 2020-09-29 PROCEDURE — 83690 ASSAY OF LIPASE: CPT

## 2020-09-29 RX ORDER — OLANZAPINE 10 MG/1
10 INJECTION, POWDER, LYOPHILIZED, FOR SOLUTION INTRAMUSCULAR
Status: COMPLETED | OUTPATIENT
Start: 2020-09-29 | End: 2020-09-29

## 2020-09-29 RX ADMIN — OLANZAPINE 10 MG: 10 INJECTION, POWDER, FOR SOLUTION INTRAMUSCULAR at 21:30

## 2020-09-29 RX ADMIN — OLANZAPINE 10 MG: 10 INJECTION, POWDER, FOR SOLUTION INTRAMUSCULAR at 23:40
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
## 2020-09-29 NOTE — ED PROVIDER NOTES
[address] ENCOUNTER          EM RESIDENT NOTE       Date of evaluation: 9/29/2020    Chief Complaint     Altered Mental Status      History of Present Illness     Sharla Greenberg is a 68 y.o. female with a PMH of A fib on no AC, cerebral amyloid angiopathy, who presents with worsening mental status changes. The patient was brought in by squad because she was aggressive towards her . The patient herself says that she lives with \"my  who is not my \". She talks a lot about her mother who lives above her and other family members that have likely passed. The patient denies any complaints at this time and just does not want to see her  at this time. Review of Systems     Review of Systems   All other systems reviewed and are negative. 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  She has a past surgical history that includes Colonoscopy (12/12/2008) and hernia repair. Her family history includes Heart Attack in her father; Heart Disease in her father; Stroke in her mother. She reports that she has never smoked. She has never used smokeless tobacco. She reports that she does not drink alcohol or use drugs.     Medications     Previous Medications    ACETAMINOPHEN (TYLENOL) 500 MG TABLET    Take 650 mg by mouth 3 times daily as needed    DONEPEZIL (ARICEPT) 10 MG TABLET    Take 1 tablet by mouth nightly    HYDROCHLOROTHIAZIDE (HYDRODIURIL) 25 MG TABLET    Take 1 tablet by mouth daily    MEMANTINE (NAMENDA) 10 MG TABLET    Take 1 tablet by mouth 2 times daily    MULTIPLE VITAMINS-MINERALS (THERAPEUTIC MULTIVITAMIN-MINERALS) TABLET    Take 1 tablet by mouth daily    PANTOPRAZOLE (PROTONIX) 40 MG TABLET    Take 1 tablet by mouth every morning (before breakfast)    POTASSIUM CHLORIDE (KLOR-CON M) 20 MEQ EXTENDED RELEASE TABLET    Take 1 tablet by mouth daily    SKIN PROTECTANTS, MISC. (HYDROCERIN) CREA CREAM    Apply topically 2 times daily    SPIRONOLACTONE (ALDACTONE) 25 MG TABLET    Take 1 tablet by mouth daily       Allergies     She is allergic to beta adrenergic blockers. Physical Exam     INITIAL VITALS: BP: 123/70, Temp: 98.7 °F (37.1 °C), Pulse: 96, Resp: 18, SpO2: 98 %   Physical Exam  Constitutional:       Appearance: She is well-developed. HENT:      Head: Normocephalic and atraumatic. Neck:      Musculoskeletal: Normal range of motion. Cardiovascular:      Rate and Rhythm: Normal rate and regular rhythm. Heart sounds: Normal heart sounds. Pulmonary:      Effort: Pulmonary effort is normal.      Breath sounds: Normal breath sounds. Abdominal:      General: There is no distension. Palpations: Abdomen is soft. Tenderness: There is no abdominal tenderness. Musculoskeletal: Normal range of motion. Skin:     General: Skin is warm and dry. Neurological:      Mental Status: She is alert. She is confused.    Psychiatric:         Mood and Affect: Mood normal.         DiagnosticResults     EKG   Interpreted in conjunction with emergencydepartment physician No att. providers found  Rhythm: atrial fibrillation - controlled  Rate: normal  Axis: left  Ectopy: none  Conduction: normal  ST Segments: no acute change  T Waves:no acute change  Q Waves: none  Clinical Impression: non-specific EKG and atrial fibrillation (chronic)  Comparison:  8/6/2020    RADIOLOGY:  No orders to display       LABS:   Results for orders placed or performed during the hospital encounter of 09/29/20   CBC Auto Differential   Result Value Ref Range    WBC 7.1 4.0 - 11.0 K/uL    RBC 4.50 4.00 - 5.20 M/uL    Hemoglobin 14.5 12.0 - 16.0 g/dL    Hematocrit 43.4 36.0 - 48.0 %    MCV 96.3 80.0 - 100.0 fL    MCH 32.1 26.0 - 34.0 pg    MCHC 33.3 31.0 - 36.0 g/dL RDW 15.1 12.4 - 15.4 %    Platelets 555 273 - 692 K/uL    MPV 9.6 5.0 - 10.5 fL    Neutrophils % 70.2 %    Lymphocytes % 17.5 %    Monocytes % 10.6 %    Eosinophils % 0.7 %    Basophils % 1.0 %    Neutrophils Absolute 5.0 1.7 - 7.7 K/uL    Lymphocytes Absolute 1.2 1.0 - 5.1 K/uL    Monocytes Absolute 0.7 0.0 - 1.3 K/uL    Eosinophils Absolute 0.0 0.0 - 0.6 K/uL    Basophils Absolute 0.1 0.0 - 0.2 K/uL   Basic Metabolic Panel w/ Reflex to MG   Result Value Ref Range    Sodium 140 136 - 145 mmol/L    Potassium reflex Magnesium 4.3 3.5 - 5.1 mmol/L    Chloride 101 99 - 110 mmol/L    CO2 29 21 - 32 mmol/L    Anion Gap 10 3 - 16    Glucose 91 70 - 99 mg/dL    BUN 12 7 - 20 mg/dL    CREATININE 0.7 0.6 - 1.2 mg/dL    GFR Non-African American >60 >60    GFR African American >60 >60    Calcium 10.2 8.3 - 10.6 mg/dL   Hepatic Function Panel   Result Value Ref Range    Total Protein 7.5 6.4 - 8.2 g/dL    Alb 4.7 3.4 - 5.0 g/dL    Alkaline Phosphatase 96 40 - 129 U/L    ALT 16 10 - 40 U/L    AST 35 15 - 37 U/L    Total Bilirubin 0.8 0.0 - 1.0 mg/dL    Bilirubin, Direct <0.2 0.0 - 0.3 mg/dL    Bilirubin, Indirect see below 0.0 - 1.0 mg/dL   Lipase   Result Value Ref Range    Lipase 21.0 13.0 - 60.0 U/L       ED BEDSIDE ULTRASOUND:  None    RECENT VITALS:  BP: 123/70, Temp: 98.7 °F (37.1 °C), Pulse: 96,Resp: 18, SpO2: 98 %     Procedures     None    ED Course     Nursing Notes, Past Medical Hx, Past Surgical Hx, Social Hx, Allergies, and Family Hx were reviewed. The patient was given the followingmedications:  No orders of the defined types were placed in this encounter. CONSULTS:  [facility] / ELDA / Moreno Gretchen is a 68 y.o. female who is hemodynamically stable and afebrile vital signs on presentation. After discussing the patient with her  it appears that she has worsened and is more agitated and confused at this point.   We did call social work who agrees that she is very disorganized and unaware of her current condition. Basic labs will be obtained to begin the process of getting her admitted to a geriatric psych facility. At this time I am going off service and my colleague will follow up:    -Labs  -Reassessment  -Final disposition    This patient was also evaluated by the attending physician. All care plans werediscussed and agreed upon. Clinical Impression     1. Altered mental status, unspecified altered mental status type        Disposition     PATIENT REFERRED TO:  No follow-up provider specified.     DISCHARGE MEDICATIONS:  New Prescriptions    No medications on file       DISPOSITION  -Continued care        Mansi Walsh MD  Resident  09/29/20 8840

## 2020-09-30 ENCOUNTER — HOSPITAL ENCOUNTER (INPATIENT)
Age: 80
LOS: 9 days | Discharge: HOME | DRG: 546 | End: 2020-10-09
Attending: PSYCHIATRY & NEUROLOGY | Admitting: PSYCHIATRY & NEUROLOGY
Payer: MEDICARE

## 2020-09-30 VITALS
HEART RATE: 71 BPM | DIASTOLIC BLOOD PRESSURE: 65 MMHG | OXYGEN SATURATION: 100 % | RESPIRATION RATE: 10 BRPM | SYSTOLIC BLOOD PRESSURE: 117 MMHG | TEMPERATURE: 98.7 F

## 2020-09-30 PROBLEM — F03.918 DEMENTIA WITH BEHAVIORAL DISTURBANCE: Status: ACTIVE | Noted: 2020-09-30

## 2020-09-30 PROBLEM — I44.2 COMPLETE HEART BLOCK (HCC): Status: ACTIVE | Noted: 2020-05-08

## 2020-09-30 LAB
BILIRUBIN URINE: NEGATIVE
BLOOD, URINE: NEGATIVE
CLARITY: CLEAR
COLOR: YELLOW
GLUCOSE URINE: NEGATIVE MG/DL
INR BLD: 1.02 (ref 0.86–1.14)
KETONES, URINE: ABNORMAL MG/DL
LEUKOCYTE ESTERASE, URINE: NEGATIVE
MICROSCOPIC EXAMINATION: ABNORMAL
NITRITE, URINE: NEGATIVE
PH UA: 6 (ref 5–8)
PROTEIN UA: NEGATIVE MG/DL
PROTHROMBIN TIME: 11.8 SEC (ref 10–13.2)
SARS-COV-2, NAAT: NOT DETECTED
SPECIFIC GRAVITY UA: 1.02 (ref 1–1.03)
URINE TYPE: ABNORMAL
UROBILINOGEN, URINE: 0.2 E.U./DL

## 2020-09-30 PROCEDURE — 6370000000 HC RX 637 (ALT 250 FOR IP): Performed by: PSYCHIATRY & NEUROLOGY

## 2020-09-30 PROCEDURE — U0002 COVID-19 LAB TEST NON-CDC: HCPCS

## 2020-09-30 PROCEDURE — 1240000000 HC EMOTIONAL WELLNESS R&B

## 2020-09-30 PROCEDURE — 83036 HEMOGLOBIN GLYCOSYLATED A1C: CPT

## 2020-09-30 PROCEDURE — 99223 1ST HOSP IP/OBS HIGH 75: CPT | Performed by: PSYCHIATRY & NEUROLOGY

## 2020-09-30 PROCEDURE — 99222 1ST HOSP IP/OBS MODERATE 55: CPT | Performed by: PHYSICIAN ASSISTANT

## 2020-09-30 PROCEDURE — 80061 LIPID PANEL: CPT

## 2020-09-30 PROCEDURE — 36415 COLL VENOUS BLD VENIPUNCTURE: CPT

## 2020-09-30 PROCEDURE — 96374 THER/PROPH/DIAG INJ IV PUSH: CPT

## 2020-09-30 PROCEDURE — 81003 URINALYSIS AUTO W/O SCOPE: CPT

## 2020-09-30 PROCEDURE — 85610 PROTHROMBIN TIME: CPT

## 2020-09-30 PROCEDURE — U0003 INFECTIOUS AGENT DETECTION BY NUCLEIC ACID (DNA OR RNA); SEVERE ACUTE RESPIRATORY SYNDROME CORONAVIRUS 2 (SARS-COV-2) (CORONAVIRUS DISEASE [COVID-19]), AMPLIFIED PROBE TECHNIQUE, MAKING USE OF HIGH THROUGHPUT TECHNOLOGIES AS DESCRIBED BY CMS-2020-01-R: HCPCS

## 2020-09-30 RX ORDER — HALOPERIDOL 1 MG/1
2 TABLET ORAL EVERY 6 HOURS PRN
Status: DISCONTINUED | OUTPATIENT
Start: 2020-09-30 | End: 2020-10-09 | Stop reason: HOSPADM

## 2020-09-30 RX ORDER — CARBAMAZEPINE 200 MG/1
100 TABLET ORAL 2 TIMES DAILY
Status: DISCONTINUED | OUTPATIENT
Start: 2020-09-30 | End: 2020-10-09 | Stop reason: HOSPADM

## 2020-09-30 RX ORDER — ACETAMINOPHEN 325 MG/1
650 TABLET ORAL EVERY 4 HOURS PRN
Status: DISCONTINUED | OUTPATIENT
Start: 2020-09-30 | End: 2020-10-09 | Stop reason: HOSPADM

## 2020-09-30 RX ORDER — LORAZEPAM 2 MG/ML
1 INJECTION INTRAMUSCULAR EVERY 6 HOURS PRN
Status: DISCONTINUED | OUTPATIENT
Start: 2020-09-30 | End: 2020-10-09 | Stop reason: HOSPADM

## 2020-09-30 RX ORDER — PANTOPRAZOLE SODIUM 40 MG/1
40 TABLET, DELAYED RELEASE ORAL
Status: DISCONTINUED | OUTPATIENT
Start: 2020-10-01 | End: 2020-10-09 | Stop reason: HOSPADM

## 2020-09-30 RX ORDER — HALOPERIDOL 5 MG/ML
2 INJECTION INTRAMUSCULAR EVERY 6 HOURS PRN
Status: DISCONTINUED | OUTPATIENT
Start: 2020-09-30 | End: 2020-10-09 | Stop reason: HOSPADM

## 2020-09-30 RX ORDER — TRAZODONE HYDROCHLORIDE 50 MG/1
25 TABLET ORAL NIGHTLY PRN
Status: DISCONTINUED | OUTPATIENT
Start: 2020-09-30 | End: 2020-10-09 | Stop reason: HOSPADM

## 2020-09-30 RX ORDER — BENZTROPINE MESYLATE 1 MG/ML
1 INJECTION INTRAMUSCULAR; INTRAVENOUS 2 TIMES DAILY PRN
Status: DISCONTINUED | OUTPATIENT
Start: 2020-09-30 | End: 2020-10-09 | Stop reason: HOSPADM

## 2020-09-30 RX ORDER — MIDAZOLAM HYDROCHLORIDE 1 MG/ML
5 INJECTION INTRAMUSCULAR; INTRAVENOUS ONCE
Status: COMPLETED | OUTPATIENT
Start: 2020-09-30 | End: 2020-09-30

## 2020-09-30 RX ORDER — MAGNESIUM HYDROXIDE/ALUMINUM HYDROXICE/SIMETHICONE 120; 1200; 1200 MG/30ML; MG/30ML; MG/30ML
30 SUSPENSION ORAL EVERY 6 HOURS PRN
Status: DISCONTINUED | OUTPATIENT
Start: 2020-09-30 | End: 2020-10-09 | Stop reason: HOSPADM

## 2020-09-30 RX ORDER — LORAZEPAM 0.5 MG/1
0.5 TABLET ORAL EVERY 6 HOURS PRN
Status: DISCONTINUED | OUTPATIENT
Start: 2020-09-30 | End: 2020-10-09 | Stop reason: HOSPADM

## 2020-09-30 RX ORDER — RISPERIDONE 0.5 MG/1
0.5 TABLET, ORALLY DISINTEGRATING ORAL 2 TIMES DAILY
Status: DISCONTINUED | OUTPATIENT
Start: 2020-09-30 | End: 2020-10-09 | Stop reason: HOSPADM

## 2020-09-30 RX ORDER — MEMANTINE HYDROCHLORIDE 5 MG/1
10 TABLET ORAL 2 TIMES DAILY
Status: DISCONTINUED | OUTPATIENT
Start: 2020-09-30 | End: 2020-10-09 | Stop reason: HOSPADM

## 2020-09-30 RX ADMIN — MIDAZOLAM HYDROCHLORIDE 5 MG: 1 INJECTION INTRAMUSCULAR; INTRAVENOUS at 00:44

## 2020-09-30 RX ADMIN — CARBAMAZEPINE 100 MG: 200 TABLET ORAL at 21:11

## 2020-09-30 ASSESSMENT — SLEEP AND FATIGUE QUESTIONNAIRES: SLEEP PATTERN: UTA

## 2020-09-30 ASSESSMENT — LIFESTYLE VARIABLES
HISTORY_ALCOHOL_USE: NO
HISTORY_ALCOHOL_USE: NO

## 2020-09-30 NOTE — BH NOTE
Patient was provided with a mask to utilize on unit. Unfortunately, due to severe cognitive impairment, patient has demonstrated an inability to comply with mask use secondary to an inability to comprehend and/or consistently recall the need for consistent use. Multiple efforts to teach patient about necessity have proven ineffective, again due to severe underlying cognitive impairment. At this point in time it has become clinically apparent that efforts to force patient to utilize mask contribute only to increased behavioral disturbance. All staff will continue to utilize masks when interacting with patient.     Manjula Heller RN

## 2020-09-30 NOTE — BH NOTE
Patient has been asleep in bed all morning. Arouses easily but goes back to sleep. Meds verified with [facility]. Most meds have not been filled since June.  The only meds recently filled were HCTZ and Aldactone

## 2020-09-30 NOTE — BH NOTE
DINORA received a return call from the pt's , Kenrick Rodríguez. Kenrick Rodríguez reported that he gets all of the pt's prescriptions at [facility]. When Sw inquired about why he has not been filling and/or picking up several of the pt's medications he stated that the pt refused to take the following medications; Donepezil, Memantine, &  Pantoprazole Sodium so he never forced her to take them and/or got them filled. SW inquired if he ever tried concealing the medications in food, such as ice cream & pudding, and he  Stated, \" we do not operate that way & isn't the way to build trust between  and wife\". Kenrick Rodríguez shared that he feels that it's OK for the hospital staff to conceal the pt's medications if needed.          Berta, PRATEEK-S

## 2020-09-30 NOTE — PLAN OF CARE
Patient dismissive and irritable with staff. Refused meds. Denies SI/HI/AVH. Refuses to answer most questions and dismissive with staff. Will continue to monitor.     Roby Fox RN

## 2020-09-30 NOTE — FLOWSHEET NOTE
09/30/20 1332   Activities of Daily Living   Patient Requires assistance with daily self-care activities? No   Leisure Activity 1   3 Favorite Leisure Activities \"Go to the [facility]'s"   Frequency several times a year   Last time can't remember   Barriers to participating  transportation;motivation   Leisure Activity 2   [address]  \"Read\"   Frequency  weekly   Last time  can't remember   Barriers to participating  motivation   Leisure Activity 3   [address]  \"Go to the [employer] and run [employer] program with my friends\"   Frequency  several times a year   Last time  can't remember   Barriers to participating  transportation   Social   Patient reports spending the majority of their free time with a group   Patient verbalizes a preference for spending free time alone   Patients perception of support system more healthy   Patients perception of barriers to socializing with others include(s) transportation   Social Details Support System: \"my neighbor\"   Beliefs & Coping   Has difficulty dealing with feelings   No   Internalizes feelings/Keeps feelings in No   Externalizes feelings through aggressiveness or poor temper control  No   Feels uncomfortable around others  No   Has difficulty talking to others  No   Depends on others for direction or decisions No   Difficulty dealing with anger of others  No   Difficulty dealing with own anger  No   Difficulty managing stress No   Frequently has difficulty with relationships  No   Has recently perceived/experienced loss, disappointment, humiliation or failure  NO   General perception about self likes self   Attitude about abilities feels like a failure much of the time   Locus of Control  never   Belief about recovery I don't have an illness/problem   Patient Identified Strengths  \"I'm open and interested in people. \"   Patient Identified Limitations  \"It's depressing here and I hate it here. \"   Perception of most stressful event prior to hospitalization \"My  went to do the laundry mat to do a very unimportant task and when we came home there was someone from [facility] sitting on the porch. They made me come here. \"   Perception of changes needed \"To go home. \"   Strengths and Limitations   Strengths Independent in basic self-care activities; Positive leisure interests   Limitations Difficulty problem solving/relies on others to help solve problems; Difficult relationships / poor social skills;Multiple barriers to leisure interests;Unrealistic self-view     CTRS completed pt's AT/OT Leisure Assessment.     Mary Upton, CTRS

## 2020-09-30 NOTE — ED NOTES
Report called to BRENDAN Ayala for ED7 going to [facility]. All questions answered. Patient will be transported on ED stretcher by [id number] ambulance service.         Aishwarya Avery RN  09/30/20 9643

## 2020-09-30 NOTE — BH NOTE
Patient is able to demonstrated the ability to move from a reclining position to an upright position within the recliner.      Manjula Heller RN

## 2020-09-30 NOTE — BH NOTE
DINORA received a call from the pt's , Lacey Phan. Lacey Phan wanted to check on the pt's wellbeing and see if her transition to the hospital went ok. Lacey Phan stated that the pt has been on the same 7 medications prescribed by Dr. Litzy Ayala; however the pt often refuses 3 of them - he was not able to readily share the 3 medication she was refusing. Lacey Phan stated that the pt has been more combative & aggressive at home and yesterday she was trying to hit him with a phone. Lacey Phan explained that the pt thinks there are multiple  Bobs, a good Raynold Olives and a bad Raynold Olives and he never knows how she will react to him. DINORA inquired if the pt  followed up with the Psychiatry and/or Neurology appointments that were set up for her during her last admission and the pt appeared confused and stated he was not aware of any appointments being set up for her. The pt's  reported that the pt has an cardiac appointment on October 7th and another appointment in November with a person that specializes in her disease; however he plans to cancel both of them today. DINORA inquired why the pt plans to cancel these appointments ; and he stated he is under the impresson that the pt will be hospitalized in the mal psych unit for a long time. DINORA instructed Jtcarol Phan not to cancel any appointments at this time and explained she would work with him to determine if the appointments need to be canceled at a later time.            JAEL Hampton

## 2020-09-30 NOTE — BH NOTE
4 Eyes Skin Assessment     The patient is being assess for  Admission    I agree that 2 RN's have performed a thorough Head to Toe Skin Assessment on the patient. ALL assessment sites listed below have been assessed. Areas assessed by both nurses: no patient refused allowing carmita to look at bottem  [x]   Head, Face, and Ears   [x]   Shoulders, Back, and Chest  [x]   Arms, Elbows, and Hands   []   Coccyx, Sacrum, and Ischum  [x]   Legs, Feet, and Heels    Callous areas to bilateral feet. Patient with bruising and scabs to bilateral forearms Rt leg> Lt leg patient with small flesh colored raised area to lt upper back    Does the Patient have Skin Breakdown?   No         Joseph Prevention initiated:  No   Wound Care Orders initiated:  No      WOC nurse consulted for Pressure Injury (Stage 3,4, Unstageable, DTI, NWPT, and Complex wounds):  No      Nurse 1 eSignature: Electronically signed by Jeanne Ghosh RN on 9/30/20 at 1:54 PM EDT    **SHARE this note so that the co-signing nurse is able to place an eSignature**    Nurse 2 eSignature: Electronically signed by Adrian Castro LPN on 7/23/76 at 4:53 PM EDT

## 2020-09-30 NOTE — GROUP NOTE
Group Therapy Note    Date: 9/30/2020    Group Start Time: 2771  Group End Time: 1400  Group Topic: Recreational    [facility]        Group Therapy Note    Attendees: 4    Patient's Goal: to engage in playing Crazy 8's with peers to increase socialization, encourage engagement in leisure, and improve overall mood. Notes:  Mel was excused from majority of group due to meeting with staff. Pt sat briefly in group, but did not engage in activity. Pt was observed wandering the unit at times and socializing with a peer.      Status After Intervention:  Unchanged    Participation Level: Minimal    Participation Quality: Appropriate      Speech:  normal      Thought Process/Content: Confused      Affective Functioning: Congruent      Mood: anxious      Level of consciousness:  Alert      Response to Learning: Progressing to goal      Endings: None Reported    Modes of Intervention: Support, Socialization, Problem-solving and Activity      Discipline Responsible: Psychoeducational Specialist      Signature:  JERARDO Knott

## 2020-09-30 NOTE — ED NOTES
Covid swab collected and labeled with a lab sticker then bagged in room and labeled with a patient sticker. Placed in another bag in patient room by this RN and walked to lab.       Gibran Cook RN  09/30/20 5680

## 2020-09-30 NOTE — ED NOTES
Speckled top, White/Yellow and Grey top urine tubes collected and sent to lab       Iman Cisneros RN  09/30/20 2622

## 2020-09-30 NOTE — ED PROVIDER NOTES
[address] [facility] 71 ENCOUNTER          PHYSICIAN ASSISTANT NOTE     Date of evaluation: 9/29/2020    ADDENDUM:      Care of this patient was assumed from my colleague, Dr. Nirali Whittington. The patient was seen for Altered Mental Status  . The patient's initial evaluation and plan have been discussed with the prior provider who initially evaluated the patient. Nursing Notes, Past Medical Hx, Past Surgical Hx, Social Hx, Allergies, and Family Hx were all reviewed.     Diagnostic Results     EKG   Please see the prior providers note for any ECG interpretation    RADIOLOGY:  No orders to display       LABS:   Results for orders placed or performed during the hospital encounter of 09/29/20   CBC Auto Differential   Result Value Ref Range    WBC 7.1 4.0 - 11.0 K/uL    RBC 4.50 4.00 - 5.20 M/uL    Hemoglobin 14.5 12.0 - 16.0 g/dL    Hematocrit 43.4 36.0 - 48.0 %    MCV 96.3 80.0 - 100.0 fL    MCH 32.1 26.0 - 34.0 pg    MCHC 33.3 31.0 - 36.0 g/dL    RDW 15.1 12.4 - 15.4 %    Platelets 372 896 - 079 K/uL    MPV 9.6 5.0 - 10.5 fL    Neutrophils % 70.2 %    Lymphocytes % 17.5 %    Monocytes % 10.6 %    Eosinophils % 0.7 %    Basophils % 1.0 %    Neutrophils Absolute 5.0 1.7 - 7.7 K/uL    Lymphocytes Absolute 1.2 1.0 - 5.1 K/uL    Monocytes Absolute 0.7 0.0 - 1.3 K/uL    Eosinophils Absolute 0.0 0.0 - 0.6 K/uL    Basophils Absolute 0.1 0.0 - 0.2 K/uL   Basic Metabolic Panel w/ Reflex to MG   Result Value Ref Range    Sodium 140 136 - 145 mmol/L    Potassium reflex Magnesium 4.3 3.5 - 5.1 mmol/L    Chloride 101 99 - 110 mmol/L    CO2 29 21 - 32 mmol/L    Anion Gap 10 3 - 16    Glucose 91 70 - 99 mg/dL    BUN 12 7 - 20 mg/dL    CREATININE 0.7 0.6 - 1.2 mg/dL    GFR Non-African American >60 >60    GFR African American >60 >60    Calcium 10.2 8.3 - 10.6 mg/dL   Hepatic Function Panel   Result Value Ref Range    Total Protein 7.5 6.4 - 8.2 g/dL    Alb 4.7 3.4 - 5.0 g/dL    Alkaline Phosphatase 96 40 - 129 U/L ALT 16 10 - 40 U/L    AST 35 15 - 37 U/L    Total Bilirubin 0.8 0.0 - 1.0 mg/dL    Bilirubin, Direct <0.2 0.0 - 0.3 mg/dL    Bilirubin, Indirect see below 0.0 - 1.0 mg/dL   Lipase   Result Value Ref Range    Lipase 21.0 13.0 - 60.0 U/L   Urinalysis, reflex to microscopic   Result Value Ref Range    Color, UA Yellow Straw/Yellow    Clarity, UA Clear Clear    Glucose, Ur Negative Negative mg/dL    Bilirubin Urine Negative Negative    Ketones, Urine TRACE (A) Negative mg/dL    Specific Gravity, UA 1.025 1.005 - 1.030    Blood, Urine Negative Negative    pH, UA 6.0 5.0 - 8.0    Protein, UA Negative Negative mg/dL    Urobilinogen, Urine 0.2 <2.0 E.U./dL    Nitrite, Urine Negative Negative    Leukocyte Esterase, Urine Negative Negative    Microscopic Examination Not Indicated     Urine Type Voided    COVID-19    Specimen: Nasopharyngeal Swab   Result Value Ref Range    Report See report     This Test Sent To [facility] lab    COVID-19   Result Value Ref Range    SARS-CoV-2, NAAT Not Detected Not Detected   EKG 12 Lead   Result Value Ref Range    Ventricular Rate 93 BPM    Atrial Rate 117 BPM    QRS Duration 100 ms    Q-T Interval 346 ms    QTc Calculation (Bazett) 430 ms    R Axis -25 degrees    T Axis 128 degrees    Diagnosis       EKG performed in ER and to be interpreted by ER physician. Confirmed by MD, ER ([id number]),  Graham Rouse ([phone]) on 9/29/2020 7:33:14 PM       RECENT VITALS:  BP: 108/87, Temp: 98.7 °F (37.1 °C), Pulse: 96, Resp: 18, SpO2: 98 %     Procedures     N/A    ED Course     The patient was given the following medications:  Orders Placed This Encounter   Medications    OLANZapine (ZYPREXA) injection 10 mg    OLANZapine (ZYPREXA) injection 10 mg    midazolam (VERSED) injection 5 mg       CONSULTS:  IP CONSULT TO [address] / ASSESSMENT / Everett Alpers is admitted to the Emergency Department for evaluation of her chief complaint as described in the history of present illness. Complete history and physical was performed by me and my attending. Nursing notes, past medical history, surgical history, family history and social history were reviewed and addressed in the HPI. Carolina Jewell is a 68 y.o. female who presents to the emergency department with a complaint of altered mental status. The patient has a history of cerebral amyloid angiopathy which has been progressively worsening over the past several months. Tonight, the patient had become significantly aggressive towards her , and she states that she lives with \"my  who is not my . \"  Apparently the patient has not been taking her Aricept or her Namenda on a regular basis. On evaluation by the earlier team in the emergency department, the patient demonstrates increased agitation. She was pacing around the room. Laboratory evaluation, she demonstrates no leukocytosis, anemia or thrombocytopenia on her CBC. Her basic metabolic function is unremarkable with preserved renal function. Her lipase was negative. At the time of turnover, the patient had become significantly agitated and would not provide a urine specimen or undergo a rapid COVID-19 test for placement. A psychiatric hold has been placed by the attending physician for transfer this patient to a geriatric psychiatric facility. A dose of IM Zyprexa was given to the patient, which had little effect other than a slight columning of her agitation. She was given an additional IM dose of Zyprexa which again, improved her agitation somewhat, but the patient was still refusing to allow for a urinalysis and COVID-19 swab. The patient returned to her state of agitation and was given 5 mg of Versed for suppression of the agitation. As the patient called, we were able to obtain a urine specimen and a COVID-19 swab. Her urine specimen demonstrates trace ketonuria without evidence of nitrites or leukocyte esterase concerning for infection. Her COVID-19 swab is negative. Her signs and symptoms appear to be most likely result of her continued progression from her cerebral amyloid angiopathy. There is no evidence or concern for an infectious etiology in this patient at this time. At this point the patient is MEDICALLY CLEARED from the standpoint of the emergency department for transfer to a geriatric psychiatric facility for further evaluation and management. The patient's case had been discussed with the patient's on-call primary care provider who agrees with this disposition. The patient's case was then discussed with the psychiatric nurse practitioner at [facility] who agreed to accept the patient under Dr. Arjun Barger. The patient will be transferred to this facility once a bed is available and transport services are arranged. I discussed this plan at length the patient who verbalizes understanding and is in agreement. The patient was seen and evaluated by myself and the attending physician, Sergio Kirk MD who agrees with my assessment, treatment and plan. Clinical Impression     1. Altered mental status, unspecified altered mental status type    2. Aggression    3.  Cerebral amyloid angiopathy (CODE)        Disposition     DISPOSITION Decision To Transfer 09/30/2020 01:53:22 AM         [address] [place] [address]  09/30/20 [address] [place] [address]  09/30/20 [id number]
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
## 2020-09-30 NOTE — GROUP NOTE
Group Therapy Note    Date: 9/30/2020    Group Start Time: 1100  Group End Time: 6116  Group Topic: [id number]        Group Therapy Note    Music Therapy group consisted of memory recall trivia and group singing to music from Chantel. Pt's engaged in True/False questions and collaborated in group decision making. Patients engaged in live music stimuli, encouraged to sing along with music by Chantel. Goals addressed include group cohesion, socialization, engagement, and quality of life. Pt actively engaged across group, answering multiple questions and singing along with music stimuli. Attendees: 3         Notes:  Pt did not attend group, standing at nurses station while engaging with floor staff.       Signature:  Airam Hernández MM, JERARDO

## 2020-09-30 NOTE — ED NOTES
When asked patient to provide a urine sample the patient refused to try and give one. When explained to the patient that we also need to get a CoVid swab, the patient refuses to allow me to swab her and states \"you'll have to talk to Dr. Nichol Remy about it. \". Will notify treatment team of refusal to give samples.       BRENDAN Sabillon  09/29/20 2000

## 2020-09-30 NOTE — H&P
Hospital Medicine History & Physical      PCP: Jhon Guillory MD    Date of Admission: 9/30/2020    Date of Service: Pt seen/examined on 9/30/2020      Chief Complaint:  Behavioral disturbances    History Of Present Illness: The patient is a 68 y.o. female with atrial fibrillation, sp pacemaker, mitral valve disorder, dementia  who presented to [facility] ER for aggressive behavior brought in by  from home. Patient was seen and evaluated in the ED by the ED medical provider, patient was medically cleared for admission to [facility] at [facility]. This note serves as an admission medical H&P. Patient denies any medical complaints. Her dementia precludes my ability to complete review of systems. Past Medical History:        Diagnosis Date    Adenomatous polyp of colon 1/29/2019    Asymptomatic varicose veins     Atrial fibrillation (HCC)     Dysthymic disorder     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)     Screening mammogram 12/8/2009    (Left)Benign       Past Surgical History:        Procedure Laterality Date    COLONOSCOPY  12/12/2008    HERNIA REPAIR      Left Femoral       Medications Prior to Admission:    Prior to Admission medications    Medication Sig Start Date End Date Taking? Authorizing Provider   hydroCHLOROthiazide (HYDRODIURIL) 25 MG tablet Take 1 tablet by mouth daily 9/18/20   Jhon Guillory MD   spironolactone (ALDACTONE) 25 MG tablet Take 1 tablet by mouth daily 9/9/20   Jhon Guillory MD   Skin Protectants, Misc.  (HYDROCERIN) CREA cream Apply topically 2 times daily  Patient not taking: Reported on 8/6/2020 5/13/20   Florin Fry DO   potassium chloride (KLOR-CON M) 20 MEQ extended release tablet Take 1 tablet by mouth daily  Patient not taking: Reported on 8/6/2020 5/14/20   Florin Fry DO   donepezil (ARICEPT) 10 MG tablet Take 1 tablet by mouth nightly  Patient not taking: Reported on 8/6/2020 5/13/20   Florin Fry DO   memantine (NAMENDA) 10 MG tablet Take 1 tablet by mouth 2 times daily  Patient not taking: Reported on 8/6/2020 5/13/20   Marlon Fry, DO   Multiple Vitamins-Minerals (THERAPEUTIC MULTIVITAMIN-MINERALS) tablet Take 1 tablet by mouth daily  Patient not taking: Reported on 8/6/2020 5/14/20   Lamond Shone L Heis DO   pantoprazole (PROTONIX) 40 MG tablet Take 1 tablet by mouth every morning (before breakfast)  Patient not taking: Reported on 8/6/2020 5/14/20   Lamond Shone L Heis DO   acetaminophen (TYLENOL) 500 MG tablet Take 650 mg by mouth 3 times daily as needed 3/15/20   Historical Provider, MD       Allergies:  Beta adrenergic blockers    Social History:  The patient currently lives at home with      TOBACCO:   reports that she has never smoked. She has never used smokeless tobacco.  ETOH:   reports no history of alcohol use. Family History:   Positive as follows:        Problem Relation Age of Onset    Stroke Mother     Heart Attack Father     Heart Disease Father        REVIEW OF SYSTEMS:     Unable to complete 2/2 dementia     PHYSICAL EXAM:    BP (!) 104/53   Pulse 65   Temp 98.1 °F (36.7 °C) (Oral)   Resp 16   Wt 145 lb (65.8 kg)   SpO2 100%   BMI 23.40 kg/m²     Gen: No distress. Alert. Eyes: PERRL. No sclera icterus. No conjunctival injection. ENT: No discharge. Pharynx clear. Neck: No JVD. Trachea midline. Resp: No accessory muscle use. No crackles. No wheezes. No rhonchi. CV: Irregularly irregular. No murmur. No rub. GI:  Non-distended. Skin: Warm and dry. No nodule on exposed extremities. No rash on exposed extremities. M/S: no obvious joint deformities- she did not cooperate with exam   Neuro: Awake. No focal neurologic deficit on exam she has ambulating easily around the unit. She will not follow commands to complete a cranial nerve assessment.   Psych: Per psychiatry team evaluation     CBC:   Recent Labs     09/29/20  1809   WBC 7.1   HGB 14.5   HCT 43.4   MCV 96.3        BMP:   Recent Labs     09/29/20  1809      K 4.3      CO2 29   BUN 12   CREATININE 0.7     LIVER PROFILE:   Recent Labs     09/29/20  1809   AST 35   ALT 16   LIPASE 21.0   BILIDIR <0.2   BILITOT 0.8   ALKPHOS 96     PT/INR: No results for input(s): PROTIME, INR in the last 72 hours. APTT: No results for input(s): APTT in the last 72 hours. UA:  Recent Labs     09/30/20  0055   COLORU Yellow   PHUR 6.0   CLARITYU Clear   SPECGRAV 1.025   LEUKOCYTESUR Negative   UROBILINOGEN 0.2   BILIRUBINUR Negative   BLOODU Negative   GLUCOSEU Negative          U/A:    Lab Results   Component Value Date    COLORU Yellow 09/30/2020    WBCUA 3-5 04/30/2020    RBCUA None seen 04/30/2020    BACTERIA 1+ 04/30/2020    CLARITYU Clear 09/30/2020    SPECGRAV 1.025 09/30/2020    LEUKOCYTESUR Negative 09/30/2020    BLOODU Negative 09/30/2020    GLUCOSEU Negative 09/30/2020    GLUCOSEU NEGATIVE 11/22/2011     EKG:  I have reviewed the EKG with the following interpretation:   A fib, ST abnormality not acutely changed from prior     RADIOLOGY  None       ASSESSMENT/PLAN:  #Dementia with behavioral disturbance  - per psychiatry team    #persistent atrial fibrillation  - previously on warfarin, but she tells me she has not taken it since May 2020, her INR is not elevated. I suspect this was stopped 2/2 fall risk  - no rate control meds, HR mid 70s in a fib at time of my exam     #CHB with pacemaker in place     #mitral valve disorder  - fb cardiology     #HTN  - HCTZ and aldactone on home med list, but BP low on admit- would not resume  - monitor     #Chronic venous insufficiency of lower extremity   - she did not allow me to examine her lower extremities today     #GERD  - PPI    She did not cooperate with exam or history taking.   H&P completed to the best of my abilities considering the limitations    Sunita Barrientos PA-C  9/30/2020 8:34 AM

## 2020-09-30 NOTE — BH NOTE
`[facility]  Admission Note     Admission Type:   Admission Type:  Involuntary    Reason for admission:  Reason for Admission: aggression @ home    PATIENT STRENGTHS:  Strengths: Communication    Patient Strengths and Limitations:  Limitations: Difficulty problem solving/relies on others to help solve problems    Addictive Behavior:   Addictive Behavior  In the past 3 months, have you felt or has someone told you that you have a problem with:  : None  Do you have a history of Chemical Use?: No  Do you have a history of Alcohol Use?: No  Do you have a history of Street Drug Abuse?: No  Histroy of Prescripton Drug Abuse?: No    Medical Problems:   Past Medical History:   Diagnosis Date    Adenomatous polyp of colon 1/29/2019    Asymptomatic varicose veins     Atrial fibrillation ([address]     Dysthymic disorder     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)     Screening mammogram 12/8/2009    (Left)Benign       Status EXAM:  Status and Exam  Normal: No  Facial Expression: Avoids Gaze  Affect: Constricted  Level of Consciousness: Alert  Mood:Normal: No  Mood: Irritable  Motor Activity:Normal: No  Motor Activity: Decreased  Interview Behavior: Irritable  Preception: [place] to Person  Attention:Normal: No  Attention: Distractible, Unable to Concentrate  Thought Processes: Tangential  Thought Content:Normal: No  Thought Content: Poverty of Content  Hallucinations: None  Delusions: No  Memory:Normal: No  Memory: Poor Recent, Poor Remote  Insight and Judgment: No  Insight and Judgment: Poor Judgment, Poor Insight  Present Suicidal Ideation: No  Present Homicidal Ideation: No    Tobacco Screening:  Practical Counseling, on admission, calra X, if applicable and completed (first 3 are required if patient doesn't refuse):            ( )  Recognizing danger situations (included triggers and roadblocks)                    ( )  Coping skills (new ways to manage stress, exercise, relaxation techniques, changing routine, distraction)                                                           ( )  Basic information about quitting (benefits of quitting, techniques in how to quit, available resources  ( ) Referral for counseling faxed to [facility]                                           ( ) Patient refused counseling  ( ) Patient has not smoked in the last 30 days    Metabolic Screening:    Lab Results   Component Value Date    LABA1C 5.3 03/19/2020       Lab Results   Component Value Date    CHOL 267 (H) 03/19/2020    CHOL 267 (H) 01/23/2019    CHOL 271 (H) 08/01/2018    CHOL 220 (H) 01/22/2018    CHOL 237 (H) 07/25/2017    CHOL 224 (H) 01/17/2017    CHOL 251 (H) 01/19/2016    CHOL 210 (H) 01/16/2015    CHOL 230 (H) 01/09/2014    CHOL 232 (H) 11/22/2011     Lab Results   Component Value Date    TRIG 64 03/19/2020    TRIG 82 01/23/2019    TRIG 106 08/01/2018    TRIG 111 01/22/2018    TRIG 73 07/25/2017    TRIG 96 01/17/2017    TRIG 86 01/19/2016    TRIG 108 01/16/2015    TRIG 70 01/09/2014    TRIG 83 11/22/2011     Lab Results   Component Value Date    HDL 86 (H) 03/19/2020     (H) 01/23/2019     (H) 08/01/2018    HDL 83 (H) 01/22/2018     (H) 07/25/2017     (H) 01/17/2017     (H) 01/19/2016    HDL 90 (H) 01/16/2015     (H) 01/09/2014     (H) 11/22/2011     No components found for: [facility] EVALUATION AND TREATMENT [place]  Lab Results   Component Value Date    LABVLDL 13 03/19/2020    LABVLDL 16 01/23/2019    LABVLDL 21 08/01/2018    LABVLDL 22 01/22/2018    LABVLDL 15 07/25/2017    LABVLDL 19 01/17/2017    LABVLDL 17 01/19/2016    LABVLDL 22 01/16/2015    LABVLDL 14 01/09/2014    LABVLDL 17 11/22/2011         Body mass index is 23.4 kg/m².     BP Readings from Last 2 Encounters:   09/30/20 (!) 104/53   09/30/20 117/65           Pt admitted with followings belongings:  Dentures: None  Vision - Corrective Lenses: Glasses(3 pairs)  Hearing Aid: None  Jewelry: Ring, Necklace, Bracelet  Body Piercings Removed: No  Clothing: Footwear, Shirt, Pants     Valuables sent home withn/a. Valuables placed in safe in security envelope, number:  n/a. Patient's home medications were n/a. Patient oriented to surroundings and program expectations and copy of patient rights given. Received admission packet:  yes. Consents reviewed, signed patient refused. Refused yes. Patient verbalize understanding:  no.    Patient education on precautions: yes    Patient irritable and dismissive when attempts made to assess patient.                    BRENDAN Ryder)
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
## 2020-10-01 LAB
CHOLESTEROL, TOTAL: 229 MG/DL (ref 0–199)
ESTIMATED AVERAGE GLUCOSE: 114 MG/DL
HBA1C MFR BLD: 5.6 %
HDLC SERPL-MCNC: 101 MG/DL (ref 40–60)
LDL CHOLESTEROL CALCULATED: 118 MG/DL
TRIGL SERPL-MCNC: 52 MG/DL (ref 0–150)
VLDLC SERPL CALC-MCNC: 10 MG/DL

## 2020-10-01 PROCEDURE — 97166 OT EVAL MOD COMPLEX 45 MIN: CPT

## 2020-10-01 PROCEDURE — 1240000000 HC EMOTIONAL WELLNESS R&B

## 2020-10-01 PROCEDURE — 99232 SBSQ HOSP IP/OBS MODERATE 35: CPT | Performed by: PSYCHIATRY & NEUROLOGY

## 2020-10-01 PROCEDURE — 6370000000 HC RX 637 (ALT 250 FOR IP): Performed by: PSYCHIATRY & NEUROLOGY

## 2020-10-01 PROCEDURE — 97530 THERAPEUTIC ACTIVITIES: CPT

## 2020-10-01 PROCEDURE — 97535 SELF CARE MNGMENT TRAINING: CPT

## 2020-10-01 RX ADMIN — CARBAMAZEPINE 100 MG: 200 TABLET ORAL at 09:42

## 2020-10-01 RX ADMIN — MEMANTINE HYDROCHLORIDE 10 MG: 5 TABLET ORAL at 20:21

## 2020-10-01 RX ADMIN — RISPERIDONE 0.5 MG: 0.5 TABLET, ORALLY DISINTEGRATING ORAL at 20:21

## 2020-10-01 RX ADMIN — MEMANTINE HYDROCHLORIDE 10 MG: 5 TABLET ORAL at 09:42

## 2020-10-01 RX ADMIN — CARBAMAZEPINE 100 MG: 200 TABLET ORAL at 20:21

## 2020-10-01 RX ADMIN — RISPERIDONE 0.5 MG: 0.5 TABLET, ORALLY DISINTEGRATING ORAL at 09:42

## 2020-10-01 ASSESSMENT — PAIN SCALES - GENERAL: PAINLEVEL_OUTOF10: 0

## 2020-10-01 NOTE — PROGRESS NOTES
Inpatient Occupational Therapy  Evaluation and Treatment    Unit:   [facility]  Date:  10/1/2020  Patient Name:    Alden Davalos  Admitting diagnosis:  Dementia with behavioral disturbance, unspecified dementia type [facility]) [F03.91]  Admit Date:  9/30/2020  Precautions/Restrictions/WB Status/ Lines/ Wounds/ Oxygen:  Standard BHI Precautions  History of Present Illness:  Patient is a 68 y.o. female with cerebral amyloid angiopathy who was admitted for aggressive and psychotic behavioral disturbance. Patient is familiar to me from a previous admission in March of this year. 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 \"  She was started on risperidone titrated to 0.5 mg BID and her psychosis improved, though it did not completely resolve. She was referred to Dr. Beth Walsh for outpatient psychiatry.     Review of her chart indicates that since discharge she has not consistently followed up with psychiatry or neurology. Notes from her PCP indicated that she may have seen Dr. Aury Boykin a few times, but her  frequently called PCP with questions about her psychiatric medications despite this, and frequently asked if she should be seeing psychiatry. When I asked  about seeing Dr. Aury Boykin he had no recollection of her having seen him and does not believe she saw a psychiatrist since her last admission. Additionally, she was admitted medically twice for symptomatic bradycardia, complete heart block and recurrent falls. She ultimately had a pacemaker placed in May of this year. She was admitted to a SNF after admission in May and it seems that risperidone was discontinued while there related to concerns it was contributing to falls, however notably, despite recurrent symptomatic bradycardia, her home donepezil was not discontinued. She is no longer on any antipsychotic medications.      reports that she has been again voicing delusions about him, frequently refusing to take medications from him or accept assistance and becoming aggressive. She was aggressive in the ED and required IM medications. Treatment Number:  1    Treatment Time: 2847-4130  Timed Code Treatment Minutes:  39   minutes   Total Treatment Time:   49   minutes    Staff Recommendations: Independent with ambulation on unit    Patient Goals for Therapy:  \" Go home. \"    Discharge Recommendations:  Home with daily assist    DME needs for discharge:      Needs Met     AM-PAC Score:   24  Home Health S4 Level: NA    MOCA:  To be assessed      Preadmission Environment    Pt. Lives with family ()  Home environment:  condominium  Steps to enter first floor: N/A  Steps to second floor: N/A  Bathroom: walk in shower, tub/shower  Equipment owned: shower chair/bench    Preadmission Status:   Pt. Able to drive: Yes  Pt Fully independent with ADLs: Yes  Pt. Required assistance from family for: N/A  Pt. independent for transfers and gait and walked with No Device  History of falls No    Sleep Hygiene:  Denies any issue with sleep  Income: pension  Financial Management:   manages  Leisure Interests:  Working in retail, reading, planting flowers on her bale[place]ruckies (3 [place] river)  Medication Management:  sets up meds and reminds her when to take them. Pt is very concerned about why she is taking some of her medications, and does not want to take them if she does not know. Encouraged pt to have nurse at d/c write out reasons for each of her meds, and/or have  have a list with picture/description of each med for her to use daily. Health Management:  Primary MD-Dr. Yuval Davis  Social Network:  Pt has been Chair and member of several boards. Has lots of \"nice\" friends in [place], none close. Had daughter in [place].   Stressors:  Seeing friends unhappy, not knowing what each medication is for that she takes. Coping Skills: being busy, talking to her daughter    Pain   No  Rating:NA  Location: NA  Pain Medicine Status: No request made      Cognition    A&O to [facility]), date (the 1st-unable to state which month)  Able to follow:  2 step commands    Upper Extremity ROM:    WFL, pt able to perform all bed mobility, transfers, and gait without ROM limitation. Upper Extremity Strength:    WFL, pt able to perform all bed mobility, transfers, and gait without strength limitation. Upper Extremity Sensation:    No apparent deficits. Upper Extremity Proprioception:    No apparent deficits. Skin Integrity:  No issues noted     Coordination and Tone:  No problem noted    Balance  Static Sitting:  Normal  Dynamic Sitting: Normal  Static Standing:  Normal  Dynamic Standing: Good     Bed mobility:    Supine to sit:   Not Tested  Sit to supine:   Not Tested  Scooting to head of bed: Not Tested   Scooting in sitting:  Independent  Rolling:   Not Tested  Bridging:   Not Tested    Transfers:    Sit to stand:   Independent  Stand to sit: Independent  Bed/Chair to/from toilet: Not Tested    Self Care: Toileting: Not Tested  Grooming: Not Tested  Dressing: Not Tested    Exercise / Activities Initiated:   Pt. Educated on role of OT. Pt. Participated in: discussion re: coping skills, med management    Assessment of Deficits:  Pitting edema noted in R foot. Pt reports she takes  Water pills for this, and it is normal for her. Pt demonstrated decreased IADL status, decreased coping skills, decreased cognition, limited education    Pt. Limited during evaluation by . . . Decreased cognition    At end of evaluation, pt. In gathering room for lunch. Goal(s) : To be met in 3 Visits:  1). Pt will verbalize 3 coping skills  2). Pt will participate in [facility] REHABILITATION assessment. To be met in 5 Visits:  1). Pt.  To identify 2 memory strategies to take medications as prescribed. 2). Pt. To complete interest check list.  3). Pt. To complete a daily schedule of healthy activities/routines with Min assist.     Rehabilitation Potential:  Good for goals listed above. Strengths for achieving goals include:  PLOF  Barriers to achieving goals include: Decreased coping skills, Decreased cognition and Limited education    Plan: To be seen 2-5x/week while in acute care setting for therapeutic exercises/act, ADL retraining, NMR and patient/caregiver ed/instruction.        Signature and License Number  Sonia Zhou [address] 89, OTR/L  #[id number]           If patient discharges from this facility prior to next visit, this note will serve as the Discharge Summary

## 2020-10-01 NOTE — PROGRESS NOTES
Department of Psychiatry  Attending Progress Note    Admission Date:    9/30/2020    Chief complaint / Reason for Admission:  Psychotic and aggressive behavioral disturbance    Patient's chart was reviewed, case was discussed with nursing/OT/RT staff, and collaborated with  about the treatment plan. SUBJECTIVE:   Over last 24 hours:  Behavioral outbursts: No   Non-aggressive behavioral disturbance: Yes  Medication compliant: No  Need for seclusion/restraints: No  Sleeping adequately:  No  Appetite adequate: Yes  Attending groups: No    Yesterday patient was irritable, uncooperative, refused medications, and exit seeking. She did not exhibit aggression or require emergency medications, seclusion or restraints, however. This morning she is somewhat better. She took her medication this morning though she stated \"I wasn't happy about it. \"  Fixates on not knowing what they're for, but then was not cooperative with my efforts to explain purposes of medications stating she would need to \"write it down later. \"      A little more cooperative today and calmer than yesterday, but still irritable and uncooperative. No insight into symptoms and generally paranoid. Believes there is no reason for her to be in the hospital.  Again related capgras delusions this time stated that there were \"3 other men who all wear bridget's glasses\" in her home.     Progressing overall: Not yet progressing  Suicidal ideation: denies  Homicidal ideation: denies  Medication side effects: no    ROS: Patient has new complaints: no    Current Medications Ordered:   memantine  10 mg Oral BID    pantoprazole  40 mg Oral QAM AC    risperiDONE  0.5 mg Oral BID    carBAMazepine  100 mg Oral BID      PRN Meds: acetaminophen, LORazepam **OR** LORazepam, haloperidol lactate **OR** haloperidol, traZODone, benztropine mesylate, magnesium hydroxide, aluminum & magnesium hydroxide-simethicone     Objective:     PE:    /69   Pulse 80 Temp 97.6 °F (36.4 °C) (Oral)   Resp 16   Wt 145 lb (65.8 kg)   SpO2 99%   BMI 23.40 kg/m²       Motor / Gait: psychomotor agitation, nml tone, no involuntary movements, normal gait and station     Mental Status Examination:    Appearance: WF, appears stated age, wearing own clothing, improved grooming and hygiene  Behavior/Attitude toward examiner:  Guarded, argumentative  Speech:  Pressured, irritable tone  Mood:  Irritable  Affect: Irritable  Thought processes:  Loose, illogical  Thought Content: denies SI, did not voice specific HI, but has been aggressive with , +capgras delusion, +general paranoia, +confabulation  Perceptions: Denies AVH, not obviously RTIS  Attention: Poor  Abstraction: [place]  Cognition: Average CHETAN, Alert and oriented to person, and place, but not time or situation, recall severely impaired  Insight: Poor insight   Judgment: Impaired judgment       LAB: Reviewed labs from last 24 hours      Assessment and Plan:     Diagnoses:   Primary Psychiatric (DSM V) Diagnosis: 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      Tx plan: Generally: prevent self injury/aggression, stabilize mood/anxiety/psychotic/behavioral disturbance, establish/maintain aftercare, increase coping mechanisms, improve medication compliance.  All conditions present on admission are being treated while pt is hospitalized.      Legal Status: Voluntary via HCPOA. Patient's severe dementia prevents her from possessing the capacity to consent for admission or discharge.     Primary Psychiatric Issues:  1.   CAA dementia with behavioral disturbance:  -Resumed risperidone 0.5 mg

## 2020-10-01 NOTE — PLAN OF CARE
Problem: Falls - Risk of:  Goal: Will remain free from falls  Description: Will remain free from falls  Outcome: Ongoing     Problem: Altered Mood, Deterioration in Function:  Goal: Ability to perform activities of daily living will improve  Description: Ability to perform activities of daily living will improve  Outcome: Ongoing    Pt has been irritable and labile. She is alert only to person. She is exit seeking and repeatedly asked to go home. She seemed suspicious of her medications and only accepted tegretol. She is independent with care and accepted snacks and drinks. She refused her BP. Pt is currently asleep in bed with fall precautions in place.

## 2020-10-01 NOTE — GROUP NOTE
Group Therapy Note    Date: 10/1/2020    Group Start Time: 1100  Group End Time: 3261  Group Topic: Relaxation    [facility]        Group Therapy Note    Attendees: 5    Patient's Goal: to engage in coloring and listening to relaxation music to achieve a relaxed state and encourage engagement in self-care. Notes: Katrin attended approximately 10 minutes of group. Pt did not engage in activity and was observed pacing the unit at times.      Status After Intervention:  Unchanged    Participation Level: Minimal    Participation Quality: Appropriate      Speech:  normal      Thought Process/Content: confused      Affective Functioning: Constricted/Restricted      Mood: anxious      Level of consciousness:  Alert      Response to Learning: Progressing to goal      Endings: None Reported    Modes of Intervention: Support, Activity, Movement and Media      Discipline Responsible: Psychoeducational Specialist      Signature:  Temple Osgood, MT-BC

## 2020-10-02 PROCEDURE — 6370000000 HC RX 637 (ALT 250 FOR IP): Performed by: PSYCHIATRY & NEUROLOGY

## 2020-10-02 PROCEDURE — 1240000000 HC EMOTIONAL WELLNESS R&B

## 2020-10-02 PROCEDURE — 99232 SBSQ HOSP IP/OBS MODERATE 35: CPT | Performed by: PSYCHIATRY & NEUROLOGY

## 2020-10-02 PROCEDURE — 99231 SBSQ HOSP IP/OBS SF/LOW 25: CPT | Performed by: PHYSICIAN ASSISTANT

## 2020-10-02 RX ADMIN — CARBAMAZEPINE 100 MG: 200 TABLET ORAL at 10:07

## 2020-10-02 RX ADMIN — PANTOPRAZOLE SODIUM 40 MG: 40 TABLET, DELAYED RELEASE ORAL at 06:16

## 2020-10-02 RX ADMIN — RISPERIDONE 0.5 MG: 0.5 TABLET, ORALLY DISINTEGRATING ORAL at 10:07

## 2020-10-02 RX ADMIN — MEMANTINE HYDROCHLORIDE 10 MG: 5 TABLET ORAL at 10:07

## 2020-10-02 ASSESSMENT — PAIN SCALES - GENERAL: PAINLEVEL_OUTOF10: 0

## 2020-10-02 NOTE — PLAN OF CARE
Mel has been visible on the unit. She is irritable and confused. States she is here for a \". \" Correctly stated \"it is the first\" but couldn't state the year. She denies SI/HI/AVH. She is walking with a steady gait. Well groomed. Med compliant whole. Was not happy about receiving half of a Tegretol and attempted to explain why the pill shouldn't be cut in half. Disorganized thought process. In bed asleep at present. Will continue to monitor.

## 2020-10-02 NOTE — BH NOTE
DINORA received a phone call from the  pt's . SW provided an update on pt and discussed pt potential LTC needs. The pt's  stated that he would be open to considering a higher level of care for pt; however he isn't sure where to start. DINORA explained that the pt will not be able to make the transition from the hospital to [address] [place] [address]; however SW can give him 2 different agency resources to assist with the process of identifying an appropriate facility/option for pt. DINORA provided him with the phone #s for both [facility] & [facility].          JAEL Hampton

## 2020-10-02 NOTE — PLAN OF CARE
[facility]  Day 3 Interdisciplinary Treatment Plan NOTE    Review Date & Time: 10/2/20 1113    Patient was not in treatment team    Admission Type:   Admission Type: Involuntary    Reason for admission:  Reason for Admission: aggression @ home  Estimated Length of Stay Update:  3-5 days  Estimated Discharge Date Update: 10/5/20-10/7/20    PATIENT STRENGTHS:  Patient Strengths Strengths: Communication  Patient Strengths and Limitations:Limitations: Difficulty problem solving/relies on others to help solve problems, Difficult relationships / poor social skills, Multiple barriers to leisure interests, Unrealistic self-view  Addictive Behavior:Addictive Behavior  In the past 3 months, have you felt or has someone told you that you have a problem with:  : None  Do you have a history of Chemical Use?: No  Do you have a history of Alcohol Use?: No  Do you have a history of Street Drug Abuse?: No  Histroy of Prescripton Drug Abuse?: No  Medical Problems:  Past Medical History:   Diagnosis Date    Adenomatous polyp of colon 1/29/2019    Asymptomatic varicose veins     Atrial fibrillation ([address]     Dysthymic disorder     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)     Screening mammogram 12/8/2009    (Left)Benign       Risk:  Fall RiskTotal: 105  Joseph Scale Joseph Scale Score: 22  BVC Total: 2  Change in scores none.  Changes to plan of Care none    Status EXAM:   Status and Exam  Normal: No  Facial Expression: Worried, Hostile  Affect: Inappropriate  Level of Consciousness: Confused  Mood:Normal: No  Mood: Anxious, Labile, Irritable  Motor Activity:Normal: Yes  Motor Activity: Decreased  Interview Behavior: Cooperative, Irritable, Impulsive  Preception: [place] to Person  Attention:Normal: No  Attention: Unable to Concentrate  Thought Processes: Perseveration, Tangential  Thought Content:Normal: No  Thought Content: Preoccupations, Obsessions  Hallucinations: None  Delusions: No  Memory:Normal: No  Memory: Poor Recent, Poor Remote  Insight and Judgment: No  Insight and Judgment: Poor Judgment, Poor Insight  Present Suicidal Ideation: No  Present Homicidal Ideation: No    Daily Assessment Last Entry:   Daily Sleep (WDL): Within Defined Limits         Patient Currently in Pain: Denies  Daily Nutrition (WDL): Within Defined Limits    Patient Monitoring:  Frequency of Checks: 4 times per hour, close    Psychiatric Symptoms:   Depression Symptoms  Depression Symptoms: Impaired concentration, Increased irritability  Anxiety Symptoms  Anxiety Symptoms: Generalized  Sharon Symptoms  Sharon Symptoms: Labile, Poor judgment     Psychosis Symptoms  Delusion Type: Paranoid    Suicide Risk CSSR-S:  1) Within the past month, have you wished you were dead or wished you could go to sleep and not wake up? : No  2) Have you actually had any thoughts of killing yourself? : No  6) Have you ever done anything, started to do anything, or prepared to do anything to end your life?: No  Change in Resultnone Change in Plan of care none      EDUCATION:   Learner Progress Toward Treatment Goals: Reviewed results and recommendations of this team    Method: Individual    Outcome: Verbalized understanding    PATIENT GOALS: none expressed    PLAN/TREATMENT RECOMMENDATIONS UPDATE: continue treatment    GOALS UPDATE:   Time frame for Short-Term Goals: 2 days      Fabiola Wilson RN

## 2020-10-02 NOTE — PROGRESS NOTES
improved grooming and hygiene  Behavior/Attitude toward examiner:  Guarded, but not argumentative today  Speech:  [place], irritable tone  Mood: \"Fine\"  Affect: Constricted  Thought processes:  Loose, illogical  Thought Content: denies SI, did not voice specific HI, but has been aggressive with , +capgras delusion, +general paranoia, +confabulation  Perceptions: Denies AVH, not obviously RTIS  Attention: Poor  Abstraction: [place]  Cognition: Average CHETAN, Alert and oriented to person, and place, but not time or situation, recall severely impaired  Insight: Poor insight   Judgment: Impaired judgment       LAB: Reviewed labs from last 24 hours      Assessment and Plan:     Diagnoses:   Primary Psychiatric (DSM V) Diagnosis: 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      Tx plan: Generally: prevent self injury/aggression, stabilize mood/anxiety/psychotic/behavioral disturbance, establish/maintain aftercare, increase coping mechanisms, improve medication compliance.  All conditions present on admission are being treated while pt is hospitalized.      Legal Status: Voluntary via HCPOA. Patient's severe dementia prevents her from possessing the capacity to consent for admission or discharge.     Primary Psychiatric Issues:  1. CAA dementia with behavioral disturbance:  -Resumed risperidone 0.5 mg BID  -Started carbamazepine 100 mg BID. -d/c'd donepezil given recent issues with symptomatic bradycardia.   -Resumed home memantine 10 mg BID     Chemical Dependency Issues:  No active issues.     Function:  -Consult occupational therapy  -Falls precautions     Medical Problems:  Internal medicine has been consulted. Appreciate recs.     1. GERD:  Resume home pantoprazole 40 mg daily    2. HTN:   Home med list includes to antihypertensive, but BP has been low. Holding for now. 3.  Chronic venous insufficiency blt LEs:  Offer compression stockings if patient will consider wearing. 4.  Afib:  No longer on rate control agent or anticoagulation.     Code Status: Full     Disposition:    -Housing: Home with .  -Current outpatient follow-up: PCP, Dr. Yuval Davis.     Estimated length of stay: 7 to 10 days        Criteria for Discharge:  Not psychotic, not homicidal, not suicidal, behavioral disturbance under control, sleeping well, mood improved/stable, eating well, aftercare arranged. Total face to face time with patient was 30 minutes and more than 50 % of that time was spent counseling the patient on their symptoms, treatment and expected goals.     Tyson Peguero MD  Staff Psychiatrist

## 2020-10-02 NOTE — PROGRESS NOTES
Brief Progress Note    Date: 10/02/20    Subjective: Mel has no complaints     Objective:  Vitals:    09/30/20 2026 10/01/20 0929 10/01/20 2226 10/02/20 0929   BP:  126/69 127/68 108/63   Pulse: 96 80 82 60   Resp:  16  18   Temp: 97 °F (36.1 °C) 97.6 °F (36.4 °C) 97.8 °F (36.6 °C) 97.1 °F (36.2 °C)   TempSrc: Oral Oral Oral Oral   SpO2: 98% 99% 98% 98%   Weight:           Physical Exam:    Gen: No distress. Alert. Eyes: PERRL. No sclera icterus. No conjunctival injection. ENT: No discharge. Pharynx clear. Neck: No JVD. Trachea midline. Resp: No accessory muscle use. No crackles. No wheezes. No rhonchi. CV: Irregularly irregular. No murmur. No rub. GI:  Non-distended. Skin: Warm and dry. No nodule on exposed extremities. No rash on exposed extremities. M/S: no obvious joint deformities- she did not cooperate with exam   Neuro: Awake. No focal neurologic deficit on exam she has ambulating easily around the unit. She will not follow commands to complete a cranial nerve assessment. Psych: Per psychiatry team evaluation     Assessment & Plan:    #Dementia with behavioral disturbance  - per psychiatry team     #persistent atrial fibrillation  - previously on warfarin, but she tells me she has not taken it since May 2020, her INR is not elevated.   I suspect this was stopped 2/2 fall risk  - no rate control meds, HR mid 70s in a fib at time of my exam      #CHB with pacemaker in place      #mitral valve disorder  - [facility] cardiology      #HTN  - HCTZ and aldactone on home med list, but BP low on admit- would not resume  - monitor      #Chronic venous insufficiency of lower extremity   - she did not allow me to examine her lower extremities today      #GERD  - PPI    Iris Fuller PA-C   4:19 PM 10/2/2020

## 2020-10-03 PROCEDURE — 6370000000 HC RX 637 (ALT 250 FOR IP): Performed by: PSYCHIATRY & NEUROLOGY

## 2020-10-03 PROCEDURE — 1240000000 HC EMOTIONAL WELLNESS R&B

## 2020-10-03 RX ADMIN — CARBAMAZEPINE 100 MG: 200 TABLET ORAL at 09:22

## 2020-10-03 RX ADMIN — MEMANTINE HYDROCHLORIDE 10 MG: 5 TABLET ORAL at 22:04

## 2020-10-03 RX ADMIN — CARBAMAZEPINE 100 MG: 200 TABLET ORAL at 22:04

## 2020-10-03 RX ADMIN — RISPERIDONE 0.5 MG: 0.5 TABLET, ORALLY DISINTEGRATING ORAL at 22:04

## 2020-10-03 NOTE — GROUP NOTE
Group Therapy Note    Date: 10/3/2020    Group Start Time: 1045  Group End Time: 1130  Group Topic: Cognitive Skills    [id number] [place] [address]        Group Therapy Note    Attendees: 8         Patient's Goal:  Patient will get to know her peers and reminisce about past experiences by playing Getting to Know You Binóscar      Notes:  Patient participated in the activity by identifying the activities/situations that applied to him. Patient then expanded on each of the situations by sharing her personal experiences with them in order to help others get to know her better. Patient interacted mostly with one other peer and with Alexi Rey, but she was pleasant and engaged      Status After Intervention:  Improved    Participation Level:  Active Listener and Interactive    Participation Quality: Appropriate and Sharing      Speech:  normal      Thought Process/Content: Logical      Affective Functioning: Congruent      Mood: anxious      Level of consciousness:  Alert and Attentive      Response to Learning: Able to verbalize current knowledge/experience and Progressing to goal      Endings: None Reported    Modes of Intervention: Activity      Discipline Responsible: /Counselor      Signature:  Sherryle Basta

## 2020-10-03 NOTE — PLAN OF CARE
Problem: Falls - Risk of:  Goal: Will remain free from falls  Description: Will remain free from falls  10/3/2020 0124 by Jax Mckeon RN  Outcome: Ongoing  Goal: Absence of physical injury  Description: Absence of physical injury  10/3/2020 0124 by Jax Mckeon RN  Outcome: Ongoing  Patient has been free of falls thus far this shift. She is noted to ambulate without difficulty this evening. Safety checks continue Q 15 minutes through out the shift. Patient is asked to call for staff to observe ambulation and to prevent falls. Patient didn't retain information and she denies that she is a patient on the unit.     Problem: Altered Mood, Deterioration in Function:  Goal: Ability to perform activities of daily living will improve  Description: Ability to perform activities of daily living will improve  10/3/2020 0124 by Jax Mckeon RN  Outcome: Ongoing  10/3/2020 0015 by Jax Mckeon RN  Outcome: Ongoing  Goal: Able to verbalize reality based thinking  Description: Able to verbalize reality based thinking  10/3/2020 0124 by Jax Mckeon RN  Outcome: Ongoing  10/3/2020 0015 by Jax Mckeon RN  Outcome: Ongoing  Goal: Skin appearance normal  Description: Skin appearance normal  10/3/2020 0124 by Jax Mckeon RN  Outcome: Ongoing  10/3/2020 0015 by Jax Mckeon RN  Outcome: Ongoing  Goal: Maintenance of adequate nutrition will improve  Description: Maintenance of adequate nutrition will improve  10/3/2020 0124 by Jax Mckeon RN  Outcome: Ongoing  Goal: Ability to tolerate increased activity will improve  Description: Ability to tolerate increased activity will improve  10/3/2020 0124 by Jax Mckeon RN  Outcome: Ongoing  Goal: Participates in care planning  Description: Participates in care planning  10/3/2020 0124 by Jax Mckeon RN  Outcome: Ongoing  Goal: Patient specific goal  Description: Patient specific goal  10/3/2020 0124 by Jax Mckeon RN  Outcome: Ongoing  Patient is able to perform activities of daily living with prompts. She remained alert and oriented to person only. No signs of skin problems that the patient was willing to disclose to this writer. She refused snack tonight and didn't take medications due to some paranoia around medications and being in the mal unit. 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 She is difficult to engage in treatment as she doesn't believe that she needs to be in the hospital on this unit. We discussed her day and what she was able to do and patient replied,\"It's so boring here, there is nothing to do here. \" We reviewed patient medications and patient refused to take meds tonight. We reviewed that she took the same medications last night and reviewed the purpose of the medications. Patient refused to take medications. She is asked to discuss refusal with her doctor. She said that she already has and the doctor treating her here was not her real doctor. Patient remained resistant and non-compliant with medications.

## 2020-10-03 NOTE — BH NOTE
Mel is alert to self and time with a labile mood. She denies SIHI and AVH, no RTIS noted. Denies pain. Refused AM meds except Tegretol. She states her memory is great and does not medication. She states the \"President of [facility] has written/called me and I'm supposed to let him/her know about the doctors and the nurses\" because she is a \"very important\" person. She has been verbally aggressive with staff. BL LE edema.

## 2020-10-03 NOTE — GROUP NOTE
Group Therapy Note    Date: 10/3/2020    Group Start Time: 1330  Group End Time: 8890  Group Topic: Relaxation    [address]        Group Therapy Note    Attendees: 9    Patient's Goal: Pt was prompted to listen to calming music and paint a picture that incorporated things that bring a smile to their face and/or make them happy, to promote; mood improvement, positivity, positive self-worth, and stress reduction. Notes: Pt was in and out of group multiple times. Pt was encouraged to participate in group intervention. Pt declined encouragement, stating \"no thanks. If you don't mind, I will just watch. \" Pt positively socialized with peers and provided peers with positive compliments, related to their artwork. Pt assisted staff and peers with cleaning art supplies.      Status After Intervention:  Unchanged    Participation Level: Interactive    Participation Quality: Appropriate and Sharing      Speech:  normal      Thought Process/Content: Linear      Affective Functioning: Constricted/Restricted      Mood: anxious      Level of consciousness:  Alert      Response to Learning: Capable of insight, Able to change behavior and Progressing to goal      Endings: None Reported    Modes of Intervention: Education, Support, Socialization, Exploration and Activity      Discipline Responsible: Psychoeducational Specialist      Signature:  Clarisse Decker, [address]

## 2020-10-04 PROCEDURE — 99233 SBSQ HOSP IP/OBS HIGH 50: CPT | Performed by: PSYCHIATRY & NEUROLOGY

## 2020-10-04 PROCEDURE — 1240000000 HC EMOTIONAL WELLNESS R&B

## 2020-10-04 PROCEDURE — 6370000000 HC RX 637 (ALT 250 FOR IP): Performed by: PSYCHIATRY & NEUROLOGY

## 2020-10-04 RX ADMIN — MEMANTINE HYDROCHLORIDE 10 MG: 5 TABLET ORAL at 20:59

## 2020-10-04 RX ADMIN — RISPERIDONE 0.5 MG: 0.5 TABLET, ORALLY DISINTEGRATING ORAL at 20:59

## 2020-10-04 RX ADMIN — PANTOPRAZOLE SODIUM 40 MG: 40 TABLET, DELAYED RELEASE ORAL at 06:29

## 2020-10-04 RX ADMIN — CARBAMAZEPINE 100 MG: 200 TABLET ORAL at 01:00

## 2020-10-04 NOTE — PLAN OF CARE
Pt is alert with confusion. She is pleasant and cooperative this pm. She continues to be grandiose. 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 She is intrusive into others conversations and frequently asks staff for snacks for the other patients. She denies SI/HI/AVH. She has not been noted to be RTIS.

## 2020-10-04 NOTE — PROGRESS NOTES
Department of Psychiatry  Attending Progress Note    Admission Date:    9/30/2020    Chief complaint / Reason for Admission:  Psychotic and aggressive behavioral disturbance    Patient's chart was reviewed, case was discussed with nursing/OT/RT staff, and collaborated with  about the treatment plan. SUBJECTIVE:   Over last 24 hours:  Behavioral outbursts: No   Non-aggressive behavioral disturbance: Yes  Medication compliant: Yes  Need for seclusion/restraints: No  Sleeping adequately:  Yes  Appetite adequate: Yes  Attending groups: No    Pt has been disorganized and exhibiting poor boundaries with other pt and staff. 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 Pt followed me around to interview pt and commented on my hair and telling me she owes me 3 because I place the straw the right position. Pt also organizing the unit. Pt has been refusing meds except tegretol and is not consistent with meds. Progressing overall: not improving  Suicidal ideation: denies  Homicidal ideation: denies  Medication side effects: no    ROS: Patient has new complaints: no    Current Medications Ordered:   memantine  10 mg Oral BID    pantoprazole  40 mg Oral QAM AC    risperiDONE  0.5 mg Oral BID    carBAMazepine  100 mg Oral BID      PRN Meds: acetaminophen, LORazepam **OR** LORazepam, haloperidol lactate **OR** haloperidol, traZODone, benztropine mesylate, magnesium hydroxide, aluminum & magnesium hydroxide-simethicone     Objective:     PE:    /67   Pulse 95   Temp 98.2 °F (36.8 °C) (Oral)   Resp 18   Wt 145 lb (65.8 kg)   SpO2 96%   BMI 23.40 kg/m²       Motor / Gait: No PMR/PMA, nml tone, no involuntary movements, normal gait and station     Mental Status Examination:    Appearance: WF, appears stated age, wearing own clothing, improved grooming and hygiene  Behavior/Attitude toward examiner:  cooperative, poor boundaries  Speech:  fluent and spontaneous  Mood:  I owe you honey  Affect: elevated  Thought processes:  illogical  Thought Content: denies SI, did not voice HI, grandiose   Perceptions: Denies AVH, not  RTIS  Attention: Poor  Abstraction: [place]  Cognition: Average CHETAN, Alert and oriented to person, and place, but not time or situation, recall severely impaired  Insight: Poor insight   Judgment: Impaired judgment       LAB: Reviewed labs from last 24 hours      Assessment and Plan:     Diagnoses:   Primary Psychiatric (DSM V) Diagnosis: 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      Tx plan: Generally: prevent self injury/aggression, stabilize mood/anxiety/psychotic/behavioral disturbance, establish/maintain aftercare, increase coping mechanisms, improve medication compliance.  All conditions present on admission are being treated while pt is hospitalized.      Legal Status: Voluntary via HCPOA. Patient's severe dementia prevents her from possessing the capacity to consent for admission or discharge.     Primary Psychiatric Issues:  1. CAA dementia with behavioral disturbance: Will cont meds as prescribed     Chemical Dependency Issues:  No active issues.     Function:  -Consult occupational therapy  -Falls precautions     Medical Problems:  Internal medicine has been consulted.   Appreciate recs.        Code Status: Full     Disposition:    -Housing: Home with .  -Current outpatient follow-up: PCP, Dr. Ronni Medina.     Estimated length of stay: per primary team        Criteria for Discharge:  Not psychotic, not homicidal, not suicidal, behavioral disturbance under control, sleeping well, mood improved/stable, eating well, aftercare arranged. Total face to face time with patient was 30 minutes and more than 50 % of that time was spent counseling the patient on their symptoms, treatment and expected goals.

## 2020-10-04 NOTE — GROUP NOTE
Group Therapy Note    Date: 10/4/2020    Group Start Time: 11:00 AM  Group End Time: 12:00 PM  Group Topic: LORI Ricks        Group Therapy Note    Attendees: 15       Patient's Goal:  Pt will participate in Self Portrait activity. Notes:  Pt left group before it began.  Pt did not meet goal.    Status After Intervention:  Unchanged    Participation Level: None    Participation Quality: Resistant      Speech:  mute      Thought Process/Content: ILIANA      Affective Functioning: Flat      Mood: ILIANA      Level of consciousness:  Alert      Response to Learning: Resistant      Endings: None Reported    Modes of Intervention: Support, Clarifying, Activity and Confrontation      Discipline Responsible: /Counselor      Signature:  LORI Ellison

## 2020-10-04 NOTE — BH NOTE
Pt was invited and encouraged to attend [facility]5 Psychoeducation Group. Pt appeared to be resting and did not rouse upon invite. Pt did not attend group.     Arvind Tran, CTRS

## 2020-10-04 NOTE — GROUP NOTE
Group Therapy Note    Date: 10/4/2020    Group Start Time: 1300  Group End Time: 0650  Group Topic: Recreational    [address]        Group Therapy Note    Attendees: 9    Patient's Goal: to select a meaningful leisure outlet to engage in, to promote; mood improvement, enjoyment, and stress reduction. Notes: Pt was encouraged to select a meaningful leisure outlet to engage in. Pt did not respond to encouragement. Pt appeared to isolate in her room during group time.      Signature:  Asa De Jesus, [address]

## 2020-10-05 ENCOUNTER — TELEPHONE (OUTPATIENT)
Dept: INTERNAL MEDICINE CLINIC | Age: 80
End: 2020-10-05

## 2020-10-05 PROCEDURE — 6370000000 HC RX 637 (ALT 250 FOR IP): Performed by: PSYCHIATRY & NEUROLOGY

## 2020-10-05 PROCEDURE — 6370000000 HC RX 637 (ALT 250 FOR IP): Performed by: NURSE PRACTITIONER

## 2020-10-05 PROCEDURE — 99233 SBSQ HOSP IP/OBS HIGH 50: CPT | Performed by: NURSE PRACTITIONER

## 2020-10-05 PROCEDURE — 6370000000 HC RX 637 (ALT 250 FOR IP): Performed by: PHYSICIAN ASSISTANT

## 2020-10-05 PROCEDURE — 99232 SBSQ HOSP IP/OBS MODERATE 35: CPT | Performed by: PHYSICIAN ASSISTANT

## 2020-10-05 PROCEDURE — 1240000000 HC EMOTIONAL WELLNESS R&B

## 2020-10-05 RX ORDER — DIMETHICONE, OXYBENZONE, AND PADIMATE O 2; 2.5; 6.6 G/100G; G/100G; G/100G
STICK TOPICAL PRN
Status: DISCONTINUED | OUTPATIENT
Start: 2020-10-05 | End: 2020-10-09 | Stop reason: HOSPADM

## 2020-10-05 RX ORDER — HYDROCHLOROTHIAZIDE 25 MG/1
25 TABLET ORAL DAILY
Status: DISCONTINUED | OUTPATIENT
Start: 2020-10-05 | End: 2020-10-09 | Stop reason: HOSPADM

## 2020-10-05 RX ADMIN — CARBAMAZEPINE 100 MG: 200 TABLET ORAL at 21:02

## 2020-10-05 RX ADMIN — MEMANTINE HYDROCHLORIDE 10 MG: 5 TABLET ORAL at 09:07

## 2020-10-05 RX ADMIN — Medication: at 12:29

## 2020-10-05 RX ADMIN — HYDROCHLOROTHIAZIDE 25 MG: 25 TABLET ORAL at 15:34

## 2020-10-05 RX ADMIN — RISPERIDONE 0.5 MG: 0.5 TABLET, ORALLY DISINTEGRATING ORAL at 09:07

## 2020-10-05 RX ADMIN — CARBAMAZEPINE 100 MG: 200 TABLET ORAL at 09:07

## 2020-10-05 RX ADMIN — PANTOPRAZOLE SODIUM 40 MG: 40 TABLET, DELAYED RELEASE ORAL at 06:11

## 2020-10-05 RX ADMIN — MEMANTINE HYDROCHLORIDE 10 MG: 5 TABLET ORAL at 21:02

## 2020-10-05 RX ADMIN — RISPERIDONE 0.5 MG: 0.5 TABLET, ORALLY DISINTEGRATING ORAL at 21:02

## 2020-10-05 ASSESSMENT — PAIN SCALES - GENERAL: PAINLEVEL_OUTOF10: 0

## 2020-10-05 NOTE — TELEPHONE ENCOUNTER
Pt is currently at [facility] per spouse stated she might be there for a week or 2. 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 .. pls advise

## 2020-10-05 NOTE — GROUP NOTE
Group Therapy Note    Date: 10/5/2020    Group Start Time: 9241  Group End Time: 6705  Group Topic: [facility]        Group Therapy Note    Music therapy group consisted of group music making, group Blues songwriting intervention. Therapist facilitated discussion of Blues music, music for emotional expression, and \"story-telling\" in creative expression. Patients then collaborated to write a blues song related to personal feelings of the day. The first verse explored \"I statements\", the second verse explored coping strategies for negative emotions. Attendees: 8         Patient's Goal:  Patient will explore self-expression through creative writing in music, demonstrate collaborative play with structured socialization. Notes:  Patient was actively engaged during group songwriting interventions, constructing lyrics as below:    Everyday, I feel better  Everyday, I feel good  You see me smilin' and joinin' in  On the adventure of life    I'm gonna trust my feelings  And believe in prayer  I want some peace and happiness  To show that I care  We can sing and dance  No matter here or there. Status After Intervention:  Improved    Participation Level:  Active Listener and Interactive    Participation Quality: Attentive, Sharing and Supportive      Speech:  normal      Thought Process/Content: Logical      Affective Functioning: Congruent      Mood: euthymic      Level of consciousness:  Alert and Attentive      Response to Learning: Able to verbalize/acknowledge new learning and Capable of insight      Endings: None Reported    Modes of Intervention: Socialization, Exploration, Activity and Media      Discipline Responsible: Psychoeducational Specialist      Signature:  Leela Nguyen, MM, MT-BC

## 2020-10-05 NOTE — GROUP NOTE
Group Therapy Note    Date: 10/5/2020    Group Start Time: 1330  Group End Time: 6519  Group Topic: [address]        Group Therapy Note    Attendees: 7         Patient's Goal:  Patients were invited to engage in an Art Therapy group using open art studio. Patients were encouraged to practice self-care by asking themselves - and sharing with the group - what it is they needed or could give themselves during the art therapy session. Patients were then invited to experiment with a variety of art materials to practice creative self-expression as a coping strategy. Towards the end of session, patients were encouraged to share and process their artwork with the group. Notes:  Mel selected to work with watercolor paints and engaged in art making and appropriate conversation with peers throughout session. Status After Intervention:  Improved    Participation Level:  Active Listener and Interactive    Participation Quality: Appropriate, Attentive and Sharing      Speech:  normal      Thought Process/Content: Flight of ideas      Affective Functioning: Congruent      Mood: euthymic      Level of consciousness:  Alert and Attentive      Response to Learning: Able to verbalize current knowledge/experience, Able to verbalize/acknowledge new learning, Able to retain information       Endings: None Reported    Modes of Intervention: Education, Support, Socialization, Exploration, Activity and Media      Discipline Responsible: Psychoeducational Specialist      Signature:  Kylah Goddard

## 2020-10-05 NOTE — PROGRESS NOTES
Department of Psychiatry  Progress Note    Admission Date: 9/30/2020    Chief Complaint / Reason for Admission: Psychotic and aggressive behavioral disturbance    Patient's chart was reviewed, case was discussed with nursing/OT/RT staff, and collaborated with  about the treatment plan. SUBJECTIVE:   Over last 24 hours:  Behavioral outbursts: No   Non-aggressive behavioral disturbance: Yes  Medication compliant: Took HS meds yesterday and AM meds today but did not take AM meds yesterday   Need for seclusion/restraints: No  Sleeping adequately: Yes  Appetite adequate: Yes  Attending groups: Yes    Rohit Hahn has been visible on the unit. She was guarded during interactions and was initially pleasant during interactions with me until told I was the provider she was seeing today. She then became more irritable, noting that she was supposed to discuss discharge with Dr. Armando Razo today, \"I guess its fine, I'll just read my book another day\". She denied SI/HI, AVH, denied needs or concerns at this time and then terminated the conversation early by walking away. Weekend documentation indicates AxO to self only, labile mood. Expressed grandiosity over the weekend, She states the \"President of [employer] has written/called me and I'm supposed to let him/her know about the doctors and the nurses\" because she is a \"very important\" person. Noted on Saturday to be verbally aggressive with staff. Sunday evening/night, noted She states her doctor doesn't know anything. She has tried to tell him which medications she needs and he won't listen. She states \"He is a self absorbed pygmy. \" She tells writer \"If you want to fight him, you can take him. He's shorter than you\". She questions each medication, stating that theres nothing wrong with her memory.      Suicidal ideation: Denies  Homicidal ideation: Denies  Medication side effects: No    ROS: Patient has new complaints: No    Current Medications Ordered:   hydroCHLOROthiazide  25 mg Oral Daily    memantine  10 mg Oral BID    pantoprazole  40 mg Oral QAM AC    risperiDONE  0.5 mg Oral BID    carBAMazepine  100 mg Oral BID      PRN Meds: medicated lip balm, acetaminophen, LORazepam **OR** LORazepam, haloperidol lactate **OR** haloperidol, traZODone, benztropine mesylate, magnesium hydroxide, aluminum & magnesium hydroxide-simethicone     Objective:     PE:    /69   Pulse 66   Temp 96.5 °F (35.8 °C) (Oral)   Resp 18   Wt 145 lb (65.8 kg)   SpO2 99%   BMI 23.40 kg/m²       Motor / Gait: No abnormalities noted, normal tone, normal gait and station    Mental Status Examination:    Appearance: WF, appears stated age, wearing own clothing, improved grooming and hygiene  Behavior/Attitude Toward Examiner: Guarded, mild underlying irritability  Speech: [place], irritable tone  Mood: \"Fine\"  Affect: Constricted  Thought Processes: Illogical  Thought Content: Denies SI, did not voice specific HI, but has been aggressive with , +capgras delusion, +general paranoia, +confabulation  Perceptions: Denies AVH, not obviously RTIS  Attention: Poor  Abstraction: [place]  Cognition: Average CHETAN, alert and oriented to person, and place, but not time or situation, recall severely impaired  Insight: Poor insight   Judgment: Impaired judgment         LAB: Reviewed labs from last 24 hours      Dx:   Primary Psychiatric (DSM V) Diagnosis: 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      Tx Plan: Generally: prevent self injury/aggression towards others, stabilize mood/anxiety/psychotic/behavioral disturbance, establish/maintain aftercare, increase coping mechanisms, improve medication compliance. All conditions present on admission are being treated while pt is hospitalized. Legal Status: Voluntary via [address] [employer].  Patient's severe dementia prevents her from possessing the capacity to consent for admission or discharge. Primary Psychiatric Issues:   1. CAA dementia with behavioral disturbance:  - Resumed risperidone 0.5 mg BID  - Started carbamazepine 100 mg BID.  - d/c'd donepezil given recent issues with symptomatic bradycardia. - Resumed home memantine 10 mg BID    Chemical Dependency Issues:  - No active issues    Function:  - Consulted physical therapy - appreciate recs  - Consulted occupational therapy - appreciate recs  - Falls precautions    Medical Problems:  Internal medicine has been consulted. Appreciate recs. 1. GERD:  Resume home pantoprazole 40 mg daily     2. HTN:   Home med list includes to antihypertensive, but BP has been low. Holding for now.     3. Chronic venous insufficiency blt LEs:  Offer compression stockings if patient will consider wearing.     4. Afib:  No longer on rate control agent or anticoagulation. Code Status: Full    Disposition:    - Housing: Home with .  - Current outpatient follow-up: PCP, Dr. Norm Alvarado. Estimated Length of Stay: 7-10 days     Criteria for Discharge:  Not suicidal, not homicidal, not grossly psychotic, behavioral disturbance improved, sleeping well, mood/affect stable, eating well, aftercare arranged. Total face to face time with patient was 30 minutes and more than 50 % of that time was spent counseling the patient on their symptoms, treatment and expected goals.     Adriana Xie, MPH, PMHNP-BC  10/05/20

## 2020-10-05 NOTE — GROUP NOTE
Group Therapy Note    Date: 10/5/2020    Group Start Time: 1000  Group End Time: 6903  Group Topic: Psychoeducation    Beni Stephens        Group Therapy Note    Attendees: 6    Patient's Goal: To practice self-care by engaging in gardening, decorating flower pots, and listening to music. To discuss the benefits of engaging in self-care and to identify self-care activities to participate in, to improve; overall health, well being, and quality of life. Notes: Pt actively engaged in practicing self-care for approximately 20 minutes. Pt appeared calm, while engaging in group intervention. Pt reported \"I need to go to my room and check on the person in it. \" Pt was not redirectable to group intervention. Pt left group and did not return. Status After Intervention:  Unchanged    Participation Level:  Active Listener and Interactive    Participation Quality: Appropriate      Speech:  normal      Thought Process/Content: Perseverating      Affective Functioning: Congruent      Mood: calm      Level of consciousness:  Alert      Response to Learning: Able to change behavior and Progressing to goal      Endings: None Reported    Modes of Intervention: Education, Support, Socialization, Exploration, Clarifying, Problem-solving and Activity      Discipline Responsible: Psychoeducational Specialist      Signature:  ODALYS LopezS

## 2020-10-05 NOTE — PLAN OF CARE
Pt has been in bed most of the evening. She just came out of her room to request we stop her room mate from snoring. She remains confused and irritable with delusions of grandeur. She states her doctor doesn't know anything. She has tried to tell him which medications she needs and he won't listen. She states \"He is a self absorbed pygmy. \" She tells writer \"If you want to fight him, you can take him. He's shorter than you\". She questions each medication, stating that theres nothing wrong with her memory. She is oriented to self only. She Denies SI/HI/AVH. She has not been noted to be RTIS.

## 2020-10-05 NOTE — PROGRESS NOTES
Brief Progress Note    Date: 10/05/20    Subjective: Jennell Phalen reports chronic swelling to BLEs and reports it resolves if she takes a water pill. No acute complaints today. Nursing staff without acute complaints. Objective:  Vitals:    10/03/20 2046 10/04/20 0800 10/04/20 2009 10/05/20 0803   BP: 119/65 122/67 133/70 125/69   Pulse: 96 95 110 66   Resp: 18 18 18 18   Temp: 97.7 °F (36.5 °C) 98.2 °F (36.8 °C) 97.8 °F (36.6 °C) 96.5 °F (35.8 °C)   TempSrc: Oral Oral Oral Oral   SpO2: 96% 96% 96% 99%   Weight:           Physical Exam:  Gen: No distress. Alert. Eyes: No conjunctival injection. ENT: No discharge. Pharynx clear. Neck: Trachea midline. Resp: No accessory muscle use. No crackles. No wheezes. No rhonchi. CV: Irregularly irregular. No murmur. No rub. GI:  Non-distended. Non tender  Skin: Warm and dry. M/S: significant lower extremity edema to RLE >LLE with pitting edema and varicose veins   Neuro: Awake. No focal neurologic deficit on exam she has ambulating easily around the unit. No acute focal deficit   Psych: Per psychiatry team evaluation     Assessment & Plan:  Dementia with behavioral disturbance  - per psychiatry team     Persistent atrial fibrillation  - previously on warfarin, but she tells me she has not taken it since May 2020, her INR is not elevated.   I suspect this was stopped 2/2 fall risk  - no rate control meds, HR mid 70s in a fib at time of my exam      CHB   - s/p pacemaker     Mitral valve disorder  - follows with cardiology      HTN  - HCTZ and aldactone on home med list, but BP low on admit  - BP has improved and BLE worsening, would resume HCTZ and monitor closely      Chronic venous insufficiency of lower extremity   - varicose veins with BLE edema with RLE>LLE  - Refuses compression stockings    - Resume HCTZ     GERD  - PPI    Олег Atwood PA-C  10/5/2020 8:43 AM

## 2020-10-05 NOTE — PLAN OF CARE
Patient relaxed,visible on unit. Cooperative with staff. Oriented x3. Patient denies SI/HI/AVH. Med compliant. No behavioral issues Will continue to monitor.     David Martin RN

## 2020-10-06 PROCEDURE — 99232 SBSQ HOSP IP/OBS MODERATE 35: CPT | Performed by: PHYSICIAN ASSISTANT

## 2020-10-06 PROCEDURE — 6370000000 HC RX 637 (ALT 250 FOR IP): Performed by: PHYSICIAN ASSISTANT

## 2020-10-06 PROCEDURE — 97535 SELF CARE MNGMENT TRAINING: CPT

## 2020-10-06 PROCEDURE — 6370000000 HC RX 637 (ALT 250 FOR IP): Performed by: PSYCHIATRY & NEUROLOGY

## 2020-10-06 PROCEDURE — 97530 THERAPEUTIC ACTIVITIES: CPT

## 2020-10-06 PROCEDURE — 99232 SBSQ HOSP IP/OBS MODERATE 35: CPT | Performed by: PSYCHIATRY & NEUROLOGY

## 2020-10-06 PROCEDURE — 1240000000 HC EMOTIONAL WELLNESS R&B

## 2020-10-06 RX ADMIN — MEMANTINE HYDROCHLORIDE 10 MG: 5 TABLET ORAL at 08:00

## 2020-10-06 RX ADMIN — PANTOPRAZOLE SODIUM 40 MG: 40 TABLET, DELAYED RELEASE ORAL at 05:36

## 2020-10-06 RX ADMIN — CARBAMAZEPINE 100 MG: 200 TABLET ORAL at 20:40

## 2020-10-06 RX ADMIN — RISPERIDONE 0.5 MG: 0.5 TABLET, ORALLY DISINTEGRATING ORAL at 08:00

## 2020-10-06 RX ADMIN — RISPERIDONE 0.5 MG: 0.5 TABLET, ORALLY DISINTEGRATING ORAL at 20:40

## 2020-10-06 RX ADMIN — MEMANTINE HYDROCHLORIDE 10 MG: 5 TABLET ORAL at 20:40

## 2020-10-06 RX ADMIN — HYDROCHLOROTHIAZIDE 25 MG: 25 TABLET ORAL at 08:00

## 2020-10-06 RX ADMIN — CARBAMAZEPINE 100 MG: 200 TABLET ORAL at 08:00

## 2020-10-06 NOTE — PROGRESS NOTES
memantine 10 mg BID    Chemical Dependency Issues:  - No active issues    Function:  - Consulted physical therapy - appreciate recs  - Consulted occupational therapy - appreciate recs  - Falls precautions    Medical Problems:  Internal medicine has been consulted. Appreciate recs. 1. GERD:  Resume home pantoprazole 40 mg daily     2. HTN:   Home med list includes to antihypertensive, but BP has been low. Holding for now.     3. Chronic venous insufficiency blt LEs:  Offer compression stockings if patient will consider wearing.     4. Afib:  No longer on rate control agent or anticoagulation. Code Status: Full    Disposition:    - Housing: Home with .  - Current outpatient follow-up: PCP, Dr. Lorena Fish. Estimated Length of Stay: 7-10 days     Criteria for Discharge:  Not suicidal, not homicidal, not grossly psychotic, behavioral disturbance improved, sleeping well, mood/affect stable, eating well, aftercare arranged. Total face to face time with patient was 30 minutes and more than 50 % of that time was spent counseling the patient on their symptoms, treatment and expected goals.     Jose Ramirez MD  Staff Psychiatrist

## 2020-10-06 NOTE — GROUP NOTE
Group Therapy Note    Date: 10/6/2020    Group Start Time: 1000  Group End Time: 5878  Group Topic: Psychoeducation    [address]        Group Therapy Note    Attendees: 9    Pt's were prompted to engage in a mixed emotions intervention, where they identify emotions that correlate with specific colors and create an art piece mixing colors/emotions they are currently feeling. Patient's Goal: to identify emotions, effects of internalizing and externalizing emotions, and healthy ways to express emotions. Notes: Mel identified emotions that correlate with specific colors. Rosanne engaged in identifying and expressing her emotions by creating a mixed emotions art piece. Mel identified effects of internalizing and externalizing emotions and healthy ways to express her emotions. Mel positively assisted a peer with group intervention. Status After Intervention:  Improved    Participation Level:  Active Listener and Interactive    Participation Quality: Appropriate and Attentive      Speech:  normal      Thought Process/Content: Linear      Affective Functioning: Congruent      Mood: euthymic      Level of consciousness:  Alert and Attentive      Response to Learning: Able to verbalize current knowledge/experience, Capable of insight, Able to change behavior and Progressing to goal      Endings: None Reported    Modes of Intervention: Education, Support, Socialization, Exploration, Clarifying, Problem-solving and Activity      Discipline Responsible: Psychoeducational Specialist      Signature:  Yan Underwood [place]

## 2020-10-06 NOTE — GROUP NOTE
Group Therapy Note    Date: 10/6/2020    Group Start Time: 1310  Group End Time: 8013  Group Topic: Recreational    Baldevtramarie [address]        Group Therapy Note    Attendees: 6    Patient's Goal: to engage in playing [place]. with peers to practice goal setting, practice using problem solving skills to achieve goals, strengthen communication skills, and improve self-esteem, mood, teamwork skills, and overall quality of life. Notes: Elizabeth actively engaged in using teamwork skills to [address] with peers and meet set goals. Elizabeth was observed smiling, laughing multiple times, and providing peers with positive encouragement. Elizabeth reported to each peer individually \"this was fun! \" Elizabeth left group during processing discussion and did not return. Status After Intervention:  Improved    Participation Level:  Active Listener and Interactive    Participation Quality: Appropriate, Attentive, Sharing and Supportive      Speech:  normal      Thought Process/Content: Linear      Affective Functioning: Congruent      Mood: euthymic      Level of consciousness:  Alert and Attentive      Response to Learning: Capable of insight, Able to change behavior and Progressing to goal      Endings: None Reported    Modes of Intervention: Education, Support, Socialization, Exploration, Clarifying, Problem-solving, Activity and Movement      Discipline Responsible: Psychoeducational Specialist      Signature:  ODALYS WuS

## 2020-10-06 NOTE — PLAN OF CARE
Patient relaxed,visible on unit and participating in groups. Med compliant. Denies SI/HI/AVH. Oriented x3 except date. No behavioral issues. Will continue to monitor.     Yulisa Horta RN

## 2020-10-06 NOTE — PLAN OF CARE
Problem: Falls - Risk of:  Goal: Will remain free from falls  Description: Will remain free from falls  Outcome: Ongoing     Problem: Altered Mood, Deterioration in Function:  Goal: Able to verbalize reality based thinking  Description: Able to verbalize reality based thinking  Outcome: Ongoing    Pt has been pleasant with staff and cooperative. She was visible on the unit and social with peers and staff. She watched tv and ate evening snacks. She denies SI/HI/AVH and no rtis noted. She took her medications without issue. She is only oriented to self. Pt is currently resting in bed with fall precautions in place.  Electronically signed by Tawnya Fry RN on 10/5/2020 at 10:16 PM

## 2020-10-06 NOTE — PROGRESS NOTES
longer on any antipsychotic medications.      reports that she has been again voicing delusions about him, frequently refusing to take medications from him or accept assistance and becoming aggressive.  She was aggressive in the ED and required IM medications. Treatment Number:  3    Treatment Time: 9061-2754  Timed Code Treatment Minutes:    44 minutes   Total Treatment Time:   44   minutes    Staff Recommendations: Independent with ambulation on unit    Discharge Recommendations: Home with daily assist    DME needs for discharge:  Needs Met    AM-PAC Score: AM-PAC Inpatient Daily Activity Raw Score: 24   Home Health S4 Level: NA     MOCA:  [place] Cognitive Assessment ([address])  (See pt folder behind nurses station for copy of assessment while pt is admitted.)   Total Score: Sum of all subscores listed on the right-hand side. Add one point for an individual who has 12 years or fewer of formal education, for a possible maximum of 30 points. A final total score of 26 and above is considered normal.      Education Level:  college     Alternating [place] Makin/1  Visuoconstructional Skills (cube)  1/1  Cisuoconstructional Skills (clock)  3/3  Naming    3/3  Attention    5/6  Sentence Repetition    1/2  Verbal Fluency    0/1  Abstraction     2/2  Delayed Recall   0/5  Orientation     6/6  (additional point for <12 grade educations)     Total Score:    21/30      Subjective:  Pt was found in common room today, was receptive to OT tx. ADLs:  Medication Management:  Pt uses mediset box that her  sets up for her. Meds are kept in the bathroom where he can see them. Pt states that he has his system that works for him.       Coping Skills: watching movies, TV    Pain   No  Rating:NA  Location: NA  Pain Medicine Status: No request made      Cognition    A&O to x4  Able to follow:  1 step commands    Balance:     Good balance throughout tx session    Bed mobility:  Not tested    Transfer Training: Sit to stand:   Independent  Stand to sit: Independent  Bed to Chair:  Not Tested  Standard toilet:   Not Tested    Activity Tolerance   Pt completed therapy session with No adverse symptoms noted w/activity    Therapeutic Exercise:   Not tested    Patient Education:   Role of OT and Medication management. Recommended pt use a memory notebook/journal to keep track of things that occur during the day, then quiz herself to test her memory. Pt was agreeable to do so. Recognizes her memory impairment. Positioning Needs: In common room with needs met    Family Present:  No    Assessment: MOCA was performed today with pt scoring 21/30. Pt should continue to benefit from skilled OT tx to maximize independence so that she may eventually return home with the least amount of assistance. GOALS  To be met in 3 Visits:  1). Pt will verbalize 3 coping skills  2). Pt will participate in [facility] REHABILITATION assessment. (Goal met 10/6/20)     To be met in 5 Visits:  1). Pt. To identify 2 memory strategies to take medications as prescribed.  (Goal Met 10/6/20)  2). Pt. To complete interest check list.  3). Pt.  To complete a daily schedule of healthy activities/routines with Min assist.          Plan: cont with [facility] MS, OTR/L  #[id number]        If patient discharges from this facility prior to next visit, this note will serve as the Discharge Summary

## 2020-10-06 NOTE — GROUP NOTE
Group Therapy Note    Date: 10/6/2020    Group Start Time: 1105  Group End Time: [address]        Group Therapy Note    Attendees: 9    Patient's Goal: to engage in [address] intervention by actively listening to music utilized, identifying emotional reactions to music, and participating in processing discussion to elevate mood, increase positive coping skills, increase emotional awareness, and increase mood regulation     Notes:  Mel intermittently engaged in group. Pt appropriately shared emotional reactions and listened to music utilized. Pt expressed enjoyment of music. Mel was not present for processing discussion and did not verbalize learning. Status After Intervention:  Unchanged    Participation Level:  Active Listener and Interactive    Participation Quality: Appropriate and Attentive      Speech:  normal      Thought Process/Content: Linear      Affective Functioning: Congruent      Mood: anxious      Level of consciousness:  Alert and Attentive      Response to Learning: Able to verbalize current knowledge/experience and Progressing to goal      Endings: None Reported    Modes of Intervention: Education, Support, Socialization, Clarifying, Problem-solving, Activity and Media      Discipline Responsible: Psychoeducational Specialist      Signature:  JERARDO Villa

## 2020-10-06 NOTE — PROGRESS NOTES
Brief Progress Note    Date: 10/06/20    Subjective: Bharat Daniel denies any acute complaints. Still having swelling to RLE >LLE. Declines SARAH hose again. Denies any complaints     Objective:  Vitals:    10/04/20 2009 10/05/20 0803 10/05/20 2000 10/06/20 0725   BP: 133/70 125/69 124/72 130/66   Pulse: 110 66 67 66   Resp: 18 18  18   Temp: 97.8 °F (36.6 °C) 96.5 °F (35.8 °C) 98.6 °F (37 °C) 98.3 °F (36.8 °C)   TempSrc: Oral Oral Oral Oral   SpO2: 96% 99% 98% 96%   Weight:           Physical Exam:  Gen: No distress. Alert. Eyes: No conjunctival injection. ENT: No discharge. Pharynx clear. Neck: Trachea midline. Resp: No accessory muscle use. No crackles. No wheezes. No rhonchi. CV: Irregularly irregular. No murmur. No rub. GI:  Non-distended. Non tender  Skin: Warm and dry. M/S: significant lower extremity edema to RLE >LLE with pitting edema and varicose veins   Neuro: Awake. No focal neurologic deficit on exam she has ambulating easily around the unit. No acute focal deficit   Psych: Per psychiatry team evaluation     Assessment & Plan:  Dementia with behavioral disturbance  - per psychiatry team     Persistent atrial fibrillation  - previously on warfarin, but she tells me she has not taken it since May 2020, her INR is not elevated. I suspect this was stopped 2/2 fall risk  - no rate control meds, HR mid 70s in a fib at time of my exam      CHB   - s/p pacemaker     Mitral valve disorder  - follows with cardiology      HTN  - HCTZ and aldactone on home med list, but BP low on admit  - BP has improved and BLE worsening, would resume HCTZ and monitor closely      Chronic venous insufficiency of lower extremity   - varicose veins with BLE edema with RLE>LLE  - Refuses compression stockings    - Resume HCTZ, may benefit from resuming home spironolactone if BP remains stable      GERD  - PPI    Continue to monitor LE edema. HCTZ resumed and BP remaining stable.  Monitor K+ levels     Libra Oneill

## 2020-10-07 ENCOUNTER — TELEPHONE (OUTPATIENT)
Dept: INTERNAL MEDICINE CLINIC | Age: 80
End: 2020-10-07

## 2020-10-07 PROCEDURE — 6370000000 HC RX 637 (ALT 250 FOR IP): Performed by: PSYCHIATRY & NEUROLOGY

## 2020-10-07 PROCEDURE — 99232 SBSQ HOSP IP/OBS MODERATE 35: CPT | Performed by: PSYCHIATRY & NEUROLOGY

## 2020-10-07 PROCEDURE — 99231 SBSQ HOSP IP/OBS SF/LOW 25: CPT | Performed by: PHYSICIAN ASSISTANT

## 2020-10-07 PROCEDURE — 6370000000 HC RX 637 (ALT 250 FOR IP): Performed by: PHYSICIAN ASSISTANT

## 2020-10-07 PROCEDURE — 1240000000 HC EMOTIONAL WELLNESS R&B

## 2020-10-07 RX ADMIN — RISPERIDONE 0.5 MG: 0.5 TABLET, ORALLY DISINTEGRATING ORAL at 20:32

## 2020-10-07 RX ADMIN — RISPERIDONE 0.5 MG: 0.5 TABLET, ORALLY DISINTEGRATING ORAL at 09:26

## 2020-10-07 RX ADMIN — MEMANTINE HYDROCHLORIDE 10 MG: 5 TABLET ORAL at 20:33

## 2020-10-07 RX ADMIN — MEMANTINE HYDROCHLORIDE 10 MG: 5 TABLET ORAL at 09:26

## 2020-10-07 RX ADMIN — CARBAMAZEPINE 100 MG: 200 TABLET ORAL at 20:33

## 2020-10-07 RX ADMIN — PANTOPRAZOLE SODIUM 40 MG: 40 TABLET, DELAYED RELEASE ORAL at 09:30

## 2020-10-07 RX ADMIN — HYDROCHLOROTHIAZIDE 25 MG: 25 TABLET ORAL at 09:26

## 2020-10-07 RX ADMIN — CARBAMAZEPINE 100 MG: 200 TABLET ORAL at 09:26

## 2020-10-07 NOTE — PROGRESS NOTES
Brief Progress Note    Date: 10/07/20    Subjective: Radha Hawkins denies any complaints. Still having swelling to RLE >LLE (she tells me this is chronic). Declines SARAH hose. Objective:  Vitals:    10/05/20 2000 10/06/20 0725 10/06/20 1933 10/07/20 0841   BP: 124/72 130/66 (!) 109/52 (!) 103/58   Pulse: 67 66 87 71   Resp:  18 16 18   Temp: 98.6 °F (37 °C) 98.3 °F (36.8 °C) 97.5 °F (36.4 °C) 97.5 °F (36.4 °C)   TempSrc: Oral Oral Oral Oral   SpO2: 98% 96% 99% 94%   Weight:           Physical Exam:  Gen: No distress. Alert. Eyes: No conjunctival injection. ENT: No discharge. Pharynx clear. Neck: Trachea midline. Resp: No accessory muscle use. No crackles. No wheezes. No rhonchi. CV: Irregularly irregular. No murmur. No rub. GI:  Non-distended. Non tender  Skin: Warm and dry. M/S: significant lower extremity edema to RLE >LLE with pitting edema and varicose veins   Neuro: Awake. No focal neurologic deficit on exam she has ambulating easily around the unit. No acute focal deficit   Psych: Per psychiatry team evaluation     Assessment & Plan:  Dementia with behavioral disturbance  - per psychiatry team     Persistent atrial fibrillation  - previously on warfarin, but she tells me she has not taken it since May 2020, her INR is not elevated.   I suspect this was stopped 2/2 fall risk  - no rate control meds, HR is controlled and clinically in a fib at time of my exam today      CHB   - s/p pacemaker     Mitral valve disorder  - follows with cardiology      HTN  - HCTZ and aldactone on home med list, but BP low on admit so they were held   - BP has improved and RLE worsening, resumed HCTZ and monitoring  closely   - will check a BMP 10/8     Chronic venous insufficiency of lower extremity   - varicose veins with BLE edema with RLE>LLE  - Refuses compression stockings    - Resume HCTZ, may benefit from resuming home spironolactone if BP remains stable  - she tells me this swelling of the RLE is chronic and has been evaluated by her PCP on many occasions in the past       GERD  - PPI      Олег Atwood PA-C  10/7/2020 1:23 PM

## 2020-10-07 NOTE — PLAN OF CARE
Problem: Falls - Risk of:  Goal: Will remain free from falls  Description: Will remain free from falls  Outcome: Ongoing     Problem: Altered Mood, Deterioration in Function:  Goal: Ability to perform activities of daily living will improve  Description: Ability to perform activities of daily living will improve  Outcome: Ongoing    Pt has been pleasant and cooperative with staff. Compliant with medications. She is independent with ambulation. She denies SI/HI/AVH and no rtis noted. She was sitting in the day room and watching tv. Pt is currently sleeping in bed with fall precautions in place.  Electronically signed by Eron Porter RN on 10/7/2020 at 12:29 AM

## 2020-10-07 NOTE — BH NOTE
Writer contacted pt's PCP office, per psychiatrist request, to provide contact information for provider and note that she is likely D/C for Friday 10/9/2020.     Chaitanya Sims MSW, LSW

## 2020-10-07 NOTE — TELEPHONE ENCOUNTER
Call returned to [facility]. Spoke Alexander - Social Work    Left message for  to call  before the patient is discharged.

## 2020-10-07 NOTE — GROUP NOTE
Group Therapy Note    Date: 10/7/2020    Group Start Time: 8828  Group End Time: 1450  Group Topic: Recreational    [facility]        Group Therapy Note    Attendees: 7    Patient's Goal: to engage in watching the [address] to encourage engagement in leisure, encourage laughter, increase socialization, and improve overall mood. Notes:  Mel was late to group. While in attendance, pt appeared to watch portions of the show. While watching, pt was observed smiling and laughing appropriately. Mel appeared to rest for a portion of group, as she was observed with her head down and eyes closed. Pt expressed enjoyment at the conclusion and participated in group discussion. Status After Intervention:  Improved    Participation Level:  Active Listener and Interactive    Participation Quality: Appropriate and Attentive      Speech:  normal      Thought Process/Content: Linear      Affective Functioning: Congruent      Mood: euthymic      Level of consciousness:  Attentive and Drowsy      Response to Learning: Able to verbalize current knowledge/experience and Progressing to goal      Endings: None Reported    Modes of Intervention: Support, Socialization, Activity and Media      Discipline Responsible: Psychoeducational Specialist      Signature:  JERARDO Richter

## 2020-10-07 NOTE — TELEPHONE ENCOUNTER
The unit at [facility] is report that  Eliazar Princess is asking that Dr. Lewis Dear have Dr. Kat Greek Ph# [phone], Advising patient might be discharged 10/9

## 2020-10-07 NOTE — PROGRESS NOTES
Department of Psychiatry  Progress Note    Admission Date: 9/30/2020    Chief Complaint / Reason for Admission: Psychotic and aggressive behavioral disturbance    Patient's chart was reviewed, case was discussed with nursing/OT/RT staff, and collaborated with  about the treatment plan. SUBJECTIVE:   Over last 24 hours:  Behavioral outbursts: No   Non-aggressive behavioral disturbance: No  Medication compliant: Yes  Need for seclusion/restraints: No  Sleeping adequately: Yes  Appetite adequate: Yes  Attending groups: Yes    Pt doing much better. Consistently compliant with medications for last 4 days. Mood much calmer, not argumentative, now participating well in groups. Not spontaneously voicing delusions about family or unit. Spoke with  for 20 minutes today to provide update and discuss current response to treatment, recommendations for pt's return home towards end of the week. Tried to reach dtr as, reportedly she had wanted to speak with me, but unable to reach her. Left message.     Suicidal ideation: Denies  Homicidal ideation: Denies  Medication side effects: No    ROS: Patient has new complaints: No    Current Medications Ordered:   hydroCHLOROthiazide  25 mg Oral Daily    memantine  10 mg Oral BID    pantoprazole  40 mg Oral QAM AC    risperiDONE  0.5 mg Oral BID    carBAMazepine  100 mg Oral BID      PRN Meds: medicated lip balm, acetaminophen, LORazepam **OR** LORazepam, haloperidol lactate **OR** haloperidol, traZODone, benztropine mesylate, magnesium hydroxide, aluminum & magnesium hydroxide-simethicone     Objective:     PE:    BP (!) 103/58   Pulse 71   Temp 97.5 °F (36.4 °C) (Oral)   Resp 18   Wt 145 lb (65.8 kg)   SpO2 94%   BMI 23.40 kg/m²       Motor / Gait: No abnormalities noted, normal tone, normal gait and station    Mental Status Examination:    Appearance: WF, appears stated age, wearing own clothing, improved grooming and hygiene  Behavior/Attitude Toward Examiner: Cooperative, much more pleasant today  Speech: Somewhat rambling, but non-pressured, nml volume, nml tone  Mood: \"Oh, pretty good\"  Affect: Euthymic  Thought Processes: Tangential with loose associations  Thought Content: Denies SI, denies HI, did not voice any delusions today +confabulation  Perceptions: Denies AVH, not obviously RTIS  Attention: Poor  Abstraction: [place]  Cognition: Average CHETAN, alert and oriented to person, and place, but not time or situation, recall severely impaired  Insight: Poor insight   Judgment: Impaired judgment         LAB: Reviewed labs from last 24 hours      Dx:   Primary Psychiatric (DSM V) Diagnosis: 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 Tx Plan: Generally: prevent self injury/aggression towards others, stabilize mood/anxiety/psychotic/behavioral disturbance, establish/maintain aftercare, increase coping mechanisms, improve medication compliance. All conditions present on admission are being treated while pt is hospitalized. Legal Status: Voluntary via [facility] [address].  Patient's severe dementia prevents her from possessing the capacity to consent for admission or discharge. Primary Psychiatric Issues:   1. CAA dementia with behavioral disturbance:  - Resumed risperidone 0.5 mg BID  - Started carbamazepine 100 mg BID.  - d/c'd donepezil given recent issues with symptomatic bradycardia.   - Resumed home memantine 10 mg BID    Chemical Dependency Issues:  - No active issues    Function:  - Consulted physical therapy - appreciate recs  - Consulted occupational therapy - appreciate recs  - Falls

## 2020-10-07 NOTE — PLAN OF CARE
Problem: Altered Mood, Deterioration in Function:  Goal: Able to verbalize reality based thinking  Description: Able to verbalize reality based thinking  Outcome: Ongoing     Problem: Altered Mood, Deterioration in Function:  Goal: Ability to tolerate increased activity will improve  Description: Ability to tolerate increased activity will improve  Outcome: Ongoing  Mel affect is brighter with decreased irritability. Personal belongings brought by spouse, ambulating with footwear, though BLLE remain swollen. She refuses to elevate legs while sitting, but thanked writer for thinking of her.

## 2020-10-07 NOTE — BH NOTE
Mel slept in this morning, AOx3. Compliant with medication administration, refusing to wear socks or compression socks for BK LE edema. Pt educated on need to raise feet when sitting, she thanked writer and stated \"I'm sure I won't do it. \"  She denies SIHI and AVH, no RTIS noted. Complains of pain related to swollen feet.

## 2020-10-07 NOTE — GROUP NOTE
Group Therapy Note    Date: 10/7/2020    Group Start Time: 6642  Group End Time: 3538  Group Topic: Healthy Living/Wellness    Beni Stephens        Group Therapy Note    Attendees: 8    Patient's Goal: to participate in a [employer] Class to improve mobility, self-esteem, mood, and overall health, wellness, and quality of life. To discuss and identify the benefits of exercise and healthy ways to incorporate exercise into daily routine. Notes: Darci Rico actively participated in [employer] Class. Darci Rico completed all movement directives. Darci Rico identified benefits of exercise and ways to incorporate exercise into her daily routine. Pt met group goal.    Status After Intervention:  Improved    Participation Level:  Active Listener and Interactive    Participation Quality: Appropriate, Attentive and Sharing      Speech:  normal      Thought Process/Content: Linear      Affective Functioning: Congruent      Mood: euthymic      Level of consciousness:  Alert and Attentive      Response to Learning: Capable of insight, Able to change behavior and Progressing to goal      Endings: None Reported    Modes of Intervention: Education, Support, Socialization, Exploration, Activity and Movement      Discipline Responsible: Psychoeducational Specialist      Signature:  Rafael Borges, [address]

## 2020-10-08 ENCOUNTER — TELEPHONE (OUTPATIENT)
Dept: INTERNAL MEDICINE CLINIC | Age: 80
End: 2020-10-08

## 2020-10-08 PROCEDURE — 99232 SBSQ HOSP IP/OBS MODERATE 35: CPT | Performed by: PSYCHIATRY & NEUROLOGY

## 2020-10-08 PROCEDURE — 6370000000 HC RX 637 (ALT 250 FOR IP): Performed by: PHYSICIAN ASSISTANT

## 2020-10-08 PROCEDURE — 6370000000 HC RX 637 (ALT 250 FOR IP): Performed by: PSYCHIATRY & NEUROLOGY

## 2020-10-08 PROCEDURE — 1240000000 HC EMOTIONAL WELLNESS R&B

## 2020-10-08 PROCEDURE — 97530 THERAPEUTIC ACTIVITIES: CPT

## 2020-10-08 RX ADMIN — CARBAMAZEPINE 100 MG: 200 TABLET ORAL at 20:49

## 2020-10-08 RX ADMIN — PANTOPRAZOLE SODIUM 40 MG: 40 TABLET, DELAYED RELEASE ORAL at 06:02

## 2020-10-08 RX ADMIN — MEMANTINE HYDROCHLORIDE 10 MG: 5 TABLET ORAL at 20:49

## 2020-10-08 RX ADMIN — RISPERIDONE 0.5 MG: 0.5 TABLET, ORALLY DISINTEGRATING ORAL at 20:49

## 2020-10-08 RX ADMIN — RISPERIDONE 0.5 MG: 0.5 TABLET, ORALLY DISINTEGRATING ORAL at 08:40

## 2020-10-08 RX ADMIN — HYDROCHLOROTHIAZIDE 25 MG: 25 TABLET ORAL at 08:40

## 2020-10-08 RX ADMIN — CARBAMAZEPINE 100 MG: 200 TABLET ORAL at 08:40

## 2020-10-08 RX ADMIN — MEMANTINE HYDROCHLORIDE 10 MG: 5 TABLET ORAL at 08:40

## 2020-10-08 ASSESSMENT — PAIN SCALES - GENERAL: PAINLEVEL_OUTOF10: 0

## 2020-10-08 NOTE — TELEPHONE ENCOUNTER
Call returned. Daughter looking for assistance in getting long term care for patient at home. Advised that we could fill out any paperwork that you have and could fax it to us at [phone].

## 2020-10-08 NOTE — PLAN OF CARE
Pt sitting in the day room at shift change. She is alert and verbal with her peers. She is bright and grandiose at times. She Denies SI/HI/AVH. She took her HS medications without difficulty. She is up ad immanuel and independent with her ADLs.

## 2020-10-08 NOTE — PROGRESS NOTES
Inpatient Occupational Therapy Treatment Note    Unit:  [facility]  Date:  10/8/2020  Patient Name:    Sreekanth Izquierdo  Admitting diagnosis:  Dementia with behavioral disturbance, unspecified dementia type [facility]) [F03.91]  Admit Date:  9/30/2020  Precautions/Restrictions/WB Status/ Lines/ Wounds/ Oxygen:  Standard BHI Precautions  History of Present Illness:  Patient is a 68 y. o. female with cerebral amyloid angiopathy who was admitted for aggressive and psychotic behavioral disturbance.  Patient is familiar to me from a previous admission in March of this year. 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 \"  She was started on risperidone titrated to 0.5 mg BID and her psychosis improved, though it did not completely resolve.  She was referred to Dr. Jona Larose for outpatient psychiatry.     Review of her chart indicates that since discharge she has not consistently followed up with psychiatry or neurology.  Notes from her PCP indicated that she may have seen Dr. Jennifer Salinas a few times, but her  frequently called PCP with questions about her psychiatric medications despite this, and frequently asked if she should be seeing psychiatry. Haylee Vasquez I asked  about seeing Dr. Jennifer Salinas he had no recollection of her having seen him and does not believe she saw a psychiatrist since her last admission.  Additionally, she was admitted medically twice for symptomatic bradycardia, complete heart block and recurrent falls.  She ultimately had a pacemaker placed in May of this year. Sophie Laureano was admitted to a SNF after admission in May and it seems that risperidone was discontinued while there related to concerns it was contributing to falls, however notably, despite recurrent symptomatic bradycardia, her home donepezil was not discontinued. Sophie Laureano is no longer on any antipsychotic medications.      reports that she has been again voicing delusions about him, frequently refusing to take medications from him or accept assistance and becoming aggressive.  She was aggressive in the ED and required IM medications. Treatment Number:  4    Treatment Time: 433-257  Timed Code Treatment Minutes:   38  minutes   Total Treatment Time:     38 minutes    Staff Recommendations: Independent with ambulation on unit    Discharge Recommendations: Home with daily assist    DME needs for discharge:  Needs Met    AM-PAC Score: AM-PAC Inpatient Daily Activity Raw Score: 24   Home Health S4 Level: NA     MOCA:  Leoncio Cognitive Assessment (MOCA)    Performed 10/6/20  Total Score:    21/30      Subjective:  Pt was found in common room today, was receptive to OT tx. ADLs:  Leisure/Coping Skills: Pt performed the Interest Checklist today and found activities in the following areas in which she is interested:  Health and Fitness:  2  Sports:  4  Creative:  1  Productivity at home: 3  Leisure at home:   6  Social:  4  Outdoor Pursuits:   2  Out and About:   6  Educational:  3    A copy of the checklist activities were given to pt to use as coping skills when possible. Pain   No  Rating:NA  Location: NA  Pain Medicine Status: No request made      Cognition    A&O to x4  Able to follow:  2 step directions    Balance:     Good balance throughout tx session    Bed mobility:  Not tested    Transfer Training:   Sit to stand:   Independent  Stand to sit: Independent  Bed to Chair:  Not Tested  Standard toilet:   Not Tested    Activity Tolerance   Pt completed therapy session with No adverse symptoms noted w/activity    Therapeutic Exercise:   Not tested    Patient Education:   Role of OT, importance of coping skills/interests    Positioning Needs:    In common room with needs met    Family Present:  No    Assessment:  Pt tolerated interest checklist well today, was receptive to OT recommendations. Pt should continue to benefit from skilled OT tx to maximize independence so that she may eventually return home with the least amount of assistance. GOALS  To be met in 3 Visits:  1). Pt will verbalize 3 coping skills  2). Pt will participate in [facility] assessment. (Goal met 10/6/20)     To be met in 5 Visits:  1). Pt. To identify 2 memory strategies to take medications as prescribed.  (Goal Met 10/6/20)  2). Pt. To complete interest check list. (Goal met 10/8/20)  3). Pt.  To complete a daily schedule of healthy activities/routines with Min assist.          Plan: cont with [facility] MS, OTR/L  #[id number]        If patient discharges from this facility prior to next visit, this note will serve as the Discharge Summary

## 2020-10-08 NOTE — PLAN OF CARE
Problem: Falls - Risk of:  Goal: Will remain free from falls  Description: Will remain free from falls  Outcome: Met This Shift     Problem: Falls - Risk of:  Goal: Absence of physical injury  Description: Absence of physical injury  Outcome: Met This Shift     Problem: Altered Mood, Deterioration in Function:  Goal: Ability to perform activities of daily living will improve  Description: Ability to perform activities of daily living will improve  Outcome: Met This Shift     Problem: Altered Mood, Deterioration in Function:  Goal: Maintenance of adequate nutrition will improve  Description: Maintenance of adequate nutrition will improve  Outcome: Met This Shift   Pt has remained free from falls and injury so far this shift. Denies SI/HI, AVH. No RTIS noted. Med compliant without issue. Cooperative and calm. Denies pain. Appetite adequate. Bright and social with staff and peers. In room talking with roommate at this time. No c/o needs or wants. Personal shoes on. Will continue to monitor.

## 2020-10-08 NOTE — TELEPHONE ENCOUNTER
Patients daughter [facility] called to have pre approval form faxed for patients long term care. Please call the daughter ASAP since patient will get discharged on 10/8/2020. Patient needs to have the form completed. Please advise.

## 2020-10-08 NOTE — PROGRESS NOTES
today  Speech: Somewhat rambling, but non-pressured, nml volume, nml tone  Mood: \"Oh, pretty good\"  Affect: Euthymic  Thought Processes: Tangential with loose associations  Thought Content: Denies SI, denies HI, did not voice any delusions today +confabulation  Perceptions: Denies AVH, not obviously RTIS  Attention: Poor  Abstraction: [place]  Cognition: Average CHETAN, alert and oriented to person, and place, but not time or situation, recall severely impaired  Insight: Poor insight   Judgment: Impaired judgment         LAB: Reviewed labs from last 24 hours      Dx:   Primary Psychiatric (DSM V) Diagnosis: 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 Tx Plan: Generally: prevent self injury/aggression towards others, stabilize mood/anxiety/psychotic/behavioral disturbance, establish/maintain aftercare, increase coping mechanisms, improve medication compliance. All conditions present on admission are being treated while pt is hospitalized. Legal Status: Voluntary via [employer].  Patient's severe dementia prevents her from possessing the capacity to consent for admission or discharge. Primary Psychiatric Issues:   1. CAA dementia with behavioral disturbance:  - Resumed risperidone 0.5 mg BID  - Started carbamazepine 100 mg BID.  - d/c'd donepezil given recent issues with symptomatic bradycardia. - Resumed home memantine 10 mg BID    Chemical Dependency Issues:  - No active issues    Function:  - Consulted physical therapy - appreciate recs  - Consulted occupational therapy - appreciate recs  - Falls precautions    Medical Problems:  Internal medicine has been consulted. Appreciate recs. 1. GERD:  Resume home pantoprazole 40 mg daily     2. HTN:   Home med list includes to antihypertensive, but BP has been low. Holding for now.     3. Chronic venous insufficiency blt LEs:  Offer compression stockings if patient will consider wearing.     4. Afib:  No longer on rate control agent or anticoagulation. Code Status: Full    Disposition:    - Housing: Home with .  - Current outpatient follow-up: PCP, Dr. Iam Flynn. Estimated Length of Stay: 7-10 days. Probable d/c Friday. Criteria for Discharge:  Not suicidal, not homicidal, not grossly psychotic, behavioral disturbance improved, sleeping well, mood/affect stable, eating well, aftercare arranged. Total face to face time with patient was 30 minutes and more than 50 % of that time was spent counseling the patient on their symptoms, treatment and expected goals. In addition, spent 60 minutes engage in supportive psychotherapy and education with family regarding diagnosis, treatment, prognosis and after care.     Margo Barrios MD  Staff Psychiatrist

## 2020-10-08 NOTE — GROUP NOTE
Group Therapy Note    Date: 10/8/2020    Group Start Time: 1105  Group End Time: 1150  Group Topic: Recreational    [facility]        Group Therapy Note    Attendees: 7    Patient's Goal: to engage in playing Horseshoes with peers to improve mood, increase socialization, and engage in gross motor movement. Notes:  Mel actively engaged in group throughout. Pt was observed positively socializing, smiling, and telling jokes at times. Mel provided peers with positive support and encouragement. Pt participated in processing discussion and verbalized enjoyment. Status After Intervention:  Improved    Participation Level:  Active Listener and Interactive    Participation Quality: Appropriate, Attentive, Sharing and Supportive      Speech:  normal      Thought Process/Content: Linear      Affective Functioning: Congruent      Mood: euthymic      Level of consciousness:  Alert and Attentive      Response to Learning: Able to verbalize current knowledge/experience, Able to verbalize/acknowledge new learning and Progressing to goal      Endings: None Reported    Modes of Intervention: Education, Support, Socialization, Activity and Movement      Discipline Responsible: Psychoeducational Specialist      Signature:  JERARDO Murray

## 2020-10-08 NOTE — GROUP NOTE
Group Therapy Note    Date: 10/8/2020    Group Start Time: 1315  Group End Time: 9035  Group Topic: Music Therapy    [facility]        Group Therapy Note    Attendees: 6         Patient's Goal:  Patient will engage in group music therapy session, utilizing reminiscence and group singing for reality orientation and social support. Notes:  Patient actively engaged in reminiscence discussions, exploring \"going steady\", dating, and other activities of adolescence. Status After Intervention:  Improved    Participation Level:  Active Listener and Interactive    Participation Quality: Appropriate and Attentive      Speech:  normal      Thought Process/Content: Logical      Affective Functioning: Congruent      Mood: Positive and Relaxed      Level of consciousness:  Alert and Attentive      Response to Learning: Able to verbalize current knowledge/experience and Progressing to goal      Endings: None Reported    Modes of Intervention: Socialization, Exploration, Activity and Media      Discipline Responsible: Psychoeducational Specialist      Signature:  Xochitl Peterson MM, MT-BC

## 2020-10-08 NOTE — PROGRESS NOTES
Brief Progress Note    Date: 10/08/20    Subjective: Kourtney Kitchen denies any complaints. Still having swelling to RLE >LLE (she tells me this is chronic). Objective:  Vitals:    10/07/20 0841 10/07/20 1458 10/07/20 2012 10/08/20 0839   BP: (!) 103/58  120/60 124/72   Pulse: 71  83 71   Resp: 18   16   Temp: 97.5 °F (36.4 °C)  98.5 °F (36.9 °C) 96.9 °F (36.1 °C)   TempSrc: Oral  Oral Oral   SpO2: 94%  96% 98%   Weight:       Height:  5' 6\" (1.676 m)         Physical Exam:  Gen: No distress. Alert. Eyes: No conjunctival injection. ENT: No discharge. Pharynx clear. Neck: Trachea midline. Resp: No accessory muscle use. No crackles. No wheezes. No rhonchi. CV: Irregularly irregular. No murmur. No rub. GI:  Non-distended. Non tender  Skin: Warm and dry. M/S: significant lower extremity edema to RLE >LLE without pitting edema, + varicose veins - not tender   Neuro: Awake. No focal neurologic deficit on exam she has ambulating easily around the unit. No acute focal deficit   Psych: Per psychiatry team evaluation     Assessment & Plan:  Dementia with behavioral disturbance  - per psychiatry team     Persistent atrial fibrillation  - previously on warfarin, but she tells me she has not taken it since May 2020, her INR is not elevated. I suspect this was stopped 2/2 fall risk  - no rate control meds, HR is controlled and clinically in a fib at time of my exam today      CHB   - s/p pacemaker     Mitral valve disorder  - follows with cardiology      HTN  - HCTZ and aldactone on home med list, but BP low on admit so they were held   - BP has improved and RLE worsening, resumed HCTZ and monitoring  closely   - I attempted to check BMP today- she declined lab draw. She told me she will ask her  and let me know tomorrow if she is willing.   I will order another BMP for tomorrow      Chronic venous insufficiency of lower extremity   - varicose veins with BLE edema with RLE>LLE  - Refuses compression

## 2020-10-09 VITALS
DIASTOLIC BLOOD PRESSURE: 62 MMHG | RESPIRATION RATE: 16 BRPM | BODY MASS INDEX: 23.3 KG/M2 | TEMPERATURE: 97.4 F | OXYGEN SATURATION: 98 % | HEART RATE: 61 BPM | SYSTOLIC BLOOD PRESSURE: 93 MMHG | HEIGHT: 66 IN | WEIGHT: 145 LBS

## 2020-10-09 PROBLEM — F03.918 DEMENTIA WITH BEHAVIORAL DISTURBANCE (HCC): Status: RESOLVED | Noted: 2020-09-30 | Resolved: 2020-10-09

## 2020-10-09 PROBLEM — F02.818 MAJOR NEUROCOGNITIVE DISORDER DUE TO MULTIPLE ETIOLOGIES WITH BEHAVIORAL DISTURBANCE: Status: RESOLVED | Noted: 2020-03-18 | Resolved: 2020-10-09

## 2020-10-09 LAB
ANION GAP SERPL CALCULATED.3IONS-SCNC: 7 MMOL/L (ref 3–16)
BUN BLDV-MCNC: 12 MG/DL (ref 7–20)
CALCIUM SERPL-MCNC: 9.5 MG/DL (ref 8.3–10.6)
CHLORIDE BLD-SCNC: 102 MMOL/L (ref 99–110)
CO2: 32 MMOL/L (ref 21–32)
CREAT SERPL-MCNC: 0.6 MG/DL (ref 0.6–1.2)
GFR AFRICAN AMERICAN: >60
GFR NON-AFRICAN AMERICAN: >60
GLUCOSE BLD-MCNC: 97 MG/DL (ref 70–99)
MAGNESIUM: 1.8 MG/DL (ref 1.8–2.4)
POTASSIUM REFLEX MAGNESIUM: 3.3 MMOL/L (ref 3.5–5.1)
SODIUM BLD-SCNC: 141 MMOL/L (ref 136–145)

## 2020-10-09 PROCEDURE — 80048 BASIC METABOLIC PNL TOTAL CA: CPT

## 2020-10-09 PROCEDURE — 6370000000 HC RX 637 (ALT 250 FOR IP): Performed by: PHYSICIAN ASSISTANT

## 2020-10-09 PROCEDURE — 83735 ASSAY OF MAGNESIUM: CPT

## 2020-10-09 PROCEDURE — 99231 SBSQ HOSP IP/OBS SF/LOW 25: CPT | Performed by: PHYSICIAN ASSISTANT

## 2020-10-09 PROCEDURE — 6370000000 HC RX 637 (ALT 250 FOR IP): Performed by: PSYCHIATRY & NEUROLOGY

## 2020-10-09 PROCEDURE — 99239 HOSP IP/OBS DSCHRG MGMT >30: CPT | Performed by: PSYCHIATRY & NEUROLOGY

## 2020-10-09 PROCEDURE — 36415 COLL VENOUS BLD VENIPUNCTURE: CPT

## 2020-10-09 RX ORDER — POTASSIUM CHLORIDE 750 MG/1
10 TABLET, EXTENDED RELEASE ORAL DAILY
Qty: 30 TABLET | Refills: 0 | Status: SHIPPED | OUTPATIENT
Start: 2020-10-09 | End: 2020-10-16

## 2020-10-09 RX ORDER — RISPERIDONE 0.5 MG/1
0.5 TABLET, FILM COATED ORAL 2 TIMES DAILY
Qty: 60 TABLET | Refills: 0 | Status: SHIPPED | OUTPATIENT
Start: 2020-10-09 | End: 2020-11-10 | Stop reason: SDUPTHER

## 2020-10-09 RX ORDER — POTASSIUM CHLORIDE 20 MEQ/1
40 TABLET, EXTENDED RELEASE ORAL ONCE
Status: COMPLETED | OUTPATIENT
Start: 2020-10-09 | End: 2020-10-09

## 2020-10-09 RX ORDER — CARBAMAZEPINE 200 MG/1
100 TABLET ORAL 2 TIMES DAILY
Qty: 30 TABLET | Refills: 0 | Status: SHIPPED | OUTPATIENT
Start: 2020-10-09 | End: 2020-11-10 | Stop reason: SDUPTHER

## 2020-10-09 RX ADMIN — PANTOPRAZOLE SODIUM 40 MG: 40 TABLET, DELAYED RELEASE ORAL at 06:13

## 2020-10-09 RX ADMIN — CARBAMAZEPINE 100 MG: 200 TABLET ORAL at 08:53

## 2020-10-09 RX ADMIN — RISPERIDONE 0.5 MG: 0.5 TABLET, ORALLY DISINTEGRATING ORAL at 08:52

## 2020-10-09 RX ADMIN — POTASSIUM CHLORIDE 40 MEQ: 1500 TABLET, EXTENDED RELEASE ORAL at 12:46

## 2020-10-09 RX ADMIN — MEMANTINE HYDROCHLORIDE 10 MG: 5 TABLET ORAL at 08:53

## 2020-10-09 ASSESSMENT — PAIN SCALES - GENERAL: PAINLEVEL_OUTOF10: 0

## 2020-10-09 NOTE — PROGRESS NOTES
Brief Progress Note    Date: 10/09/20    Subjective:  Gerardo Pérez is being discharged today. She has no complaints. Objective:  Vitals:    10/07/20 2012 10/08/20 0839 10/08/20 1952 10/09/20 0830   BP: 120/60 124/72 132/76 93/62   Pulse: 83 71 80 61   Resp:  16 18 16   Temp: 98.5 °F (36.9 °C) 96.9 °F (36.1 °C) 97.4 °F (36.3 °C) 97.4 °F (36.3 °C)   TempSrc: Oral Oral Oral Oral   SpO2: 96% 98% 98% 98%   Weight:       Height:           Physical Exam:  Gen: No distress. Alert. Eyes: No conjunctival injection. ENT: No discharge. Pharynx clear. Neck: Trachea midline. Resp: No accessory muscle use. No crackles. No wheezes. No rhonchi. CV: Irregularly irregular. No murmur. No rub. GI:  Non-distended. Non tender  Skin: Warm and dry. M/S: significant lower extremity edema to RLE >LLE without pitting edema, + varicose veins - not tender   Neuro: Awake. No focal neurologic deficit on exam she has ambulating easily around the unit. No acute focal deficit   Psych: Per psychiatry team evaluation     Assessment & Plan:  Dementia with behavioral disturbance  - per psychiatry team     Persistent atrial fibrillation  - previously on warfarin, but she tells me she has not taken it since May 2020, her INR is not elevated. I suspect this was stopped 2/2 fall risk  - no rate control meds, HR is controlled and clinically in a fib at time of my exam today      CHB   - s/p pacemaker     Mitral valve disorder  - follows with cardiology      HTN  - HCTZ and aldactone on home med list, but BP low on admit so they were held   - BP had improved and RLE worsening, resumed HCTZ  But did not resume aldaconte    Hypokalemia  -Labs were checked after resumption of hydrochlorothiazide, her potassium was found to be mildly low at 3.3. As we are not resuming her Aldactone (due to borderline low blood pressures) I will start her on 10 mEq of potassium orally daily. She will need a repeat BMP in 1 week.   Unfortunately I added this prescription after the attending physician had completed his discharge summary, so this prescription has not reflected in that discharge summary document. I have typed on her discharge instructions the need for follow-up labs next week, I have ensured with her nurse that the printed copy that the patient and her  will receive on discharge as these instructions on it.   I explained instructions to the patient as well      Chronic venous insufficiency of lower extremity   - varicose veins with BLE edema with RLE>LLE  - Refuses compression stockings    - Resume HCTZ, may benefit from resuming home spironolactone if BP remains stable  - she tells me this swelling of the RLE is chronic and has been evaluated by her PCP on many occasions in the past       GERD  - PPI      Susan Barber PA-C  10/9/2020 3:37 PM

## 2020-10-09 NOTE — DISCHARGE SUMMARY
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      Disposition -  Home     Discharge Meds:       Medication List      START taking these medications    carBAMazepine 200 MG tablet  Commonly known as:  TEGRETOL  Take 0.5 tablets by mouth 2 times daily     risperiDONE 0.5 MG tablet  Commonly known as:  RISPERDAL  Take 1 tablet by mouth 2 times daily        CONTINUE taking these medications    acetaminophen 500 MG tablet  Commonly known as:  TYLENOL     Hydrocerin Crea cream  Apply topically 2 times daily     hydroCHLOROthiazide 25 MG tablet  Commonly known as:  HYDRODIURIL  Take 1 tablet by mouth daily     memantine 10 MG tablet  Commonly known as:  NAMENDA  Take 1 tablet by mouth 2 times daily     pantoprazole 40 MG tablet  Commonly known as:  PROTONIX  Take 1 tablet by mouth every morning (before breakfast)     therapeutic multivitamin-minerals tablet  Take 1 tablet by mouth daily        STOP taking these medications    donepezil 10 MG tablet  Commonly known as:  ARICEPT     potassium chloride 20 MEQ extended release tablet  Commonly known as:  KLOR-CON M     spironolactone 25 MG tablet  Commonly known as:  ALDACTONE           Where to Get Your Medications      These medications were sent to [facility] [address] [place] [address], [address] [facility] [address]    Phone:  [phone]   · carBAMazepine 200 MG tablet  · risperiDONE 0.5 MG tablet       Multiple Neuroleptics? No    Reason for admission: Patient is an 68 y.o. female with dementia due to cerebral amyloid angiopathy who was admitted to the the older adult behavioral unit on 9/30/2020 for psychotic and aggressive behavioral disturbance. Patient met with and was treated by an interdisciplinary treatment team that included social work, occupational therapy, recreational therapy, nursing, and psychiatry. Patient was admitted on a voluntary basis via her HPOA. Hospital Course:    Patient was brought to the ED by her  for psychotic and aggressive behavioral disturbance. She is familiar to our service from a pervious admission in February 2020 for the same issues. 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 During her first admission she was treated with risperidone 0.5 mg BID and demonstrated improvement in the intensity of these delusions; she was referred to Dr. Chai Michael for outpatient geriatric psychiatric services. Evidently she did not continue treatment for long after admission. For unclear reasons her  did not follow-up with Dr. Cranston Najjar. Further, risperidone was discontinued in May while pt was residing in a SNF as she had been having issues with recurrent falls and these were ultimately attributed to risperidone. Based upon my review of her charting, however, I feel there is low likelihood risperidone was the culprit. Pt developed symptomatic bradycardia and was hospitalized twice for this in the Spring, ultimately requiring a pacemaker. Simultaneously, however, she was maintained on donepezil 10 mg daily which is known to contribute to bradycardia. I suspect this issue was much more likely to contribute to falls than the low dose risperidone.     On the occasion of this admission patient was brought to the hospital because capgras delusions had returned, and she was evidencing significant mood lability and irritability and starting to become moderately aggressive with her  at home. On initial presentation, patient manifested notable paranoia, irritability, and uncooperative behavior. We resumed risperidone 0.5 mg BID and later added carbamazepine 100 mg BID for mood stabilization. Pt was initially resistant to taking medications, but eventually began to comply without issue. Over the course of admission she demonstrated remarkable improvement in her symptoms. Capgras delusion largely dissipated and patient never volunteered this spontaneously by the conclusion of admission. Further, her mood improved profoundly and by the conclusion of admission she was consistently bright, pleasant, polite, and participating in groups. We engaged with both her  and her daughter via phone family meetings throughout admission to provide updates and education regarding her disease course, and future expectations. Most critically, we recommended that  begin considering increased structure now, as it was clear that he was having difficulty managing patient alone. Family was set up with [facility] to help them begin the process of navigating a potential transition to assisted living.        PE: (reviewed) and labs (see medical H&PE)  VITALS:  BP 93/62   Pulse 61   Temp 97.4 °F (36.3 °C) (Oral)   Resp 16   Ht 5' 6\" (1.676 m)   Wt 145 lb (65.8 kg)   SpO2 98%   BMI 23.40 kg/m²     Motor / Gait: No abnormalities noted, normal tone, normal gait and station     Mental Status Examination:    Appearance: WF, appears stated age, wearing own clothing, improved grooming and hygiene  Behavior/Attitude Toward Examiner: Cooperative, pleasant, polite  Speech: Somewhat rambling, but non-pressured, nml volume, nml tone  Mood: \"Oh, pretty good\"  Affect: Euthymic  Thought Processes: Tangential, but without loose associations or disorganization  Thought Content: Denies SI, denies HI, No delusions voiced, +confabulation  Perceptions: Denies AVH, not RTIS  Attention: Limited  Abstraction: [place]  Cognition: Average CHETAN, alert and oriented to person, and place, but not time or situation, recall impaired  Insight: Poor insight   Judgment: Impaired judgment         Risk factor analysis:    Patient does have several risk factors for future dangerousness from a psychiatric standpoint including: dementia with behavioral disturbance and poor insight  Protective factors include: Involved and supportive family, no SI, no HI, resolution of psychosis  Stratification: Chronic moderate, acutely less than imminent. Condition on DC  Mood and affect are stable and pt is not suicidal, homicidal, or psychotic.     Follow Up:  See Discharge Instructions     Spent over 40 minutes with patient and staff on Alicia Desir MD  Staff Psychiatrist

## 2020-10-09 NOTE — PLAN OF CARE
Pt is alert and oriented X3. When asked why she is here, she stated \"Because my doctor wanted me here. \" She is pleasant and cooperative. She has made no grandiose statements thus far this shift. She is up ad immanuel on the unit with a steady gait. She continues to have +4 pitting edema in her RLE and +3 in the left. She is encouraged to sit with her feet elevated often, but does not. Educated regarding edema. Foot of bed raised when pt is in bed. Denies SI/HI/AVH. No RTIS noted.

## 2020-10-09 NOTE — BH NOTE
Mel is AOx3, pleasant and interactive with staff. She denies SIHI and AVH, no RTIS noted, no pain. Compliant with medication administration and care.

## 2020-10-09 NOTE — BH NOTE
[facility]  Discharge Note    Pt discharged with followings belongings:   Dentures: None  Vision - Corrective Lenses: Glasses(3 pairs)  Hearing Aid: None  Jewelry: Ring, Necklace, Bracelet  Body Piercings Removed: No  Clothing: Footwear, Shirt, Pants   Valuables sent home with YES. Valuables retrieved from safe, Security envelope number:  NA and returned to patient. Patient education on aftercare instructions: YES  Information faxed to PCP by NURSING Patient verbalize understanding of AVS:  YES. Status EXAM upon discharge:  Status and Exam  Normal: Yes  Facial Expression: Brightened  Affect: Appropriate, Congruent, Stable  Level of Consciousness: Alert  Mood:Normal: Yes  Mood: (euthymic)  Motor Activity:Normal: Yes  Motor Activity: Increased  Interview Behavior: Cooperative  Preception: [place] to Person, Christiane Colander to Time, [place] to Place  Attention:Normal: Yes  Attention: Distractible  Thought Processes: Loose Assoc.   Thought Content:Normal: Yes  Thought Content: Poverty of Content  Hallucinations: None(denies, no RTIS noted)  Delusions: No  Delusions: Obsessions  Memory:Normal: No  Memory: Poor Recent  Insight and Judgment: No  Insight and Judgment: Poor Judgment, Poor Insight  Present Suicidal Ideation: No(denies)  Present Homicidal Ideation: No(denies)      Metabolic Screening:    Lab Results   Component Value Date    LABA1C 5.6 09/30/2020       Lab Results   Component Value Date    CHOL 229 (H) 09/30/2020    CHOL 267 (H) 03/19/2020    CHOL 267 (H) 01/23/2019    CHOL 271 (H) 08/01/2018    CHOL 220 (H) 01/22/2018    CHOL 237 (H) 07/25/2017    CHOL 224 (H) 01/17/2017    CHOL 251 (H) 01/19/2016    CHOL 210 (H) 01/16/2015    CHOL 230 (H) 01/09/2014    CHOL 232 (H) 11/22/2011     Lab Results   Component Value Date    TRIG 52 09/30/2020    TRIG 64 03/19/2020    TRIG 82 01/23/2019    TRIG 106 08/01/2018    TRIG 111 01/22/2018    TRIG 73 07/25/2017    TRIG 96 01/17/2017    TRIG 86 01/19/2016    TRIG 108 01/16/2015    TRIG 70 01/09/2014    TRIG 83 11/22/2011     Lab Results   Component Value Date     (H) 09/30/2020    HDL 86 (H) 03/19/2020     (H) 01/23/2019     (H) 08/01/2018    HDL 83 (H) 01/22/2018     (H) 07/25/2017     (H) 01/17/2017     (H) 01/19/2016    HDL 90 (H) 01/16/2015     (H) 01/09/2014     (H) 11/22/2011     No components found for: LDLCAL  Lab Results   Component Value Date    LABVLDL 10 09/30/2020    LABVLDL 13 03/19/2020    LABVLDL 16 01/23/2019    LABVLDL 21 08/01/2018    LABVLDL 22 01/22/2018    LABVLDL 15 07/25/2017    LABVLDL 19 01/17/2017    LABVLDL 17 01/19/2016    LABVLDL 22 01/16/2015    LABVLDL 14 01/09/2014    LABVLDL 17 11/22/2011     Bridge Appointment completed: Reviewed Discharge Instructions with patient. Patient verbalizes understanding and agreement with the discharge plan using the teachback method.      Referral for Outpatient Tobacco Cessation Counseling, upon discharge (clara X if applicable and completed):    ( )  Hospital staff assisted patient to call Quit Line or faxed referral                                   during hospitalization                  ( )  Recognizing danger situations (included triggers and roadblocks), if not completed on admission                    ( )  Coping skills (new ways to manage stress, exercise, relaxation techniques, changing routine, distraction), if not completed on admission                                                           ( )  Basic information about quitting (benefits of quitting, techniques in how to quit, available resources, if not completed on admission  ( ) Referral for counseling faxed to [facility]   ( ) Patient refused referral  ( ) Patient refused counseling  ( X ) Patient refused smoking cessation medication upon discharge    Vaccinations (clara X if applicable and completed):  ( ) Patient states already received influenza vaccine elsewhere  ( ) Patient received influenza vaccine during this hospitalization  ( X ) Patient refused influenza vaccine at this time      Faby Michelle RN

## 2020-10-09 NOTE — GROUP NOTE
Group Therapy Note    Date: 10/9/2020    Group Start Time: 0137  Group End Time: 5270  Group Topic: Psychoeducation    [facility]        Group Therapy Note    Attendees: 5    Patient's Goal: to engage in seasonal holiday painting activity to encourage reminiscing, engage in enjoyable leisure, increase socialization, and improve mood. Notes:  Mel actively engaged in activity throughout. Pt was observed smiling, laughing, telling jokes, and positively interacting with peers. Mel reminisced regarding Halloween and other seasonal holiday traditions. Pt reported enjoyment at the conclusion. Status After Intervention:  Improved    Participation Level:  Active Listener and Interactive    Participation Quality: Appropriate, Attentive and Sharing      Speech:  normal      Thought Process/Content: Linear      Affective Functioning: Congruent      Mood: euthymic      Level of consciousness:  Alert and Attentive      Response to Learning: Able to verbalize current knowledge/experience, Able to verbalize/acknowledge new learning and Progressing to goal      Endings: None Reported    Modes of Intervention: Education, Support, Socialization, Problem-solving, Activity and Movement      Discipline Responsible: Psychoeducational Specialist      Signature:  JERARDO Cordova

## 2020-10-14 ENCOUNTER — TELEPHONE (OUTPATIENT)
Dept: INTERNAL MEDICINE CLINIC | Age: 80
End: 2020-10-14

## 2020-10-14 NOTE — TELEPHONE ENCOUNTER
Patient is scheduled to see Dr. Ronni Medina on 10/16 at 11:15am, the appointment notes states 1:00 pm per [facility]. I advised patient 11:15 am.     Please Advise.

## 2020-10-16 ENCOUNTER — OFFICE VISIT (OUTPATIENT)
Dept: INTERNAL MEDICINE CLINIC | Age: 80
End: 2020-10-16
Payer: MEDICARE

## 2020-10-16 VITALS — SYSTOLIC BLOOD PRESSURE: 154 MMHG | DIASTOLIC BLOOD PRESSURE: 94 MMHG

## 2020-10-16 PROCEDURE — G8484 FLU IMMUNIZE NO ADMIN: HCPCS | Performed by: INTERNAL MEDICINE

## 2020-10-16 PROCEDURE — G8428 CUR MEDS NOT DOCUMENT: HCPCS | Performed by: INTERNAL MEDICINE

## 2020-10-16 PROCEDURE — 1123F ACP DISCUSS/DSCN MKR DOCD: CPT | Performed by: INTERNAL MEDICINE

## 2020-10-16 PROCEDURE — 1036F TOBACCO NON-USER: CPT | Performed by: INTERNAL MEDICINE

## 2020-10-16 PROCEDURE — 1111F DSCHRG MED/CURRENT MED MERGE: CPT | Performed by: INTERNAL MEDICINE

## 2020-10-16 PROCEDURE — 4040F PNEUMOC VAC/ADMIN/RCVD: CPT | Performed by: INTERNAL MEDICINE

## 2020-10-16 PROCEDURE — G8400 PT W/DXA NO RESULTS DOC: HCPCS | Performed by: INTERNAL MEDICINE

## 2020-10-16 PROCEDURE — 1090F PRES/ABSN URINE INCON ASSESS: CPT | Performed by: INTERNAL MEDICINE

## 2020-10-16 PROCEDURE — 99213 OFFICE O/P EST LOW 20 MIN: CPT | Performed by: INTERNAL MEDICINE

## 2020-10-16 PROCEDURE — G8420 CALC BMI NORM PARAMETERS: HCPCS | Performed by: INTERNAL MEDICINE

## 2020-10-16 RX ORDER — SPIRONOLACTONE AND HYDROCHLOROTHIAZIDE 25; 25 MG/1; MG/1
1 TABLET ORAL DAILY
Qty: 30 TABLET | Refills: 3 | Status: SHIPPED | OUTPATIENT
Start: 2020-10-16 | End: 2021-02-10

## 2020-10-16 NOTE — PROGRESS NOTES
Subjective:      Patient ID: Sreekanth Izquierdo is a 68 y.o. female with a PMH of afibb, HTN, cerebral amyloid angiopathy who presents for f/u after hospital admission. HPI     Pt states that she was recently admitted to the hospital due to a man admitting her. This man has been following her whole life, and she states that he continues to say that they are  when they are not. Pt is unaware of any changes to her care or medication while in the hospital.      Pt reports leg swelling that has increased recently. She has a new \"bump\" on the right side due to swelling. Pt states current mood is \"panicked\". She says she is concerned that the man will admit her to the hospital again. She is oriented to place. Unaware of today's date/day of the week. Review of Systems     Denies fever/chills, fatigue, headache, vision/hearing changes, memory changes. Endorses palpations. Denies chest pain. Denies N/V/constipation. Denies issues regarding urination. Objective:   Physical Exam  Constitutional:       Appearance: Normal appearance. HENT:      Head: Normocephalic and atraumatic. Cardiovascular:      Rate and Rhythm: Normal rate. Rhythm irregular. Pulmonary:      Effort: Pulmonary effort is normal.      Breath sounds: Normal breath sounds and air entry. Musculoskeletal:      Comments: 3+ edema bilaterally in lower extremities      Neurological:      Mental Status: She is alert.        Assessment:      Cerebral amyloid angiopathy with behavioral disturbance disturbance   Peripheral edema secondary to low-grade CHF as a result of chronic atrial fibrillation diastolic dysfunction   Plan:      Stop hydrochlorothiazide start Aldactazide 2525 1 daily  Follow-up in 1 month at that time we will get blood studies including potassium        Nadine Meneses MD

## 2020-11-05 ENCOUNTER — TELEPHONE (OUTPATIENT)
Dept: INTERNAL MEDICINE CLINIC | Age: 80
End: 2020-11-05

## 2020-11-05 RX ORDER — HYDROXYZINE HYDROCHLORIDE 10 MG/1
10 TABLET, FILM COATED ORAL 2 TIMES DAILY PRN
Qty: 30 TABLET | Refills: 0 | Status: SHIPPED
Start: 2020-11-05 | End: 2020-11-11 | Stop reason: DRUGHIGH

## 2020-11-05 NOTE — TELEPHONE ENCOUNTER
Patient  called back to see if anything was called in yet. Yanique is aware of this and she will see about having something sent over for her. Mr Wai Callahan did ask if you could please call him back and let him know when you call her something in, so he can go pick it up. Thanks!

## 2020-11-10 RX ORDER — RISPERIDONE 0.5 MG/1
0.5 TABLET, FILM COATED ORAL 2 TIMES DAILY
Qty: 60 TABLET | Refills: 2 | Status: SHIPPED | OUTPATIENT
Start: 2020-11-10 | End: 2021-01-12

## 2020-11-10 RX ORDER — CARBAMAZEPINE 200 MG/1
100 TABLET ORAL 2 TIMES DAILY
Qty: 30 TABLET | Refills: 2 | Status: SHIPPED | OUTPATIENT
Start: 2020-11-10 | End: 2020-12-24

## 2020-11-10 NOTE — TELEPHONE ENCOUNTER
Med refill    carBAMazepine (TEGRETOL) 200 MG tablet     risperiDONE (RISPERDAL) 0.5 MG tablet     [address]

## 2020-11-11 ENCOUNTER — TELEPHONE (OUTPATIENT)
Dept: INTERNAL MEDICINE CLINIC | Age: 80
End: 2020-11-11

## 2020-11-11 RX ORDER — HYDROXYZINE HYDROCHLORIDE 25 MG/1
25 TABLET, FILM COATED ORAL EVERY 8 HOURS PRN
Qty: 30 TABLET | Refills: 0 | Status: SHIPPED | OUTPATIENT
Start: 2020-11-11 | End: 2020-11-25

## 2020-11-11 NOTE — TELEPHONE ENCOUNTER
Atarax 25 mg tid#30 prn    Script sent. No answer. Did not give option to leave voicemail, disconnected.

## 2020-11-13 ENCOUNTER — APPOINTMENT (OUTPATIENT)
Dept: CT IMAGING | Age: 80
End: 2020-11-13
Payer: MEDICARE

## 2020-11-13 ENCOUNTER — TELEPHONE (OUTPATIENT)
Dept: INTERNAL MEDICINE CLINIC | Age: 80
End: 2020-11-13

## 2020-11-13 ENCOUNTER — APPOINTMENT (OUTPATIENT)
Dept: GENERAL RADIOLOGY | Age: 80
End: 2020-11-13
Payer: MEDICARE

## 2020-11-13 ENCOUNTER — HOSPITAL ENCOUNTER (EMERGENCY)
Age: 80
Discharge: HOME | End: 2020-11-13
Attending: EMERGENCY MEDICINE
Payer: MEDICARE

## 2020-11-13 VITALS
HEART RATE: 96 BPM | BODY MASS INDEX: 25.55 KG/M2 | OXYGEN SATURATION: 100 % | DIASTOLIC BLOOD PRESSURE: 91 MMHG | SYSTOLIC BLOOD PRESSURE: 123 MMHG | TEMPERATURE: 97.8 F | RESPIRATION RATE: 18 BRPM | WEIGHT: 159 LBS | HEIGHT: 66 IN

## 2020-11-13 PROCEDURE — 72131 CT LUMBAR SPINE W/O DYE: CPT

## 2020-11-13 PROCEDURE — 72170 X-RAY EXAM OF PELVIS: CPT

## 2020-11-13 PROCEDURE — 99283 EMERGENCY DEPT VISIT LOW MDM: CPT

## 2020-11-13 RX ORDER — CARBAMAZEPINE 100 MG/1
TABLET, CHEWABLE ORAL
Qty: 60 TABLET | Refills: 3 | Status: SHIPPED | OUTPATIENT
Start: 2020-11-13 | End: 2021-04-26

## 2020-11-13 ASSESSMENT — PAIN DESCRIPTION - PAIN TYPE: TYPE: ACUTE PAIN

## 2020-11-13 ASSESSMENT — PAIN DESCRIPTION - LOCATION: LOCATION: BUTTOCKS

## 2020-11-13 ASSESSMENT — PAIN DESCRIPTION - ORIENTATION: ORIENTATION: MID;LOWER

## 2020-11-13 ASSESSMENT — PAIN DESCRIPTION - PROGRESSION: CLINICAL_PROGRESSION: GRADUALLY WORSENING

## 2020-11-13 ASSESSMENT — ENCOUNTER SYMPTOMS
ABDOMINAL PAIN: 0
BACK PAIN: 1
COUGH: 0
RHINORRHEA: 0
SHORTNESS OF BREATH: 0

## 2020-11-13 ASSESSMENT — PAIN DESCRIPTION - ONSET: ONSET: ON-GOING

## 2020-11-13 ASSESSMENT — PAIN DESCRIPTION - DESCRIPTORS: DESCRIPTORS: CONSTANT;SORE

## 2020-11-13 ASSESSMENT — PAIN DESCRIPTION - FREQUENCY: FREQUENCY: CONTINUOUS

## 2020-11-13 ASSESSMENT — PAIN SCALES - GENERAL: PAINLEVEL_OUTOF10: 8

## 2020-11-13 NOTE — ED TRIAGE NOTES
Pt to ER after fall yesterday at [facility]. Pt c/o tailbone pain. No bruising or swelling noted.

## 2020-11-13 NOTE — TELEPHONE ENCOUNTER
Patients spouse states patient fell yesterday and he was calling to advise he is taking patient to ED today for complaints of back pain.  KATRINA

## 2020-11-13 NOTE — ED NOTES
Bed: B16-16  Expected date:   Expected time:   Means of arrival:   Comments:  Via Linda Darling, RN  11/13/20 [id number]

## 2020-11-13 NOTE — ED NOTES
Pt walked to room from waiting room without abnormal gait or c/o.       December BRENDAN Brady  11/13/20 0751

## 2020-11-13 NOTE — ED PROVIDER NOTES
ED Attending Attestation Note     Date of evaluation: 11/13/2020    This patient was seen by the resident. I have seen and examined the patient, agree with the workup, evaluation, management and diagnosis. The care plan has been discussed. I was present for any procedures performed in the resident's  note and have made edits to the note where appropriate. My assessment reveals 68 y.o. female with history of cerebral amyloid angiopathy and behavioral disturbance presenting for a trip and fall yesterday at the store. This was witnessed by her  and she tripped on a ruffled piece of rug. Freeman Ayala on her bottom, no head injury or LOC. Has mild mid L spine tenderness in the midline. No tenderness of the hips with logroll of legs, pelvis stable. No other evidence of external injury. Is acting at her baseline per .        Danika Duggan MD  11/13/20 0222

## 2020-11-13 NOTE — ED PROVIDER NOTES
[address] [facility] RESIDENT NOTE       Date of evaluation: 11/13/2020    Chief Complaint     Fall    History of Present Illness     Zenia Sánchez is a 68 y.o. female w/PMHx of atrial fibrillation not on AC, HTN, cerebral amyloid angiopathy, who presents after a fall. Ms. Sergio Whitney reports falling yesterday while at [facility] after tripping on a carpet/rug while trying to leave the store. Her  was pushing a cart and it caused the rug end to lift up which she tripped on. She insists that she was not lightheaded or had any unstable gait. She fell on her back/buttocks and did not lose consciousness or hit her head. She has a history of falls, and it appears that she stopped taking warfarin because of these falls. Her  corroborates this story. She initially had sharp pain that was affected by the bumps on the road on the way home but now she has dull pain on her lower back and L buttock. This pain does not radiate. She denies numbness or sensory changes of her lower extremities and pelvic area. She reports that her pain is greatly improved with a heat pad. She is unaware of any positional changes to the pain. She did not use any pain medication. No history of osteoporosis or fragility fractures. DEXA scan from many years ago was normal per patient, but she notes that her PCP asked her to repeat her scan soon. Review of Systems     Review of Systems   Constitutional: Negative for fatigue and fever. HENT: Negative for congestion and rhinorrhea. Respiratory: Negative for cough and shortness of breath. Cardiovascular: Positive for leg swelling (chronic). Negative for chest pain and palpitations. Gastrointestinal: Negative for abdominal pain. Genitourinary: Negative for difficulty urinating and dysuria. Musculoskeletal: Positive for back pain. Negative for gait problem.    Neurological: Negative for dizziness, syncope, weakness, light-headedness and headaches. 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  She has a past surgical history that includes Colonoscopy (12/12/2008) and hernia repair. Her family history includes Heart Attack in her father; Heart Disease in her father; Stroke in her mother. She reports that she has never smoked. She has never used smokeless tobacco. She reports that she does not drink alcohol or use drugs. Medications     Discharge Medication List as of 11/13/2020  8:57 PM      CONTINUE these medications which have NOT CHANGED    Details   carBAMazepine (TEGRETOL) 100 MG chewable tablet 1 TABLET BY MOUTH TWICE DAILY, Disp-60 tablet,R-3originally from dr Arun Mendez ([facility]) -pt needs refill thank Huong      hydrOXYzine (ATARAX) 25 MG tablet Take 1 tablet by mouth every 8 hours as needed for Itching, Disp-30 tablet,R-0Normal      carBAMazepine (TEGRETOL) 200 MG tablet Take 0.5 tablets by mouth 2 times daily, Disp-30 tablet,R-2Normal      risperiDONE (RISPERDAL) 0.5 MG tablet Take 1 tablet by mouth 2 times daily, Disp-60 tablet,R-2Normal      spironolactone-hydroCHLOROthiazide (ALDACTAZIDE) 25-25 MG per tablet Take 1 tablet by mouth daily, Disp-30 tablet,R-3Normal      Skin Protectants, Misc.  (HYDROCERIN) CREA cream Apply topically 2 times daily, Topical, 2 TIMES DAILY Starting Wed 5/13/2020, Disp-2 Container, R-0, Normal      memantine (NAMENDA) 10 MG tablet Take 1 tablet by mouth 2 times daily, Disp-60 tablet, R-3Normal      Multiple Vitamins-Minerals (THERAPEUTIC MULTIVITAMIN-MINERALS) tablet Take 1 tablet by mouth daily, Disp-30 tablet, R-1Normal      pantoprazole (PROTONIX) 40 MG tablet Take 1 tablet by mouth every morning (before breakfast), Disp-30 tablet, R-3Normal      acetaminophen (TYLENOL) 500 MG tablet Take 650 mg by mouth 3 times daily as neededHistorical Med             Allergies     She is allergic to beta adrenergic blockers. Physical Exam     INITIAL VITALS: BP: 128/71, Temp: 97.8 °F (36.6 °C), Pulse: 96, Resp: 18, SpO2: 97 %     Physical Exam  Constitutional:       General: She is not in acute distress. Appearance: She is not ill-appearing. HENT:      Head: Normocephalic and atraumatic. Right Ear: External ear normal.      Left Ear: External ear normal.   Eyes:      General: No scleral icterus. Extraocular Movements: Extraocular movements intact. Neck:      Musculoskeletal: Normal range of motion. No neck rigidity. Cardiovascular:      Rate and Rhythm: Normal rate. Rhythm irregular. Heart sounds: No murmur. No friction rub. No gallop. Pulmonary:      Effort: Pulmonary effort is normal. No respiratory distress. Breath sounds: Normal breath sounds. No stridor. No wheezing or rales. Abdominal:      General: Bowel sounds are normal. There is no distension. Palpations: Abdomen is soft. Tenderness: There is no abdominal tenderness. There is no guarding. Musculoskeletal:         General: Tenderness (point tenderness at upper lumbar spine and L buttock below PSIS) present. Right lower leg: Edema present. Left lower leg: Edema present. Neurological:      General: No focal deficit present. Mental Status: She is alert. Mental status is at baseline. Sensory: No sensory deficit. Motor: No weakness. Diagnostic Results     EKG   N/A    RADIOLOGY:  CT LUMBAR SPINE WO CONTRAST   Final Result      1. Acute L1 compression fracture with 20% height loss and 3 mm retropulsion without associated spinal canal stenosis. This would be amenable to IR vertebroplasty. 2.  Chronic appearing L4 compression fracture. 3.  Moderate lumbar spondylosis. XR PELVIS (1-2 VIEWS)   Final Result   Impression:    No acute osseous injury.    Moderate bilateral hip osteoarthritis. LABS:   No results found for this visit on 11/13/20. ED BEDSIDE ULTRASOUND:  N/A    RECENT VITALS:  BP: (!) 123/91, Temp: 97.8 °F (36.6 °C),Pulse: 96, Resp: 18, SpO2: 100 %     Procedures     N/A    ED Course     Nursing Notes, Past Medical Hx, Past Surgical Hx, Social Hx, Allergies, and FamilyHx were reviewed. The patient was giventhe following medications:  No orders of the defined types were placed in this encounter. CONSULTS:  [address] / ASSESSMENT / Ricardo Urvashi is a 68 y.o. female presenting with back pain after a mechanical fall. Pain is well controlled with a heat pad and has been improving. Pelvic XR notable for OA and no acute abnormality. CT lumbar spine was notable for acute L1 compression fracture (20%) and 3 mm retropulsion without associated spinal canal stenosis and chronic L4 compression fracture. Currently have low suspicion of significant radiculopathy. Given stable symptoms and signs of improvement with conservative therapy, will continue with conservative management. Patient was discussed with neurosurgery and they recommended a TLSO brace and follow-up with them outpatient 4 to 6 weeks with repeat imaging. Brace was placed and patient was discharged to home with follow-up. Patient declined pain medication. This patient was also evaluated by the attending physician. All care plans were discussed and agreed upon. Clinical Impression     1. Compression fracture of L1 vertebra, initial encounter ([facility] [address]    2.  Acute midline low back pain without sciatica      Disposition     PATIENT REFERRED TO:  Gloria Abarca MD  [address]  [phone]          DISCHARGE MEDICATIONS:  Discharge Medication List as of 11/13/2020  8:57 PM          DISPOSITION    Discharged home    Jose M Joyner MD  PGY-1, Internal Medicine  Contact via [facility],

## 2020-11-17 ENCOUNTER — TELEPHONE (OUTPATIENT)
Dept: INTERNAL MEDICINE CLINIC | Age: 80
End: 2020-11-17

## 2020-11-17 NOTE — TELEPHONE ENCOUNTER
Pt. States she [place] and her pelvis was hurting went to hospital and call Dr. Perry Abts office left a message, she did not hit her head, and wants to know if you got the information concerning her incident.

## 2020-11-17 NOTE — TELEPHONE ENCOUNTER
Appointment one week. Call placed to patient's spouse. Message has been left, call office to schedule appointment.

## 2020-11-19 ENCOUNTER — TELEPHONE (OUTPATIENT)
Dept: INTERNAL MEDICINE CLINIC | Age: 80
End: 2020-11-19

## 2020-11-19 NOTE — TELEPHONE ENCOUNTER
Patient called stating he received a message stating that Dr. Demetris Clarke wants his wife to come in for an appointment on Friday. He needs to know what time?      Please call to advise

## 2020-11-19 NOTE — TELEPHONE ENCOUNTER
Patients  Sravanthi Pap calling back stating is his wife suppose to be seen tomorrow 11/20? She does have an appointment on 11/30. Please document when she is suppose to be seen or please leave a detailed on his voicemail with date and time. Please Advise.

## 2020-11-20 ENCOUNTER — TELEPHONE (OUTPATIENT)
Dept: INTERNAL MEDICINE CLINIC | Age: 80
End: 2020-11-20

## 2020-11-20 NOTE — TELEPHONE ENCOUNTER
The patient's  is requesting an earlier appt or medication because she is unable to sleep due to pain after fall  FYI-They are reporting the patient has a vv visit with [address] on file verified

## 2020-11-20 NOTE — TELEPHONE ENCOUNTER
Call returned. Message has been left for the patient's  to call the office to schedule an appointment.

## 2020-11-23 ENCOUNTER — HOSPITAL ENCOUNTER (EMERGENCY)
Age: 80
Discharge: HOME | End: 2020-11-23
Attending: EMERGENCY MEDICINE
Payer: MEDICARE

## 2020-11-23 ENCOUNTER — TELEPHONE (OUTPATIENT)
Dept: INTERNAL MEDICINE CLINIC | Age: 80
End: 2020-11-23

## 2020-11-23 VITALS
DIASTOLIC BLOOD PRESSURE: 76 MMHG | RESPIRATION RATE: 18 BRPM | TEMPERATURE: 98.6 F | SYSTOLIC BLOOD PRESSURE: 134 MMHG | HEART RATE: 80 BPM | OXYGEN SATURATION: 100 %

## 2020-11-23 PROCEDURE — 99282 EMERGENCY DEPT VISIT SF MDM: CPT

## 2020-11-23 ASSESSMENT — PAIN SCALES - GENERAL: PAINLEVEL_OUTOF10: 8

## 2020-11-23 ASSESSMENT — PAIN DESCRIPTION - PAIN TYPE: TYPE: ACUTE PAIN

## 2020-11-23 ASSESSMENT — PAIN DESCRIPTION - ORIENTATION: ORIENTATION: RIGHT;LOWER

## 2020-11-23 ASSESSMENT — ENCOUNTER SYMPTOMS: BACK PAIN: 1

## 2020-11-23 ASSESSMENT — PAIN DESCRIPTION - LOCATION: LOCATION: BACK

## 2020-11-23 NOTE — TELEPHONE ENCOUNTER
Pts.  called to let you know he can do Wednesday afternoon for her appt. If not she will need to keep her other appt.

## 2020-11-23 NOTE — CARE COORDINATION
Referred to patient for possible home care. Discussed with patient and . Both decline. Patient/ known from previous visit. Patient with dementia.  attempts to manage MD appointments. Encouraged pt/ if they change their minds about home care to contact PCP.   Electronically signed by FAUSTO Summers LISW-S on 11/23/2020 at 4:53 PM

## 2020-11-23 NOTE — ED PROVIDER NOTES
by mouth 2 times daily, Disp-30 tablet,R-2Normal      risperiDONE (RISPERDAL) 0.5 MG tablet Take 1 tablet by mouth 2 times daily, Disp-60 tablet,R-2Normal      spironolactone-hydroCHLOROthiazide (ALDACTAZIDE) 25-25 MG per tablet Take 1 tablet by mouth daily, Disp-30 tablet,R-3Normal      Skin Protectants, Misc. (HYDROCERIN) CREA cream Apply topically 2 times daily, Topical, 2 TIMES DAILY Starting Wed 5/13/2020, Disp-2 Container, R-0, Normal      memantine (NAMENDA) 10 MG tablet Take 1 tablet by mouth 2 times daily, Disp-60 tablet, R-3Normal      Multiple Vitamins-Minerals (THERAPEUTIC MULTIVITAMIN-MINERALS) tablet Take 1 tablet by mouth daily, Disp-30 tablet, R-1Normal      pantoprazole (PROTONIX) 40 MG tablet Take 1 tablet by mouth every morning (before breakfast), Disp-30 tablet, R-3Normal      acetaminophen (TYLENOL) 500 MG tablet Take 650 mg by mouth 3 times daily as neededHistorical Med             Allergies     She is allergic to beta adrenergic blockers. Physical Exam     INITIAL VITALS: BP: 134/76, Temp: 98.6 °F (37 °C), Pulse: 80, Resp: 18, SpO2: 100 %   Physical Exam  Constitutional: Well nourished elderly female who appears in no acute distress. HEENT: NC/AT. EOMI.         MSK: She is in a well fitting TLSO brace (which our medic helped her apply before I reached the room). She has full strength of all extremities and ambulates with a steady gait. No focal tenderness noted during brace adjustment. Skin: Extremities are warm and well perfused. Cap Refill <3 seconds. Neuro: A&Ox3. Cranial nerves grossly intact   Strength and sensation intact x4 extremities. Psych: Thought content, behavior & judgement normal.    Diagnostic Results         RADIOLOGY:  No orders to display       LABS:   No results found for this visit on 11/23/20.         RECENT VITALS:  BP: 134/76,Temp: 98.6 °F (37 °C), Pulse: 80, Resp: 18, SpO2: 100 %     Procedures         ED Course     Nursing Notes, Past Medical Hx, Past Surgical Hx, Social Hx,Allergies, and Family Hx were reviewed. patient was given the following medications:  No orders of the defined types were placed in this encounter. CONSULTS:  None    MEDICAL DECISIONMAKING / ASSESSMENT / Karen Leidy is a 68 y.o. female presenting requesting brace instructions for reapplication. She has no focal neurologic deficits no sign of acute cord compression. She ambulates with steady gait. Her brace was readjusted and she was discharged. She will call [facility] for follow-up. Clinical Impression     1.  Back pain of thoracolumbar region        Disposition     PATIENT REFERRED TO:  [facility] Emergency Department  [address] [facility]    If symptoms worsen    Alex Lema MD  [address] [place] [phone]      As needed    Jo Dobson MD  [address] [address]  [phone]      As needed      DISCHARGE MEDICATIONS:  Discharge Medication List as of 11/23/2020  4:43 PM          DISPOSITION Decision To Discharge 11/23/2020 04:41:53 PM          Hammad Castro MD  11/23/20 [id number]

## 2020-11-25 PROCEDURE — 93296 REM INTERROG EVL PM/IDS: CPT | Performed by: INTERNAL MEDICINE

## 2020-11-25 PROCEDURE — 93294 REM INTERROG EVL PM/LDLS PM: CPT | Performed by: INTERNAL MEDICINE

## 2020-11-25 RX ORDER — HYDROXYZINE HYDROCHLORIDE 25 MG/1
25 TABLET, FILM COATED ORAL EVERY 8 HOURS PRN
Qty: 30 TABLET | Refills: 3 | Status: SHIPPED | OUTPATIENT
Start: 2020-11-25 | End: 2020-12-03 | Stop reason: SDUPTHER

## 2020-11-27 NOTE — PROGRESS NOTES
We received remote transmission from patient's monitor at home. Transmission shows normal sensing and pacing function. EP physician will review. See interrogation under cardiology tab in the [address] [place] [address] [address] field for more details. Micra pacer. Pacing (% of Time Since 06-Aug-2020)  VS 82.0%   18.0%  No observations. Capture Threshold 0.38 V @ 0.24 ms  Measured On 25-Nov-2020  Programmed Amplitude/Pulse Width 1.00 V / 0.24 ms. Follow up in 3 months via [employer].

## 2020-12-01 ENCOUNTER — NURSE ONLY (OUTPATIENT)
Dept: CARDIOLOGY CLINIC | Age: 80
End: 2020-12-01
Payer: MEDICARE

## 2020-12-01 NOTE — LETTER
[address] [facility] [phone]  [address] [facility]  [address] [address] [address] [facility] [phone]    Pacemaker/Defibrillator Clinic          11/27/20        [address] [place] [address]        Dear Sreekanth Izquierdo    This letter is to inform you that we received the transmission from your monitor at home that checks your implanted heart device. The next date you should transmit will be 3/2/2021. If your report requires attention, we will notify you. Please be aware that the remote device transmission sites are periodically monitored only during regular business hours during which simultaneous in-office device clinics are being run. If your transmission requires attention, we will contact you as soon as possible. Thank you.             [facility]

## 2020-12-03 RX ORDER — HYDROXYZINE HYDROCHLORIDE 25 MG/1
25 TABLET, FILM COATED ORAL EVERY 8 HOURS PRN
Qty: 30 TABLET | Refills: 3 | Status: SHIPPED | OUTPATIENT
Start: 2020-12-03 | End: 2021-03-29 | Stop reason: SDUPTHER

## 2020-12-24 RX ORDER — CARBAMAZEPINE 100 MG/1
TABLET, CHEWABLE ORAL
Qty: 60 TABLET | Refills: 1 | Status: SHIPPED
Start: 2020-12-24 | End: 2021-03-29 | Stop reason: CLARIF

## 2020-12-28 ENCOUNTER — OFFICE VISIT (OUTPATIENT)
Dept: INTERNAL MEDICINE CLINIC | Age: 80
End: 2020-12-28
Payer: MEDICARE

## 2020-12-28 VITALS — DIASTOLIC BLOOD PRESSURE: 82 MMHG | SYSTOLIC BLOOD PRESSURE: 124 MMHG

## 2020-12-28 DIAGNOSIS — I48.11 LONGSTANDING PERSISTENT ATRIAL FIBRILLATION (HCC): ICD-10-CM

## 2020-12-28 DIAGNOSIS — E87.6 HYPOKALEMIA: ICD-10-CM

## 2020-12-28 DIAGNOSIS — Z79.01 LONG TERM CURRENT USE OF ANTICOAGULANT THERAPY: ICD-10-CM

## 2020-12-28 LAB
A/G RATIO: 1.9 (ref 1.1–2.2)
ALBUMIN SERPL-MCNC: 4.4 G/DL (ref 3.4–5)
ALP BLD-CCNC: 142 U/L (ref 40–129)
ALT SERPL-CCNC: 10 U/L (ref 10–40)
ANION GAP SERPL CALCULATED.3IONS-SCNC: 15 MMOL/L (ref 3–16)
AST SERPL-CCNC: 25 U/L (ref 15–37)
BASOPHILS ABSOLUTE: 0 K/UL (ref 0–0.2)
BASOPHILS RELATIVE PERCENT: 0.8 %
BILIRUB SERPL-MCNC: 0.5 MG/DL (ref 0–1)
BUN BLDV-MCNC: 11 MG/DL (ref 7–20)
CALCIUM SERPL-MCNC: 10.1 MG/DL (ref 8.3–10.6)
CHLORIDE BLD-SCNC: 99 MMOL/L (ref 99–110)
CO2: 29 MMOL/L (ref 21–32)
CREAT SERPL-MCNC: 0.7 MG/DL (ref 0.6–1.2)
EOSINOPHILS ABSOLUTE: 0.1 K/UL (ref 0–0.6)
EOSINOPHILS RELATIVE PERCENT: 2.6 %
GFR AFRICAN AMERICAN: >60
GFR NON-AFRICAN AMERICAN: >60
GLOBULIN: 2.3 G/DL
GLUCOSE BLD-MCNC: 90 MG/DL (ref 70–99)
HCT VFR BLD CALC: 40.9 % (ref 36–48)
HEMOGLOBIN: 13.8 G/DL (ref 12–16)
INR BLD: 0.97 (ref 0.86–1.14)
LYMPHOCYTES ABSOLUTE: 1 K/UL (ref 1–5.1)
LYMPHOCYTES RELATIVE PERCENT: 19.3 %
MCH RBC QN AUTO: 31.2 PG (ref 26–34)
MCHC RBC AUTO-ENTMCNC: 33.8 G/DL (ref 31–36)
MCV RBC AUTO: 92.3 FL (ref 80–100)
MONOCYTES ABSOLUTE: 0.5 K/UL (ref 0–1.3)
MONOCYTES RELATIVE PERCENT: 10.4 %
NEUTROPHILS ABSOLUTE: 3.5 K/UL (ref 1.7–7.7)
NEUTROPHILS RELATIVE PERCENT: 66.9 %
PDW BLD-RTO: 14.7 % (ref 12.4–15.4)
PLATELET # BLD: 180 K/UL (ref 135–450)
PMV BLD AUTO: 9 FL (ref 5–10.5)
POTASSIUM SERPL-SCNC: 3.8 MMOL/L (ref 3.5–5.1)
PROTHROMBIN TIME: 11.3 SEC (ref 10–13.2)
RBC # BLD: 4.43 M/UL (ref 4–5.2)
SODIUM BLD-SCNC: 143 MMOL/L (ref 136–145)
TOTAL PROTEIN: 6.7 G/DL (ref 6.4–8.2)
TSH SERPL DL<=0.05 MIU/L-ACNC: 2.96 UIU/ML (ref 0.27–4.2)
WBC # BLD: 5.2 K/UL (ref 4–11)

## 2020-12-28 PROCEDURE — 4040F PNEUMOC VAC/ADMIN/RCVD: CPT | Performed by: INTERNAL MEDICINE

## 2020-12-28 PROCEDURE — 1090F PRES/ABSN URINE INCON ASSESS: CPT | Performed by: INTERNAL MEDICINE

## 2020-12-28 PROCEDURE — 1036F TOBACCO NON-USER: CPT | Performed by: INTERNAL MEDICINE

## 2020-12-28 PROCEDURE — G8484 FLU IMMUNIZE NO ADMIN: HCPCS | Performed by: INTERNAL MEDICINE

## 2020-12-28 PROCEDURE — 1123F ACP DISCUSS/DSCN MKR DOCD: CPT | Performed by: INTERNAL MEDICINE

## 2020-12-28 PROCEDURE — G8417 CALC BMI ABV UP PARAM F/U: HCPCS | Performed by: INTERNAL MEDICINE

## 2020-12-28 PROCEDURE — G8400 PT W/DXA NO RESULTS DOC: HCPCS | Performed by: INTERNAL MEDICINE

## 2020-12-28 PROCEDURE — 99213 OFFICE O/P EST LOW 20 MIN: CPT | Performed by: INTERNAL MEDICINE

## 2020-12-28 PROCEDURE — G8428 CUR MEDS NOT DOCUMENT: HCPCS | Performed by: INTERNAL MEDICINE

## 2020-12-28 NOTE — PROGRESS NOTES
Medical: No     Non-medical: No   Tobacco Use    Smoking status: Never Smoker    Smokeless tobacco: Never Used   Substance and Sexual Activity    Alcohol use: No    Drug use: Never    Sexual activity: Not on file   Lifestyle    Physical activity     Days per week: Not on file     Minutes per session: Not on file    Stress: Not on file   Relationships    Social connections     Talks on phone: Not on file     Gets together: Not on file     Attends Islam service: Not on file     Active member of club or organization: Not on file     Attends meetings of clubs or organizations: Not on file     Relationship status: Not on file    Intimate partner violence     Fear of current or ex partner: Not on file     Emotionally abused: Not on file     Physically abused: Not on file     Forced sexual activity: Not on file   Other Topics Concern    Not on file   Social History Narrative    Not on file         Allergies:  Beta adrenergic blockers    Current Medications:    Prior to Admission medications    Medication Sig Start Date End Date Taking? Authorizing Provider   carBAMazepine (TEGRETOL) 100 MG chewable tablet 1 TABLET BY MOUTH TWICE DAILY 12/24/20   Eugene Sanon MD   hydrOXYzine (ATARAX) 25 MG tablet Take 1 tablet by mouth every 8 hours as needed for Itching 12/3/20 1/2/21  Eugene Sanon MD   carBAMazepine (TEGRETOL) 100 MG chewable tablet 1 TABLET BY MOUTH TWICE DAILY 11/13/20   Eugene Sanon MD   risperiDONE (RISPERDAL) 0.5 MG tablet Take 1 tablet by mouth 2 times daily 11/10/20   Eugene Sanon MD   spironolactone-hydroCHLOROthiazide [facility]) 25-25 MG per tablet Take 1 tablet by mouth daily 10/16/20   Eugene Sanon MD   Skin Protectants, Misc.  (HYDROCERIN) CREA cream Apply topically 2 times daily 5/13/20   Sebastián Fry DO   memantine (NAMENDA) 10 MG tablet Take 1 tablet by mouth 2 times daily  Patient not taking: Reported on 8/6/2020 5/13/20   Avinash Fry DO Multiple Vitamins-Minerals (THERAPEUTIC MULTIVITAMIN-MINERALS) tablet Take 1 tablet by mouth daily  Patient not taking: Reported on 8/6/2020 5/14/20   Colette Fry DO   pantoprazole (PROTONIX) 40 MG tablet Take 1 tablet by mouth every morning (before breakfast)  Patient not taking: Reported on 8/6/2020 5/14/20   Colette Fry DO   acetaminophen (TYLENOL) 500 MG tablet Take 650 mg by mouth 3 times daily as needed 3/15/20   Historical Provider, MD       Physical Exam:  Vital Signs: /82   General: Patient appears  non-toxic  HENT: Atraumatic, normocephalic, oral mucosa moist  Lungs:  Clear bilaterally  Heart: Regular rhythm with controlled ventricular response  Abdomen: Non-distended, soft, non-tender  Extremities: No edema  Neuro: Nonfocal oriented x3 but poor insight and delusional at present    Medical Decision Making and Plan:  Pertinent Labs & Imaging studies reviewed. (See chart for details)  Blood tests are pending    1. Longstanding persistent atrial fibrillation (HCC)  Problem is stable off Eliquis as a result of brain hemorrhaging  - CBC Auto Differential; Future  - Comprehensive Metabolic Panel; Future    2. Hypokalemia  Above labs  - CBC Auto Differential; Future  - Comprehensive Metabolic Panel; Future  - TSH without Reflex;  Future  Dementia to continue present meds and follow-up with neurology

## 2020-12-29 DIAGNOSIS — R74.8 ELEVATED LIVER ENZYMES: ICD-10-CM

## 2020-12-29 LAB — GAMMA GLUTAMYL TRANSFERASE: 45 U/L (ref 5–36)

## 2021-01-04 ENCOUNTER — OFFICE VISIT (OUTPATIENT)
Dept: PRIMARY CARE CLINIC | Age: 81
End: 2021-01-04
Payer: MEDICARE

## 2021-01-04 DIAGNOSIS — Z20.822 SUSPECTED COVID-19 VIRUS INFECTION: Primary | ICD-10-CM

## 2021-01-04 PROCEDURE — G8428 CUR MEDS NOT DOCUMENT: HCPCS | Performed by: NURSE PRACTITIONER

## 2021-01-04 PROCEDURE — 99211 OFF/OP EST MAY X REQ PHY/QHP: CPT | Performed by: NURSE PRACTITIONER

## 2021-01-04 PROCEDURE — G8417 CALC BMI ABV UP PARAM F/U: HCPCS | Performed by: NURSE PRACTITIONER

## 2021-01-04 NOTE — PROGRESS NOTES
Bret Mayfield received a viral test for COVID-19. They were educated on isolation and quarantine as appropriate. For any symptoms, they were directed to seek care from their PCP, given contact information to establish with a doctor, directed to an urgent care or the emergency room.

## 2021-01-06 LAB — SARS-COV-2, NAA: NOT DETECTED

## 2021-01-12 RX ORDER — RISPERIDONE 0.5 MG/1
0.5 TABLET, FILM COATED ORAL 2 TIMES DAILY
Qty: 60 TABLET | Refills: 2 | Status: SHIPPED | OUTPATIENT
Start: 2021-01-12 | End: 2021-04-13

## 2021-01-13 RX ORDER — MEMANTINE HYDROCHLORIDE 10 MG/1
TABLET ORAL
Qty: 60 TABLET | Refills: 4 | Status: SHIPPED | OUTPATIENT
Start: 2021-01-13 | End: 2021-05-10

## 2021-01-29 ENCOUNTER — TELEPHONE (OUTPATIENT)
Dept: INTERNAL MEDICINE CLINIC | Age: 81
End: 2021-01-29

## 2021-01-29 RX ORDER — PANTOPRAZOLE SODIUM 40 MG/1
40 TABLET, DELAYED RELEASE ORAL
Qty: 90 TABLET | Refills: 0 | Status: SHIPPED | OUTPATIENT
Start: 2021-01-29 | End: 2021-03-31

## 2021-01-29 NOTE — TELEPHONE ENCOUNTER
Medication Refill:    pantoprazole (PROTONIX) 40 MG tablet [[id number]        [address] [place]

## 2021-02-10 RX ORDER — SPIRONOLACTONE AND HYDROCHLOROTHIAZIDE 25; 25 MG/1; MG/1
1 TABLET ORAL DAILY
Qty: 30 TABLET | Refills: 3 | Status: SHIPPED | OUTPATIENT
Start: 2021-02-10 | End: 2021-05-10

## 2021-03-24 ENCOUNTER — NURSE ONLY (OUTPATIENT)
Dept: CARDIOLOGY CLINIC | Age: 81
End: 2021-03-24
Payer: MEDICARE

## 2021-03-24 DIAGNOSIS — Z95.0 PACEMAKER: ICD-10-CM

## 2021-03-24 DIAGNOSIS — I44.2 COMPLETE HEART BLOCK (HCC): ICD-10-CM

## 2021-03-24 PROCEDURE — 93296 REM INTERROG EVL PM/IDS: CPT | Performed by: INTERNAL MEDICINE

## 2021-03-24 PROCEDURE — 93294 REM INTERROG EVL PM/LDLS PM: CPT | Performed by: INTERNAL MEDICINE

## 2021-03-24 NOTE — LETTER
[address] [facility] [phone]  [address] [facility]  [address] [address] [address] [facility] [phone]    Pacemaker/Defibrillator Clinic          03/25/21        [address] [place] [address]        Dear Yolanda Reis    This letter is to inform you that we received the transmission from your monitor at home that checks your implanted heart device. The next date you should transmit will be 6/29. If your report requires attention, we will notify you. Please be aware that the remote device transmission sites are periodically monitored only during regular business hours during which simultaneous in-office device clinics are being run. If your transmission requires attention, we will contact you as soon as possible. Thank you.             [facility]

## 2021-03-25 NOTE — PROGRESS NOTES
Remote transmission of MI[employer] pacemaker  shows normal sensing and pacing function. Hx of  persistent AF. No AC due to cerebral amyloid angiopathy per neuro. no observations noted.  20.6%  Follow up in 3 months via [employer].

## 2021-03-26 ENCOUNTER — CARE COORDINATION (OUTPATIENT)
Dept: CARE COORDINATION | Age: 81
End: 2021-03-26

## 2021-03-26 ENCOUNTER — TELEPHONE (OUTPATIENT)
Dept: INTERNAL MEDICINE CLINIC | Age: 81
End: 2021-03-26

## 2021-03-26 SDOH — SOCIAL STABILITY: SOCIAL NETWORK: ARE YOU MARRIED, WIDOWED, DIVORCED, SEPARATED, NEVER MARRIED, OR LIVING WITH A PARTNER?: MARRIED

## 2021-03-26 NOTE — CARE COORDINATION
RN, ACM Note:  Hx: Alzheimer's Disease, A-Fib, CHB, Pacemaker, Progressive Aphasia, Falls, Cerebral Amyloid, Debility, Capgras Delusion, CVA    The pt's  had called the office asking for advice and assistance in r/t to the pt's progression in memory loss. The  stated at times the pt does not recognize him or during the day may speak to him like he he is someone else ( sometimes 3-4 different people ). The  stated this has been becoming progressively worse recently. The  stated other times they will have a normal conversation. The pt is currently seeing Dr. Ignacio Atwood, the geriatric neurologist and the RN, ACM encouraged the pt's  to call Dr. Shivani Martinez office to see if he could get a sooner appointment for the pt. The  stated he does not know how to deal with the changes in the pt. The RN, ACM offered to work with the  but did instruct the  to have the pt fill out a HIPPA form at the PCP's office since the patient does not have anyone listed on her current form. The  was not able to discuss further as pt was with him. The  stated the pt does have an appointment with the PCP on Mon, 3/29/21 at 3:45 PM.  The RN, ACM will assist the  with resources and education.

## 2021-03-26 NOTE — TELEPHONE ENCOUNTER
The  says she is not recognizing people and he can be 3 - 4 different people with her on a daily basis.  He states she is much worse

## 2021-03-29 ENCOUNTER — OFFICE VISIT (OUTPATIENT)
Dept: INTERNAL MEDICINE CLINIC | Age: 81
End: 2021-03-29
Payer: MEDICARE

## 2021-03-29 VITALS
HEIGHT: 66 IN | WEIGHT: 164 LBS | BODY MASS INDEX: 26.36 KG/M2 | TEMPERATURE: 98.4 F | DIASTOLIC BLOOD PRESSURE: 75 MMHG | SYSTOLIC BLOOD PRESSURE: 121 MMHG | HEART RATE: 80 BPM

## 2021-03-29 DIAGNOSIS — I48.21 PERMANENT ATRIAL FIBRILLATION (HCC): ICD-10-CM

## 2021-03-29 DIAGNOSIS — F02.818 LATE ONSET ALZHEIMER'S DISEASE WITH BEHAVIORAL DISTURBANCE (HCC): Primary | ICD-10-CM

## 2021-03-29 DIAGNOSIS — F02.818 LATE ONSET ALZHEIMER'S DISEASE WITH BEHAVIORAL DISTURBANCE (HCC): ICD-10-CM

## 2021-03-29 DIAGNOSIS — G30.1 LATE ONSET ALZHEIMER'S DISEASE WITH BEHAVIORAL DISTURBANCE (HCC): Primary | ICD-10-CM

## 2021-03-29 DIAGNOSIS — G30.1 LATE ONSET ALZHEIMER'S DISEASE WITH BEHAVIORAL DISTURBANCE (HCC): ICD-10-CM

## 2021-03-29 PROCEDURE — G8484 FLU IMMUNIZE NO ADMIN: HCPCS | Performed by: INTERNAL MEDICINE

## 2021-03-29 PROCEDURE — 99214 OFFICE O/P EST MOD 30 MIN: CPT | Performed by: INTERNAL MEDICINE

## 2021-03-29 PROCEDURE — 1036F TOBACCO NON-USER: CPT | Performed by: INTERNAL MEDICINE

## 2021-03-29 PROCEDURE — G8427 DOCREV CUR MEDS BY ELIG CLIN: HCPCS | Performed by: INTERNAL MEDICINE

## 2021-03-29 PROCEDURE — G8417 CALC BMI ABV UP PARAM F/U: HCPCS | Performed by: INTERNAL MEDICINE

## 2021-03-29 PROCEDURE — G8400 PT W/DXA NO RESULTS DOC: HCPCS | Performed by: INTERNAL MEDICINE

## 2021-03-29 PROCEDURE — 1090F PRES/ABSN URINE INCON ASSESS: CPT | Performed by: INTERNAL MEDICINE

## 2021-03-29 PROCEDURE — 1123F ACP DISCUSS/DSCN MKR DOCD: CPT | Performed by: INTERNAL MEDICINE

## 2021-03-29 PROCEDURE — 4040F PNEUMOC VAC/ADMIN/RCVD: CPT | Performed by: INTERNAL MEDICINE

## 2021-03-29 RX ORDER — HYDROXYZINE HYDROCHLORIDE 25 MG/1
25 TABLET, FILM COATED ORAL EVERY 8 HOURS PRN
Qty: 30 TABLET | Refills: 3 | Status: SHIPPED
Start: 2021-03-29 | End: 2021-03-29 | Stop reason: CLARIF

## 2021-03-29 RX ORDER — HYDROXYZINE HYDROCHLORIDE 25 MG/1
25 TABLET, FILM COATED ORAL EVERY 8 HOURS PRN
Qty: 30 TABLET | Refills: 5 | Status: SHIPPED | OUTPATIENT
Start: 2021-03-29 | End: 2021-09-03

## 2021-03-30 LAB
A/G RATIO: 1.9 (ref 1.1–2.2)
ALBUMIN SERPL-MCNC: 4.2 G/DL (ref 3.4–5)
ALP BLD-CCNC: 130 U/L (ref 40–129)
ALT SERPL-CCNC: 10 U/L (ref 10–40)
ANION GAP SERPL CALCULATED.3IONS-SCNC: 11 MMOL/L (ref 3–16)
AST SERPL-CCNC: 21 U/L (ref 15–37)
BASOPHILS ABSOLUTE: 0 K/UL (ref 0–0.2)
BASOPHILS RELATIVE PERCENT: 1 %
BILIRUB SERPL-MCNC: <0.2 MG/DL (ref 0–1)
BUN BLDV-MCNC: 12 MG/DL (ref 7–20)
CALCIUM SERPL-MCNC: 9.3 MG/DL (ref 8.3–10.6)
CHLORIDE BLD-SCNC: 101 MMOL/L (ref 99–110)
CO2: 31 MMOL/L (ref 21–32)
CREAT SERPL-MCNC: 0.7 MG/DL (ref 0.6–1.2)
EOSINOPHILS ABSOLUTE: 0.2 K/UL (ref 0–0.6)
EOSINOPHILS RELATIVE PERCENT: 3.5 %
GFR AFRICAN AMERICAN: >60
GFR NON-AFRICAN AMERICAN: >60
GLOBULIN: 2.2 G/DL
GLUCOSE BLD-MCNC: 108 MG/DL (ref 70–99)
HCT VFR BLD CALC: 39.4 % (ref 36–48)
HEMOGLOBIN: 13.6 G/DL (ref 12–16)
LYMPHOCYTES ABSOLUTE: 1 K/UL (ref 1–5.1)
LYMPHOCYTES RELATIVE PERCENT: 22.5 %
MCH RBC QN AUTO: 32.5 PG (ref 26–34)
MCHC RBC AUTO-ENTMCNC: 34.4 G/DL (ref 31–36)
MCV RBC AUTO: 94.5 FL (ref 80–100)
MONOCYTES ABSOLUTE: 0.5 K/UL (ref 0–1.3)
MONOCYTES RELATIVE PERCENT: 10.8 %
NEUTROPHILS ABSOLUTE: 2.8 K/UL (ref 1.7–7.7)
NEUTROPHILS RELATIVE PERCENT: 62.2 %
PDW BLD-RTO: 14.9 % (ref 12.4–15.4)
PLATELET # BLD: 155 K/UL (ref 135–450)
PMV BLD AUTO: 8.9 FL (ref 5–10.5)
POTASSIUM SERPL-SCNC: 4.2 MMOL/L (ref 3.5–5.1)
RBC # BLD: 4.17 M/UL (ref 4–5.2)
SODIUM BLD-SCNC: 143 MMOL/L (ref 136–145)
TOTAL PROTEIN: 6.4 G/DL (ref 6.4–8.2)
TSH SERPL DL<=0.05 MIU/L-ACNC: 3.14 UIU/ML (ref 0.27–4.2)
WBC # BLD: 4.5 K/UL (ref 4–11)

## 2021-03-31 ENCOUNTER — CARE COORDINATION (OUTPATIENT)
Dept: CARE COORDINATION | Age: 81
End: 2021-03-31

## 2021-03-31 RX ORDER — PANTOPRAZOLE SODIUM 40 MG/1
TABLET, DELAYED RELEASE ORAL
Qty: 30 TABLET | Refills: 4 | Status: SHIPPED | OUTPATIENT
Start: 2021-03-31 | End: 2021-09-09

## 2021-04-01 ENCOUNTER — TELEPHONE (OUTPATIENT)
Dept: INTERNAL MEDICINE CLINIC | Age: 81
End: 2021-04-01

## 2021-04-01 NOTE — TELEPHONE ENCOUNTER
To see if she can help you with any needs that you have. Call placed to patient's spouse. I have explained to him that Jay Champion can be a very helpful resource to assist in the care of his wife. He did express that this would be ok for Jay Champion to continue to contact him and or he would call back with any questions.

## 2021-04-01 NOTE — TELEPHONE ENCOUNTER
Patient;s  called stating her received a call from Issac Garnica RN and wants to know what the reason is that Dr. Taco Delgado wants him to speak to Lelia Morrison? Please call to advise.

## 2021-04-08 ENCOUNTER — CARE COORDINATION (OUTPATIENT)
Dept: CARE COORDINATION | Age: 81
End: 2021-04-08

## 2021-04-08 NOTE — CARE COORDINATION
Ambulatory Care Coordination Note  4/8/2021  CM Risk Score: 8  Charlson 10 Year Mortality Risk Score: 79%     ACC: Curly Pascal, BRENDAN [facility]    Hx: Alzheimer's Disease, A-Fib, CHB, Pacemaker, Progressive Aphasia, Falls, Cerebral Amyloid, Debility, Capgras Delusion, CVA    Summary Note: The pt's  had called the RN, ACM asking for advice and assistance in r/t to the pt's progression in memory loss. The  stated his wife was currently sleeping so he had a brief opportunity to speak with the nurse. The  stated at times the pt does not recognize him or during the day may speak to him like he he is someone else ( sometimes 3-4 different people ). The  stated this has been becoming progressively worse recently. The  stated other times they will have a normal conversation. The  stated he cannot speak to the pt about her memory loss without the pt becoming agitated. The  stated the pt can still do normal activities and does shower every few days. The RN, ACM explained to the  as the disease progresses the pt will forget how to do normal ADL's and he may need to prompt the pt to do these things or have an aide to assist the pt. The pt is seeing a geriatric neurologist but the sessions are long and the pt states the interactions make her feel stupid. The  stated this is a sad situation and he is trying to cope with the pt's diagnosis. The RN, ACM discussed signs of deterioration of self care with memory loss. The RN, ACM encouraged the  to reach out to the Alzheimer's Association for resources and care guidance. The RN, ACM discussed respite care for the . The RN, ACM discussed the different care options for the future: aide services, PC and hospice. Plan:  The  wants to think about his options and he will call the nurse. The  may call the Alzheimer's Association or he may wait. The  is interested in respite care.   The RN, severity and impact on the patient?: Mild problems - don't interfere with function   How would you rate their home environment in terms of safety and stability (including domestic violence, insecure housing, neighbor harassment)?: Consistently safe, supportive, stable, no identified problems   How do daily activities impact on the patient's well-being? (include current or anticipated unemployment, work, caregiving, access to transportation or other): No identified problems or perceived positive benefits   How would you rate their social network (family, work, friends)?: Adequate participation with social networks   How would you rate their financial resources (including ability to afford all required medical care)?: Financially secure, resources adequate, no identified problems   How wells does the patient now understand their health and well-being (symptoms, signs or risk factors) and what they need to do to manage their health?: Reasonable to good understanding and already engages in managing health or is willing to undertake better management   How well do you think your patient can engage in healthcare discussions? (Barriers include language, deafness, aphasia, alcohol or drug problems, learning difficulties, concentration): Clear and open communication, no identified barriers   Do other services need to be involved to help this patient?: Other care/services in place and adequate   Are current services involved with this patient well-coordinated? (Include coordination with other services you are now recommendation): Required care/services in place and adequately coordinated   Suggested Interventions and [employer] [facility]: In Process     Other Services or Interventions: Encouraged the pt's  to reach out to the Alzheimer's Association.    Pharmacist: In Process         Set up/Review Goals, Set up/Review an Education Plan              Prior to Admission medications    Medication Sig Start Date End Date Taking? Authorizing Provider   pantoprazole (PROTONIX) 40 MG tablet 1 TABLET BY MOUTH DAILY BEFORE BREAKFAST 3/31/21   Johnnie Salguero MD   hydrOXYzine (ATARAX) 25 MG tablet TAKE 1 TABLET BY MOUTH EVERY 8 HOURS AS NEEDED FOR [facility] 3/29/21 4/28/21  Johnnie Salguero MD   spironolactone-hydroCHLOROthiazide (ALDACTAZIDE) 25-25 MG per tablet TAKE 1 TABLET BY MOUTH DAILY 2/10/21   Johnnie Salguero MD   memantine [facility]) 10 MG tablet ONE TWICE DAILY 1/13/21   Johnnie Salguero MD   risperiDONE (RISPERDAL) 0.5 MG tablet TAKE 1 TABLET BY MOUTH 2 TIMES DAILY 1/12/21   Johnnie Salguero MD   carBAMazepine (TEGRETOL) 100 MG chewable tablet 1 TABLET BY MOUTH TWICE DAILY 11/13/20   Johnnie Salguero MD   Skin Protectants, Misc.  (HYDROCERIN) CREA cream Apply topically 2 times daily 5/13/20   Luis Fry, DO   Multiple Vitamins-Minerals (THERAPEUTIC MULTIVITAMIN-MINERALS) tablet Take 1 tablet by mouth daily 5/14/20   Luis Fry, DO   acetaminophen (TYLENOL) 500 MG tablet Take 650 mg by mouth 3 times daily as needed 3/15/20   Historical Provider, MD       Future Appointments   Date Time Provider Andrae Ragsdale   6/22/2021  2:30 PM Johnnie Salguero MD [facility]   6/29/2021  7:05 AM SCHEDULE, [place] REMOTE TRANSMISSION [place] [facility]      and   General Assessment    Do you have any symptoms that are causing concern?: Yes  Progression since Onset: Gradually Worsening  Reported Symptoms: Other (Comment: Progressing memory loss)

## 2021-04-13 RX ORDER — RISPERIDONE 0.5 MG/1
0.5 TABLET, FILM COATED ORAL 2 TIMES DAILY
Qty: 60 TABLET | Refills: 4 | Status: SHIPPED | OUTPATIENT
Start: 2021-04-13 | End: 2021-09-20

## 2021-04-23 ENCOUNTER — CARE COORDINATION (OUTPATIENT)
Dept: CARE COORDINATION | Age: 81
End: 2021-04-23

## 2021-04-23 NOTE — CARE COORDINATION
RN, ACM Note:    Progressive aphasia in Alzheimer's disease (CMS Dx) 4 months ago    [facility]         The pt's  is not able to speak in front of the pt. The pt has Alzheimer's and become agitated when the  discusses the pt's memory issues. The RN, ACM left a voice message for the pt's  to call when it is convenient for him. The RN, ACM is trying to provide the  with resources.

## 2021-04-26 ENCOUNTER — CARE COORDINATION (OUTPATIENT)
Dept: CARE COORDINATION | Age: 81
End: 2021-04-26

## 2021-04-26 RX ORDER — CARBAMAZEPINE 100 MG/1
TABLET, CHEWABLE ORAL
Qty: 60 TABLET | Refills: 1 | Status: SHIPPED | OUTPATIENT
Start: 2021-04-26 | End: 2021-06-29

## 2021-04-26 NOTE — CARE COORDINATION
RN, ACM Note:  The pt's  called and stated today the pt got up and sat down in her normal morning spot but will not respond to him. The  stated the pt looks blank and won't respond to his questions. The  stated the pt has been slowing down more and her memory loss is progressing. The  stated she keeps losing the key to the [address] [employer]. The  stated the pt seems different today. The  stated tomorrow is the pt's birthday and he knows people will be calling to wish her a happy birthday and he doesn't know what to do. The  stated the pt does not have an appointment with the PCP until late June and she does not have an appointment with the neurologist, Dr. Chun Kenny for at least a month. The RN, ACM told the  that she would get a message to the PCP for guidance. The RN, ACM did speak with the PCP's MA and the MA will speak with the PCP and call the .

## 2021-04-26 NOTE — CARE COORDINATION
The pt's  stated when he returned from his errands the pt was taking a shower and was answering him. The PCP instructed the  to take the pt to the psych ED if she does not respond to him in the next 12 hours. The  told the PCP's MA that he would rather take her to [facility] ED to start with and if needed they could transfer her. The  stated the he wasn't going to do anything now because she is talking and is active.

## 2021-05-04 ENCOUNTER — CARE COORDINATION (OUTPATIENT)
Dept: CARE COORDINATION | Age: 81
End: 2021-05-04

## 2021-05-04 NOTE — CARE COORDINATION
RN, ACM Note:  The pt's  stated the pt is doing well at the moment and they had a nice weekend. The  stated they are off for the week. The RN, ACM informed the  that the RNASHLEY would be out of the office and to call the office directly if any needs arise for the pt. The  appreciated the call.

## 2021-05-10 RX ORDER — SPIRONOLACTONE AND HYDROCHLOROTHIAZIDE 25; 25 MG/1; MG/1
1 TABLET ORAL DAILY
Qty: 30 TABLET | Refills: 1 | Status: SHIPPED | OUTPATIENT
Start: 2021-05-10 | End: 2021-07-14

## 2021-05-10 RX ORDER — MEMANTINE HYDROCHLORIDE 10 MG/1
TABLET ORAL
Qty: 60 TABLET | Refills: 1 | Status: SHIPPED | OUTPATIENT
Start: 2021-05-10 | End: 2021-07-19

## 2021-05-19 ENCOUNTER — OFFICE VISIT (OUTPATIENT)
Dept: INTERNAL MEDICINE CLINIC | Age: 81
End: 2021-05-19
Payer: MEDICARE

## 2021-05-19 VITALS
HEIGHT: 66 IN | SYSTOLIC BLOOD PRESSURE: 110 MMHG | OXYGEN SATURATION: 94 % | WEIGHT: 172 LBS | DIASTOLIC BLOOD PRESSURE: 64 MMHG | BODY MASS INDEX: 27.64 KG/M2 | HEART RATE: 92 BPM

## 2021-05-19 DIAGNOSIS — S70.12XA CONTUSION OF LEFT THIGH, INITIAL ENCOUNTER: Primary | ICD-10-CM

## 2021-05-19 DIAGNOSIS — Z91.81 AT HIGH RISK FOR FALLS: ICD-10-CM

## 2021-05-19 PROCEDURE — G8400 PT W/DXA NO RESULTS DOC: HCPCS | Performed by: INTERNAL MEDICINE

## 2021-05-19 PROCEDURE — G8427 DOCREV CUR MEDS BY ELIG CLIN: HCPCS | Performed by: INTERNAL MEDICINE

## 2021-05-19 PROCEDURE — 1036F TOBACCO NON-USER: CPT | Performed by: INTERNAL MEDICINE

## 2021-05-19 PROCEDURE — 1123F ACP DISCUSS/DSCN MKR DOCD: CPT | Performed by: INTERNAL MEDICINE

## 2021-05-19 PROCEDURE — 4040F PNEUMOC VAC/ADMIN/RCVD: CPT | Performed by: INTERNAL MEDICINE

## 2021-05-19 PROCEDURE — G8417 CALC BMI ABV UP PARAM F/U: HCPCS | Performed by: INTERNAL MEDICINE

## 2021-05-19 PROCEDURE — 99213 OFFICE O/P EST LOW 20 MIN: CPT | Performed by: INTERNAL MEDICINE

## 2021-05-19 PROCEDURE — 1090F PRES/ABSN URINE INCON ASSESS: CPT | Performed by: INTERNAL MEDICINE

## 2021-05-19 SDOH — ECONOMIC STABILITY: FOOD INSECURITY: WITHIN THE PAST 12 MONTHS, THE FOOD YOU BOUGHT JUST DIDN'T LAST AND YOU DIDN'T HAVE MONEY TO GET MORE.: NEVER TRUE

## 2021-05-19 NOTE — PROGRESS NOTES
CHIEF COMPLAINT: Abi Isaacs is a 80 y.o. female who presents for : Ecchymosis left leg    HPI: Patient presented with ecchymosis of the left leg after a fall few days ago this was unwitnessed there is no loss of consciousness she has some pain over the left leg but is able to walk without problems    Review of Systems:   Constitutional:  Denies fever or chills   Eyes:  Denies change in visual acuity   HENT:  Denies nasal congestion or sore throat   Respiratory:  Denies cough or shortness of breath   Cardiovascular:  Denies chest pain or edema   GI:  Denies abdominal pain, nausea, vomiting, bloody stools or diarrhea   :  Denies dysuria   Musculoskeletal:  Denies back pain or joint pain   Integument:  Denies rash   Neurologic:  Denies headache, focal weakness or sensory changes   Endocrine:  Denies polyuria or polydipsia   Lymphatic:  Denies swollen glands   Psychiatric:  Denies depression or anxiety     Past Medical History:        Diagnosis Date    Adenomatous polyp of colon 1/29/2019    Asymptomatic varicose veins     Atrial fibrillation ([address]     Dysthymic disorder     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)     Screening mammogram 12/8/2009    (Left)Benign       Past Surgical History:        Procedure Laterality Date    COLONOSCOPY  12/12/2008    HERNIA REPAIR      Left Femoral       Family History:  Family History   Problem Relation Age of Onset    Stroke Mother     Heart Attack Father     Heart Disease Father        Social History:  Social History     Socioeconomic History    Marital status:      Spouse name: None    Number of children: None    Years of education: None    Highest education level: None   Occupational History    None   Tobacco Use    Smoking status: Never Smoker    Smokeless tobacco: Never Used   Substance and Sexual Activity    Alcohol use: No    Drug use: Never    Sexual activity: Not Currently     Partners: Male   Other Topics Concern    None   Social History Narrative  None     Social Determinants of Health     Financial Resource Strain: Low Risk     Difficulty of Paying Living Expenses: Not hard at all   Food Insecurity: No Food Insecurity    Worried About Running Out of Food in the Last Year: Never true    Michelle of Food in the Last Year: Never true   Transportation Needs:     Lack of Transportation (Medical):  Lack of Transportation (Non-Medical):    Physical Activity: Inactive    Days of Exercise per Week: 0 days    Minutes of Exercise per Session: 0 min   Stress: Stress Concern Present    Feeling of Stress : To some extent   Social Connections: Unknown    Frequency of Communication with Friends and Family: Not on file    Frequency of Social Gatherings with Friends and Family: Not on file    Attends Scientology Services: Not on file    Active Member of Clubs or Organizations: Not on file    Attends Club or Organization Meetings: Not on file    Marital Status:    Intimate Partner Violence:     Fear of Current or Ex-Partner:     Emotionally Abused:     Physically Abused:     Sexually Abused: Allergies:  Beta adrenergic blockers    Current Medications:    Prior to Admission medications    Medication Sig Start Date End Date Taking? Authorizing Provider   memantine (NAMENDA) 10 MG tablet TAKE 1 TABLET BY MOUTH TWICE DAILY 5/10/21  Yes Jair Ladd MD   spironolactone-hydroCHLOROthiazide (ALDACTAZIDE) 25-25 MG per tablet TAKE 1 TABLET BY MOUTH DAILY 5/10/21  Yes Jair Ladd MD   carBAMazepine (TEGRETOL) 100 MG chewable tablet 1 TABLET BY MOUTH TWICE DAILY 4/26/21  Yes Jair Ladd MD   risperiDONE (RISPERDAL) 0.5 MG tablet TAKE 1 TABLET BY MOUTH 2 TIMES DAILY 4/13/21  Yes Jair Ladd MD   pantoprazole (PROTONIX) 40 MG tablet 1 TABLET BY MOUTH DAILY BEFORE BREAKFAST 3/31/21  Yes Jair Ladd MD   Skin Protectants, Misc.  (HYDROCERIN) CREA cream Apply topically 2 times daily 5/13/20  Yes Sebastián Fry, DO   Multiple Vitamins-Minerals (THERAPEUTIC MULTIVITAMIN-MINERALS) tablet Take 1 tablet by mouth daily 5/14/20  Yes Sebastián Fry DO   acetaminophen (TYLENOL) 500 MG tablet Take 650 mg by mouth 3 times daily as needed 3/15/20  Yes Historical Provider, MD       Physical Exam:  Vital Signs: /64   Pulse 92   Ht 5' 6\" (1.676 m)   Wt 172 lb (78 kg)   SpO2 94%   BMI 27.76 kg/m²   General: Patient appears  non-toxic  HENT: Atraumatic, normocephalic, oral mucosa moist  Lungs:  Clear bilaterally  Heart: Regular rate and rhythm  Abdomen: Non-distended, soft, non-tender  Extremities: 2+ edema bilaterally ecchymosis of the left lower extremity between the mid thigh to the knee laterally  Neuro: Nonfocal    Medical Decision Making and Plan:  Pertinent Labs & Imaging studies reviewed. (See chart for details)    Ecchymosis secondary to fall heat to area Voltaren gel call if symptoms not resolve    On the basis of positive falls risk screening, assessment and plan is as follows: home safety tips provided.

## 2021-06-02 ENCOUNTER — CARE COORDINATION (OUTPATIENT)
Dept: CARE COORDINATION | Age: 81
End: 2021-06-02

## 2021-06-02 NOTE — CARE COORDINATION
RN, ACM Note:  The pt's  left the RN, ACM a message and stated he would like to discuss the pt's memory loss and questions that he has about the pt's condition. The  stated the pt has been sleeping a lot and he is wondering if this is normal. The  wanted to know if it is okay to tell the pt that she has a memory problem. The  stated he needs some guidance. The RN, ACM return the 's call and had to leave a message asking the pt to return the nurse's call. The RN, ACM did suggest [facility] [place] and [address] [place] [address], [phone] as a resource.

## 2021-06-09 ENCOUNTER — CARE COORDINATION (OUTPATIENT)
Dept: CARE COORDINATION | Age: 81
End: 2021-06-09

## 2021-06-15 ENCOUNTER — CARE COORDINATION (OUTPATIENT)
Dept: CARE COORDINATION | Age: 81
End: 2021-06-15

## 2021-06-15 NOTE — CARE COORDINATION
education  Plan for overcoming my barriers: Work with RN, ASHLEY  Confidence: 7/10  Anticipated Goal Completion Date: 6/26/21            Prior to Admission medications    Medication Sig Start Date End Date Taking? Authorizing Provider   memantine (NAMENDA) 10 MG tablet TAKE 1 TABLET BY MOUTH TWICE DAILY 5/10/21   Zain Gipson MD   spironolactone-hydroCHLOROthiazide (ALDACTAZIDE) 25-25 MG per tablet TAKE 1 TABLET BY MOUTH DAILY 5/10/21   Zain Gipson MD   carBAMazepine (TEGRETOL) 100 MG chewable tablet 1 TABLET BY MOUTH TWICE DAILY 4/26/21   Zain Gipson MD   risperiDONE (RISPERDAL) 0.5 MG tablet TAKE 1 TABLET BY MOUTH 2 TIMES DAILY 4/13/21   Zain Gipson MD   pantoprazole (PROTONIX) 40 MG tablet 1 TABLET BY MOUTH DAILY BEFORE BREAKFAST 3/31/21   Zain Gipson MD   Skin Protectants, Misc.  (HYDROCERIN) CREA cream Apply topically 2 times daily 5/13/20   Sameera Fry, DO   Multiple Vitamins-Minerals (THERAPEUTIC MULTIVITAMIN-MINERALS) tablet Take 1 tablet by mouth daily 5/14/20   Sameera Fry, DO   acetaminophen (TYLENOL) 500 MG tablet Take 650 mg by mouth 3 times daily as needed 3/15/20   Historical Provider, MD       Future Appointments   Date Time Provider Andrae Ragsdale   6/22/2021  2:30 PM Zain Gipson MD [facility] [place] - [facility]   6/29/2021  7:05 AM SCHEDULE, [place] REMOTE TRANSMISSION [place] [facility]      and   General Assessment    Do you have any symptoms that are causing concern?: Yes  Progression since Onset: Unchanged  Reported Symptoms: Other (Comment: Memory Loss)

## 2021-06-22 ENCOUNTER — TELEPHONE (OUTPATIENT)
Dept: INTERNAL MEDICINE CLINIC | Age: 81
End: 2021-06-22

## 2021-06-22 NOTE — TELEPHONE ENCOUNTER
Florecita Barriga called back in stating that they will work out some dates and get back with herbert in the next couple days to reschedule appt     Please advise

## 2021-06-22 NOTE — TELEPHONE ENCOUNTER
Call returned to assist spouse in rescheduling appointment. No answer. Message left for spouse to call back to schedule appointment.

## 2021-06-22 NOTE — TELEPHONE ENCOUNTER
----- Message from Venkat Duarte sent at 6/22/2021  1:11 PM EDT -----  Subject: Message to Provider    QUESTIONS  Information for Provider? Pt's  called today to cancel patient's   appt. Pt refused to come to appt time and Brodie Lyons is requesting a call back to   discuss rescheduling. Brodie Lyons is requesting a call from either Dr. Tomi Kim or   Dr. Mansi Francis. Please advise.   ---------------------------------------------------------------------------  --------------  CALL BACK INFO  What is the best way for the office to contact you? OK to leave message on   voicemail  Preferred Call Back Phone Number? [phone]  ---------------------------------------------------------------------------  --------------  SCRIPT ANSWERS  Relationship to Patient? Other  Representative Name? Rogelio Root  Is the Representative on the appropriate HIPAA document in Epic?  Yes

## 2021-06-29 ENCOUNTER — TELEPHONE (OUTPATIENT)
Dept: CARDIOLOGY CLINIC | Age: 81
End: 2021-06-29

## 2021-06-29 RX ORDER — CARBAMAZEPINE 100 MG/1
TABLET, CHEWABLE ORAL
Qty: 60 TABLET | Refills: 4 | Status: SHIPPED | OUTPATIENT
Start: 2021-06-29 | End: 2022-01-20

## 2021-06-29 NOTE — TELEPHONE ENCOUNTER
The patient's spouse Maylon Mohs called stating the patient is refusing to send a sarah remote transmission, and he would her to be rescheduled for a different day. If you have any questions for Maylon Mohs he can be reached at [phone].

## 2021-07-06 ENCOUNTER — OFFICE VISIT (OUTPATIENT)
Dept: INTERNAL MEDICINE CLINIC | Age: 81
End: 2021-07-06
Payer: MEDICARE

## 2021-07-06 VITALS
HEIGHT: 66 IN | WEIGHT: 174 LBS | DIASTOLIC BLOOD PRESSURE: 70 MMHG | BODY MASS INDEX: 27.97 KG/M2 | SYSTOLIC BLOOD PRESSURE: 132 MMHG

## 2021-07-06 DIAGNOSIS — I68.0 CEREBRAL AMYLOID ANGIOPATHY (CODE): ICD-10-CM

## 2021-07-06 DIAGNOSIS — I48.21 PERMANENT ATRIAL FIBRILLATION (HCC): Primary | ICD-10-CM

## 2021-07-06 PROCEDURE — G8400 PT W/DXA NO RESULTS DOC: HCPCS | Performed by: INTERNAL MEDICINE

## 2021-07-06 PROCEDURE — 1036F TOBACCO NON-USER: CPT | Performed by: INTERNAL MEDICINE

## 2021-07-06 PROCEDURE — 1090F PRES/ABSN URINE INCON ASSESS: CPT | Performed by: INTERNAL MEDICINE

## 2021-07-06 PROCEDURE — G8417 CALC BMI ABV UP PARAM F/U: HCPCS | Performed by: INTERNAL MEDICINE

## 2021-07-06 PROCEDURE — 1123F ACP DISCUSS/DSCN MKR DOCD: CPT | Performed by: INTERNAL MEDICINE

## 2021-07-06 PROCEDURE — 99213 OFFICE O/P EST LOW 20 MIN: CPT | Performed by: INTERNAL MEDICINE

## 2021-07-06 PROCEDURE — 4040F PNEUMOC VAC/ADMIN/RCVD: CPT | Performed by: INTERNAL MEDICINE

## 2021-07-06 PROCEDURE — G8427 DOCREV CUR MEDS BY ELIG CLIN: HCPCS | Performed by: INTERNAL MEDICINE

## 2021-07-06 ASSESSMENT — PATIENT HEALTH QUESTIONNAIRE - PHQ9
1. LITTLE INTEREST OR PLEASURE IN DOING THINGS: 0
SUM OF ALL RESPONSES TO PHQ QUESTIONS 1-9: 0
2. FEELING DOWN, DEPRESSED OR HOPELESS: 0
SUM OF ALL RESPONSES TO PHQ QUESTIONS 1-9: 0
SUM OF ALL RESPONSES TO PHQ9 QUESTIONS 1 & 2: 0
SUM OF ALL RESPONSES TO PHQ QUESTIONS 1-9: 0

## 2021-07-06 NOTE — PROGRESS NOTES
CHIEF COMPLAINT: Osmany Kaba is a 80 y.o. female who presents for : Amyloid angiopathy    HPI: Patient presented with both of the above she is doing about the same her memory is unchanged she has no other significant problems including chest pain or shortness of breath    Review of Systems:   Constitutional:  Denies fever or chills   Eyes:  Denies change in visual acuity   HENT:  Denies nasal congestion or sore throat   Respiratory:  Denies cough or shortness of breath   Cardiovascular:  Denies chest pain or edema   GI:  Denies abdominal pain, nausea, vomiting, bloody stools or diarrhea   :  Denies dysuria   Musculoskeletal:  Denies back pain or joint pain   Integument:  Denies rash   Neurologic:  Denies headache, focal weakness or sensory changes   Endocrine:  Denies polyuria or polydipsia   Lymphatic:  Denies swollen glands   Psychiatric:  Denies depression or anxiety     Past Medical History:        Diagnosis Date    Adenomatous polyp of colon 1/29/2019    Asymptomatic varicose veins     Atrial fibrillation ([address]     Dysthymic disorder     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)     Screening mammogram 12/8/2009    (Left)Benign       Past Surgical History:        Procedure Laterality Date    COLONOSCOPY  12/12/2008    HERNIA REPAIR      Left Femoral       Family History:  Family History   Problem Relation Age of Onset    Stroke Mother     Heart Attack Father     Heart Disease Father        Social History:  Social History     Socioeconomic History    Marital status:      Spouse name: None    Number of children: None    Years of education: None    Highest education level: None   Occupational History    None   Tobacco Use    Smoking status: Never Smoker    Smokeless tobacco: Never Used   Substance and Sexual Activity    Alcohol use: No    Drug use: Never    Sexual activity: Not Currently     Partners: Male   Other Topics Concern    None   Social History Narrative    None     Social Determinants of Health     Financial Resource Strain: Low Risk     Difficulty of Paying Living Expenses: Not hard at all   Food Insecurity: No Food Insecurity    Worried About Running Out of Food in the Last Year: Never true    Michelle of Food in the Last Year: Never true   Transportation Needs:     Lack of Transportation (Medical):  Lack of Transportation (Non-Medical):    Physical Activity: Inactive    Days of Exercise per Week: 0 days    Minutes of Exercise per Session: 0 min   Stress: Stress Concern Present    Feeling of Stress : To some extent   Social Connections: Unknown    Frequency of Communication with Friends and Family: Not on file    Frequency of Social Gatherings with Friends and Family: Not on file    Attends Cheondoism Services: Not on file    Active Member of Clubs or Organizations: Not on file    Attends Club or Organization Meetings: Not on file    Marital Status:    Intimate Partner Violence:     Fear of Current or Ex-Partner:     Emotionally Abused:     Physically Abused:     Sexually Abused: Allergies:  Beta adrenergic blockers    Current Medications:    Prior to Admission medications    Medication Sig Start Date End Date Taking? Authorizing Provider   carBAMazepine (TEGRETOL) 100 MG chewable tablet 1 TABLET BY MOUTH TWICE DAILY 6/29/21  Yes Rollins Range, MD   memantine (NAMENDA) 10 MG tablet TAKE 1 TABLET BY MOUTH TWICE DAILY 5/10/21  Yes Rollins Range, MD   spironolactone-hydroCHLOROthiazide (ALDACTAZIDE) 25-25 MG per tablet TAKE 1 TABLET BY MOUTH DAILY 5/10/21  Yes Rollins Range, MD   risperiDONE (RISPERDAL) 0.5 MG tablet TAKE 1 TABLET BY MOUTH 2 TIMES DAILY 4/13/21  Yes Rollins MD Cheryl   pantoprazole (PROTONIX) 40 MG tablet 1 TABLET BY MOUTH DAILY BEFORE BREAKFAST 3/31/21  Yes Ria Luna MD   Skin Protectants, Misc.  (HYDROCERIN) CREA cream Apply topically 2 times daily 5/13/20  Yes Sebastián Fry, DO   Multiple Vitamins-Minerals (THERAPEUTIC MULTIVITAMIN-MINERALS) tablet Take 1 tablet by mouth daily 5/14/20  Yes Sebastián Fry,    acetaminophen (TYLENOL) 500 MG tablet Take 650 mg by mouth 3 times daily as needed 3/15/20  Yes Historical Provider, MD       Physical Exam:  Vital Signs: /70 (Site: Right Upper Arm)   Ht 5' 6\" (1.676 m)   Wt 174 lb (78.9 kg)   BMI 28.08 kg/m²   General: Patient appears  non-toxic  HENT: Atraumatic, normocephalic, oral mucosa moist  Lungs:  Clear bilaterally  Heart: Regular rhythm with controlled ventricular response  Abdomen: Non-distended, soft, non-tender  Extremities: No edema  Neuro: Nonfocal    Medical Decision Making and Plan:  Pertinent Labs & Imaging studies reviewed.  (See chart for details)    Amyloid angiopathy stable at present continue present meds  Atrial fibs off anticoagulation as a result of bleeding of the brain continue present meds

## 2021-07-09 ENCOUNTER — CARE COORDINATION (OUTPATIENT)
Dept: CARE COORDINATION | Age: 81
End: 2021-07-09

## 2021-07-09 NOTE — CARE COORDINATION
The pt's  stated it was not a good time to talk. The  agreed to speak with the RN, ASHLEY next week.

## 2021-07-13 ENCOUNTER — TELEPHONE (OUTPATIENT)
Dept: INTERNAL MEDICINE CLINIC | Age: 81
End: 2021-07-13

## 2021-07-13 ENCOUNTER — CARE COORDINATION (OUTPATIENT)
Dept: CARE COORDINATION | Age: 81
End: 2021-07-13

## 2021-07-13 NOTE — TELEPHONE ENCOUNTER
Patient  called wanting to if Dr. Na King still wants his wife to see Dr. Javier Gonzalez? If so, will she need another referral?  He states its been a while since she has seen her. Please call to advise.

## 2021-07-13 NOTE — CARE COORDINATION
Ambulatory Care Coordination Note  7/13/2021  CM Risk Score: 4  Charlson 10 Year Mortality Risk Score: 79%     ACC: Irving Lloyd, BRENDAN [facility]    Hx: Alzheimer's Disease, A-Fib, CHB, Pacemaker, Progressive Aphasia, Falls, Cerebral Amyloid, Debility, Capgras Delusion, CVA     Summary Note: The pt's  had called the office asking for advice and assistance in r/t to the pt's progression in memory loss. The pt is currently seeing Dr. Jeremy Wilson, the geriatric neurologist and the RN, ACM encouraged the pt's  to call Dr. Blanco Nunez office and make an appointment for follow up. The RN, ACM acknowledged that it is hard to see a family member losing heir memory and reassured the  that he is being supportive. The RN, ACM encouraged the  to open with the pt in r/t to her memory loss and give her gentle reminders. The  stated the pt only wants to eats ice cream and cookies and has been gaining weight. The RN, ACM suggested limiting the sugary snacks and offer small meals as to not to overwhelm the pt at mealtime. The  stated the pt sleeps a lot. The RN, ACM encouraged the  to discuss past experiences from the pt's long term memory and to play familiar music for the pt. The RN, ACM suggested taking the pt to favorite places or restaurants. The RN, ACM encouraged the pt's  to call the Alzheimer's Association for resources and support groups. Plan:  The  agreed to call and make an appointment for follow up with Dr. Jeremy Wilson. The pt is scheduled to see her cardiologist on 8/16/21. The RN, ACM will continue to provide support and resources. The RN, ACM will continue to encourage the  to reach out to the Alzheimer's Association. Care Coordination Interventions    Program Enrollment: Complex Care  Referral from Primary Care Provider: No  Suggested Interventions and Community Resources  Behavorial Health:  In Process  Fall Risk Prevention: Not Started  Disease Specific Clinic: In Process  Pharmacist: In Process  Specialty Services Referral:  (Comment: Alzheimer's Association)  Other Services or Interventions: Encouraged the pt's  to reach out to the Alzheimer's Association. Goals Addressed                 This Visit's Progress     Patient Stated   Improving     To assist pt's  in obtaining community resources for pt. Barriers: stress and lack of education  Plan for overcoming my barriers: Work with [facility]  Confidence: 7/10  Anticipated Goal Completion Date: 6/26/21 Extend Goal: 8/21/21 7/13/21:  reluctant to call. Encouraged  to follow up with the geriatric neurologist            Prior to Admission medications    Medication Sig Start Date End Date Taking? Authorizing Provider   carBAMazepine (TEGRETOL) 100 MG chewable tablet 1 TABLET BY MOUTH TWICE DAILY 6/29/21   Eugene Sanon MD   memantine (NAMENDA) 10 MG tablet TAKE 1 TABLET BY MOUTH TWICE DAILY 5/10/21   Eugene Sanon MD   spironolactone-hydroCHLOROthiazide (ALDACTAZIDE) 25-25 MG per tablet TAKE 1 TABLET BY MOUTH DAILY 5/10/21   Eugene Sanon MD   risperiDONE (RISPERDAL) 0.5 MG tablet TAKE 1 TABLET BY MOUTH 2 TIMES DAILY 4/13/21   Eugene Sanon MD   pantoprazole (PROTONIX) 40 MG tablet 1 TABLET BY MOUTH DAILY BEFORE BREAKFAST 3/31/21   Eugene Sanon MD   Skin Protectants, Misc.  (HYDROCERIN) CREA cream Apply topically 2 times daily 5/13/20   Avinash Fry, DO   Multiple Vitamins-Minerals (THERAPEUTIC MULTIVITAMIN-MINERALS) tablet Take 1 tablet by mouth daily 5/14/20   Avinash Fry, DO   acetaminophen (TYLENOL) 500 MG tablet Take 650 mg by mouth 3 times daily as needed 3/15/20   Historical Provider, MD       Future Appointments   Date Time Provider Andrae Ragsdale   7/20/2021  9:20 AM SCHEDULE, Chris Shaw REMOTE TRANSMISSION [facility]   10/6/2021  1:45 PM Eugene Sanon MD [facility] [place] - [facility]      and   General Assessment    Do you have any symptoms that are causing concern?: Yes  Progression since Onset: Gradually Worsening  Reported Symptoms: Other (Comment: Memory loss)

## 2021-07-13 NOTE — TELEPHONE ENCOUNTER
No not necessary to see . She would not be a candidate for new medication. Patient's spouse has been notified of recommendation. He states he will call back tomorrow as he is unable to talk now.

## 2021-07-14 RX ORDER — SPIRONOLACTONE AND HYDROCHLOROTHIAZIDE 25; 25 MG/1; MG/1
1 TABLET ORAL DAILY
Qty: 30 TABLET | Refills: 2 | Status: SHIPPED | OUTPATIENT
Start: 2021-07-14 | End: 2021-10-25

## 2021-07-19 RX ORDER — MEMANTINE HYDROCHLORIDE 10 MG/1
TABLET ORAL
Qty: 60 TABLET | Refills: 3 | Status: SHIPPED | OUTPATIENT
Start: 2021-07-19 | End: 2021-12-23

## 2021-08-03 ENCOUNTER — TELEPHONE (OUTPATIENT)
Dept: INTERNAL MEDICINE CLINIC | Age: 81
End: 2021-08-03

## 2021-08-03 ENCOUNTER — NURSE ONLY (OUTPATIENT)
Dept: CARDIOLOGY CLINIC | Age: 81
End: 2021-08-03
Payer: MEDICARE

## 2021-08-03 ENCOUNTER — TELEPHONE (OUTPATIENT)
Dept: CARDIOLOGY CLINIC | Age: 81
End: 2021-08-03

## 2021-08-03 DIAGNOSIS — I44.2 COMPLETE HEART BLOCK (HCC): ICD-10-CM

## 2021-08-03 DIAGNOSIS — Z95.0 PACEMAKER: ICD-10-CM

## 2021-08-03 DIAGNOSIS — I48.21 PERMANENT ATRIAL FIBRILLATION (HCC): ICD-10-CM

## 2021-08-03 PROCEDURE — 93294 REM INTERROG EVL PM/LDLS PM: CPT | Performed by: INTERNAL MEDICINE

## 2021-08-03 PROCEDURE — 93296 REM INTERROG EVL PM/IDS: CPT | Performed by: INTERNAL MEDICINE

## 2021-08-03 NOTE — TELEPHONE ENCOUNTER
Transmission received and showed normal pacemaker function. Next remote scheduled 11.02.2021. Please also inform patient that the date present on monitor screen is the date of the last transmission sent.

## 2021-08-03 NOTE — TELEPHONE ENCOUNTER
Pt's  wanted to notify the office scheduled an appointment with Bravo Whitley in 2/8/2022 was the soonest appointment he could get for his wife. Pt has been losing her connection to reality occasionally. Pt's  is unsure of what to do about her mental state. Pt needs to know what to expect and resources available to help. Please advise.

## 2021-08-03 NOTE — TELEPHONE ENCOUNTER
Is she seeing psychiatrist    If not Sharonda Buckley is a geriatric psychiatrist    Call returned to spouse. He was unable to talk at the time and will call back at his convenience.

## 2021-08-03 NOTE — TELEPHONE ENCOUNTER
Mignon Pereira called wanting to make sure the transmission was received. He stated there had been issues in the past. He also asked about upcoming appts, I don't see one scheduled. Please call [phone]

## 2021-08-03 NOTE — LETTER
[address] [facility] [phone]  [address] [facility]  [address]  [facility] [address] [facility] [phone]    Pacemaker/Defibrillator Clinic        08/03/21        [address] [place] [address]      Dear Sharla Greenberg    This letter is to inform you that we received the transmission from your monitor at home that checks your implanted heart device. The next date you should transmit will be 11/2. If your report requires attention, we will notify you. Please be aware that the remote device transmission sites are periodically monitored only during regular business hours during which simultaneous in-office device clinics are being run. If your transmission requires attention, we will contact you as soon as possible. **PLEASE NOTE** that our [facility] policy and processes are changing to ensure a more seamless approach for all parties involved, allowing more time for our nurses to address patient issues and concerns. We will no longer be sending letters for NORMAL remote transmissions. You will be contacted by phone if your transmission requires attention (as previously done), and letters will only be sent regarding monitor disconnections or missed transmissions if you are unable to be reached by phone. Please do not be alarmed by this new process, as we will continue to contact you when you are due for your transmission AND/OR if your transmission report requires attention. This will be your final NORMAL remote received letter. From this point forward, the [facility] will be utilizing the no news is good news approach. As always, please feel free to contact your nurse with any questions or concerns. Thank you.       [facility] [place]

## 2021-08-19 ENCOUNTER — CARE COORDINATION (OUTPATIENT)
Dept: CARE COORDINATION | Age: 81
End: 2021-08-19

## 2021-08-19 NOTE — CARE COORDINATION
RN, ACM Note:  The pt's  is not engaged with the RN, ACM and has not followed up with the Alzheimer's Association. The  has not been able to discuss the the pt's memory loss with the pt at this time. The RN, ACM did call and give the pt's  the referral from the PCP for the geriatric psychiatrist.  Dr. Lorie Lewis  [phone]  [address] [place], [address]    The RN, ACM will sign off at this time.

## 2021-08-26 ENCOUNTER — CARE COORDINATION (OUTPATIENT)
Dept: CARE COORDINATION | Age: 81
End: 2021-08-26

## 2021-08-26 NOTE — CARE COORDINATION
RN, ACM Note:  The pt's  called and left a message that he has decided not to have the pt see the geriatric psychiatrist. The  stated he will just wait until the pt sees Dr. Julio Cesar Trevino in February.

## 2021-08-30 NOTE — PROGRESS NOTES
89 F p/w abd pain. Concern for PUD vs GERD vs pancreatitis given location of pain and tenderness LUQ and epigastric . Given cardiac hx concern for atypical acs. Will get labs lipase trop ekg and CT ab. Will give analgesia and maalox for stomach pain. Occupational Therapy - Group Treatment Note    Unit: [facility]  Date:  10/3/2020  Patient Name:    Cathy Davlaos  Admitting diagnosis:  Dementia with behavioral disturbance, unspecified dementia type [facility]) [F03.91]  Admit Date:  9/30/2020     Treatment Time:  0646-8752  Treatment number:  2   Total Group Time: 30  minutes     Attendees: 6    Group Purpose: Patients will demonstrate understanding of the importance of healthy morning and/or evening routines, engaging in ADL tasks within a group setting. Patients will identify any foreseeable barriers and work toward solutions as a group. Note: Patient identified several evening routine strategies including using deep breathing to calm self before bed. Participation Level: Active Listener and Intermittently engaged      Participation Quality: Appropriate and Sharing    Thought Process/Content: Appropriate      Response to Learning:  Verbalizes Understanding     Modes of Intervention: Education and Discussion      Discipline Responsible: Occupational Therapist       Jocelin Mclaughlin OTR/TAMIKO [id number]        Plan continues per OT plan of care.

## 2021-09-03 RX ORDER — HYDROXYZINE HYDROCHLORIDE 25 MG/1
25 TABLET, FILM COATED ORAL EVERY 8 HOURS PRN
Qty: 30 TABLET | Refills: 4 | Status: SHIPPED | OUTPATIENT
Start: 2021-09-03 | End: 2021-11-29

## 2021-09-09 RX ORDER — PANTOPRAZOLE SODIUM 40 MG/1
TABLET, DELAYED RELEASE ORAL
Qty: 30 TABLET | Refills: 3 | Status: SHIPPED | OUTPATIENT
Start: 2021-09-09 | End: 2022-02-16

## 2021-09-20 RX ORDER — RISPERIDONE 0.5 MG/1
0.5 TABLET, FILM COATED ORAL 2 TIMES DAILY
Qty: 60 TABLET | Refills: 3 | Status: SHIPPED | OUTPATIENT
Start: 2021-09-20 | End: 2022-04-14

## 2021-09-23 ENCOUNTER — TELEPHONE (OUTPATIENT)
Dept: INTERNAL MEDICINE CLINIC | Age: 81
End: 2021-09-23

## 2021-09-23 NOTE — TELEPHONE ENCOUNTER
wants to know, since is ill should she or should she not get the covid booster.        Please call pt back and advise

## 2021-10-06 ENCOUNTER — OFFICE VISIT (OUTPATIENT)
Dept: INTERNAL MEDICINE CLINIC | Age: 81
End: 2021-10-06
Payer: MEDICARE

## 2021-10-06 VITALS — BODY MASS INDEX: 26.63 KG/M2 | SYSTOLIC BLOOD PRESSURE: 144 MMHG | DIASTOLIC BLOOD PRESSURE: 102 MMHG | WEIGHT: 165 LBS

## 2021-10-06 DIAGNOSIS — Z79.899 ENCOUNTER FOR MONITORING DIURETIC THERAPY: ICD-10-CM

## 2021-10-06 DIAGNOSIS — Z51.81 ENCOUNTER FOR MONITORING DIURETIC THERAPY: ICD-10-CM

## 2021-10-06 DIAGNOSIS — I48.21 PERMANENT ATRIAL FIBRILLATION (HCC): ICD-10-CM

## 2021-10-06 DIAGNOSIS — R60.0 LOWER EXTREMITY EDEMA: ICD-10-CM

## 2021-10-06 DIAGNOSIS — Z23 NEED FOR INFLUENZA VACCINATION: ICD-10-CM

## 2021-10-06 DIAGNOSIS — F02.818 LATE ONSET ALZHEIMER'S DISEASE WITH BEHAVIORAL DISTURBANCE (HCC): ICD-10-CM

## 2021-10-06 DIAGNOSIS — I68.0 CEREBRAL AMYLOID ANGIOPATHY (CODE): Primary | ICD-10-CM

## 2021-10-06 DIAGNOSIS — G30.1 LATE ONSET ALZHEIMER'S DISEASE WITH BEHAVIORAL DISTURBANCE (HCC): ICD-10-CM

## 2021-10-06 PROCEDURE — 1090F PRES/ABSN URINE INCON ASSESS: CPT | Performed by: INTERNAL MEDICINE

## 2021-10-06 PROCEDURE — 4040F PNEUMOC VAC/ADMIN/RCVD: CPT | Performed by: INTERNAL MEDICINE

## 2021-10-06 PROCEDURE — G8484 FLU IMMUNIZE NO ADMIN: HCPCS | Performed by: INTERNAL MEDICINE

## 2021-10-06 PROCEDURE — G0008 ADMIN INFLUENZA VIRUS VAC: HCPCS | Performed by: INTERNAL MEDICINE

## 2021-10-06 PROCEDURE — 1123F ACP DISCUSS/DSCN MKR DOCD: CPT | Performed by: INTERNAL MEDICINE

## 2021-10-06 PROCEDURE — G8400 PT W/DXA NO RESULTS DOC: HCPCS | Performed by: INTERNAL MEDICINE

## 2021-10-06 PROCEDURE — G8417 CALC BMI ABV UP PARAM F/U: HCPCS | Performed by: INTERNAL MEDICINE

## 2021-10-06 PROCEDURE — G8427 DOCREV CUR MEDS BY ELIG CLIN: HCPCS | Performed by: INTERNAL MEDICINE

## 2021-10-06 PROCEDURE — 1036F TOBACCO NON-USER: CPT | Performed by: INTERNAL MEDICINE

## 2021-10-06 PROCEDURE — 99213 OFFICE O/P EST LOW 20 MIN: CPT | Performed by: INTERNAL MEDICINE

## 2021-10-06 PROCEDURE — 90694 VACC AIIV4 NO PRSRV 0.5ML IM: CPT | Performed by: INTERNAL MEDICINE

## 2021-10-06 NOTE — PROGRESS NOTES
CHIEF COMPLAINT: Guilherme Billings is a 80 y.o. female who presents for follow-up. HPI: Patient presented for follow-up. She was last seen in our office 7/6/2021. She has been stable and has had no medication changes. She denies mood changes, N/V, F/C, CP, SOB. After speaking to the patient's  privately, it seems that her symptoms of dementia and agitation have been progressively worsening. He expressed concern about these symptoms and would like more information about what he can expect in the future given the nature of her disease.     Review of Systems:   Constitutional:  Denies fever or chills   Eyes:  Denies change in visual acuity   HENT:  Denies nasal congestion or sore throat   Respiratory:  Denies cough or shortness of breath   Cardiovascular:  Denies chest pain or edema   GI:  Denies abdominal pain, nausea, vomiting, bloody stools or diarrhea   :  Denies dysuria   Musculoskeletal:  Denies back pain or joint pain   Integument:  Denies rash   Neurologic:  Denies headache, focal weakness or sensory changes   Endocrine:  Denies polyuria or polydipsia   Lymphatic:  Denies swollen glands   Psychiatric:  Denies depression or anxiety     Past Medical History:        Diagnosis Date    Adenomatous polyp of colon 1/29/2019    Asymptomatic varicose veins     Atrial fibrillation (HCC)     Dysthymic disorder     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)     Screening mammogram 12/8/2009    (Left)Benign       Past Surgical History:        Procedure Laterality Date    COLONOSCOPY  12/12/2008    HERNIA REPAIR      Left Femoral       Family History:  Family History   Problem Relation Age of Onset    Stroke Mother     Heart Attack Father     Heart Disease Father        Social History:  Social History     Socioeconomic History    Marital status:      Spouse name: Not on file    Number of children: Not on file    Years of education: Not on file    Highest education level: Not on file   Occupational History  Not on file   Tobacco Use    Smoking status: Never Smoker    Smokeless tobacco: Never Used   Substance and Sexual Activity    Alcohol use: No    Drug use: Never    Sexual activity: Not Currently     Partners: Male   Other Topics Concern    Not on file   Social History Narrative    Not on file     Social Determinants of Health     Financial Resource Strain: Low Risk     Difficulty of Paying Living Expenses: Not hard at all   Food Insecurity: No Food Insecurity    Worried About [address] in the Last Year: Never true    [address] in the Last Year: Never true   Transportation Needs:     Lack of Transportation (Medical):  Lack of Transportation (Non-Medical):    Physical Activity: Inactive    Days of Exercise per Week: 0 days    Minutes of Exercise per Session: 0 min   Stress: Stress Concern Present    Feeling of Stress : To some extent   Social Connections: Unknown    Frequency of Communication with Friends and Family: Not on file    Frequency of Social Gatherings with Friends and Family: Not on file    Attends Hoahaoism Services: Not on file    Active Member of Clubs or Organizations: Not on file    Attends Club or Organization Meetings: Not on file    Marital Status:    Intimate Partner Violence:     Fear of Current or Ex-Partner:     Emotionally Abused:     Physically Abused:     Sexually Abused: Allergies:  Beta adrenergic blockers    Current Medications:    Prior to Admission medications    Medication Sig Start Date End Date Taking?  Authorizing Provider   risperiDONE (RISPERDAL) 0.5 MG tablet TAKE 1 TABLET BY MOUTH 2 TIMES DAILY 9/20/21  Yes Ora Read MD   pantoprazole (PROTONIX) 40 MG tablet 1 TABLET BY MOUTH DAILY BEFORE BREAKFAST 9/9/21  Yes Ora Read MD   memantine (NAMENDA) 10 MG tablet TAKE 1 TABLET BY MOUTH TWICE DAILY 7/19/21  Yes Ora Read MD   spironolactone-hydroCHLOROthiazide (ALDACTAZIDE) 25-25 MG per tablet TAKE 1 TABLET BY MOUTH DAILY 7/14/21  Yes Bob Koroma MD   carBAMazepine (TEGRETOL) 100 MG chewable tablet 1 TABLET BY MOUTH TWICE DAILY 6/29/21  Yes Bob Koroma MD   Skin Protectants, Misc. (HYDROCERIN) CREA cream Apply topically 2 times daily 5/13/20  Yes Sebastián Fry, DO   Multiple Vitamins-Minerals (THERAPEUTIC MULTIVITAMIN-MINERALS) tablet Take 1 tablet by mouth daily 5/14/20  Yes Sebastián Fry DO   acetaminophen (TYLENOL) 500 MG tablet Take 650 mg by mouth 3 times daily as needed 3/15/20  Yes Historical Provider, MD       Physical Exam:  Vital Signs: BP (!) 144/102 (Site: Left Upper Arm, Position: Sitting, Cuff Size: Large Adult)   Wt 165 lb (74.8 kg)   BMI 26.63 kg/m²   General: Patient appears  non-toxic  HENT: Atraumatic, normocephalic, oral mucosa moist  Lungs:  Clear bilaterally  Heart: Regular rate and rhythm   Abdomen: Non-distended, soft, non-tender  Extremities: Edema of bilateral LEs, pulses intact  Neuro: Nonfocal    Medical Decision Making and Plan:  Pertinent Labs & Imaging studies reviewed. (See chart for details)    Cerebral amyloid angiopathy  Hx of brain bleed  - NO anticoagulation    Permanent atrial fibrillation [facility])  Patient follows with cardiology, most recent appointment in Aug 2021. Pacemaker in place, interrogated Aug 2021 with normal pacemaker function. - not on anticoag 2/2 cerebral amyloid and prev hx of brain bleed    Late onset Alzheimer's Disease  Patient's dementia and agitation have been worsening, per patient's .   - continue risperidone, memantine, carbamazepine  - recommended f/u with patient's neurologist, Dr. Johnnie Flynn for further discussion regarding long term prognosis    Encounter for monitoring diuretic therapy  - continue aldactone-hctz  - CBC Auto Differential; Future  - COMPREHENSIVE METABOLIC PANEL;  Future    Lower extremity edema  Stable  - recommended compression stockings    Need for influenza vaccination  - flu shot today

## 2021-10-10 ENCOUNTER — HOSPITAL ENCOUNTER (EMERGENCY)
Age: 81
Discharge: HOME | End: 2021-10-11
Attending: EMERGENCY MEDICINE
Payer: MEDICARE

## 2021-10-10 ENCOUNTER — APPOINTMENT (OUTPATIENT)
Dept: CT IMAGING | Age: 81
End: 2021-10-10
Payer: MEDICARE

## 2021-10-10 ENCOUNTER — APPOINTMENT (OUTPATIENT)
Dept: GENERAL RADIOLOGY | Age: 81
End: 2021-10-10
Payer: MEDICARE

## 2021-10-10 VITALS
HEIGHT: 66 IN | HEART RATE: 81 BPM | BODY MASS INDEX: 27.8 KG/M2 | DIASTOLIC BLOOD PRESSURE: 76 MMHG | OXYGEN SATURATION: 96 % | TEMPERATURE: 98 F | WEIGHT: 173 LBS | SYSTOLIC BLOOD PRESSURE: 126 MMHG | RESPIRATION RATE: 21 BRPM

## 2021-10-10 DIAGNOSIS — R41.0 CONFUSION: Primary | ICD-10-CM

## 2021-10-10 LAB
ANION GAP SERPL CALCULATED.3IONS-SCNC: 12 MMOL/L (ref 3–16)
BASOPHILS ABSOLUTE: 0 K/UL (ref 0–0.2)
BASOPHILS RELATIVE PERCENT: 0.7 %
BILIRUBIN URINE: NEGATIVE
BLOOD, URINE: ABNORMAL
BUN BLDV-MCNC: 21 MG/DL (ref 7–20)
CALCIUM SERPL-MCNC: 8.9 MG/DL (ref 8.3–10.6)
CHLORIDE BLD-SCNC: 98 MMOL/L (ref 99–110)
CLARITY: CLEAR
CO2: 25 MMOL/L (ref 21–32)
COLOR: YELLOW
CREAT SERPL-MCNC: 0.8 MG/DL (ref 0.6–1.2)
EOSINOPHILS ABSOLUTE: 0.1 K/UL (ref 0–0.6)
EOSINOPHILS RELATIVE PERCENT: 2 %
GFR AFRICAN AMERICAN: >60
GFR NON-AFRICAN AMERICAN: >60
GLUCOSE BLD-MCNC: 118 MG/DL (ref 70–99)
GLUCOSE URINE: NEGATIVE MG/DL
HCT VFR BLD CALC: 36.3 % (ref 36–48)
HEMOGLOBIN: 12.7 G/DL (ref 12–16)
KETONES, URINE: NEGATIVE MG/DL
LEUKOCYTE ESTERASE, URINE: NEGATIVE
LYMPHOCYTES ABSOLUTE: 1.4 K/UL (ref 1–5.1)
LYMPHOCYTES RELATIVE PERCENT: 24.3 %
MCH RBC QN AUTO: 33.1 PG (ref 26–34)
MCHC RBC AUTO-ENTMCNC: 34.9 G/DL (ref 31–36)
MCV RBC AUTO: 94.8 FL (ref 80–100)
MICROSCOPIC EXAMINATION: YES
MONOCYTES ABSOLUTE: 0.6 K/UL (ref 0–1.3)
MONOCYTES RELATIVE PERCENT: 10.4 %
NEUTROPHILS ABSOLUTE: 3.7 K/UL (ref 1.7–7.7)
NEUTROPHILS RELATIVE PERCENT: 62.6 %
NITRITE, URINE: NEGATIVE
PDW BLD-RTO: 15 % (ref 12.4–15.4)
PH UA: 6 (ref 5–8)
PLATELET # BLD: 175 K/UL (ref 135–450)
PMV BLD AUTO: 8.5 FL (ref 5–10.5)
POTASSIUM REFLEX MAGNESIUM: 3.7 MMOL/L (ref 3.5–5.1)
PROTEIN UA: NEGATIVE MG/DL
RBC # BLD: 3.83 M/UL (ref 4–5.2)
RBC UA: NORMAL /HPF (ref 0–4)
SODIUM BLD-SCNC: 135 MMOL/L (ref 136–145)
SPECIFIC GRAVITY UA: 1.02 (ref 1–1.03)
URINE TYPE: ABNORMAL
UROBILINOGEN, URINE: 0.2 E.U./DL
WBC # BLD: 5.9 K/UL (ref 4–11)
WBC UA: NORMAL /HPF (ref 0–5)

## 2021-10-10 PROCEDURE — 93005 ELECTROCARDIOGRAM TRACING: CPT | Performed by: STUDENT IN AN ORGANIZED HEALTH CARE EDUCATION/TRAINING PROGRAM

## 2021-10-10 PROCEDURE — 80048 BASIC METABOLIC PNL TOTAL CA: CPT

## 2021-10-10 PROCEDURE — 36415 COLL VENOUS BLD VENIPUNCTURE: CPT

## 2021-10-10 PROCEDURE — 71045 X-RAY EXAM CHEST 1 VIEW: CPT

## 2021-10-10 PROCEDURE — 87086 URINE CULTURE/COLONY COUNT: CPT

## 2021-10-10 PROCEDURE — 99284 EMERGENCY DEPT VISIT MOD MDM: CPT

## 2021-10-10 PROCEDURE — 85025 COMPLETE CBC W/AUTO DIFF WBC: CPT

## 2021-10-10 PROCEDURE — 81001 URINALYSIS AUTO W/SCOPE: CPT

## 2021-10-10 PROCEDURE — 70450 CT HEAD/BRAIN W/O DYE: CPT

## 2021-10-11 ENCOUNTER — TELEPHONE (OUTPATIENT)
Dept: INTERNAL MEDICINE CLINIC | Age: 81
End: 2021-10-11

## 2021-10-11 LAB
EKG DIAGNOSIS: NORMAL
EKG Q-T INTERVAL: 384 MS
EKG QRS DURATION: 98 MS
EKG QTC CALCULATION (BAZETT): 453 MS
EKG R AXIS: 55 DEGREES
EKG T AXIS: 270 DEGREES
EKG VENTRICULAR RATE: 84 BPM
URINE CULTURE, ROUTINE: NORMAL

## 2021-10-11 NOTE — ED PROVIDER NOTES
[address] [place]          EM RESIDENT NOTE       Date of evaluation: 10/10/2021    Chief Complaint     Altered Mental Status (?? increased confusion, on call MD spoke with patient and sent in, Patient denies any problems)      History of Present Illness     Yosef Cortes is a 80 y.o. female with a PMH of Alzheimer's dementia, cerebral amyloid angiopathy c/w ICH, A-fib s/p pacemaker (not on AC due to previous [address]),  who presents with confusion. Patient has a history of Alzheimer's disease that has progressively gotten worse. She was seen in clinic on 10/6/2021 which point has been inquired about measures he can take at home for her symptoms. Patient is coming in today accompanied by her  with concerns of worsening confusion. The patient's  called the on-call physician for her primary care physician to discuss her confusion. There is concerned that her confusion may have acutely worsened so it was recommended that the patient come into the emergency department for evaluation. History is overall limited from the patient given her underlying medical issues. The  cannot provide any meaningful collateral history the concerns about her confusion versus her baseline. He did deny any recent trauma/falls. No reported fevers or new weakness. Review of Systems     Review of Systems   Unable to perform ROS: Dementia       Past Medical, Surgical, Family, and Social History     She has a past medical history of Adenomatous polyp of colon, Asymptomatic varicose veins, Atrial fibrillation ([address] Dysthymic disorder, 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), and Screening mammogram.  She has a past surgical history that includes Colonoscopy (12/12/2008) and hernia repair. Her family history includes Heart Attack in her father; Heart Disease in her father; Stroke in her mother. She reports that she has never smoked.  She has never used smokeless tobacco. She reports current alcohol use. She reports that she does not use drugs. Medications     Discharge Medication List as of 10/10/2021 11:57 PM      CONTINUE these medications which have NOT CHANGED    Details   risperiDONE (RISPERDAL) 0.5 MG tablet TAKE 1 TABLET BY MOUTH 2 TIMES DAILY, Disp-60 tablet, R-3Normal      pantoprazole (PROTONIX) 40 MG tablet 1 TABLET BY MOUTH DAILY BEFORE BREAKFAST, Disp-30 tablet, R-3Normal      memantine (NAMENDA) 10 MG tablet TAKE 1 TABLET BY MOUTH TWICE DAILY, Disp-60 tablet, R-3Normal      spironolactone-hydroCHLOROthiazide (ALDACTAZIDE) 25-25 MG per tablet TAKE 1 TABLET BY MOUTH DAILY, Disp-30 tablet, R-2Normal      carBAMazepine (TEGRETOL) 100 MG chewable tablet 1 TABLET BY MOUTH TWICE DAILY, Disp-60 tablet, R-4Normal      Skin Protectants, Misc. (HYDROCERIN) CREA cream Apply topically 2 times daily, Topical, 2 TIMES DAILY Starting Wed 5/13/2020, Disp-2 Container, R-0, Normal      Multiple Vitamins-Minerals (THERAPEUTIC MULTIVITAMIN-MINERALS) tablet Take 1 tablet by mouth daily, Disp-30 tablet, R-1Normal      acetaminophen (TYLENOL) 500 MG tablet Take 650 mg by mouth 3 times daily as neededHistorical Med             Allergies     She is allergic to beta adrenergic blockers. Physical Exam     INITIAL VITALS: BP: 127/73, Temp: 97.9 °F (36.6 °C), Pulse: 80, Resp: 20, SpO2: 96 %   Physical Exam  Constitutional:       General: She is not in acute distress. Appearance: She is not ill-appearing, toxic-appearing or diaphoretic. HENT:      Head: Normocephalic and atraumatic. Eyes:      Extraocular Movements: Extraocular movements intact. Pupils: Pupils are equal, round, and reactive to light. Cardiovascular:      Rate and Rhythm: Normal rate. Rhythm irregular. Pulmonary:      Effort: Pulmonary effort is normal. No respiratory distress. Breath sounds: No wheezing. Abdominal:      General: There is no distension. Palpations: Abdomen is soft. Tenderness:  There is no abdominal tenderness. There is no guarding. Musculoskeletal:         General: Normal range of motion. Cervical back: Normal range of motion and neck supple. Skin:     General: Skin is warm and dry. Neurological:      Comments: And oriented x1 (self only). CN II through XII grossly intact. No gross motor or sensory changes in the bilateral upper or lower extremities. Finger-nose and heel-to-shin normal.   Psychiatric:         Mood and Affect: Mood normal.         Behavior: Behavior normal.         DiagnosticResults     EKG   Interpreted in conjunction with emergencydepartment physician No att. providers found  Rhythm: Atrial fibrillation  Rate: Normal  Axis: normal  Conduction: Atrial fibrillation  ST Segments: No obvious ST elevations or significant depressions  T Waves: T wave inversion in lead III as well as the lateral precordial leads  Clinical Impression: Atrial fibrillation  Comparison: 9/29/2020    RADIOLOGY:  CT HEAD WO CONTRAST   Final Result      1. No intracranial hemorrhage, mass effect or large acute territorial infarction   2. Stable severe cerebral white matter disease      XR CHEST PORTABLE   Final Result      No evidence for acute cardiopulmonary disease.                  LABS:   Results for orders placed or performed during the hospital encounter of 10/10/21   CBC Auto Differential   Result Value Ref Range    WBC 5.9 4.0 - 11.0 K/uL    RBC 3.83 (L) 4.00 - 5.20 M/uL    Hemoglobin 12.7 12.0 - 16.0 g/dL    Hematocrit 36.3 36.0 - 48.0 %    MCV 94.8 80.0 - 100.0 fL    MCH 33.1 26.0 - 34.0 pg    MCHC 34.9 31.0 - 36.0 g/dL    RDW 15.0 12.4 - 15.4 %    Platelets 155 579 - 647 K/uL    MPV 8.5 5.0 - 10.5 fL    Neutrophils % 62.6 %    Lymphocytes % 24.3 %    Monocytes % 10.4 %    Eosinophils % 2.0 %    Basophils % 0.7 %    Neutrophils Absolute 3.7 1.7 - 7.7 K/uL    Lymphocytes Absolute 1.4 1.0 - 5.1 K/uL    Monocytes Absolute 0.6 0.0 - 1.3 K/uL    Eosinophils Absolute 0.1 0.0 - 0.6 K/uL Basophils Absolute 0.0 0.0 - 0.2 K/uL   Basic Metabolic Panel w/ Reflex to MG   Result Value Ref Range    Sodium 135 (L) 136 - 145 mmol/L    Potassium reflex Magnesium 3.7 3.5 - 5.1 mmol/L    Chloride 98 (L) 99 - 110 mmol/L    CO2 25 21 - 32 mmol/L    Anion Gap 12 3 - 16    Glucose 118 (H) 70 - 99 mg/dL    BUN 21 (H) 7 - 20 mg/dL    CREATININE 0.8 0.6 - 1.2 mg/dL    GFR Non-African American >60 >60    GFR African American >60 >60    Calcium 8.9 8.3 - 10.6 mg/dL   Urinalysis, reflex to microscopic   Result Value Ref Range    Color, UA Yellow Straw/Yellow    Clarity, UA Clear Clear    Glucose, Ur Negative Negative mg/dL    Bilirubin Urine Negative Negative    Ketones, Urine Negative Negative mg/dL    Specific Gravity, UA 1.025 1.005 - 1.030    Blood, Urine TRACE-INTACT (A) Negative    pH, UA 6.0 5.0 - 8.0    Protein, UA Negative Negative mg/dL    Urobilinogen, Urine 0.2 <2.0 E.U./dL    Nitrite, Urine Negative Negative    Leukocyte Esterase, Urine Negative Negative    Microscopic Examination YES     Urine Type Other    Microscopic Urinalysis   Result Value Ref Range    WBC, UA None seen 0 - 5 /HPF    RBC, UA 0-2 0 - 4 /HPF       ED BEDSIDE ULTRASOUND:  None     RECENT VITALS:  BP: 126/76, Temp: 98 °F (36.7 °C), Pulse: 81,Resp: 21, SpO2: 96 %     Procedures     None     ED Course     Nursing Notes, Past Medical Hx, Past Surgical Hx, Social Hx, Allergies, and Family Hx were reviewed. The patient was given the followingmedications:  No orders of the defined types were placed in this encounter. CONSULTS:  IP CONSULT TO PRIMARY CARE PROVIDER    MEDICAL DECISION MAKING / ASSESSMENT / Roscoerik Navarro is a 80 y.o. female with PMH of Alzheimer's dementia, cerebral amyloid angiopathy c/w ICH, A-fib s/p pacemaker (not on AC due to previous ICH),  who presents with confusion. On arrival, the patient was hemodynamically stable in no active distress.   Patient was only alert and oriented x1 but had no focality on her neurological examination. It seems the patient has rapidly progressive Alzheimer's dementia is unclear how her current baseline is different from her usual baseline. I mentioned the fact that the patient was recently seen by her primary care physician on 10/6/2021 at which point it was noted that her dementia rapidly progressing and that they were being referred to the neurologist for further evaluation. When this was mentioned, both the patient and her  stated they were unaware of this. I am concerned there may be some poor insight to the patient's disease processes contributing to her current emergency department visit. However given her age and underlying comorbidities, a work-up was obtained. Cross-sectional imaging of the head did not reveal any acute intracranial pathology to explain her symptoms. Plain films of the chest were grossly unremarkable. Labs included a BMP, CBC, and urinalysis did not find a cause of the patient's presentation. His presentation with the on-call physician for Dr. Fierro Falling to ensure there is no additional on their previous conversation. At this point in time, I do not have a organic cause to her symptoms and I feel that her presentation is consistent with her known diagnosis of Alzheimer's dementia. I discussed with the patient's  that he should follow-up with the primary care for provider this week discuss her emergency department visit. If there are any additional ongoing concerns, they may come back for reevaluation. This patient was also evaluated by the attending physician. All care plans werediscussed and agreed upon. Clinical Impression     1.  Confusion        Disposition     PATIENT REFERRED TO:  Troy Ramsey MD  [address] [place] [address]  [phone]    On 10/11/2021        DISCHARGE MEDICATIONS:  Discharge Medication List as of 10/10/2021 11:57 PM          DISPOSITION Decision To Discharge 10/10/2021 11:47:15 PM Aissatou Saini MD  Resident  10/11/21 0265

## 2021-10-11 NOTE — ED PROVIDER NOTES
ED Attending Attestation Note     Date of evaluation: 10/10/2021    This patient was seen by the resident. I have seen and examined the patient, agree with the workup, evaluation, management and diagnosis. The care plan has been discussed. I have reviewed the ECG and concur with the resident's interpretation. My assessment reveals, an elderly patients, somewhat confused, but pleasantly conversational, in no acute distress. She is followed by Dr. Renee Mcgill for known Alzheimer's dementia, and at a recent outpatient visit just a few days ago, was noted by her  to have progressive worsening of her symptoms of confusion and agitation, and they were referred to follow-up regarding these worsening symptoms with their neurologist.  This evening, the patient was felt to be more confused than usual, and the on-call physician was contacted, who recommended evaluation in the emergency department. Neither the patient nor her  expressed any other new symptoms or other concerns. The patient is oriented to self, but not to place or time, and otherwise has no focal neurologic deficits. This seems to be her baseline, according to her . A basic metabolic and infectious work-up was initiated, which has shown no concerning abnormalities or reversible processes which could contribute to worsening symptoms of dementia. After discussion with the patient and her , present at bedside, they are comfortable returning home, and following up with her primary care provider and neurologist as an outpatient.          Taqueria Gonzalez MD  10/25/21 [id number]

## 2021-10-11 NOTE — TELEPHONE ENCOUNTER
----- Message from Aline Sun sent at 10/11/2021 10:15 AM EDT -----  Subject: Appointment Request    Reason for Call: Urgent (Patient Request) ED Follow Up Visit    QUESTIONS  Type of Appointment? Established Patient  Reason for appointment request? Available appointments did not meet   patient need  Additional Information for Provider? Pt , Ba Horton, is calling in to   schedule an appt with Dr. Sierra Abarca after a ed encounter at [facility]. on   10/11 and there is nothing available until December; Pt  has some   concerns; Would like a call back  ---------------------------------------------------------------------------  --------------  CALL BACK INFO  What is the best way for the office to contact you? OK to leave message on   voicemail  Preferred Call Back Phone Number? [phone]  ---------------------------------------------------------------------------  --------------  SCRIPT ANSWERS  Relationship to Patient? Other  Representative Name? Haley Villegas  Additional information verified (besides Name and Date of Birth)? Address  (Patient requests to see provider urgently. )? Yes  Do you have any questions for your primary care provider that need to be   answered prior to your appointment? No  Have you been diagnosed with, awaiting test results for, or told that you   are suspected of having COVID-19 (Coronavirus)? (If patient has tested   negative or was tested as a requirement for work, school, or travel and   not based on symptoms, answer no)? No  Within the past two weeks have you developed any of the following symptoms   (answer no if symptoms have been present longer than 2 weeks or began   more than 2 weeks ago)? Fever or Chills, Cough, Shortness of breath or   difficulty breathing, Loss of taste or smell, Sore throat, Nasal   congestion, Sneezing or runny nose, Fatigue or generalized body aches   (answer no if pain is specific to a body part e.g. back pain), Diarrhea,   Headache? No  Have you had close contact with someone with COVID-19 in the last 14 days? No  (Service Expert  click yes below to proceed with [employer] As Usual   Scheduling)?  Yes

## 2021-10-13 ENCOUNTER — OFFICE VISIT (OUTPATIENT)
Dept: INTERNAL MEDICINE CLINIC | Age: 81
End: 2021-10-13
Payer: MEDICARE

## 2021-10-13 VITALS
SYSTOLIC BLOOD PRESSURE: 132 MMHG | WEIGHT: 171 LBS | BODY MASS INDEX: 27.6 KG/M2 | OXYGEN SATURATION: 98 % | HEART RATE: 73 BPM | DIASTOLIC BLOOD PRESSURE: 60 MMHG

## 2021-10-13 DIAGNOSIS — Z09 HOSPITAL DISCHARGE FOLLOW-UP: Primary | ICD-10-CM

## 2021-10-13 DIAGNOSIS — F02.818 LATE ONSET ALZHEIMER'S DISEASE WITH BEHAVIORAL DISTURBANCE (HCC): ICD-10-CM

## 2021-10-13 DIAGNOSIS — G30.1 LATE ONSET ALZHEIMER'S DISEASE WITH BEHAVIORAL DISTURBANCE (HCC): ICD-10-CM

## 2021-10-13 PROCEDURE — G8484 FLU IMMUNIZE NO ADMIN: HCPCS | Performed by: INTERNAL MEDICINE

## 2021-10-13 PROCEDURE — 1090F PRES/ABSN URINE INCON ASSESS: CPT | Performed by: INTERNAL MEDICINE

## 2021-10-13 PROCEDURE — G8427 DOCREV CUR MEDS BY ELIG CLIN: HCPCS | Performed by: INTERNAL MEDICINE

## 2021-10-13 PROCEDURE — 4040F PNEUMOC VAC/ADMIN/RCVD: CPT | Performed by: INTERNAL MEDICINE

## 2021-10-13 PROCEDURE — 1036F TOBACCO NON-USER: CPT | Performed by: INTERNAL MEDICINE

## 2021-10-13 PROCEDURE — 99213 OFFICE O/P EST LOW 20 MIN: CPT | Performed by: INTERNAL MEDICINE

## 2021-10-13 PROCEDURE — G8417 CALC BMI ABV UP PARAM F/U: HCPCS | Performed by: INTERNAL MEDICINE

## 2021-10-13 PROCEDURE — G8400 PT W/DXA NO RESULTS DOC: HCPCS | Performed by: INTERNAL MEDICINE

## 2021-10-13 PROCEDURE — 1123F ACP DISCUSS/DSCN MKR DOCD: CPT | Performed by: INTERNAL MEDICINE

## 2021-10-13 NOTE — PROGRESS NOTES
CHIEF COMPLAINT: Ellis England is a 80 y.o. female who presents for hospital follow-up:    HPI: Patient presented to the ED on 10/10/21 with worsening confusion. In the ED, she was oriented to self only, with no other focal neurological deficits noted on exam. She had EKG which showed atrial fibrillation. CT head showed stable severe cerebral white matter disease without intracranial hemorrhage, mass effect or large acute territorial infarction. CXR showed no evidence for acute cardiopulmonary disease. Labs and UA were grossly normal. Patient was discharged in the ED without any new diagnosis after no organic cause was found for confusion. She was seen in our office 10/06/2021 for progressive dementia and agitation. At that time, she was recommended to follow with her neurologist regulary and continue her medications. Her alzheimer's dementia and cerebral amyloid angiopathy have been progressing, which is not unexpected. In speaking with the patient, she is unable to recall why she initially presented to the ED. The patient's  believes she was confused and had acute worsening of memory. Per his report, the patient called the physician on call for the practice. She was recommended by the on-call physician to go to the ED for further evaluation of acute confusion. Today the patient feels \"at her baseline. \" She denies headache, vision changes, numbness, tingling, or weakness. Denies CP, SOB, N/V.     Review of Systems:   Constitutional:  Denies fever or chills   Eyes:  Denies change in visual acuity   HENT:  Denies nasal congestion or sore throat   Respiratory:  Denies cough or shortness of breath   Cardiovascular:  Denies chest pain or edema   GI:  Denies abdominal pain, nausea, vomiting, bloody stools or diarrhea   :  Denies dysuria   Musculoskeletal:  Denies back pain or joint pain   Integument:  Denies rash   Neurologic:  Denies headache, focal weakness or sensory changes   Endocrine:  Denies polyuria or polydipsia   Lymphatic:  Denies swollen glands   Psychiatric:  Denies depression or anxiety     Past Medical History:        Diagnosis Date    Adenomatous polyp of colon 1/29/2019    Asymptomatic varicose veins     Atrial fibrillation (HCC)     Dysthymic disorder     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)     Screening mammogram 12/8/2009    (Left)Benign       Past Surgical History:        Procedure Laterality Date    COLONOSCOPY  12/12/2008    HERNIA REPAIR      Left Femoral       Family History:  Family History   Problem Relation Age of Onset    Stroke Mother     Heart Attack Father     Heart Disease Father        Social History:  Social History     Socioeconomic History    Marital status:      Spouse name: None    Number of children: None    Years of education: None    Highest education level: None   Occupational History    None   Tobacco Use    Smoking status: Never Smoker    Smokeless tobacco: Never Used   Substance and Sexual Activity    Alcohol use: Yes     Comment: rare    Drug use: Never    Sexual activity: Not Currently     Partners: Male   Other Topics Concern    None   Social History Narrative    None     Social Determinants of Health     Financial Resource Strain: Low Risk     Difficulty of Paying Living Expenses: Not hard at all   Food Insecurity: No Food Insecurity    Worried About Running Out of Food in the Last Year: Never true    Michelle of Food in the Last Year: Never true   Transportation Needs:     Lack of Transportation (Medical):  Lack of Transportation (Non-Medical):    Physical Activity: Inactive    Days of Exercise per Week: 0 days    Minutes of Exercise per Session: 0 min   Stress: Stress Concern Present    Feeling of Stress :  To some extent   Social Connections: Unknown    Frequency of Communication with Friends and Family: Not on file    Frequency of Social Gatherings with Friends and Family: Not on file    Attends Quaker Services: Not on file   [address] [facility] [employer] or Organizations: Not on file    Attends Club or Organization Meetings: Not on file    Marital Status:    Intimate Partner Violence:     Fear of Current or Ex-Partner:     Emotionally Abused:     Physically Abused:     Sexually Abused: Allergies:  Beta adrenergic blockers    Current Medications:    Prior to Admission medications    Medication Sig Start Date End Date Taking? Authorizing Provider   risperiDONE (RISPERDAL) 0.5 MG tablet TAKE 1 TABLET BY MOUTH 2 TIMES DAILY 9/20/21  Yes Gopal Reed MD   pantoprazole (PROTONIX) 40 MG tablet 1 TABLET BY MOUTH DAILY BEFORE BREAKFAST 9/9/21  Yes Gopal Reed MD   memantine (NAMENDA) 10 MG tablet TAKE 1 TABLET BY MOUTH TWICE DAILY 7/19/21  Yes Gopal Reed MD   spironolactone-hydroCHLOROthiazide (ALDACTAZIDE) 25-25 MG per tablet TAKE 1 TABLET BY MOUTH DAILY 7/14/21  Yes Gopal Reed MD   carBAMazepine (TEGRETOL) 100 MG chewable tablet 1 TABLET BY MOUTH TWICE DAILY 6/29/21  Yes Gopal Reed MD   Skin Protectants, Misc. (HYDROCERIN) CREA cream Apply topically 2 times daily 5/13/20  Yes Sebastián Fry DO   Multiple Vitamins-Minerals (THERAPEUTIC MULTIVITAMIN-MINERALS) tablet Take 1 tablet by mouth daily 5/14/20  Yes Sebastián Fry DO   acetaminophen (TYLENOL) 500 MG tablet Take 650 mg by mouth 3 times daily as needed 3/15/20  Yes Historical Provider, MD       Physical Exam:  Vital Signs: /60   Pulse 73   Wt 171 lb (77.6 kg)   SpO2 98%   BMI 27.60 kg/m²   General: Patient appears  non-toxic  HENT: Atraumatic, normocephalic, oral mucosa moist  Lungs:  Clear bilaterally  Heart: Irregular rate and rhythm  Abdomen: Non-distended, soft, non-tender  Extremities: No edema  Neuro: Nonfocal    Medical Decision Making and Plan:  Pertinent Labs & Imaging studies reviewed. (See chart for details)      1. Hospital discharge follow-up  Patient underwent preliminary workup for AMS.  CT head, CXR, UA, and bloodwork wnl.   - patient back at baseline today  - patient encouraged to go to ED if she experiences any changes in mental status in the future, especially given her hx of cerebral amyloid and hx of ICH    2. Late onset Alzheimer's disease with behavioral disturbance ([address]  - seems to be progressing  - patient has follow-up with Dr. Juan May (neurology) next month    Follow up in 3 months

## 2021-10-25 RX ORDER — SPIRONOLACTONE AND HYDROCHLOROTHIAZIDE 25; 25 MG/1; MG/1
1 TABLET ORAL DAILY
Qty: 30 TABLET | Refills: 3 | Status: SHIPPED | OUTPATIENT
Start: 2021-10-25 | End: 2022-07-20

## 2021-11-29 RX ORDER — HYDROXYZINE HYDROCHLORIDE 25 MG/1
25 TABLET, FILM COATED ORAL EVERY 8 HOURS PRN
Qty: 30 TABLET | Refills: 2 | Status: SHIPPED | OUTPATIENT
Start: 2021-11-29 | End: 2021-12-29

## 2021-12-02 ENCOUNTER — TELEPHONE (OUTPATIENT)
Dept: INTERNAL MEDICINE CLINIC | Age: 81
End: 2021-12-02

## 2021-12-02 NOTE — TELEPHONE ENCOUNTER
Can contact Alzheimer's [employer]     Does not know what to expect. Message has been left for the patient's spouse.

## 2021-12-02 NOTE — TELEPHONE ENCOUNTER
----- Message from Katya Body sent at 12/2/2021 10:54 AM EST -----  Subject: Message to Provider    QUESTIONS  Information for Provider? pt  would like to know what to expect   with wife issues and wants to know if you have any resources that can help   him figure out what to do.  ---------------------------------------------------------------------------  --------------  CALL BACK INFO  What is the best way for the office to contact you? OK to leave message on   voicemail  Preferred Call Back Phone Number? [phone]  ---------------------------------------------------------------------------  --------------  SCRIPT ANSWERS  Relationship to Patient? Other  Representative Name? Warden Martinez  Is the Representative on the appropriate HIPAA document in Epic?  Yes

## 2021-12-07 NOTE — TELEPHONE ENCOUNTER
Patients  called, would like to speak to [facility] about his wife's care who has alzheimers. She is having pain in her stomach. She cannot sleep.     Please call and advise    [phone]

## 2021-12-09 ENCOUNTER — OFFICE VISIT (OUTPATIENT)
Dept: INTERNAL MEDICINE CLINIC | Age: 81
End: 2021-12-09
Payer: MEDICARE

## 2021-12-09 VITALS
TEMPERATURE: 97.3 F | DIASTOLIC BLOOD PRESSURE: 80 MMHG | SYSTOLIC BLOOD PRESSURE: 134 MMHG | WEIGHT: 177 LBS | HEIGHT: 66 IN | BODY MASS INDEX: 28.45 KG/M2

## 2021-12-09 DIAGNOSIS — G30.1 LATE ONSET ALZHEIMER'S DEMENTIA WITH BEHAVIORAL DISTURBANCE (HCC): Primary | ICD-10-CM

## 2021-12-09 DIAGNOSIS — F02.818 LATE ONSET ALZHEIMER'S DEMENTIA WITH BEHAVIORAL DISTURBANCE (HCC): Primary | ICD-10-CM

## 2021-12-09 PROCEDURE — 1090F PRES/ABSN URINE INCON ASSESS: CPT | Performed by: INTERNAL MEDICINE

## 2021-12-09 PROCEDURE — G8484 FLU IMMUNIZE NO ADMIN: HCPCS | Performed by: INTERNAL MEDICINE

## 2021-12-09 PROCEDURE — 99213 OFFICE O/P EST LOW 20 MIN: CPT | Performed by: INTERNAL MEDICINE

## 2021-12-09 PROCEDURE — G8417 CALC BMI ABV UP PARAM F/U: HCPCS | Performed by: INTERNAL MEDICINE

## 2021-12-09 PROCEDURE — G8400 PT W/DXA NO RESULTS DOC: HCPCS | Performed by: INTERNAL MEDICINE

## 2021-12-09 PROCEDURE — 4040F PNEUMOC VAC/ADMIN/RCVD: CPT | Performed by: INTERNAL MEDICINE

## 2021-12-09 PROCEDURE — 1123F ACP DISCUSS/DSCN MKR DOCD: CPT | Performed by: INTERNAL MEDICINE

## 2021-12-09 PROCEDURE — G8427 DOCREV CUR MEDS BY ELIG CLIN: HCPCS | Performed by: INTERNAL MEDICINE

## 2021-12-09 PROCEDURE — 1036F TOBACCO NON-USER: CPT | Performed by: INTERNAL MEDICINE

## 2021-12-09 NOTE — PROGRESS NOTES
CHIEF COMPLAINT: Leesa Heredia is a 80 y.o. female who presents for : Follow-up of back and stomach pain    HPI: Patient presented with follow-up of abdominal pain and back pain these to to occur in the morning they are not longer present and she feels fine now    Review of Systems:   Constitutional:  Denies fever or chills   Eyes:  Denies change in visual acuity   HENT:  Denies nasal congestion or sore throat   Respiratory:  Denies cough or shortness of breath   Cardiovascular:  Denies chest pain or edema   GI:  Denies abdominal pain, nausea, vomiting, bloody stools or diarrhea   :  Denies dysuria   Musculoskeletal:  Denies back pain or joint pain   Integument:  Denies rash   Neurologic:  Denies headache, focal weakness or sensory changes   Endocrine:  Denies polyuria or polydipsia   Lymphatic:  Denies swollen glands   Psychiatric:  Denies depression or anxiety     Past Medical History:        Diagnosis Date    Adenomatous polyp of colon 1/29/2019    Asymptomatic varicose veins     Atrial fibrillation ([address]     Dysthymic disorder     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)     Screening mammogram 12/8/2009    (Left)Benign       Past Surgical History:        Procedure Laterality Date    COLONOSCOPY  12/12/2008    HERNIA REPAIR      Left Femoral       Family History:  Family History   Problem Relation Age of Onset    Stroke Mother     Heart Attack Father     Heart Disease Father        Social History:  Social History     Socioeconomic History    Marital status:      Spouse name: None    Number of children: None    Years of education: None    Highest education level: None   Occupational History    None   Tobacco Use    Smoking status: Never Smoker    Smokeless tobacco: Never Used   Substance and Sexual Activity    Alcohol use: Yes     Comment: rare    Drug use: Never    Sexual activity: Not Currently     Partners: Male   Other Topics Concern    None   Social History Narrative    None     Social Determinants of Health     Financial Resource Strain: Low Risk     Difficulty of Paying Living Expenses: Not hard at all   Food Insecurity: No Food Insecurity    Worried About Running Out of Food in the Last Year: Never true    Michelle of Food in the Last Year: Never true   Transportation Needs:     Lack of Transportation (Medical): Not on file    Lack of Transportation (Non-Medical): Not on file   Physical Activity: Inactive    Days of Exercise per Week: 0 days    Minutes of Exercise per Session: 0 min   Stress: Stress Concern Present    Feeling of Stress : To some extent   Social Connections: Unknown    Frequency of Communication with Friends and Family: Not on file    Frequency of Social Gatherings with Friends and Family: Not on file    Attends Advent Services: Not on file    Active Member of Clubs or Organizations: Not on file    Attends Club or Organization Meetings: Not on file    Marital Status:    Intimate Partner Violence:     Fear of Current or Ex-Partner: Not on file    Emotionally Abused: Not on file    Physically Abused: Not on file    Sexually Abused: Not on file   Housing Stability:     Unable to Pay for Housing in the Last Year: Not on file    Number of [place] in the Last Year: Not on file    Unstable Housing in the Last Year: Not on file         Allergies:  Beta adrenergic blockers    Current Medications:    Prior to Admission medications    Medication Sig Start Date End Date Taking?  Authorizing Provider   hydrOXYzine (ATARAX) 25 MG tablet TAKE 1 TABLET BY MOUTH EVERY 8 HOURS AS NEEDED FOR ITCHING 11/29/21 12/29/21 Yes Alexia Rueda MD   spironolactone-hydroCHLOROthiazide (ALDACTAZIDE) 25-25 MG per tablet TAKE 1 TABLET BY MOUTH DAILY 10/25/21  Yes Alexia Rueda MD   risperiDONE (RISPERDAL) 0.5 MG tablet TAKE 1 TABLET BY MOUTH 2 TIMES DAILY 9/20/21  Yes Alexia Rueda MD   pantoprazole (PROTONIX) 40 MG tablet 1 TABLET BY MOUTH DAILY BEFORE BREAKFAST 9/9/21  Yes Paula Prescott Thiago Gonzalez MD   memantine (NAMENDA) 10 MG tablet TAKE 1 TABLET BY MOUTH TWICE DAILY 7/19/21  Yes Terrence Frazier MD   carBAMazepine (TEGRETOL) 100 MG chewable tablet 1 TABLET BY MOUTH TWICE DAILY 6/29/21  Yes Terrence Frazier MD   Skin Protectants, Misc. (HYDROCERIN) CREA cream Apply topically 2 times daily 5/13/20  Yes Sebastián Fry DO   Multiple Vitamins-Minerals (THERAPEUTIC MULTIVITAMIN-MINERALS) tablet Take 1 tablet by mouth daily 5/14/20  Yes Sebastián Fry DO   acetaminophen (TYLENOL) 500 MG tablet Take 650 mg by mouth 3 times daily as needed 3/15/20  Yes Historical Provider, MD       Physical Exam:  Vital Signs: /80   Temp 97.3 °F (36.3 °C)   Ht 5' 6\" (1.676 m)   Wt 177 lb (80.3 kg)   BMI 28.57 kg/m²   General: Patient appears  non-toxic  HENT: Atraumatic, normocephalic, oral mucosa moist  Lungs:  Clear bilaterally  Heart: Regular rate and rhythm  Abdomen: Non-distended, soft, non-tender  Extremities: No edema  Neuro: Nonfocal    Medical Decision Making and Plan:  Pertinent Labs & Imaging studies reviewed. (See chart for details)      1.  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

## 2021-12-23 RX ORDER — MEMANTINE HYDROCHLORIDE 10 MG/1
TABLET ORAL
Qty: 60 TABLET | Refills: 3 | Status: SHIPPED | OUTPATIENT
Start: 2021-12-23

## 2022-01-06 ENCOUNTER — OFFICE VISIT (OUTPATIENT)
Dept: INTERNAL MEDICINE CLINIC | Age: 82
End: 2022-01-06
Payer: COMMERCIAL

## 2022-01-06 VITALS
HEART RATE: 91 BPM | BODY MASS INDEX: 27.44 KG/M2 | DIASTOLIC BLOOD PRESSURE: 68 MMHG | OXYGEN SATURATION: 97 % | WEIGHT: 170 LBS | SYSTOLIC BLOOD PRESSURE: 130 MMHG

## 2022-01-06 DIAGNOSIS — I10 PRIMARY HYPERTENSION: Primary | ICD-10-CM

## 2022-01-06 DIAGNOSIS — G30.9 ALZHEIMER'S DEMENTIA WITH BEHAVIORAL DISTURBANCE, UNSPECIFIED TIMING OF DEMENTIA ONSET: ICD-10-CM

## 2022-01-06 DIAGNOSIS — F02.81 ALZHEIMER'S DEMENTIA WITH BEHAVIORAL DISTURBANCE, UNSPECIFIED TIMING OF DEMENTIA ONSET: ICD-10-CM

## 2022-01-06 PROCEDURE — 99213 OFFICE O/P EST LOW 20 MIN: CPT | Performed by: INTERNAL MEDICINE

## 2022-01-06 PROCEDURE — G8417 CALC BMI ABV UP PARAM F/U: HCPCS | Performed by: INTERNAL MEDICINE

## 2022-01-06 PROCEDURE — 1036F TOBACCO NON-USER: CPT | Performed by: INTERNAL MEDICINE

## 2022-01-06 PROCEDURE — 1123F ACP DISCUSS/DSCN MKR DOCD: CPT | Performed by: INTERNAL MEDICINE

## 2022-01-06 PROCEDURE — G8400 PT W/DXA NO RESULTS DOC: HCPCS | Performed by: INTERNAL MEDICINE

## 2022-01-06 PROCEDURE — 4040F PNEUMOC VAC/ADMIN/RCVD: CPT | Performed by: INTERNAL MEDICINE

## 2022-01-06 PROCEDURE — G8428 CUR MEDS NOT DOCUMENT: HCPCS | Performed by: INTERNAL MEDICINE

## 2022-01-06 PROCEDURE — 1090F PRES/ABSN URINE INCON ASSESS: CPT | Performed by: INTERNAL MEDICINE

## 2022-01-06 PROCEDURE — G8484 FLU IMMUNIZE NO ADMIN: HCPCS | Performed by: INTERNAL MEDICINE

## 2022-01-06 NOTE — PROGRESS NOTES
CHIEF COMPLAINT: Hailey Charlton is a 80 y.o. female who presents for : Follow-up dementia hypertension    HPI: Patient presented with follow-up dementia and hypertension  Per dementia is stable she is otherwise been doing okay she did not get the booster of her Covid  Review of Systems:   Constitutional:  Denies fever or chills   Eyes:  Denies change in visual acuity   HENT:  Denies nasal congestion or sore throat   Respiratory:  Denies cough or shortness of breath   Cardiovascular:  Denies chest pain or edema   GI:  Denies abdominal pain, nausea, vomiting, bloody stools or diarrhea   :  Denies dysuria   Musculoskeletal:  Denies back pain or joint pain   Integument:  Denies rash   Neurologic:  Denies headache, focal weakness or sensory changes   Endocrine:  Denies polyuria or polydipsia   Lymphatic:  Denies swollen glands   Psychiatric:  Denies depression or anxiety     Past Medical History:        Diagnosis Date    Adenomatous polyp of colon 1/29/2019    Asymptomatic varicose veins     Atrial fibrillation ([address]     Dysthymic disorder     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)     Screening mammogram 12/8/2009    (Left)Benign       Past Surgical History:        Procedure Laterality Date    COLONOSCOPY  12/12/2008    HERNIA REPAIR      Left Femoral       Family History:  Family History   Problem Relation Age of Onset    Stroke Mother     Heart Attack Father     Heart Disease Father        Social History:  Social History     Socioeconomic History    Marital status:      Spouse name: None    Number of children: None    Years of education: None    Highest education level: None   Occupational History    None   Tobacco Use    Smoking status: Never Smoker    Smokeless tobacco: Never Used   Substance and Sexual Activity    Alcohol use: Yes     Comment: rare    Drug use: Never    Sexual activity: Not Currently     Partners: Male   Other Topics Concern    None   Social History Narrative    None     Social Determinants of Health     Financial Resource Strain: Low Risk     Difficulty of Paying Living Expenses: Not hard at all   Food Insecurity: No Food Insecurity    Worried About Running Out of Food in the Last Year: Never true    Michelle of Food in the Last Year: Never true   Transportation Needs:     Lack of Transportation (Medical): Not on file    Lack of Transportation (Non-Medical): Not on file   Physical Activity: Inactive    Days of Exercise per Week: 0 days    Minutes of Exercise per Session: 0 min   Stress: Stress Concern Present    Feeling of Stress : To some extent   Social Connections: Unknown    Frequency of Communication with Friends and Family: Not on file    Frequency of Social Gatherings with Friends and Family: Not on file    Attends Christian Services: Not on file    Active Member of Clubs or Organizations: Not on file    Attends Club or Organization Meetings: Not on file    Marital Status:    Intimate Partner Violence:     Fear of Current or Ex-Partner: Not on file    Emotionally Abused: Not on file    Physically Abused: Not on file    Sexually Abused: Not on file   Housing Stability:     Unable to Pay for Housing in the Last Year: Not on file    Number of [place] in the Last Year: Not on file    Unstable Housing in the Last Year: Not on file         Allergies:  Beta adrenergic blockers    Current Medications:    Prior to Admission medications    Medication Sig Start Date End Date Taking?  Authorizing Provider   memantine (NAMENDA) 10 MG tablet TAKE 1 TABLET BY MOUTH TWICE DAILY 12/23/21   Coco Lobato MD   spironolactone-hydroCHLOROthiazide (ALDACTAZIDE) 25-25 MG per tablet TAKE 1 TABLET BY MOUTH DAILY 10/25/21   Coco Lobato MD   risperiDONE (RISPERDAL) 0.5 MG tablet TAKE 1 TABLET BY MOUTH 2 TIMES DAILY 9/20/21   Coco Lobato MD   pantoprazole (PROTONIX) 40 MG tablet 1 TABLET BY MOUTH DAILY BEFORE BREAKFAST 9/9/21   Coco Lobato MD   carBAMazepine (TEGRETOL) 100 MG chewable tablet 1 TABLET BY MOUTH TWICE DAILY 6/29/21   Denisa Lawton MD   Skin Protectants, Misc. (HYDROCERIN) CREA cream Apply topically 2 times daily 5/13/20   Teryl Friday Heis, DO   Multiple Vitamins-Minerals (THERAPEUTIC MULTIVITAMIN-MINERALS) tablet Take 1 tablet by mouth daily 5/14/20   Pheobe  L Heis, DO   acetaminophen (TYLENOL) 500 MG tablet Take 650 mg by mouth 3 times daily as needed 3/15/20   Historical Provider, MD       Physical Exam:  Vital Signs: /68   Pulse 91   Wt 170 lb (77.1 kg)   SpO2 97%   BMI 27.44 kg/m²   General: Patient appears  non-toxic  HENT: Atraumatic, normocephalic, oral mucosa moist  Lungs:  Clear bilaterally  Heart: Regular rate and rhythm  Abdomen: Non-distended, soft, non-tender  Extremities: No edema  Neuro: Nonfocal    Medical Decision Making and Plan:  Pertinent Labs & Imaging studies reviewed. (See chart for details)      1.  Primary hypertension  Problem is stable continue present meds  Dementia stable at present continue present meds follow-up in 3 months for wellness exam

## 2022-01-20 RX ORDER — CARBAMAZEPINE 100 MG/1
TABLET, CHEWABLE ORAL
Qty: 60 TABLET | Refills: 4 | Status: SHIPPED | OUTPATIENT
Start: 2022-01-20

## 2022-01-20 RX ORDER — HYDROXYZINE HYDROCHLORIDE 25 MG/1
TABLET, FILM COATED ORAL
Qty: 30 TABLET | Refills: 0 | Status: SHIPPED | OUTPATIENT
Start: 2022-01-20 | End: 2022-01-27

## 2022-01-27 RX ORDER — HYDROXYZINE HYDROCHLORIDE 25 MG/1
TABLET, FILM COATED ORAL
Qty: 30 TABLET | Refills: 0 | Status: SHIPPED | OUTPATIENT
Start: 2022-01-27 | End: 2022-02-10

## 2022-02-07 NOTE — GROUP NOTE
Group Therapy Note    Date: 10/2/2020    Group Start Time: 1400  Group End Time: 1757  Group Topic: Recreational    [facility]        Group Therapy Note    Attendees: 5    Patient's Goal: to engage in completing a puzzle to improve mood and increase socialization. Notes: Reji Vasquez sat quietly away from peers and ate lunch throughout the majority of group. Pt was observed pacing the unit at times and talking with a peer. Pt declined to engage in completing a puzzle. Status After Intervention:  Unchanged    Participation Level:  Active Listener    Participation Quality: Appropriate and Attentive      Speech:  normal      Thought Process/Content: Linear      Affective Functioning: Constricted/Restricted      Mood: anxious      Level of consciousness:  Alert      Response to Learning: Progressing to goal      Endings: None Reported    Modes of Intervention: Support, Socialization, Problem-solving and Activity      Discipline Responsible: Psychoeducational Specialist      Signature:  JERARDO Richter Render Post-Care Instructions In Note?: no Show Aperture Variable?: Yes Detail Level: Detailed Post-Care Instructions: I reviewed with the patient in detail post-care instructions. Patient is to wear sunprotection, and avoid picking at any of the treated lesions. Pt may apply Vaseline to crusted or scabbing areas. Duration Of Freeze Thaw-Cycle (Seconds): 0 Consent: The patient's consent was obtained including but not limited to risks of crusting, scabbing, blistering, scarring, darker or lighter pigmentary change, recurrence, incomplete removal and infection.

## 2022-02-10 RX ORDER — HYDROXYZINE HYDROCHLORIDE 25 MG/1
TABLET, FILM COATED ORAL
Qty: 30 TABLET | Refills: 0 | Status: SHIPPED | OUTPATIENT
Start: 2022-02-10 | End: 2022-04-14

## 2022-02-16 RX ORDER — PANTOPRAZOLE SODIUM 40 MG/1
TABLET, DELAYED RELEASE ORAL
Qty: 30 TABLET | Refills: 3 | Status: SHIPPED | OUTPATIENT
Start: 2022-02-16

## 2022-03-07 ENCOUNTER — TELEPHONE (OUTPATIENT)
Dept: INTERNAL MEDICINE CLINIC | Age: 82
End: 2022-03-07

## 2022-03-07 NOTE — TELEPHONE ENCOUNTER
Pt  calling to let Dr Brigitte Pritchard know wife may be getting a sleep study and wants to know if its important for her condition.  And needs some advise on how to get wife to take her pills       Please advise

## 2022-03-07 NOTE — TELEPHONE ENCOUNTER
Would not push her to have sleep study. Put medication in something like applesauce,yogurt, some type of soft food. Call returned. Message has been left on cell phone voicemail.

## 2022-04-07 ENCOUNTER — OFFICE VISIT (OUTPATIENT)
Dept: INTERNAL MEDICINE CLINIC | Age: 82
End: 2022-04-07
Payer: COMMERCIAL

## 2022-04-07 VITALS
WEIGHT: 185 LBS | SYSTOLIC BLOOD PRESSURE: 128 MMHG | HEART RATE: 69 BPM | OXYGEN SATURATION: 96 % | BODY MASS INDEX: 29.86 KG/M2 | DIASTOLIC BLOOD PRESSURE: 82 MMHG

## 2022-04-07 DIAGNOSIS — E55.9 VITAMIN D DEFICIENCY: ICD-10-CM

## 2022-04-07 DIAGNOSIS — I68.0 CEREBRAL AMYLOID ANGIOPATHY (CODE): ICD-10-CM

## 2022-04-07 DIAGNOSIS — I48.21 PERMANENT ATRIAL FIBRILLATION (HCC): ICD-10-CM

## 2022-04-07 DIAGNOSIS — Z00.00 PE (PHYSICAL EXAM), ANNUAL: Primary | ICD-10-CM

## 2022-04-07 DIAGNOSIS — I10 BENIGN ESSENTIAL HTN: ICD-10-CM

## 2022-04-07 DIAGNOSIS — I44.2 COMPLETE HEART BLOCK (HCC): ICD-10-CM

## 2022-04-07 DIAGNOSIS — I05.9 MITRAL VALVE DISORDER: ICD-10-CM

## 2022-04-07 DIAGNOSIS — Z95.0 S/P PLACEMENT OF CARDIAC PACEMAKER: ICD-10-CM

## 2022-04-07 DIAGNOSIS — D12.6 ADENOMATOUS POLYP OF COLON, UNSPECIFIED PART OF COLON: ICD-10-CM

## 2022-04-07 DIAGNOSIS — Z00.00 PE (PHYSICAL EXAM), ANNUAL: ICD-10-CM

## 2022-04-07 DIAGNOSIS — F02.818 MAJOR NEUROCOGNITIVE DISORDER DUE TO ANOTHER MEDICAL CONDITION WITH BEHAVIORAL DISTURBANCE: ICD-10-CM

## 2022-04-07 PROBLEM — R41.82 ALTERED MENTAL STATUS: Status: RESOLVED | Noted: 2020-03-15 | Resolved: 2022-04-07

## 2022-04-07 LAB
BILIRUBIN URINE: NEGATIVE
BLOOD, URINE: NEGATIVE
CLARITY: ABNORMAL
COLOR: YELLOW
GLUCOSE URINE: NEGATIVE MG/DL
KETONES, URINE: NEGATIVE MG/DL
LEUKOCYTE ESTERASE, URINE: ABNORMAL
MICROSCOPIC EXAMINATION: YES
NITRITE, URINE: NEGATIVE
PH UA: 6 (ref 5–8)
PROTEIN UA: NEGATIVE MG/DL
SPECIFIC GRAVITY UA: 1.01 (ref 1–1.03)
URINE TYPE: ABNORMAL
UROBILINOGEN, URINE: 0.2 E.U./DL

## 2022-04-07 PROCEDURE — 1123F ACP DISCUSS/DSCN MKR DOCD: CPT | Performed by: INTERNAL MEDICINE

## 2022-04-07 PROCEDURE — G0439 PPPS, SUBSEQ VISIT: HCPCS | Performed by: INTERNAL MEDICINE

## 2022-04-07 PROCEDURE — 4040F PNEUMOC VAC/ADMIN/RCVD: CPT | Performed by: INTERNAL MEDICINE

## 2022-04-07 NOTE — PROGRESS NOTES
Medicare Annual Wellness Visit    [facility] is here for Medicare AW    Assessment & Plan    Recommendations for Preventive Services Due: see orders and patient instructions/AVS.  Recommended screening schedule for the next 5-10 years is provided to the patient in written form: see Patient Instructions/AVS.     No follow-ups on file. Subjective       Patient's complete Health Risk Assessment and screening values have been reviewed and are found in Flowsheets. The following problems were reviewed today and where indicated follow up appointments were made and/or referrals ordered. Positive Risk Factor Screenings with Interventions:                  No Positive Risk Factors identified today. Objective   Vitals:    04/07/22 1317   Pulse: 69   SpO2: 96%   Weight: 185 lb (83.9 kg)      Body mass index is 29.86 kg/m².         General Appearance: alert and oriented to person, place and time, well developed and well- nourished, in no acute distress  Skin: warm and dry, no rash or erythema  Head: normocephalic and atraumatic  Eyes: pupils equal, round, and reactive to light, extraocular eye movements intact, conjunctivae normal  ENT: tympanic membrane, external ear and ear canal normal bilaterally, nose without deformity, nasal mucosa and turbinates normal without polyps  Neck: supple and non-tender without mass, no thyromegaly or thyroid nodules, no cervical lymphadenopathy  Pulmonary/Chest: clear to auscultation bilaterally- no wheezes, rales or rhonchi, normal air movement, no respiratory distress  Cardiovascular: normal rate, regular rhythm, normal S1 and S2, no murmurs, rubs, clicks, or gallops, distal pulses intact, no carotid bruits  Abdomen: soft, non-tender, non-distended, normal bowel sounds, no masses or organomegaly  Extremities: no cyanosis, clubbing or edema  Musculoskeletal: normal range of motion, no joint swelling, deformity or tenderness  Neurologic: reflexes normal and symmetric, no cranial nerve deficit, gait, coordination and speech normal       Allergies   Allergen Reactions    Beta Adrenergic Blockers      Prior to Visit Medications    Medication Sig Taking? Authorizing Provider   pantoprazole (PROTONIX) 40 MG tablet TAKE 1 TABLET BY MOUTH DAILY BEFORE BREAKFAST  Roberto Ching MD   hydrOXYzine (ATARAX) 25 MG tablet TAKE 1 TABLET BY MOUTH EVERY 8 HOURS AS NEEDED FOR ITCHING  Roberto Ching MD   carBAMazepine (TEGRETOL) 100 MG chewable tablet CHEW AND SWALLOW ONE TABLET TWICE DAILY  Roberto Ching MD   memantine (NAMENDA) 10 MG tablet TAKE 1 TABLET BY MOUTH TWICE DAILY  Roberto Ching MD   spironolactone-hydroCHLOROthiazide (ALDACTAZIDE) 25-25 MG per tablet TAKE 1 TABLET BY MOUTH DAILY  Roberto Ching MD   risperiDONE (RISPERDAL) 0.5 MG tablet TAKE 1 TABLET BY MOUTH 2 TIMES DAILY  Roberto Ching MD   Skin Protectants, Misc.  (HYDROCERIN) CREA cream Apply topically 2 times daily  Sebastián Fry, DO   Multiple Vitamins-Minerals (THERAPEUTIC MULTIVITAMIN-MINERALS) tablet Take 1 tablet by mouth daily  Sebastián Fry DO   acetaminophen (TYLENOL) 500 MG tablet Take 650 mg by mouth 3 times daily as needed  Historical Provider, MD Chau (Including outside providers/suppliers regularly involved in providing care):   Patient Care Team:  Roberto Ching MD as PCP - General (Internal Medicine)  Roberto Ching MD as PCP - [facility]    Reviewed and updated this visit:  Tobacco  Med Hx  Surg Hx  Soc Hx  Fam Hx

## 2022-04-07 NOTE — PROGRESS NOTES
Annual Wellness Visit     Patient: Osmany Kaba                                               : 1940  Age: 80 y.o. MRN: [id number]  Date : 2022      CHIEF COMPLAINT: Osmany Kaba is a 80 y.o. female who presents for : Sickle exam    1. Permanent atrial fibrillation (HCC)  Problem is stable has been off anticoagulants as a result of central nervous system bleeding from amyloid  - CBC with Auto Differential; Future  - Comprehensive Metabolic Panel; Future  - Lipid Panel; Future  - TSH; Future  - Urinalysis; Future  - Vitamin D 25 Hydroxy; Future    2. Complete heart block ([facility] [address]  As post pacemaker    3. Adenomatous polyp of colon, unspecified part of colon  Problem is stable    4. Benign essential HTN  Problem is stable and controlled  - CBC with Auto Differential; Future  - Comprehensive Metabolic Panel; Future  - Lipid Panel; Future  - TSH; Future  - Urinalysis; Future  - Vitamin D 25 Hydroxy; Future    5. Cerebral amyloid angiopathy (CODE)  History of dementia thought secondary to amyloid angiopathy basically unsure    6. 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 ([address]  As above    7. Mitral valve disorder  Mitral valve prolapse stable at present    8. S/P placement of cardiac pacemaker  History of complete heart block status post pacemaker  - CBC with Auto Differential; Future  - Comprehensive Metabolic Panel; Future  - Lipid Panel; Future  - TSH; Future  - Urinalysis; Future  - Vitamin D 25 Hydroxy; Future    9. PE (physical exam), annual  Only feels stable still having significant dementia symptoms but only slightly progressive no chest pain or shortness of breath  - CBC with Auto Differential; Future  - Comprehensive Metabolic Panel; Future  - Lipid Panel; Future  - TSH; Future  - Urinalysis; Future  - Vitamin D 25 Hydroxy; Future    10. Vitamin D deficiency    - Vitamin D 25 Hydroxy;  Future        Patient Active Problem List    Diagnosis Date Noted History    None   Tobacco Use    Smoking status: Never Smoker    Smokeless tobacco: Never Used   Substance and Sexual Activity    Alcohol use: Yes     Comment: rare    Drug use: Never    Sexual activity: Not Currently     Partners: Male   Other Topics Concern    None   Social History Narrative    None     Social Determinants of Health     Financial Resource Strain: Low Risk     Difficulty of Paying Living Expenses: Not hard at all   Food Insecurity: No Food Insecurity    Worried About Running Out of Food in the Last Year: Never true    Michelle of Food in the Last Year: Never true   Transportation Needs:     Lack of Transportation (Medical): Not on file    Lack of Transportation (Non-Medical): Not on file   Physical Activity:     Days of Exercise per Week: Not on file    Minutes of Exercise per Session: Not on file   Stress: Stress Concern Present    Feeling of Stress : To some extent   Social Connections:     Frequency of Communication with Friends and Family: Not on file    Frequency of Social Gatherings with Friends and Family: Not on file    Attends Catholic Services: Not on file    Active Member of Clubs or Organizations: Not on file    Attends Club or Organization Meetings: Not on file    Marital Status: Not on file   Intimate Partner Violence:     Fear of Current or Ex-Partner: Not on file    Emotionally Abused: Not on file    Physically Abused: Not on file    Sexually Abused: Not on file   Housing Stability:     Unable to Pay for Housing in the Last Year: Not on file    Number of [place] in the Last Year: Not on file    Unstable Housing in the Last Year: Not on file         Allergies:  Beta adrenergic blockers    Current Medications:    Prior to Admission medications    Medication Sig Start Date End Date Taking?  Authorizing Provider   pantoprazole (PROTONIX) 40 MG tablet TAKE 1 TABLET BY MOUTH DAILY BEFORE BREAKFAST 2/16/22   Darleen Philip MD   hydrOXYzine (ATARAX) 25 MG tablet TAKE 1 TABLET BY MOUTH EVERY 8 HOURS AS NEEDED FOR ITCHING 2/10/22   Jhon Guillory MD   carBAMazepine (TEGRETOL) 100 MG chewable tablet CHEW AND SWALLOW ONE TABLET TWICE DAILY 1/20/22   Jhon Guillory MD   memantine (NAMENDA) 10 MG tablet TAKE 1 TABLET BY MOUTH TWICE DAILY 12/23/21   Jhon Guillory MD   spironolactone-hydroCHLOROthiazide (ALDACTAZIDE) 25-25 MG per tablet TAKE 1 TABLET BY MOUTH DAILY 10/25/21   Jhon Guillory MD   risperiDONE (RISPERDAL) 0.5 MG tablet TAKE 1 TABLET BY MOUTH 2 TIMES DAILY 9/20/21   Jhon Guillory MD   Skin Protectants, Misc. (HYDROCERIN) CREA cream Apply topically 2 times daily 5/13/20   Dearl Geronimo Fry, DO   Multiple Vitamins-Minerals (THERAPEUTIC MULTIVITAMIN-MINERALS) tablet Take 1 tablet by mouth daily 5/14/20   Dearl Geronimo L Rishiis, DO   acetaminophen (TYLENOL) 500 MG tablet Take 650 mg by mouth 3 times daily as needed 3/15/20   Historical Provider, MD           Physical Exam:      Constitutional:  Well developed, well nourished, no acute distress, non-toxic appearance   Eyes:  PERRL, conjunctiva normal   HENT:  Atraumatic, external ears normal, nose normal, oropharynx moist, no pharyngeal exudates. Neck- normal range of motion, no tenderness, supple   Respiratory:  No respiratory distress, normal breath sounds, no rales, no wheezing   Cardiovascular:  Normal rate, irregular rate, no murmurs, no gallops, no rubs   GI:  Soft, nondistended, normal bowel sounds, nontender, no organomegaly, no mass, no rebound, no guarding   :  No costovertebral angle tenderness   Musculoskeletal:  No edema, no tenderness, no deformities.  Back- no tenderness  Integument:  Well hydrated, no rash   Lymphatic:  No lymphadenopathy noted   Neurologic:  Alert & oriented x 3, CN 2-12 normal, normal motor function, normal sensory function, no focal deficits noted   Psychiatric:  Speech and behavior appropriate   Extremities show 2+ edema with severe varicosities bilaterally    Vitals: Pulse 69   Wt 185 lb (83.9 kg)   SpO2 96%   BMI 29.86 kg/m²     Body mass index is 29.86 kg/m². Wt Readings from Last 3 Encounters:   04/07/22 185 lb (83.9 kg)   01/06/22 170 lb (77.1 kg)   12/09/21 177 lb (80.3 kg)         LABS:    CBC:   Lab Results   Component Value Date    WBC 5.9 10/10/2021    HGB 12.7 10/10/2021    HCT 36.3 10/10/2021    MCV 94.8 10/10/2021     10/10/2021           Lab Results   Component Value Date    FOLATE >20.00 03/16/2020    SVOECBDO86 928 (H) 03/16/2020                                                             BMP:    Lab Results   Component Value Date     (L) 10/10/2021    K 3.7 10/10/2021    CL 98 (L) 10/10/2021    CO2 25 10/10/2021       LFT's:   Lab Results   Component Value Date    ALT 11 10/06/2021    AST 19 10/06/2021    GGT 45 (H) 12/28/2020    ALKPHOS 133 (H) 10/06/2021    BILITOT 0.4 10/06/2021       Lipids:   Lab Results   Component Value Date    CHOL 229 (H) 09/30/2020     (H) 09/30/2020    LDLCALC 118 (H) 09/30/2020    TRIG 52 09/30/2020       INR:   Lab Results   Component Value Date    INR 0.97 12/28/2020    INR 1.02 09/30/2020    INR 1.02 05/07/2020    PROTIME 11.3 12/28/2020    PROTIME 11.8 09/30/2020    PROTIME 11.8 05/07/2020       U/A:  Lab Results   Component Value Date    LABMICR YES 10/10/2021          Lab Results   Component Value Date    LABA1C 5.6 09/30/2020        Lab Results   Component Value Date    CREATININE 0.8 10/10/2021       -----------------------------------------------------------------     Assessment/Plan:   1. Permanent atrial fibrillation (HCC)  Problem is stable continue off anticoagulation  - CBC with Auto Differential; Future  - Comprehensive Metabolic Panel; Future  - Lipid Panel; Future  - TSH; Future  - Urinalysis; Future  - Vitamin D 25 Hydroxy; Future    2. Complete heart block (HCC)  Problem is stable status post pacemaker    3. Adenomatous polyp of colon, unspecified part of colon  Problem is stable continue to monitor    4.  Benign essential HTN  Problem is stable and well-controlled continue present meds  - CBC with Auto Differential; Future  - Comprehensive Metabolic Panel; Future  - Lipid Panel; Future  - TSH; Future  - Urinalysis; Future  - Vitamin D 25 Hydroxy; Future    5. Cerebral amyloid angiopathy (CODE)  Is slowly progressive followed by neurology    6. 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 Mitral valve disorder  Problem is stable    8. S/P placement of cardiac pacemaker  Is stable followed by electrophysiology  - CBC with Auto Differential; Future  - Comprehensive Metabolic Panel; Future  - Lipid Panel; Future  - TSH; Future  - Urinalysis; Future  - Vitamin D 25 Hydroxy; Future    9. PE (physical exam), annual  Check screening labs continue diet and exercise up-to-date on all her immunizations will get a fourth Covid shot  - CBC with Auto Differential; Future  - Comprehensive Metabolic Panel; Future  - Lipid Panel; Future  - TSH; Future  - Urinalysis; Future  - Vitamin D 25 Hydroxy; Future    10. Vitamin D deficiency  Check above labs  - Vitamin D 25 Hydroxy;  Future       l

## 2022-04-08 ENCOUNTER — TELEPHONE (OUTPATIENT)
Dept: INTERNAL MEDICINE CLINIC | Age: 82
End: 2022-04-08

## 2022-04-08 LAB
A/G RATIO: 2.3 (ref 1.1–2.2)
ALBUMIN SERPL-MCNC: 4.3 G/DL (ref 3.4–5)
ALP BLD-CCNC: 134 U/L (ref 40–129)
ALT SERPL-CCNC: 9 U/L (ref 10–40)
ANION GAP SERPL CALCULATED.3IONS-SCNC: 15 MMOL/L (ref 3–16)
AST SERPL-CCNC: 22 U/L (ref 15–37)
BASOPHILS ABSOLUTE: 0 K/UL (ref 0–0.2)
BASOPHILS RELATIVE PERCENT: 0.8 %
BILIRUB SERPL-MCNC: 0.3 MG/DL (ref 0–1)
BUN BLDV-MCNC: 14 MG/DL (ref 7–20)
CALCIUM SERPL-MCNC: 9.3 MG/DL (ref 8.3–10.6)
CHLORIDE BLD-SCNC: 100 MMOL/L (ref 99–110)
CHOLESTEROL, TOTAL: 319 MG/DL (ref 0–199)
CO2: 25 MMOL/L (ref 21–32)
CREAT SERPL-MCNC: 0.7 MG/DL (ref 0.6–1.2)
EOSINOPHILS ABSOLUTE: 0.1 K/UL (ref 0–0.6)
EOSINOPHILS RELATIVE PERCENT: 1.7 %
EPITHELIAL CELLS, UA: 1 /HPF (ref 0–5)
GFR AFRICAN AMERICAN: >60
GFR NON-AFRICAN AMERICAN: >60
GLUCOSE BLD-MCNC: 94 MG/DL (ref 70–99)
HCT VFR BLD CALC: 40.8 % (ref 36–48)
HDLC SERPL-MCNC: 102 MG/DL (ref 40–60)
HEMOGLOBIN: 13.9 G/DL (ref 12–16)
HYALINE CASTS: 0 /LPF (ref 0–8)
LDL CHOLESTEROL CALCULATED: 200 MG/DL
LYMPHOCYTES ABSOLUTE: 1.1 K/UL (ref 1–5.1)
LYMPHOCYTES RELATIVE PERCENT: 19.2 %
MCH RBC QN AUTO: 32.1 PG (ref 26–34)
MCHC RBC AUTO-ENTMCNC: 34.1 G/DL (ref 31–36)
MCV RBC AUTO: 94.2 FL (ref 80–100)
MONOCYTES ABSOLUTE: 0.5 K/UL (ref 0–1.3)
MONOCYTES RELATIVE PERCENT: 8.5 %
NEUTROPHILS ABSOLUTE: 4.2 K/UL (ref 1.7–7.7)
NEUTROPHILS RELATIVE PERCENT: 69.8 %
PDW BLD-RTO: 15.5 % (ref 12.4–15.4)
PLATELET # BLD: 164 K/UL (ref 135–450)
PMV BLD AUTO: 9.3 FL (ref 5–10.5)
POTASSIUM SERPL-SCNC: 3.6 MMOL/L (ref 3.5–5.1)
RBC # BLD: 4.34 M/UL (ref 4–5.2)
RBC UA: 1 /HPF (ref 0–4)
SODIUM BLD-SCNC: 140 MMOL/L (ref 136–145)
TOTAL PROTEIN: 6.2 G/DL (ref 6.4–8.2)
TRIGL SERPL-MCNC: 84 MG/DL (ref 0–150)
TSH SERPL DL<=0.05 MIU/L-ACNC: 2.73 UIU/ML (ref 0.27–4.2)
VITAMIN D 25-HYDROXY: 31.6 NG/ML
VLDLC SERPL CALC-MCNC: 17 MG/DL
WBC # BLD: 6 K/UL (ref 4–11)
WBC UA: 1 /HPF (ref 0–5)

## 2022-04-08 NOTE — TELEPHONE ENCOUNTER
Rios Cai MD  P [facility] Practice Support  Your cholesterol is elevated and you should begin the following medication in addition to watching your diet and increasing exercise. You should repeat your labs in 3 months.    Lipitor 20

## 2022-04-09 ENCOUNTER — APPOINTMENT (OUTPATIENT)
Dept: CT IMAGING | Age: 82
End: 2022-04-09
Payer: COMMERCIAL

## 2022-04-09 ENCOUNTER — APPOINTMENT (OUTPATIENT)
Dept: GENERAL RADIOLOGY | Age: 82
End: 2022-04-09
Payer: COMMERCIAL

## 2022-04-09 ENCOUNTER — HOSPITAL ENCOUNTER (EMERGENCY)
Age: 82
Discharge: HOME | End: 2022-04-09
Attending: STUDENT IN AN ORGANIZED HEALTH CARE EDUCATION/TRAINING PROGRAM
Payer: COMMERCIAL

## 2022-04-09 VITALS
OXYGEN SATURATION: 99 % | HEART RATE: 84 BPM | SYSTOLIC BLOOD PRESSURE: 137 MMHG | RESPIRATION RATE: 21 BRPM | DIASTOLIC BLOOD PRESSURE: 99 MMHG | TEMPERATURE: 97.4 F

## 2022-04-09 DIAGNOSIS — G25.2 OTHER SPECIFIED FORMS OF TREMOR: Primary | ICD-10-CM

## 2022-04-09 LAB
ANION GAP SERPL CALCULATED.3IONS-SCNC: 12 MMOL/L (ref 3–16)
BACTERIA: ABNORMAL /HPF
BASOPHILS ABSOLUTE: 0 K/UL (ref 0–0.2)
BASOPHILS RELATIVE PERCENT: 0.6 %
BILIRUBIN URINE: NEGATIVE
BLOOD, URINE: NEGATIVE
BUN BLDV-MCNC: 15 MG/DL (ref 7–20)
CALCIUM SERPL-MCNC: 9.5 MG/DL (ref 8.3–10.6)
CHLORIDE BLD-SCNC: 102 MMOL/L (ref 99–110)
CLARITY: CLEAR
CO2: 25 MMOL/L (ref 21–32)
COLOR: YELLOW
CREAT SERPL-MCNC: 0.8 MG/DL (ref 0.6–1.2)
EOSINOPHILS ABSOLUTE: 0.1 K/UL (ref 0–0.6)
EOSINOPHILS RELATIVE PERCENT: 1.2 %
EPITHELIAL CELLS, UA: ABNORMAL /HPF (ref 0–5)
GFR AFRICAN AMERICAN: >60
GFR NON-AFRICAN AMERICAN: >60
GLUCOSE BLD-MCNC: 99 MG/DL (ref 70–99)
GLUCOSE URINE: NEGATIVE MG/DL
HCT VFR BLD CALC: 38.7 % (ref 36–48)
HEMOGLOBIN: 12.8 G/DL (ref 12–16)
KETONES, URINE: NEGATIVE MG/DL
LEUKOCYTE ESTERASE, URINE: ABNORMAL
LYMPHOCYTES ABSOLUTE: 0.9 K/UL (ref 1–5.1)
LYMPHOCYTES RELATIVE PERCENT: 19 %
MCH RBC QN AUTO: 31 PG (ref 26–34)
MCHC RBC AUTO-ENTMCNC: 32.9 G/DL (ref 31–36)
MCV RBC AUTO: 94.1 FL (ref 80–100)
MICROSCOPIC EXAMINATION: YES
MONOCYTES ABSOLUTE: 0.4 K/UL (ref 0–1.3)
MONOCYTES RELATIVE PERCENT: 8.6 %
NEUTROPHILS ABSOLUTE: 3.4 K/UL (ref 1.7–7.7)
NEUTROPHILS RELATIVE PERCENT: 70.6 %
NITRITE, URINE: NEGATIVE
PDW BLD-RTO: 15.1 % (ref 12.4–15.4)
PH UA: 6 (ref 5–8)
PLATELET # BLD: 146 K/UL (ref 135–450)
PMV BLD AUTO: 9 FL (ref 5–10.5)
POTASSIUM REFLEX MAGNESIUM: 4.2 MMOL/L (ref 3.5–5.1)
PRO-BNP: 1499 PG/ML (ref 0–449)
PROTEIN UA: NEGATIVE MG/DL
RBC # BLD: 4.11 M/UL (ref 4–5.2)
RBC UA: ABNORMAL /HPF (ref 0–4)
SODIUM BLD-SCNC: 139 MMOL/L (ref 136–145)
SPECIFIC GRAVITY UA: 1.01 (ref 1–1.03)
TROPONIN: <0.01 NG/ML
URINE REFLEX TO CULTURE: ABNORMAL
URINE TYPE: ABNORMAL
UROBILINOGEN, URINE: 0.2 E.U./DL
WBC # BLD: 4.8 K/UL (ref 4–11)
WBC UA: ABNORMAL /HPF (ref 0–5)

## 2022-04-09 PROCEDURE — 83880 ASSAY OF NATRIURETIC PEPTIDE: CPT

## 2022-04-09 PROCEDURE — 99282 EMERGENCY DEPT VISIT SF MDM: CPT

## 2022-04-09 PROCEDURE — 36415 COLL VENOUS BLD VENIPUNCTURE: CPT

## 2022-04-09 PROCEDURE — 71046 X-RAY EXAM CHEST 2 VIEWS: CPT

## 2022-04-09 PROCEDURE — 81001 URINALYSIS AUTO W/SCOPE: CPT

## 2022-04-09 PROCEDURE — 80048 BASIC METABOLIC PNL TOTAL CA: CPT

## 2022-04-09 PROCEDURE — 84484 ASSAY OF TROPONIN QUANT: CPT

## 2022-04-09 PROCEDURE — 85025 COMPLETE CBC W/AUTO DIFF WBC: CPT

## 2022-04-09 PROCEDURE — 70450 CT HEAD/BRAIN W/O DYE: CPT

## 2022-04-09 PROCEDURE — 93005 ELECTROCARDIOGRAM TRACING: CPT | Performed by: STUDENT IN AN ORGANIZED HEALTH CARE EDUCATION/TRAINING PROGRAM

## 2022-04-09 NOTE — ED PROVIDER NOTES
1 [facility]  EMERGENCY DEPARTMENT ENCOUNTER          ATTENDING PHYSICIAN NOTE       Date of evaluation: 4/9/2022    Chief Complaint     Tremors      History of Present Illness     Lolis Hyatt is a 80 y.o. female who presents complaint of tremors. Patient has a history of Alzheimer's per . She is slightly confused to situation and time however is alert to person and place. Been at bedside states the patient had an episode of shaking this morning which is since resolved however on advice from her primary care physician's office presented to the ED. Patient has a history of cerebral amyloid angiopathy, neurocognitive decline. She is able to answer yes/no questions and provide review of systems. She denies any chest pain or shortness of breath. Denies any fevers or chills. She is unsure makes her symptoms better or worse    Review of Systems     REVIEW OF SYSTEMS  GENERAL: Negative for any fevers, chills, or weight loss. HEENT: Negative for any head trauma, neck trauma, neck stiffness  CARDIAC: Negative for any chest pain, palpitations  PULMONARY: Negative for any shortness of breath, cough, wheezing  GASTROINTESTINAL: Negative for any abdominal pain, nausea, vomiting,   GENITOURINARY: Negative for any dysuria, hematuria, incontinence. SKIN: Negative for any rashes, wounds  MSK: Negative for any joint pains, back pain. HEMATOLOGIC: Negative for any abnormal bruising, frequent infections or bleeding. NEUROLOGIC: Negative for any headache, dizziness, focal weakness  PSYCH: Per HPI      Past Medical, Surgical, Family, and Social History     She has a past medical history of Adenomatous polyp of colon, Asymptomatic varicose veins, Atrial fibrillation ([address] Dysthymic disorder, 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), and Screening mammogram.  She has a past surgical history that includes Colonoscopy (12/12/2008) and hernia repair.   Her family history includes Heart Attack in her father; Heart Disease in her father; Stroke in her mother. She reports that she has never smoked. She has never used smokeless tobacco. She reports current alcohol use. She reports that she does not use drugs. Medications     Previous Medications    ACETAMINOPHEN (TYLENOL) 500 MG TABLET    Take 650 mg by mouth 3 times daily as needed    CARBAMAZEPINE (TEGRETOL) 100 MG CHEWABLE TABLET    CHEW AND SWALLOW ONE TABLET TWICE DAILY    HYDROXYZINE (ATARAX) 25 MG TABLET    TAKE 1 TABLET BY MOUTH EVERY 8 HOURS AS NEEDED FOR ITCHING    MEMANTINE (NAMENDA) 10 MG TABLET    TAKE 1 TABLET BY MOUTH TWICE DAILY    MULTIPLE VITAMINS-MINERALS (THERAPEUTIC MULTIVITAMIN-MINERALS) TABLET    Take 1 tablet by mouth daily    PANTOPRAZOLE (PROTONIX) 40 MG TABLET    TAKE 1 TABLET BY MOUTH DAILY BEFORE BREAKFAST    RISPERIDONE (RISPERDAL) 0.5 MG TABLET    TAKE 1 TABLET BY MOUTH 2 TIMES DAILY    SKIN PROTECTANTS, MISC. (HYDROCERIN) CREA CREAM    Apply topically 2 times daily    SPIRONOLACTONE-HYDROCHLOROTHIAZIDE (ALDACTAZIDE) 25-25 MG PER TABLET    TAKE 1 TABLET BY MOUTH DAILY       Allergies     She is allergic to beta adrenergic blockers. Physical Exam     INITIAL VITALS: BP: (!) 137/99,  , Pulse: 127, Resp: 21, SpO2: 98 %   Physical Exam  Vitals and nursing note reviewed. Constitutional:       Appearance: Normal appearance. HENT:      Head: Normocephalic and atraumatic. Eyes:      Conjunctiva/sclera: Conjunctivae normal.      Pupils: Pupils are equal, round, and reactive to light. Cardiovascular:      Rate and Rhythm: Normal rate. Rhythm irregular. Pulses: Normal pulses. Heart sounds: Normal heart sounds. Pulmonary:      Effort: Pulmonary effort is normal.      Breath sounds: Normal breath sounds. Abdominal:      General: Abdomen is flat. Palpations: Abdomen is soft. Tenderness: There is no abdominal tenderness. Musculoskeletal:         General: No swelling or tenderness. Normal range of motion.       Cervical back: Normal range of motion and neck supple. Skin:     General: Skin is warm and dry. Neurological:      General: No focal deficit present. Mental Status: She is alert and oriented to person, place, and time. Psychiatric:         Mood and Affect: Mood normal.         Thought Content: Thought content normal.         Diagnostic Results     EKG   Atrial fibrillation of 94, nonspecific ST changes, leftward axis  Similar in appearance to prior EKGs    RADIOLOGY:  CT Head WO Contrast   Final Result      1. No findings for acute intracranial abnormality. 2.  Stable severe cerebral white matter disease.       XR CHEST (2 VW)   Final Result      No acute cardiopulmonary disease          LABS:   Results for orders placed or performed during the hospital encounter of 04/09/22   CBC with Auto Differential   Result Value Ref Range    WBC 4.8 4.0 - 11.0 K/uL    RBC 4.11 4.00 - 5.20 M/uL    Hemoglobin 12.8 12.0 - 16.0 g/dL    Hematocrit 38.7 36.0 - 48.0 %    MCV 94.1 80.0 - 100.0 fL    MCH 31.0 26.0 - 34.0 pg    MCHC 32.9 31.0 - 36.0 g/dL    RDW 15.1 12.4 - 15.4 %    Platelets 658 077 - 257 K/uL    MPV 9.0 5.0 - 10.5 fL    Neutrophils % 70.6 %    Lymphocytes % 19.0 %    Monocytes % 8.6 %    Eosinophils % 1.2 %    Basophils % 0.6 %    Neutrophils Absolute 3.4 1.7 - 7.7 K/uL    Lymphocytes Absolute 0.9 (L) 1.0 - 5.1 K/uL    Monocytes Absolute 0.4 0.0 - 1.3 K/uL    Eosinophils Absolute 0.1 0.0 - 0.6 K/uL    Basophils Absolute 0.0 0.0 - 0.2 K/uL   Basic Metabolic Panel w/ Reflex to MG   Result Value Ref Range    Sodium 139 136 - 145 mmol/L    Potassium reflex Magnesium 4.2 3.5 - 5.1 mmol/L    Chloride 102 99 - 110 mmol/L    CO2 25 21 - 32 mmol/L    Anion Gap 12 3 - 16    Glucose 99 70 - 99 mg/dL    BUN 15 7 - 20 mg/dL    CREATININE 0.8 0.6 - 1.2 mg/dL    GFR Non-African American >60 >60    GFR African American >60 >60    Calcium 9.5 8.3 - 10.6 mg/dL   Troponin   Result Value Ref Range    Troponin <0.01 <0.01 ng/mL   Brain Natriuretic Peptide   Result Value Ref Range    Pro-BNP 1,499 (H) 0 - 449 pg/mL   Urinalysis with Reflex to Culture    Specimen: Urine   Result Value Ref Range    Color, UA Yellow Straw/Yellow    Clarity, UA Clear Clear    Glucose, Ur Negative Negative mg/dL    Bilirubin Urine Negative Negative    Ketones, Urine Negative Negative mg/dL    Specific Gravity, UA 1.015 1.005 - 1.030    Blood, Urine Negative Negative    pH, UA 6.0 5.0 - 8.0    Protein, UA Negative Negative mg/dL    Urobilinogen, Urine 0.2 <2.0 E.U./dL    Nitrite, Urine Negative Negative    Leukocyte Esterase, Urine SMALL (A) Negative    Microscopic Examination YES     Urine Type NotGiven     Urine Reflex to Culture Not Indicated    Microscopic Urinalysis   Result Value Ref Range    WBC, UA 6-9 (A) 0 - 5 /HPF    RBC, UA 0-2 0 - 4 /HPF    Epithelial Cells, UA 2-5 0 - 5 /HPF    Bacteria, UA Rare (A) None Seen /HPF       ED BEDSIDE ULTRASOUND:      RECENT VITALS:  BP: (!) 137/99, , Pulse: 127, Resp: 21, SpO2: 98 %     Procedures         ED Course     Nursing Notes, Past Medical Hx, Past Surgical Hx, Social Hx,Allergies, and Family Hx were reviewed. patient was given the following medications:  No orders of the defined types were placed in this encounter. CONSULTS:  None    MEDICAL DECISIONMAKING / ASSESSMENT / Kaylin Brunner is a 80 y.o. female with a chief complaint of tremors. Patient with a complex past medical history including cerebral amyloid angiopathy, atrial fibrillation, and cognitive decline. On physical exam per  patient appears to be at baseline. She is alert and oriented person place but confused to time and situation, tremors have resolved since presenting to the ED, throughout visit patient without any further episodes of tremors.    states the patient had consciousness the whole time making seizure lower on the differential.  CT head obtained which also appears to be at patient's baseline without acute change. Discussed with patient and , decision made to follow-up outpatient with primary care physician. Strict return precautions given, all questions answered properly. Etiology of tremors unknown at this time however discussed possible neurology follow-up should they continue. Clinical Impression     1.  Other specified forms of tremor        Disposition     PATIENT REFERRED TO:  [facility] [address]  [phone]    In 2 days        DISCHARGE MEDICATIONS:  New Prescriptions    No medications on file       Mary Ann Moncada MD  04/09/22 7410

## 2022-04-09 NOTE — ED TRIAGE NOTES
Pt presents to the ED c/o tremors/shakiness that started this morning and has been worsening. She is not a very good historian. Her  is accompanying her today. They deny a history of dementia however in the last few weeks have noticed signs of forgetfulness. No other acute complaints at this time.

## 2022-04-09 NOTE — ED NOTES
Pt DC from ED ambulatory with spouse. Spouse verbalized understanding to DC instructions and will f/u with PCP next week.       Patrick Fox RN  04/09/22 2922

## 2022-04-11 ENCOUNTER — TELEPHONE (OUTPATIENT)
Dept: INTERNAL MEDICINE CLINIC | Age: 82
End: 2022-04-11

## 2022-04-11 NOTE — TELEPHONE ENCOUNTER
FYI:    Patients  called. Wife went to hospital on Saturday for shaking uncontrollably. It stopped before they got there. The hospital said she was okay and sent her home. Hospital advised that she notify Dr. Juana Nicolas that she was there. If Dr. Juana Nicolas wants her to come in or has any questions, please let them know.

## 2022-04-14 RX ORDER — HYDROXYZINE HYDROCHLORIDE 25 MG/1
TABLET, FILM COATED ORAL
Qty: 30 TABLET | Refills: 0 | Status: SHIPPED | OUTPATIENT
Start: 2022-04-14 | End: 2022-05-09

## 2022-04-14 RX ORDER — RISPERIDONE 0.5 MG/1
0.5 TABLET, FILM COATED ORAL 2 TIMES DAILY
Qty: 60 TABLET | Refills: 3 | Status: SHIPPED | OUTPATIENT
Start: 2022-04-14

## 2022-04-15 ENCOUNTER — TELEPHONE (OUTPATIENT)
Dept: INTERNAL MEDICINE CLINIC | Age: 82
End: 2022-04-15

## 2022-04-15 NOTE — TELEPHONE ENCOUNTER
Patient wants to know more information about cholesterol being high. Also wants to know if the medication will be sent to pharmacy.     Please call and advise

## 2022-05-09 RX ORDER — HYDROXYZINE HYDROCHLORIDE 25 MG/1
TABLET, FILM COATED ORAL
Qty: 30 TABLET | Refills: 0 | Status: SHIPPED | OUTPATIENT
Start: 2022-05-09 | End: 2022-07-12

## 2022-05-17 LAB
EKG ATRIAL RATE: 110 BPM
EKG DIAGNOSIS: NORMAL
EKG Q-T INTERVAL: 372 MS
EKG QRS DURATION: 98 MS
EKG QTC CALCULATION (BAZETT): 465 MS
EKG R AXIS: -40 DEGREES
EKG T AXIS: 213 DEGREES
EKG VENTRICULAR RATE: 94 BPM

## 2022-06-23 ENCOUNTER — TELEPHONE (OUTPATIENT)
Dept: CARDIOLOGY CLINIC | Age: 82
End: 2022-06-23

## 2022-06-23 NOTE — TELEPHONE ENCOUNTER
Noted, thanks! Marlo De Los Santos made a note on the appointment to discuss remote monitoring and that patient may only want to do office checks.

## 2022-06-23 NOTE — TELEPHONE ENCOUNTER
I spoke with the patient's spouse Taty Arellano and the patient is refusing to do a home remote check, so I scheduled the patient for an in office visit with Dr. Americo Nickerson on 07/14/22 with a device check. The patient was last seen in the office in 2020 with [address].

## 2022-07-07 ENCOUNTER — OFFICE VISIT (OUTPATIENT)
Dept: INTERNAL MEDICINE CLINIC | Age: 82
End: 2022-07-07
Payer: COMMERCIAL

## 2022-07-07 VITALS
BODY MASS INDEX: 27.97 KG/M2 | HEIGHT: 66 IN | WEIGHT: 174 LBS | SYSTOLIC BLOOD PRESSURE: 125 MMHG | DIASTOLIC BLOOD PRESSURE: 65 MMHG

## 2022-07-07 DIAGNOSIS — I48.21 PERMANENT ATRIAL FIBRILLATION (HCC): Primary | ICD-10-CM

## 2022-07-07 DIAGNOSIS — F02.818 MAJOR NEUROCOGNITIVE DISORDER DUE TO ANOTHER MEDICAL CONDITION WITH BEHAVIORAL DISTURBANCE: ICD-10-CM

## 2022-07-07 DIAGNOSIS — I10 BENIGN ESSENTIAL HTN: ICD-10-CM

## 2022-07-07 PROCEDURE — 99213 OFFICE O/P EST LOW 20 MIN: CPT | Performed by: INTERNAL MEDICINE

## 2022-07-07 PROCEDURE — G8400 PT W/DXA NO RESULTS DOC: HCPCS | Performed by: INTERNAL MEDICINE

## 2022-07-07 PROCEDURE — G8427 DOCREV CUR MEDS BY ELIG CLIN: HCPCS | Performed by: INTERNAL MEDICINE

## 2022-07-07 PROCEDURE — 1036F TOBACCO NON-USER: CPT | Performed by: INTERNAL MEDICINE

## 2022-07-07 PROCEDURE — G8417 CALC BMI ABV UP PARAM F/U: HCPCS | Performed by: INTERNAL MEDICINE

## 2022-07-07 PROCEDURE — 1090F PRES/ABSN URINE INCON ASSESS: CPT | Performed by: INTERNAL MEDICINE

## 2022-07-07 PROCEDURE — 1123F ACP DISCUSS/DSCN MKR DOCD: CPT | Performed by: INTERNAL MEDICINE

## 2022-07-07 SDOH — ECONOMIC STABILITY: FOOD INSECURITY: WITHIN THE PAST 12 MONTHS, THE FOOD YOU BOUGHT JUST DIDN'T LAST AND YOU DIDN'T HAVE MONEY TO GET MORE.: NEVER TRUE

## 2022-07-07 SDOH — ECONOMIC STABILITY: FOOD INSECURITY: WITHIN THE PAST 12 MONTHS, YOU WORRIED THAT YOUR FOOD WOULD RUN OUT BEFORE YOU GOT MONEY TO BUY MORE.: NEVER TRUE

## 2022-07-07 ASSESSMENT — SOCIAL DETERMINANTS OF HEALTH (SDOH): HOW HARD IS IT FOR YOU TO PAY FOR THE VERY BASICS LIKE FOOD, HOUSING, MEDICAL CARE, AND HEATING?: NOT HARD AT ALL

## 2022-07-07 NOTE — PROGRESS NOTES
CHIEF COMPLAINT: Guilherme Billings is a [de-identified] y.o. female who presents for HTN follow-up    HPI: Patient presents for routine follow-up of HTN. BP is well-controlled today. Patient does not exercise or limit salt intake. She denies any chest pain, palpitations, nausea/vomiting, shortness of breath. She reports feeling overall well. She recently underwent neurocognitive testing with Dr. Re Giron at [facility]. Results pending. Next neurology appt is in 1 month. Patient has an appt in 1 week for pacemaker interrogation. Denies palpitations. Hx of tubular adenoma on colonoscopy in 2018. Due for colonoscopy Dec 2023.     Review of Systems:   Constitutional:  Denies fever or chills   Eyes:  Denies change in visual acuity   HENT:  Denies nasal congestion or sore throat   Respiratory:  Denies cough or shortness of breath   Cardiovascular:  Denies chest pain or edema   GI:  Denies abdominal pain, nausea, vomiting, bloody stools or diarrhea   :  Denies dysuria   Musculoskeletal:  Denies back pain or joint pain   Integument:  Denies rash   Neurologic:  Denies headache, focal weakness or sensory changes   Endocrine:  Denies polyuria or polydipsia   Lymphatic:  Denies swollen glands   Psychiatric:  Denies depression or anxiety     Past Medical History:        Diagnosis Date    Adenomatous polyp of colon 1/29/2019    Asymptomatic varicose veins     Atrial fibrillation (HCC)     Dysthymic disorder     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)     Screening mammogram 12/8/2009    (Left)Benign       Past Surgical History:        Procedure Laterality Date    COLONOSCOPY  12/12/2008    HERNIA REPAIR      Left Femoral       Family History:  Family History   Problem Relation Age of Onset    Stroke Mother     Heart Attack Father     Heart Disease Father        Social History:  Social History     Socioeconomic History    Marital status:      Spouse name: None    Number of children: None    Years of education: None    Highest education level: None   Occupational History    None   Tobacco Use    Smoking status: Never Smoker    Smokeless tobacco: Never Used   Substance and Sexual Activity    Alcohol use: Yes     Comment: rare    Drug use: Never    Sexual activity: Not Currently     Partners: Male   Other Topics Concern    None   Social History Narrative    None     Social Determinants of Health     Financial Resource Strain: Low Risk     Difficulty of Paying Living Expenses: Not hard at all   Food Insecurity: No Food Insecurity    Worried About Running Out of Food in the Last Year: Never true    [address] in the Last Year: Never true   Transportation Needs:     Lack of Transportation (Medical): Not on file    Lack of Transportation (Non-Medical): Not on file   Physical Activity:     Days of Exercise per Week: Not on file    Minutes of Exercise per Session: Not on file   Stress:     Feeling of Stress : Not on file   Social Connections:     Frequency of Communication with Friends and Family: Not on file    Frequency of Social Gatherings with Friends and Family: Not on file    Attends Sikh Services: Not on file    Active Member of [address] [place] [address] or Organizations: Not on file    Attends Club or Organization Meetings: Not on file    Marital Status: Not on file   Intimate Partner Violence:     Fear of Current or Ex-Partner: Not on file    Emotionally Abused: Not on file    Physically Abused: Not on file    Sexually Abused: Not on file   Housing Stability:     Unable to Pay for Housing in the Last Year: Not on file    Number of [place] in the Last Year: Not on file    Unstable Housing in the Last Year: Not on file         Allergies:  Beta adrenergic blockers    Current Medications:    Prior to Admission medications    Medication Sig Start Date End Date Taking?  Authorizing Provider   hydrOXYzine (ATARAX) 25 MG tablet TAKE 1 TABLET BY MOUTH EVERY 8 HOURS AS NEEDED FOR ITCHING 5/9/22  Yes Shree Bingham MD   risperiDONE (RISPERDAL) 0.5 MG tablet TAKE 1 TABLET BY MOUTH 2 TIMES DAILY 4/14/22  Yes Alexia Rueda MD   pantoprazole (PROTONIX) 40 MG tablet TAKE 1 TABLET BY MOUTH DAILY BEFORE BREAKFAST 2/16/22  Yes Alexia Rueda MD   carBAMazepine (TEGRETOL) 100 MG chewable tablet CHEW AND SWALLOW ONE TABLET TWICE DAILY 1/20/22  Yes Alexia Rueda MD   memantine (NAMENDA) 10 MG tablet TAKE 1 TABLET BY MOUTH TWICE DAILY 12/23/21  Yes Alexia Rueda MD   spironolactone-hydroCHLOROthiazide (ALDACTAZIDE) 25-25 MG per tablet TAKE 1 TABLET BY MOUTH DAILY 10/25/21  Yes Alexia Rueda MD   Skin Protectants, Misc. (HYDROCERIN) CREA cream Apply topically 2 times daily 5/13/20  Yes Sebastián Fry, DO   Multiple Vitamins-Minerals (THERAPEUTIC MULTIVITAMIN-MINERALS) tablet Take 1 tablet by mouth daily 5/14/20  Yes Sebastián Fry, DO   acetaminophen (TYLENOL) 500 MG tablet Take 650 mg by mouth 3 times daily as needed 3/15/20  Yes Historical Provider, MD       Physical Exam:  Vital Signs: /65   Ht 5' 6\" (1.676 m)   Wt 174 lb (78.9 kg)   BMI 28.08 kg/m²   General: Patient appears  non-toxic  HENT: Atraumatic, normocephalic, oral mucosa moist  Lungs:  Clear bilaterally  Heart: Regular rate and rhythm  Abdomen: Non-distended, soft, non-tender  Extremities: Stable bilateral LE and pedal edema, nontender  Neuro: Nonfocal    Medical Decision Making and Plan:  Pertinent Labs & Imaging studies reviewed. (See chart for details)    1. Permanent atrial fibrillation [facility])  Patient of Dr. Geneva Vargas, has follow up in 1 week. - not a candidate for anticoagulation given CNS amyloid  - needs pacer interrogation    2. Benign essential HTN  Well-controlled on current regimen. - recommended a low salt diet  - continue spironolactone-HCTZ 25-25 daily  - recommended some physical activity such as walking    3. 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 Re Giron (neuro).  Currently in process of neurocognitive testing, has follow up in 1 month.   - continue memantine  - continue carbamazepine  - continue risperidone    Return in 3 months

## 2022-07-12 RX ORDER — HYDROXYZINE HYDROCHLORIDE 25 MG/1
TABLET, FILM COATED ORAL
Qty: 30 TABLET | Refills: 3 | Status: SHIPPED | OUTPATIENT
Start: 2022-07-12

## 2022-07-14 ENCOUNTER — NURSE ONLY (OUTPATIENT)
Dept: CARDIOLOGY CLINIC | Age: 82
End: 2022-07-14
Payer: COMMERCIAL

## 2022-07-14 ENCOUNTER — OFFICE VISIT (OUTPATIENT)
Dept: CARDIOLOGY CLINIC | Age: 82
End: 2022-07-14

## 2022-07-14 VITALS
WEIGHT: 179.4 LBS | BODY MASS INDEX: 28.96 KG/M2 | DIASTOLIC BLOOD PRESSURE: 85 MMHG | HEART RATE: 91 BPM | SYSTOLIC BLOOD PRESSURE: 128 MMHG

## 2022-07-14 DIAGNOSIS — I44.2 COMPLETE HEART BLOCK (HCC): ICD-10-CM

## 2022-07-14 DIAGNOSIS — I48.21 PERMANENT ATRIAL FIBRILLATION (HCC): Primary | ICD-10-CM

## 2022-07-14 DIAGNOSIS — I48.21 PERMANENT ATRIAL FIBRILLATION (HCC): ICD-10-CM

## 2022-07-14 DIAGNOSIS — Z95.0 PACEMAKER: ICD-10-CM

## 2022-07-14 DIAGNOSIS — Z95.0 S/P PLACEMENT OF CARDIAC PACEMAKER: ICD-10-CM

## 2022-07-14 PROCEDURE — 1036F TOBACCO NON-USER: CPT | Performed by: INTERNAL MEDICINE

## 2022-07-14 PROCEDURE — 1090F PRES/ABSN URINE INCON ASSESS: CPT | Performed by: INTERNAL MEDICINE

## 2022-07-14 PROCEDURE — G8427 DOCREV CUR MEDS BY ELIG CLIN: HCPCS | Performed by: INTERNAL MEDICINE

## 2022-07-14 PROCEDURE — 93279 PRGRMG DEV EVAL PM/LDLS PM: CPT | Performed by: INTERNAL MEDICINE

## 2022-07-14 PROCEDURE — 99213 OFFICE O/P EST LOW 20 MIN: CPT | Performed by: INTERNAL MEDICINE

## 2022-07-14 PROCEDURE — 1123F ACP DISCUSS/DSCN MKR DOCD: CPT | Performed by: INTERNAL MEDICINE

## 2022-07-14 PROCEDURE — 93000 ELECTROCARDIOGRAM COMPLETE: CPT | Performed by: INTERNAL MEDICINE

## 2022-07-14 PROCEDURE — G8400 PT W/DXA NO RESULTS DOC: HCPCS | Performed by: INTERNAL MEDICINE

## 2022-07-14 PROCEDURE — G8417 CALC BMI ABV UP PARAM F/U: HCPCS | Performed by: INTERNAL MEDICINE

## 2022-07-14 NOTE — PROGRESS NOTES
[address]   Cardiac Consultation    Referring Provider:  Terrence Frazier MD     Chief Complaint   Patient presents with    Follow-up     2 year for Micra     HPI:  Alexandro Barbosa is a [de-identified] y.o. female, previous pt of Dr. Low Davey, with PMH of HTN, mitral valve disease, cerebral amyloid angiopathy, persistent AF, recurrent falls, found to have intermittent CHB, s/p Micra leadless PPM (5/6/20, Dr. Low Davey). Neurology recommended against [address] based on the angiopathy. They are following for cognitive issues. She is here today with her . She has been feeling well. Denies complaints of palpitations, dizziness, CP, SOB, orthopnea, presyncope, or syncope. Device interrogation today shows normally functioning PPM with stable sensing and pacing thresholds.  26%. No arrhythmias noted. FERCHO at >8 yrs. Past Medical History:   has a past medical history of Adenomatous polyp of colon, Asymptomatic varicose veins, Atrial fibrillation ([address] Dysthymic disorder, 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), and Screening mammogram.    Surgical History:   has a past surgical history that includes Colonoscopy (12/12/2008) and hernia repair. Social History:   reports that she has never smoked. She has never used smokeless tobacco. She reports current alcohol use. She reports that she does not use drugs. Family History:  family history includes Heart Attack in her father; Heart Disease in her father; Stroke in her mother.      Home Medications:  Outpatient Encounter Medications as of 7/14/2022   Medication Sig Dispense Refill    hydrOXYzine HCl (ATARAX) 25 MG tablet TAKE 1 TABLET BY MOUTH EVERY 8 HOURS AS NEEDED FOR ITCHING 30 tablet 3    risperiDONE (RISPERDAL) 0.5 MG tablet TAKE 1 TABLET BY MOUTH 2 TIMES DAILY 60 tablet 3    pantoprazole (PROTONIX) 40 MG tablet TAKE 1 TABLET BY MOUTH DAILY BEFORE BREAKFAST 30 tablet 3    carBAMazepine (TEGRETOL) 100 MG chewable tablet CHEW AND SWALLOW ONE TABLET TWICE DAILY 60 tablet 4    memantine (NAMENDA) 10 MG tablet TAKE 1 TABLET BY MOUTH TWICE DAILY 60 tablet 3    spironolactone-hydroCHLOROthiazide (ALDACTAZIDE) 25-25 MG per tablet TAKE 1 TABLET BY MOUTH DAILY 30 tablet 3    Skin Protectants, Misc. (HYDROCERIN) CREA cream Apply topically 2 times daily 2 Container 0    Multiple Vitamins-Minerals (THERAPEUTIC MULTIVITAMIN-MINERALS) tablet Take 1 tablet by mouth daily 30 tablet 1    acetaminophen (TYLENOL) 500 MG tablet Take 650 mg by mouth 3 times daily as needed       No facility-administered encounter medications on file as of 7/14/2022. Allergies:  Beta adrenergic blockers     Review of Systems   Constitutional: Negative for activity change and appetite change. HENT: Negative for facial swelling and neck pain. Eyes: Negative for discharge and itching. Respiratory: Negative for chest tightness and shortness of breath. Cardiovascular: Negative for palpitations. Negative for chest pain. Negative for leg swelling. Gastrointestinal: Negative for abdominal pain and abdominal distention. Genitourinary: Negative. Musculoskeletal: Negative. Skin: Negative for color change and pallor. Neurological: Negative for dizziness, syncope and light-headedness. cognitive issues  Hematological: Negative. Psychiatric/Behavioral: Negative for behavioral problems and agitation. /85   Pulse 91   Wt 179 lb 6.4 oz (81.4 kg)   BMI 28.96 kg/m²     ECG 7/14/22, personally reviewed. AF 88    Echo 4/30/20    Summary   Normal left ventricular size. Normal left ventricular wall thickness. The inferoseptum is poorly visualized but is probably normal. Overall LVEF   is roughly 55%. Indeterminate diastolic function. Biatrial enlargement. The aortic valve appears mildly sclerotic but opens well. Moderate tricuspid regurgitation. Estimated pulmonary artery systolic pressure is normal at 31 mmHg assuming a   right atrial pressure of 3 mmHg.

## 2022-07-14 NOTE — Clinical Note
Juan Antonio Lawton. Kayce Phillips. I spoke to patient and spouse about home monitor. They should be sending a transmission as long as monitor is working properly. KATRINA. Thanks.

## 2022-07-14 NOTE — PROGRESS NOTES
Patient comes in for programming evaluation on their MICRA VR. Hx of persistent AF. No AC due to cerebral amyloid angiopathy per neuro. Patient accompanied by spouse. Last remote 8. 3.2021. Discussed home monitoring. Provided literature and [employer] Stay Connected info. All sensing and pacing parameters are within normal range. Battery life >8 years   26%. No observations. No changes need to be made at this time. Please see interrogation for more detail. Patient will see Dr. Cherrie Avila today and follow up in 3 months in office or remotely.

## 2022-07-20 RX ORDER — SPIRONOLACTONE AND HYDROCHLOROTHIAZIDE 25; 25 MG/1; MG/1
1 TABLET ORAL DAILY
Qty: 30 TABLET | Refills: 3 | Status: SHIPPED | OUTPATIENT
Start: 2022-07-20

## 2022-09-30 ENCOUNTER — OFFICE VISIT (OUTPATIENT)
Dept: INTERNAL MEDICINE CLINIC | Age: 82
End: 2022-09-30
Payer: COMMERCIAL

## 2022-09-30 VITALS
SYSTOLIC BLOOD PRESSURE: 124 MMHG | OXYGEN SATURATION: 98 % | WEIGHT: 164 LBS | DIASTOLIC BLOOD PRESSURE: 70 MMHG | HEART RATE: 44 BPM | BODY MASS INDEX: 26.47 KG/M2

## 2022-09-30 DIAGNOSIS — I10 BENIGN ESSENTIAL HTN: ICD-10-CM

## 2022-09-30 DIAGNOSIS — G47.33 OBSTRUCTIVE SLEEP APNEA: ICD-10-CM

## 2022-09-30 DIAGNOSIS — G30.1 LATE ONSET ALZHEIMER'S DEMENTIA WITHOUT BEHAVIORAL DISTURBANCE (HCC): ICD-10-CM

## 2022-09-30 DIAGNOSIS — I48.21 PERMANENT ATRIAL FIBRILLATION (HCC): ICD-10-CM

## 2022-09-30 DIAGNOSIS — F02.80 LATE ONSET ALZHEIMER'S DEMENTIA WITHOUT BEHAVIORAL DISTURBANCE (HCC): ICD-10-CM

## 2022-09-30 DIAGNOSIS — I44.2 COMPLETE HEART BLOCK (HCC): Primary | ICD-10-CM

## 2022-09-30 DIAGNOSIS — Z23 NEED FOR INFLUENZA VACCINATION: ICD-10-CM

## 2022-09-30 PROCEDURE — 99213 OFFICE O/P EST LOW 20 MIN: CPT | Performed by: INTERNAL MEDICINE

## 2022-09-30 PROCEDURE — G8427 DOCREV CUR MEDS BY ELIG CLIN: HCPCS | Performed by: INTERNAL MEDICINE

## 2022-09-30 PROCEDURE — 1123F ACP DISCUSS/DSCN MKR DOCD: CPT | Performed by: INTERNAL MEDICINE

## 2022-09-30 PROCEDURE — 90694 VACC AIIV4 NO PRSRV 0.5ML IM: CPT | Performed by: INTERNAL MEDICINE

## 2022-09-30 PROCEDURE — G8400 PT W/DXA NO RESULTS DOC: HCPCS | Performed by: INTERNAL MEDICINE

## 2022-09-30 PROCEDURE — G8417 CALC BMI ABV UP PARAM F/U: HCPCS | Performed by: INTERNAL MEDICINE

## 2022-09-30 PROCEDURE — 1036F TOBACCO NON-USER: CPT | Performed by: INTERNAL MEDICINE

## 2022-09-30 PROCEDURE — 1090F PRES/ABSN URINE INCON ASSESS: CPT | Performed by: INTERNAL MEDICINE

## 2022-09-30 PROCEDURE — 90471 IMMUNIZATION ADMIN: CPT | Performed by: INTERNAL MEDICINE

## 2022-09-30 ASSESSMENT — PATIENT HEALTH QUESTIONNAIRE - PHQ9
SUM OF ALL RESPONSES TO PHQ9 QUESTIONS 1 & 2: 0
9. THOUGHTS THAT YOU WOULD BE BETTER OFF DEAD, OR OF HURTING YOURSELF: 0
1. LITTLE INTEREST OR PLEASURE IN DOING THINGS: 0
SUM OF ALL RESPONSES TO PHQ QUESTIONS 1-9: 0
8. MOVING OR SPEAKING SO SLOWLY THAT OTHER PEOPLE COULD HAVE NOTICED. OR THE OPPOSITE, BEING SO FIGETY OR RESTLESS THAT YOU HAVE BEEN MOVING AROUND A LOT MORE THAN USUAL: 0
3. TROUBLE FALLING OR STAYING ASLEEP: 0
SUM OF ALL RESPONSES TO PHQ QUESTIONS 1-9: 0
SUM OF ALL RESPONSES TO PHQ QUESTIONS 1-9: 0
7. TROUBLE CONCENTRATING ON THINGS, SUCH AS READING THE NEWSPAPER OR WATCHING TELEVISION: 0
4. FEELING TIRED OR HAVING LITTLE ENERGY: 0
SUM OF ALL RESPONSES TO PHQ QUESTIONS 1-9: 0
2. FEELING DOWN, DEPRESSED OR HOPELESS: 0
5. POOR APPETITE OR OVEREATING: 0
6. FEELING BAD ABOUT YOURSELF - OR THAT YOU ARE A FAILURE OR HAVE LET YOURSELF OR YOUR FAMILY DOWN: 0
10. IF YOU CHECKED OFF ANY PROBLEMS, HOW DIFFICULT HAVE THESE PROBLEMS MADE IT FOR YOU TO DO YOUR WORK, TAKE CARE OF THINGS AT HOME, OR GET ALONG WITH OTHER PEOPLE: 0

## 2022-09-30 NOTE — PROGRESS NOTES
Follow Up Visit  Established Patient Visit    Patient: Tami Patel                                               : 1940  Age: [de-identified] y.o. MRN: [id number]  Date : 2022      CHIEF COMPLAINT: Tami Patel is a [de-identified] y.o. female who presents for : follow-up visit    1. Late onset Alzheimer's dementia without behavioral disturbance ([facility] [address]  Had LP done in August confirmed diagnosis of Alzheimer's. Follows-up with Dr Guyann Lesches. Pt was started on Aricept. Only oriented to name. 2. Complete heart block (HCC)  S/p Micra leadless PPM (). Had an appointment with Dr. Paul Orn 22. No changes on her current medications. Pt denies chest pain or SOB    3. Permanent atrial fibrillation ([facility] [address]  As above    4. Benign essential HTN  Controlled 124/70.  states that they do not take BP at home. She sometimes refuses to take medications    5.  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 (HCC)     Cerebral amyloid angiopathy (CODE)     Hypokalemia     Adenomatous polyp of colon 2019    Lower extremity edema 2015    Mitral valve disorder     Dysthymic disorder     Atrial fibrillation ([address] 2010     Unable to obtain ROS 2/2 dementia    Past Medical History:        Diagnosis Date    Adenomatous polyp of colon 2019    Asymptomatic varicose veins     Atrial fibrillation (HCC)     Dysthymic disorder     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)     Screening mammogram 2009    (Left)Benign       Past Surgical History:        Procedure Laterality Date    COLONOSCOPY  12/12/2008    HERNIA REPAIR      Left Femoral         Allergies:  Beta adrenergic blockers    Current Medications:    Prior to Admission medications    Medication Sig Start Date End Date Taking? Authorizing Provider   spironolactone-hydroCHLOROthiazide (ALDACTAZIDE) 25-25 MG per tablet TAKE 1 TABLET BY MOUTH DAILY 7/20/22  Yes Vijay Gutierres MD   hydrOXYzine HCl (ATARAX) 25 MG tablet TAKE 1 TABLET BY MOUTH EVERY 8 HOURS AS NEEDED FOR ITCHING 7/12/22  Yes Gopal Reed MD   risperiDONE (RISPERDAL) 0.5 MG tablet TAKE 1 TABLET BY MOUTH 2 TIMES DAILY 4/14/22  Yes Gopal Reed MD   pantoprazole (PROTONIX) 40 MG tablet TAKE 1 TABLET BY MOUTH DAILY BEFORE BREAKFAST 2/16/22  Yes Gopal Reed MD   carBAMazepine (TEGRETOL) 100 MG chewable tablet CHEW AND SWALLOW ONE TABLET TWICE DAILY 1/20/22  Yes Gopal Reed MD   memantine (NAMENDA) 10 MG tablet TAKE 1 TABLET BY MOUTH TWICE DAILY 12/23/21  Yes Gopal Reed MD   Skin Protectants, Misc. (HYDROCERIN) CREA cream Apply topically 2 times daily 5/13/20  Yes Sebastián Fry DO   Multiple Vitamins-Minerals (THERAPEUTIC MULTIVITAMIN-MINERALS) tablet Take 1 tablet by mouth daily 5/14/20  Yes Sebastián Fry DO   acetaminophen (TYLENOL) 500 MG tablet Take 650 mg by mouth 3 times daily as needed 3/15/20  Yes Historical Provider, MD           Physical Exam:      Constitutional:  Well developed, well nourished, no acute distress, non-toxic appearance   Eyes:  PERRL, conjunctiva normal   HENT:  Atraumatic, external ears normal, nose normal, oropharynx moist, no pharyngeal exudates.  Neck- normal range of motion, no tenderness, supple   Respiratory:  No respiratory distress, normal breath sounds, no rales, no wheezing   Cardiovascular:  Normal rate, normal rhythm, no murmurs, no gallops, no rubs   GI:  Soft, nondistended, normal bowel sounds, nontender, no organomegaly, no mass, no rebound, no guarding   :  No costovertebral angle tenderness   Musculoskeletal:  No edema, no tenderness, no deformities. Back- no tenderness  Integument:  Well hydrated, no rash   Lymphatic:  No lymphadenopathy noted   Neurologic:  Alert & oriented x 3, CN 2-12 normal, normal motor function, normal sensory function, no focal deficits noted   Psychiatric:  Speech and behavior appropriate       Vitals: /70   Pulse (!) 44   Wt 164 lb (74.4 kg)   SpO2 98%   BMI 26.47 kg/m²     Body mass index is 26.47 kg/m². Wt Readings from Last 3 Encounters:   09/30/22 164 lb (74.4 kg)   07/14/22 179 lb 6.4 oz (81.4 kg)   07/07/22 174 lb (78.9 kg)         LABS:    CBC:   Lab Results   Component Value Date    WBC 4.8 04/09/2022    HGB 12.8 04/09/2022    HCT 38.7 04/09/2022    MCV 94.1 04/09/2022     04/09/2022           Lab Results   Component Value Date    FOLATE >20.00 03/16/2020    FNJJIXIH45 928 (H) 03/16/2020                                                             BMP:    Lab Results   Component Value Date     04/09/2022    K 4.2 04/09/2022     04/09/2022    CO2 25 04/09/2022       LFT's:   Lab Results   Component Value Date    ALT 9 (L) 04/07/2022    AST 22 04/07/2022    GGT 45 (H) 12/28/2020    ALKPHOS 134 (H) 04/07/2022    BILITOT 0.3 04/07/2022       Lipids:   Lab Results   Component Value Date    CHOL 319 (H) 04/07/2022     (H) 04/07/2022    LDLCALC 200 (H) 04/07/2022    TRIG 84 04/07/2022       INR:   Lab Results   Component Value Date    INR 0.97 12/28/2020    INR 1.02 09/30/2020    INR 1.02 05/07/2020    PROTIME 11.3 12/28/2020    PROTIME 11.8 09/30/2020    PROTIME 11.8 05/07/2020       U/A:  Lab Results   Component Value Date    LABMICR YES 04/09/2022          Lab Results   Component Value Date    LABA1C 5.6 09/30/2020        Lab Results   Component Value Date    CREATININE 0.8 04/09/2022       -----------------------------------------------------------------       Assessment/Plan:   1.  Late onset Alzheimer's dementia without behavioral disturbance ([facility]  Problem is stable continue present meds followed by neurology    2. Complete heart block (HCC)  Problem is stable followed by EP PE    3. Permanent atrial fibrillation ([address]  As above    4. Benign essential HTN  Blood pressure controlled    5.  Obstructive sleep apnea  Now noncompliant with her BiPAP

## 2022-11-17 ENCOUNTER — TELEPHONE (OUTPATIENT)
Dept: INTERNAL MEDICINE CLINIC | Age: 82
End: 2022-11-17

## 2022-11-17 NOTE — TELEPHONE ENCOUNTER
Ms. Wilene Skiff daughter called and was wanting to know if Dr. Merilynn Essex would write a referral for home healthcare for her mom. Please advise.

## 2022-11-21 ENCOUNTER — TELEPHONE (OUTPATIENT)
Dept: INTERNAL MEDICINE CLINIC | Age: 82
End: 2022-11-21

## 2022-11-21 NOTE — TELEPHONE ENCOUNTER
Call returned to [facility]. Message left for her to call back with her questions. We can order home health care as long as they are receptive to receiving the care.

## 2022-11-21 NOTE — TELEPHONE ENCOUNTER
Pt daughter called and is requesting a referral  for home healthcare. and has several questions for Dr. Effie Pope about her mothers health. A referral for an emergency podiatrist.  Pt toenails are an inch long and they cant cut them.

## 2022-11-22 NOTE — TELEPHONE ENCOUNTER
Call has been returned several times. Message has been left for [facility] to call back.     Please leave specific questions that they have

## 2022-12-09 ENCOUNTER — TELEPHONE (OUTPATIENT)
Dept: CARDIOLOGY CLINIC | Age: 82
End: 2022-12-09

## 2022-12-09 NOTE — TELEPHONE ENCOUNTER
Please reach out to patient/family to provide assistance in sending remote transmission, as previous confirmation call notes show they are confused about what to do: \"Spoke with the patient's spouse and they are unsure how to send a reading. \" If patient unable to use remote monitor, may need to make office checks only (Micra pacemaker).

## 2022-12-22 ENCOUNTER — TELEPHONE (OUTPATIENT)
Dept: INTERNAL MEDICINE CLINIC | Age: 82
End: 2022-12-22

## 2022-12-22 NOTE — TELEPHONE ENCOUNTER
Is there any change from baseline mental status/confusion? Any fever? Does she have any pain with urinating or blood with urine?-Might suggest urinary infection, can they come in for UA?

## 2022-12-22 NOTE — TELEPHONE ENCOUNTER
Call returned to [phone] Carey Carter. No answer, voicemail message left for spouse to return call to office to discuss this message. Walk in

## 2022-12-22 NOTE — TELEPHONE ENCOUNTER
Patient's  Israel Ngo called in stating that the patient is having accidents and urinating on the floor and would like to know if she needs to go to the hospital. This has happened last night and this morning. Pls call and advise.

## 2023-01-05 ENCOUNTER — OFFICE VISIT (OUTPATIENT)
Dept: INTERNAL MEDICINE CLINIC | Age: 83
End: 2023-01-05
Payer: COMMERCIAL

## 2023-01-05 VITALS
SYSTOLIC BLOOD PRESSURE: 130 MMHG | BODY MASS INDEX: 26.2 KG/M2 | DIASTOLIC BLOOD PRESSURE: 60 MMHG | HEIGHT: 66 IN | WEIGHT: 163 LBS

## 2023-01-05 DIAGNOSIS — G30.1 SEVERE LATE ONSET ALZHEIMER'S DEMENTIA WITH AGITATION (HCC): ICD-10-CM

## 2023-01-05 DIAGNOSIS — F02.C11 SEVERE LATE ONSET ALZHEIMER'S DEMENTIA WITH AGITATION (HCC): ICD-10-CM

## 2023-01-05 DIAGNOSIS — I48.11 LONGSTANDING PERSISTENT ATRIAL FIBRILLATION (HCC): ICD-10-CM

## 2023-01-05 DIAGNOSIS — I48.11 LONGSTANDING PERSISTENT ATRIAL FIBRILLATION (HCC): Primary | ICD-10-CM

## 2023-01-05 LAB
BASOPHILS ABSOLUTE: 0 K/UL (ref 0–0.2)
BASOPHILS RELATIVE PERCENT: 0.9 %
EOSINOPHILS ABSOLUTE: 0.1 K/UL (ref 0–0.6)
EOSINOPHILS RELATIVE PERCENT: 3 %
HCT VFR BLD CALC: 41.6 % (ref 36–48)
HEMOGLOBIN: 13.4 G/DL (ref 12–16)
LYMPHOCYTES ABSOLUTE: 1 K/UL (ref 1–5.1)
LYMPHOCYTES RELATIVE PERCENT: 21.8 %
MCH RBC QN AUTO: 30.7 PG (ref 26–34)
MCHC RBC AUTO-ENTMCNC: 32.3 G/DL (ref 31–36)
MCV RBC AUTO: 95.1 FL (ref 80–100)
MONOCYTES ABSOLUTE: 0.5 K/UL (ref 0–1.3)
MONOCYTES RELATIVE PERCENT: 10.1 %
NEUTROPHILS ABSOLUTE: 3 K/UL (ref 1.7–7.7)
NEUTROPHILS RELATIVE PERCENT: 64.2 %
PDW BLD-RTO: 15.3 % (ref 12.4–15.4)
PLATELET # BLD: 135 K/UL (ref 135–450)
PMV BLD AUTO: 10.4 FL (ref 5–10.5)
RBC # BLD: 4.37 M/UL (ref 4–5.2)
WBC # BLD: 4.7 K/UL (ref 4–11)

## 2023-01-05 PROCEDURE — G8400 PT W/DXA NO RESULTS DOC: HCPCS | Performed by: INTERNAL MEDICINE

## 2023-01-05 PROCEDURE — 99214 OFFICE O/P EST MOD 30 MIN: CPT | Performed by: INTERNAL MEDICINE

## 2023-01-05 PROCEDURE — G8417 CALC BMI ABV UP PARAM F/U: HCPCS | Performed by: INTERNAL MEDICINE

## 2023-01-05 PROCEDURE — 1090F PRES/ABSN URINE INCON ASSESS: CPT | Performed by: INTERNAL MEDICINE

## 2023-01-05 PROCEDURE — 3078F DIAST BP <80 MM HG: CPT | Performed by: INTERNAL MEDICINE

## 2023-01-05 PROCEDURE — 1123F ACP DISCUSS/DSCN MKR DOCD: CPT | Performed by: INTERNAL MEDICINE

## 2023-01-05 PROCEDURE — 3075F SYST BP GE 130 - 139MM HG: CPT | Performed by: INTERNAL MEDICINE

## 2023-01-05 PROCEDURE — G8427 DOCREV CUR MEDS BY ELIG CLIN: HCPCS | Performed by: INTERNAL MEDICINE

## 2023-01-05 PROCEDURE — 1036F TOBACCO NON-USER: CPT | Performed by: INTERNAL MEDICINE

## 2023-01-05 PROCEDURE — G8484 FLU IMMUNIZE NO ADMIN: HCPCS | Performed by: INTERNAL MEDICINE

## 2023-01-05 NOTE — PROGRESS NOTES
CHIEF COMPLAINT: Sola Cerda is a [de-identified] y.o. female who presents for : Alzheimer's dementia atrial fibrillation    HPI: Patient presented with follow-up of the above she is basically doing fine she has not been overly agitated now that she is on Risperdal she has no other complaints and her peripheral edema is pretty much resolved on the Aldactazide    Review of Systems:   Constitutional:  Denies fever or chills   Eyes:  Denies change in visual acuity   HENT:  Denies nasal congestion or sore throat   Respiratory:  Denies cough or shortness of breath   Cardiovascular:  Denies chest pain or edema   GI:  Denies abdominal pain, nausea, vomiting, bloody stools or diarrhea   :  Denies dysuria   Musculoskeletal:  Denies back pain or joint pain   Integument:  Denies rash   Neurologic:  Denies headache, focal weakness or sensory changes   Endocrine:  Denies polyuria or polydipsia   Lymphatic:  Denies swollen glands   Psychiatric:  Denies depression or anxiety     Past Medical History:        Diagnosis Date    Adenomatous polyp of colon 1/29/2019    Asymptomatic varicose veins     Atrial fibrillation (HCC)     Dysthymic disorder     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)     Screening mammogram 12/8/2009    (Left)Benign       Past Surgical History:        Procedure Laterality Date    COLONOSCOPY  12/12/2008    HERNIA REPAIR      Left Femoral       Family History:  Family History   Problem Relation Age of Onset    Stroke Mother     Heart Attack Father     Heart Disease Father        Social History:  Social History     Socioeconomic History    Marital status:      Spouse name: None    Number of children: None    Years of education: None    Highest education level: None   Tobacco Use    Smoking status: Never    Smokeless tobacco: Never   Substance and Sexual Activity    Alcohol use: Yes     Comment: rare    Drug use: Never    Sexual activity: Not Currently     Partners: Male     Social Determinants of Health     Financial Resource Strain: Low Risk     Difficulty of Paying Living Expenses: Not hard at all   Food Insecurity: No Food Insecurity    Worried About Running Out of Food in the Last Year: Never true    Ran Out of Food in the Last Year: Never true         Allergies:  Beta adrenergic blockers    Current Medications:    Prior to Admission medications    Medication Sig Start Date End Date Taking? Authorizing Provider   spironolactone-hydroCHLOROthiazide (ALDACTAZIDE) 25-25 MG per tablet TAKE 1 TABLET BY MOUTH DAILY 7/20/22  Yes Katty Ponce MD   hydrOXYzine HCl (ATARAX) 25 MG tablet TAKE 1 TABLET BY MOUTH EVERY 8 HOURS AS NEEDED FOR ITCHING 7/12/22  Yes Lj Sow MD   risperiDONE (RISPERDAL) 0.5 MG tablet TAKE 1 TABLET BY MOUTH 2 TIMES DAILY 4/14/22  Yes Lj Sow MD   pantoprazole (PROTONIX) 40 MG tablet TAKE 1 TABLET BY MOUTH DAILY BEFORE BREAKFAST 2/16/22  Yes Lj Sow MD   carBAMazepine (TEGRETOL) 100 MG chewable tablet CHEW AND SWALLOW ONE TABLET TWICE DAILY 1/20/22  Yes Lj Sow MD   memantine (NAMENDA) 10 MG tablet TAKE 1 TABLET BY MOUTH TWICE DAILY 12/23/21  Yes Lj Sow MD   Skin Protectants, Misc. (HYDROCERIN) CREA cream Apply topically 2 times daily 5/13/20  Yes Sebastián Fry, DO   Multiple Vitamins-Minerals (THERAPEUTIC MULTIVITAMIN-MINERALS) tablet Take 1 tablet by mouth daily 5/14/20  Yes Sebastián Fry DO   acetaminophen (TYLENOL) 500 MG tablet Take 650 mg by mouth 3 times daily as needed 3/15/20  Yes Historical Provider, MD       Physical Exam:  Vital Signs: /60   Ht 5' 6\" (1.676 m)   Wt 163 lb (73.9 kg)   BMI 26.31 kg/m²   General: Patient appears  non-toxic  HENT: Atraumatic, normocephalic, oral mucosa moist  Lungs:  Clear bilaterally  Heart: Irregular rhythm with controlled ventricular response  Abdomen: Non-distended, soft, non-tender  Extremities: 1+ edema bilaterally with varicosities  Neuro: Nonfocal    Medical Decision Making and Plan:  Pertinent Labs & Imaging studies reviewed.  (See chart for details)  Blood studies are pending    1. Longstanding persistent atrial fibrillation ([facility]  This problem is stable continue present meds  - CBC with Auto Differential; Future  - Comprehensive Metabolic Panel; Future  - TSH; Future    2. Severe late onset Alzheimer's dementia with agitation ([facility]  Problem is stable followed by neurology continue present meds and follow-up in 6 months for physical  - CBC with Auto Differential; Future  - Comprehensive Metabolic Panel; Future  - TSH;  Future

## 2023-01-06 LAB
A/G RATIO: 2 (ref 1.1–2.2)
ALBUMIN SERPL-MCNC: 3.9 G/DL (ref 3.4–5)
ALP BLD-CCNC: 130 U/L (ref 40–129)
ALT SERPL-CCNC: <5 U/L (ref 10–40)
ANION GAP SERPL CALCULATED.3IONS-SCNC: 14 MMOL/L (ref 3–16)
AST SERPL-CCNC: 20 U/L (ref 15–37)
BILIRUB SERPL-MCNC: 0.4 MG/DL (ref 0–1)
BUN BLDV-MCNC: 17 MG/DL (ref 7–20)
CALCIUM SERPL-MCNC: 9.4 MG/DL (ref 8.3–10.6)
CHLORIDE BLD-SCNC: 106 MMOL/L (ref 99–110)
CO2: 23 MMOL/L (ref 21–32)
CREAT SERPL-MCNC: 0.9 MG/DL (ref 0.6–1.2)
GFR SERPL CREATININE-BSD FRML MDRD: >60 ML/MIN/{1.73_M2}
GLUCOSE BLD-MCNC: 69 MG/DL (ref 70–99)
POTASSIUM SERPL-SCNC: 4.1 MMOL/L (ref 3.5–5.1)
SODIUM BLD-SCNC: 143 MMOL/L (ref 136–145)
TOTAL PROTEIN: 5.9 G/DL (ref 6.4–8.2)
TSH SERPL DL<=0.05 MIU/L-ACNC: 2.9 UIU/ML (ref 0.27–4.2)

## 2023-01-12 NOTE — TELEPHONE ENCOUNTER
No transmission received after multiple attempts made to contact patient to assist with monitor by office staff and [employer] Stay Connected. Please contact patient to schedule device OV check in place of missed remote checks. Patient may prefer office checks only going forward d/t the lack of automaticity/wireless connection of her pacemaker model (Micra).

## 2023-01-13 NOTE — PROGRESS NOTES
Patient comes in for programming evaluation on their [employer] VR. Hx of persistent AF. No AC due to cerebral amyloid angiopathy per neuro. Last remote 8. 3.2021. Discussed home monitoring. Instructions and literature provided to spouse. Use J[employer] model for visual.  Will attempt to transmit by end of week. Might consider doing office checks only. All sensing and pacing parameters appear unchanged since last in office check on 7.14.2022. Battery life >8years   37.8%. Please see interrogation for more detail. Patient will follow up as scheduled. AVS provided. We will continue to monitor remotely or in office.

## 2023-01-16 ENCOUNTER — NURSE ONLY (OUTPATIENT)
Dept: CARDIOLOGY CLINIC | Age: 83
End: 2023-01-16
Payer: COMMERCIAL

## 2023-01-16 DIAGNOSIS — I44.2 COMPLETE HEART BLOCK (HCC): ICD-10-CM

## 2023-01-16 DIAGNOSIS — I48.0 PAROXYSMAL ATRIAL FIBRILLATION (HCC): ICD-10-CM

## 2023-01-16 DIAGNOSIS — Z95.0 S/P PLACEMENT OF CARDIAC PACEMAKER: Primary | ICD-10-CM

## 2023-01-16 NOTE — Clinical Note
Discussed home monitoring. Instructions and literature provided to spouse. Use demo model for visual.  Will attempt to transmit by end of week. Might consider doing office checks only.

## 2023-01-17 PROCEDURE — 93279 PRGRMG DEV EVAL PM/LDLS PM: CPT | Performed by: INTERNAL MEDICINE

## 2023-02-10 ENCOUNTER — APPOINTMENT (OUTPATIENT)
Dept: GENERAL RADIOLOGY | Age: 83
DRG: 884 | End: 2023-02-10
Payer: MEDICARE

## 2023-02-10 ENCOUNTER — APPOINTMENT (OUTPATIENT)
Dept: CT IMAGING | Age: 83
DRG: 884 | End: 2023-02-10
Payer: MEDICARE

## 2023-02-10 ENCOUNTER — HOSPITAL ENCOUNTER (INPATIENT)
Age: 83
LOS: 5 days | Discharge: NURSING, REHAB OR LONG-TERM CARE | DRG: 884 | End: 2023-02-15
Attending: STUDENT IN AN ORGANIZED HEALTH CARE EDUCATION/TRAINING PROGRAM | Admitting: INTERNAL MEDICINE
Payer: MEDICARE

## 2023-02-10 DIAGNOSIS — R41.82 ALTERED MENTAL STATUS, UNSPECIFIED ALTERED MENTAL STATUS TYPE: Primary | ICD-10-CM

## 2023-02-10 LAB
ALBUMIN SERPL-MCNC: 4 G/DL (ref 3.4–5)
ALP BLD-CCNC: 137 U/L (ref 40–129)
ALT SERPL-CCNC: 19 U/L (ref 10–40)
ANION GAP SERPL CALCULATED.3IONS-SCNC: 12 MMOL/L (ref 3–16)
AST SERPL-CCNC: 29 U/L (ref 15–37)
BASOPHILS ABSOLUTE: 0 K/UL (ref 0–0.2)
BASOPHILS RELATIVE PERCENT: 0.5 %
BILIRUB SERPL-MCNC: 0.3 MG/DL (ref 0–1)
BILIRUBIN DIRECT: <0.2 MG/DL (ref 0–0.3)
BILIRUBIN, INDIRECT: ABNORMAL MG/DL (ref 0–1)
BUN BLDV-MCNC: 21 MG/DL (ref 7–20)
CALCIUM SERPL-MCNC: 9.4 MG/DL (ref 8.3–10.6)
CHLORIDE BLD-SCNC: 106 MMOL/L (ref 99–110)
CO2: 22 MMOL/L (ref 21–32)
CREAT SERPL-MCNC: 0.9 MG/DL (ref 0.6–1.2)
EOSINOPHILS ABSOLUTE: 0.1 K/UL (ref 0–0.6)
EOSINOPHILS RELATIVE PERCENT: 1.6 %
GFR SERPL CREATININE-BSD FRML MDRD: >60 ML/MIN/{1.73_M2}
GLUCOSE BLD-MCNC: 99 MG/DL (ref 70–99)
HCT VFR BLD CALC: 37.8 % (ref 36–48)
HEMOGLOBIN: 12.7 G/DL (ref 12–16)
LYMPHOCYTES ABSOLUTE: 1.1 K/UL (ref 1–5.1)
LYMPHOCYTES RELATIVE PERCENT: 19 %
MAGNESIUM: 2 MG/DL (ref 1.8–2.4)
MCH RBC QN AUTO: 31.2 PG (ref 26–34)
MCHC RBC AUTO-ENTMCNC: 33.6 G/DL (ref 31–36)
MCV RBC AUTO: 92.8 FL (ref 80–100)
MONOCYTES ABSOLUTE: 0.5 K/UL (ref 0–1.3)
MONOCYTES RELATIVE PERCENT: 9.1 %
NEUTROPHILS ABSOLUTE: 4 K/UL (ref 1.7–7.7)
NEUTROPHILS RELATIVE PERCENT: 69.8 %
PDW BLD-RTO: 15.1 % (ref 12.4–15.4)
PLATELET # BLD: 138 K/UL (ref 135–450)
PMV BLD AUTO: 10.2 FL (ref 5–10.5)
POTASSIUM REFLEX MAGNESIUM: 4.4 MMOL/L (ref 3.5–5.1)
RBC # BLD: 4.08 M/UL (ref 4–5.2)
SODIUM BLD-SCNC: 140 MMOL/L (ref 136–145)
TOTAL PROTEIN: 6.4 G/DL (ref 6.4–8.2)
TROPONIN: <0.01 NG/ML
WBC # BLD: 5.8 K/UL (ref 4–11)

## 2023-02-10 PROCEDURE — 1200000000 HC SEMI PRIVATE

## 2023-02-10 PROCEDURE — 84439 ASSAY OF FREE THYROXINE: CPT

## 2023-02-10 PROCEDURE — 84484 ASSAY OF TROPONIN QUANT: CPT

## 2023-02-10 PROCEDURE — 6360000002 HC RX W HCPCS

## 2023-02-10 PROCEDURE — 2500000003 HC RX 250 WO HCPCS: Performed by: PHYSICIAN ASSISTANT

## 2023-02-10 PROCEDURE — 83735 ASSAY OF MAGNESIUM: CPT

## 2023-02-10 PROCEDURE — 80048 BASIC METABOLIC PNL TOTAL CA: CPT

## 2023-02-10 PROCEDURE — 85025 COMPLETE CBC W/AUTO DIFF WBC: CPT

## 2023-02-10 PROCEDURE — 2580000003 HC RX 258

## 2023-02-10 PROCEDURE — 70450 CT HEAD/BRAIN W/O DYE: CPT

## 2023-02-10 PROCEDURE — 80076 HEPATIC FUNCTION PANEL: CPT

## 2023-02-10 PROCEDURE — 84443 ASSAY THYROID STIM HORMONE: CPT

## 2023-02-10 PROCEDURE — 99285 EMERGENCY DEPT VISIT HI MDM: CPT

## 2023-02-10 PROCEDURE — 6360000002 HC RX W HCPCS: Performed by: STUDENT IN AN ORGANIZED HEALTH CARE EDUCATION/TRAINING PROGRAM

## 2023-02-10 PROCEDURE — 71045 X-RAY EXAM CHEST 1 VIEW: CPT

## 2023-02-10 PROCEDURE — 96372 THER/PROPH/DIAG INJ SC/IM: CPT

## 2023-02-10 PROCEDURE — 93005 ELECTROCARDIOGRAM TRACING: CPT | Performed by: PHYSICIAN ASSISTANT

## 2023-02-10 RX ORDER — OLANZAPINE 10 MG/1
2.5 INJECTION, POWDER, LYOPHILIZED, FOR SOLUTION INTRAMUSCULAR
Status: COMPLETED | OUTPATIENT
Start: 2023-02-10 | End: 2023-02-10

## 2023-02-10 RX ORDER — HALOPERIDOL 5 MG/ML
2 INJECTION INTRAMUSCULAR ONCE
Status: COMPLETED | OUTPATIENT
Start: 2023-02-10 | End: 2023-02-10

## 2023-02-10 RX ORDER — HALOPERIDOL 5 MG/ML
5 INJECTION INTRAMUSCULAR ONCE
Status: COMPLETED | OUTPATIENT
Start: 2023-02-10 | End: 2023-02-10

## 2023-02-10 RX ORDER — LORAZEPAM 0.5 MG/1
0.5 TABLET ORAL ONCE
Status: DISCONTINUED | OUTPATIENT
Start: 2023-02-10 | End: 2023-02-10

## 2023-02-10 RX ORDER — WATER 1000 ML/1000ML
INJECTION, SOLUTION INTRAVENOUS
Status: COMPLETED
Start: 2023-02-10 | End: 2023-02-10

## 2023-02-10 RX ADMIN — HALOPERIDOL LACTATE 5 MG: 5 INJECTION, SOLUTION INTRAMUSCULAR at 22:51

## 2023-02-10 RX ADMIN — WATER 2.1 ML: 1 INJECTION INTRAMUSCULAR; INTRAVENOUS; SUBCUTANEOUS at 21:10

## 2023-02-10 RX ADMIN — OLANZAPINE 2.5 MG: 10 INJECTION, POWDER, FOR SOLUTION INTRAMUSCULAR at 21:10

## 2023-02-10 RX ADMIN — HALOPERIDOL LACTATE 2 MG: 5 INJECTION, SOLUTION INTRAMUSCULAR at 21:58

## 2023-02-10 ASSESSMENT — ENCOUNTER SYMPTOMS: GASTROINTESTINAL NEGATIVE: 1

## 2023-02-10 ASSESSMENT — PAIN - FUNCTIONAL ASSESSMENT: PAIN_FUNCTIONAL_ASSESSMENT: NONE - DENIES PAIN
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
## 2023-02-11 LAB
BILIRUBIN URINE: NEGATIVE
BLOOD, URINE: NEGATIVE
CLARITY: CLEAR
COLOR: YELLOW
EKG ATRIAL RATE: 312 BPM
EKG DIAGNOSIS: NORMAL
EKG Q-T INTERVAL: 340 MS
EKG QRS DURATION: 94 MS
EKG QTC CALCULATION (BAZETT): 425 MS
EKG R AXIS: -42 DEGREES
EKG T AXIS: 248 DEGREES
EKG VENTRICULAR RATE: 94 BPM
GLUCOSE BLD-MCNC: 101 MG/DL (ref 70–99)
GLUCOSE BLD-MCNC: 90 MG/DL (ref 70–99)
GLUCOSE BLD-MCNC: 97 MG/DL (ref 70–99)
GLUCOSE URINE: NEGATIVE MG/DL
KETONES, URINE: NEGATIVE MG/DL
LEUKOCYTE ESTERASE, URINE: NEGATIVE
MICROSCOPIC EXAMINATION: NORMAL
NITRITE, URINE: NEGATIVE
PERFORMED ON: ABNORMAL
PERFORMED ON: NORMAL
PERFORMED ON: NORMAL
PH UA: 6.5 (ref 5–8)
PROTEIN UA: NEGATIVE MG/DL
SPECIFIC GRAVITY UA: 1.02 (ref 1–1.03)
T4 FREE: 1.1 NG/DL (ref 0.9–1.8)
TSH SERPL DL<=0.05 MIU/L-ACNC: 2.71 UIU/ML (ref 0.27–4.2)
URINE REFLEX TO CULTURE: NORMAL
URINE TYPE: NORMAL
UROBILINOGEN, URINE: 0.2 E.U./DL

## 2023-02-11 PROCEDURE — 6360000002 HC RX W HCPCS: Performed by: PHYSICIAN ASSISTANT

## 2023-02-11 PROCEDURE — 81003 URINALYSIS AUTO W/O SCOPE: CPT

## 2023-02-11 PROCEDURE — 1200000000 HC SEMI PRIVATE

## 2023-02-11 PROCEDURE — 99222 1ST HOSP IP/OBS MODERATE 55: CPT | Performed by: INTERNAL MEDICINE

## 2023-02-11 PROCEDURE — 2580000003 HC RX 258: Performed by: PHYSICIAN ASSISTANT

## 2023-02-11 RX ORDER — ENOXAPARIN SODIUM 100 MG/ML
40 INJECTION SUBCUTANEOUS DAILY
Status: DISCONTINUED | OUTPATIENT
Start: 2023-02-11 | End: 2023-02-15 | Stop reason: HOSPADM

## 2023-02-11 RX ORDER — RISPERIDONE 0.5 MG/1
0.5 TABLET ORAL 2 TIMES DAILY
Status: DISCONTINUED | OUTPATIENT
Start: 2023-02-11 | End: 2023-02-14

## 2023-02-11 RX ORDER — SODIUM CHLORIDE 0.9 % (FLUSH) 0.9 %
5-40 SYRINGE (ML) INJECTION PRN
Status: DISCONTINUED | OUTPATIENT
Start: 2023-02-11 | End: 2023-02-15 | Stop reason: HOSPADM

## 2023-02-11 RX ORDER — ACETAMINOPHEN 650 MG/1
650 SUPPOSITORY RECTAL EVERY 6 HOURS PRN
Status: DISCONTINUED | OUTPATIENT
Start: 2023-02-11 | End: 2023-02-15 | Stop reason: HOSPADM

## 2023-02-11 RX ORDER — DEXTROSE MONOHYDRATE 50 MG/ML
INJECTION, SOLUTION INTRAVENOUS CONTINUOUS
Status: ACTIVE | OUTPATIENT
Start: 2023-02-11 | End: 2023-02-11

## 2023-02-11 RX ORDER — CARBAMAZEPINE 100 MG/1
100 TABLET, CHEWABLE ORAL 2 TIMES DAILY
Status: DISCONTINUED | OUTPATIENT
Start: 2023-02-11 | End: 2023-02-15 | Stop reason: HOSPADM

## 2023-02-11 RX ORDER — SODIUM CHLORIDE 0.9 % (FLUSH) 0.9 %
5-40 SYRINGE (ML) INJECTION EVERY 12 HOURS SCHEDULED
Status: DISCONTINUED | OUTPATIENT
Start: 2023-02-11 | End: 2023-02-15 | Stop reason: HOSPADM

## 2023-02-11 RX ORDER — HALOPERIDOL 5 MG/ML
5 INJECTION INTRAMUSCULAR EVERY 12 HOURS PRN
Status: DISCONTINUED | OUTPATIENT
Start: 2023-02-11 | End: 2023-02-15 | Stop reason: HOSPADM

## 2023-02-11 RX ORDER — ONDANSETRON 2 MG/ML
4 INJECTION INTRAMUSCULAR; INTRAVENOUS EVERY 6 HOURS PRN
Status: DISCONTINUED | OUTPATIENT
Start: 2023-02-11 | End: 2023-02-15 | Stop reason: HOSPADM

## 2023-02-11 RX ORDER — DONEPEZIL HYDROCHLORIDE 10 MG/1
10 TABLET, FILM COATED ORAL NIGHTLY
COMMUNITY

## 2023-02-11 RX ORDER — ONDANSETRON 4 MG/1
4 TABLET, ORALLY DISINTEGRATING ORAL EVERY 8 HOURS PRN
Status: DISCONTINUED | OUTPATIENT
Start: 2023-02-11 | End: 2023-02-15 | Stop reason: HOSPADM

## 2023-02-11 RX ORDER — ACETAMINOPHEN 325 MG/1
650 TABLET ORAL EVERY 6 HOURS PRN
Status: DISCONTINUED | OUTPATIENT
Start: 2023-02-11 | End: 2023-02-15 | Stop reason: HOSPADM

## 2023-02-11 RX ORDER — POLYETHYLENE GLYCOL 3350 17 G/17G
17 POWDER, FOR SOLUTION ORAL DAILY PRN
Status: DISCONTINUED | OUTPATIENT
Start: 2023-02-11 | End: 2023-02-15 | Stop reason: HOSPADM

## 2023-02-11 RX ORDER — SODIUM CHLORIDE 9 MG/ML
INJECTION, SOLUTION INTRAVENOUS PRN
Status: DISCONTINUED | OUTPATIENT
Start: 2023-02-11 | End: 2023-02-15 | Stop reason: HOSPADM

## 2023-02-11 RX ORDER — MEMANTINE HYDROCHLORIDE 10 MG/1
10 TABLET ORAL 2 TIMES DAILY
Status: DISCONTINUED | OUTPATIENT
Start: 2023-02-11 | End: 2023-02-15 | Stop reason: HOSPADM

## 2023-02-11 RX ADMIN — HALOPERIDOL LACTATE 5 MG: 5 INJECTION, SOLUTION INTRAMUSCULAR at 16:32

## 2023-02-11 RX ADMIN — DEXTROSE MONOHYDRATE: 50 INJECTION, SOLUTION INTRAVENOUS at 11:13

## 2023-02-11 ASSESSMENT — ENCOUNTER SYMPTOMS
VOMITING: 0
BACK PAIN: 0
SHORTNESS OF BREATH: 0
ABDOMINAL PAIN: 0
CONSTIPATION: 0
DIARRHEA: 0
NAUSEA: 0

## 2023-02-11 NOTE — ED NOTES
Hospital security called and present for administration of IM Zyprexa.       Daisy Graham RN  02/10/23 2128
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
## 2023-02-11 NOTE — PROGRESS NOTES
4 Eyes Admission Assessment     I agree as the admission nurse that 2 RN's have performed a thorough Head to Toe Skin Assessment on the patient. ALL assessment sites listed below have been assessed on admission. Areas assessed by both nurses:   [x]   Head, Face, and Ears   [x]   Shoulders, Back, and Chest  [x]   Arms, Elbows, and Hands   [x]   Coccyx, Sacrum, and Ischium  [x]   Legs, Feet, and Heels        Does the Patient have Skin Breakdown?   No         Joseph Prevention initiated:  No   Wound Care Orders initiated:  No      [facility] nurse consulted for Pressure Injury (Stage 3,4, Unstageable, DTI, NWPT, and Complex wounds) or Joseph score 18 or lower:  No      Nurse 1 eSignature: Electronically signed by Adriane Pearlta RN on 2/11/23 at 7:39 AM EST    SHARE this note so that the co-signing nurse is able to place an eSignature    Nurse 2 eSignature: Electronically signed by Ratna Garcia RN on 2/11/23 at 7:40 AM EST

## 2023-02-11 NOTE — PROGRESS NOTES
Patient is alert vss this am and afternoon, refused evening vitals and fingerstick. Walked to restroom was unable to void, changed pull up full of yellow urine. Denies any pain, increased confusion and agitation.  is at bedside. Sitter also at bedside. IV to right arm in place and fluids running. Call light is within reach.      Electronically signed by Jeremiah Osorio RN on 2/11/2023 at 3:51 PM

## 2023-02-11 NOTE — PROGRESS NOTES
Pt received from ED, alert and confused. uncooperative with care. VSS. Urine specimen collected via straight cath using steri;e technique. Very combative when approaching to do nursing assessment and care. Sitter at bedside for safety. Refused tele.
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
## 2023-02-11 NOTE — PROGRESS NOTES
Increased aggression and trying to remove iv and agitated with staff. PRN haldol given will monitor effects.

## 2023-02-11 NOTE — PLAN OF CARE
Problem: Discharge Planning  Goal: Discharge to home or other facility with appropriate resources  Flowsheets (Taken 2/11/2023 0959)  Discharge to home or other facility with appropriate resources:   Identify barriers to discharge with patient and caregiver   Arrange for needed discharge resources and transportation as appropriate

## 2023-02-11 NOTE — ED NOTES
Pt walked down to the bathroom with RN to give urine sample. Pt became agitated, yelling at nurse and refusing to give sample. Pt agitated with  and having to be redirected constantly to go back to her room. PA aware. Awaiting med orders.       Aditi Hoff RN  02/10/23 2045

## 2023-02-11 NOTE — PROGRESS NOTES
Internal Medicine PGY- 3 Resident Progress Note    PCP: Elías Keith MD    Date of Admission: 2/10/2023    Chief Complaint: AMS    Subjective: Patient remains agitated however less aggressive. She is lying in bed not answering questions. She denies any chest pain, abdominal pain, fever, chills, nausea, vomiting. Patient was seen and evaluated with Dr. Lion Ramirez. Medications:  Reviewed    Infusion Medications    sodium chloride       Scheduled Medications    sodium chloride flush  5-40 mL IntraVENous 2 times per day    enoxaparin  40 mg SubCUTAneous Daily    carBAMazepine  100 mg Oral BID    memantine  10 mg Oral BID    risperiDONE  0.5 mg Oral BID     PRN Meds: sodium chloride flush, sodium chloride, ondansetron **OR** ondansetron, polyethylene glycol, acetaminophen **OR** acetaminophen      Intake/Output Summary (Last 24 hours) at 2/11/2023 0800  Last data filed at 2/11/2023 0100  Gross per 24 hour   Intake --   Output 500 ml   Net -500 ml       Physical Exam Performed:    BP (!) 137/94   Pulse 63   Temp 98.4 °F (36.9 °C) (Oral)   Resp 17   Ht 5' 6\" (1.676 m)   Wt 163 lb 8 oz (74.2 kg)   SpO2 96%   BMI 26.39 kg/m²     General appearance: Agitated. Not participating on exam. No apparent distress, appears stated age and cooperative. HEENT: Pupils equal, round, and reactive to light. Respiratory:  Normal respiratory effort. Clear to auscultation, bilaterally without Rales/Wheezes/Rhonchi. Cardiovascular: Regular rate and rhythm with normal S1/S2 without murmurs, rubs or gallops. Abdomen: Soft, non-tender, non-distended with normal bowel sounds. Musculoskeletal: No clubbing, cyanosis or edema bilaterally. Neurologic:  Unable to assess.  No gross motor deficits  Psychiatric: Alert and oriented, thought content appropriate, normal insight  Peripheral Pulses: +2 palpable, equal bilaterally     Labs:   Recent Labs     02/10/23  1825   WBC 5.8   HGB 12.7   HCT 37.8        Recent Labs 02/10/23  1825      K 4.4      CO2 22   BUN 21*   CREATININE 0.9   CALCIUM 9.4     Recent Labs     02/10/23  1825   AST 29   ALT 19   BILIDIR <0.2   BILITOT 0.3   ALKPHOS 137*     No results for input(s): INR in the last 72 hours. Recent Labs     02/10/23  1825   TROPONINI <0.01       Urinalysis:      Lab Results   Component Value Date/Time    NITRU Negative 02/11/2023 01:00 AM    WBCUA 6-9 04/09/2022 12:58 PM    BACTERIA Rare 04/09/2022 12:58 PM    RBCUA 0-2 04/09/2022 12:58 PM    BLOODU Negative 02/11/2023 01:00 AM    SPECGRAV 1.020 02/11/2023 01:00 AM    GLUCOSEU Negative 02/11/2023 01:00 AM    GLUCOSEU NEGATIVE 11/22/2011 07:45 AM       Radiology:  XR CHEST PORTABLE   Final Result      No acute cardiopulmonary disease. CT HEAD WO CONTRAST   Final Result      1. Stable CT scan of the brain demonstrating at least moderately advanced diffuse cerebral atrophy with superimposed severe small vessel ischemic disease. No recent infarct, hemorrhage or mass. No interval change since 4/9/2022. Assessment/Plan:    Active Hospital Problems    Diagnosis Date Noted    Altered mental status [R41.82] 02/10/2023     Priority: Medium     Acute encephalopathy  Alzheimer's dementia   Per report patient with history of Dementia. Ct head with no acute intracranial abnormalities. Follows with Neurology (Dr. Shalonda Duran) LP done in 2022  - Very agitated/aggressive in the ED. Required Zyprexa 2.5-->Haldol 2mg--> 5mg  - No obvious source of infection or metabolic issue driving her current situation  - UA unremarkable, CXR without acute cardiopulmonary process   -Pharmacy medication reconciliation  - TSH and B12 pending  - Psych consulted for eval  - Haldol for aggression      Atrial fibrillation  Pt rate controlled  Not on anticoagulation due to history of ICH.    - Telemetry   - monitor lytes     DVT PPx: Lovenox  Diet: Diet NPO   Code status:  Full Code     Discussed the patient with MD Cruz Sears Leslie Neely MD, MD  Internal Medicine Resident PGY-3  Contact via [employer]

## 2023-02-11 NOTE — ED NOTES
Patient wide awake, still remains uncooperative. Patient refusing to be placed in gown, throwing blankets on floor and attempting to remove seizure pads which were placed for patient safety of not hitting on rails. AOD present and made aware additional medication is needed.       Ishmael Mullins RN  02/10/23 3292

## 2023-02-11 NOTE — ED NOTES
ED TO INPATIENT SBAR HANDOFF    Patient Name: Sophia Israel   :  1940  [de-identified] y.o. MRN:  [id number]  Preferred Name \" Sivakumar Marcano \"  ED Room #:  Y24/C19-84  Family/Caregiver Present no (  going home)  Restraints no   Sitter : Recommend   Sepsis Risk Score Sepsis Risk Score: 0.7    Situation  Code Status: Prior Limited Code details: Intubation/Re-intubation No Comment; Defibrillation/Cardioversion No Comment; Chest Compressions No Comment; Resuscitative Medications No Comment; Other No Comment  . Allergies: Beta adrenergic blockers  Weight: Patient Vitals for the past 96 hrs (Last 3 readings):   Weight   02/10/23 1615 163 lb 8 oz (74.2 kg)     Arrived from: home  Chief Complaint:   Chief Complaint   Patient presents with    Altered Mental Status     Hx of confusion and alzheimers   having a hard time directing her, dtr thought she might have a UTI. Patient following directions with coaxing.   not the best historian     Hospital Problem/Diagnosis:  Principal Problem:    Altered mental status  Resolved Problems:    * No resolved hospital problems. *    Imaging:   XR CHEST PORTABLE   Final Result      No acute cardiopulmonary disease. CT HEAD WO CONTRAST   Final Result      1. Stable CT scan of the brain demonstrating at least moderately advanced diffuse cerebral atrophy with superimposed severe small vessel ischemic disease. No recent infarct, hemorrhage or mass. No interval change since 2022.         Abnormal labs:   Abnormal Labs Reviewed   BASIC METABOLIC PANEL W/ REFLEX TO MG FOR LOW K - Abnormal; Notable for the following components:       Result Value    BUN 21 (*)     All other components within normal limits   HEPATIC FUNCTION PANEL - Abnormal; Notable for the following components:    Alkaline Phosphatase 137 (*)     All other components within normal limits     Critical values: no     Abnormal Assessment Findings:  Background  History:   Past Medical History:   Diagnosis Date    Adenomatous polyp of colon 1/29/2019    Asymptomatic varicose veins     Atrial fibrillation (HCC)     Dysthymic disorder     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)     Screening mammogram 12/8/2009    (Left)Benign       Assessment    Vitals/MEWS: MEWS Score: 1  Level of Consciousness: New confusion or agitation (1)   Vitals:    02/10/23 1615 02/10/23 1757   BP: (!) 127/94 (!) 127/94   Pulse: 79 66   Resp: 20 18   Temp: 98.1 °F (36.7 °C)    TempSrc: Oral    SpO2: 98% 97%   Weight: 163 lb 8 oz (74.2 kg)    Height: 5' 6\" (1.676 m)      FiO2 (%): RA  O2 Flow Rate: O2 Device: None (Room air)    Cardiac Rhythm:    Pain Assessment: [] Verbal [x] Padmaja Wise Scale  Pain Scale: Pain Assessment  Pain Assessment: None - Denies Pain  Last documented pain score (0-10 scale)    Last documented pain medication administered: Zyprexa and Hadol IM for agitation -see MAR  Mental Status: confused x 4  NIH Score:    C-SSRS: Risk of Suicide: No Risk  Bedside swallow:    Maryjane Coma Scale (GCS): Maryjane Coma Scale  Eye Opening: Spontaneous  Best Verbal Response: Confused  Best Motor Response: Obeys commands  [place] Coma Scale Score: 14  Active LDA's:   Peripheral IV 02/10/23 Right Antecubital (Active)   Site Assessment Clean, dry & intact 02/10/23 1857   Line Status Blood return noted; Flushed;Specimen collected;Normal saline locked 02/10/23 1857   Dressing Intervention New 02/10/23 1857     PO Status: Regular  Pertinent or High Risk Medications/Drips: no   o If Yes, please provide details:  Pending Blood Product Administration: no     You may also review the ED PT Care Timeline found under the Summary Nursing Index tab.     Recommendation    Pending orders -(needs ua still)  Plan for Discharge (if known): unknown  Additional Comments: patient confused x 4, kicks, grabs at staff  If any further questions, please call Sending RN at Martin Gusman RN    Electronically signed by: Electronically signed by Padmaja Wise RN on 2/10/2023 at 9:53 PM     Vianey Gutierres RN  02/10/23 5994

## 2023-02-11 NOTE — CONSULTS
Clinical Pharmacy Progress Note  Medication History      Asked to verify home medications by Dr. Puja Johnson. List of of current medications patient is taking is complete. Home Medication list in EPIC updated to reflect changes noted below. Source of information: [address] - [phone]), outpatient PCP and neurology note  Pt and  are poor historians / unable to provide useful info  Outpt notes indicate that  has stated that pt will often refuse to take meds     Changes made to home medication list:   Medications removed (no longer taking):  Hydroxyzine - told to stop by neurologist 8/2022     Medications added:   Donepezil - restarted by neurologist 8/2022     Medication doses / instructions adjusted:   None     Other notes:   Carbamazepine (filled 2/2/23) and Pantoprazole (filled 1/13/23) are the only medications that have been filled recently. Concern for medication non-compliance based on last fill dates. Left meds on home med list as outpt notes indicate she should be on them. Donepezil - last filled 8/16/2022  Memantine - last filled 7/12/2022  Risperidone - last filled 7/12/2022  Spironolactone/HCTZ - last filled 7/12/2022     Please call with questions--  Thanks--  Hanh Perez, PharmD, BCPS, [facility]  [id number] ([facility])   2/11/2023 9:43 AM      Current Outpatient Medications   Medication Instructions    acetaminophen (TYLENOL) 650 mg, Oral, 3 TIMES DAILY PRN    carBAMazepine (TEGRETOL) 100 MG chewable tablet CHEW AND SWALLOW ONE TABLET TWICE DAILY    donepezil (ARICEPT) 10 mg, Oral, NIGHTLY    memantine (NAMENDA) 10 MG tablet TAKE 1 TABLET BY MOUTH TWICE DAILY    Multiple Vitamins-Minerals (THERAPEUTIC MULTIVITAMIN-MINERALS) tablet 1 tablet, Oral, DAILY    pantoprazole (PROTONIX) 40 MG tablet TAKE 1 TABLET BY MOUTH DAILY BEFORE BREAKFAST    risperiDONE (RISPERDAL) 0.5 mg, Oral, 2 TIMES DAILY    Skin Protectants, Misc.  (HYDROCERIN) CREA cream Topical, 2 TIMES DAILY    spironolactone-hydroCHLOROthiazide (ALDACTAZIDE) 25-25 MG per tablet 1 tablet, Oral, DAILY

## 2023-02-12 LAB
ANION GAP SERPL CALCULATED.3IONS-SCNC: 6 MMOL/L (ref 3–16)
BASOPHILS ABSOLUTE: 0 K/UL (ref 0–0.2)
BASOPHILS RELATIVE PERCENT: 0.8 %
BUN BLDV-MCNC: 10 MG/DL (ref 7–20)
CALCIUM SERPL-MCNC: 9.1 MG/DL (ref 8.3–10.6)
CHLORIDE BLD-SCNC: 109 MMOL/L (ref 99–110)
CO2: 26 MMOL/L (ref 21–32)
CREAT SERPL-MCNC: 0.7 MG/DL (ref 0.6–1.2)
EOSINOPHILS ABSOLUTE: 0.1 K/UL (ref 0–0.6)
EOSINOPHILS RELATIVE PERCENT: 1 %
GFR SERPL CREATININE-BSD FRML MDRD: >60 ML/MIN/{1.73_M2}
GLUCOSE BLD-MCNC: 101 MG/DL (ref 70–99)
GLUCOSE BLD-MCNC: 116 MG/DL (ref 70–99)
GLUCOSE BLD-MCNC: 88 MG/DL (ref 70–99)
GLUCOSE BLD-MCNC: 93 MG/DL (ref 70–99)
GLUCOSE BLD-MCNC: 98 MG/DL (ref 70–99)
HCT VFR BLD CALC: 37.4 % (ref 36–48)
HEMOGLOBIN: 12.5 G/DL (ref 12–16)
LYMPHOCYTES ABSOLUTE: 1.1 K/UL (ref 1–5.1)
LYMPHOCYTES RELATIVE PERCENT: 17.8 %
MAGNESIUM: 1.9 MG/DL (ref 1.8–2.4)
MCH RBC QN AUTO: 30.8 PG (ref 26–34)
MCHC RBC AUTO-ENTMCNC: 33.5 G/DL (ref 31–36)
MCV RBC AUTO: 92 FL (ref 80–100)
MONOCYTES ABSOLUTE: 0.5 K/UL (ref 0–1.3)
MONOCYTES RELATIVE PERCENT: 8.3 %
NEUTROPHILS ABSOLUTE: 4.3 K/UL (ref 1.7–7.7)
NEUTROPHILS RELATIVE PERCENT: 72.1 %
PDW BLD-RTO: 15.4 % (ref 12.4–15.4)
PERFORMED ON: ABNORMAL
PERFORMED ON: NORMAL
PLATELET # BLD: 131 K/UL (ref 135–450)
PMV BLD AUTO: 10.1 FL (ref 5–10.5)
POTASSIUM SERPL-SCNC: 3.8 MMOL/L (ref 3.5–5.1)
RBC # BLD: 4.07 M/UL (ref 4–5.2)
SODIUM BLD-SCNC: 141 MMOL/L (ref 136–145)
WBC # BLD: 6 K/UL (ref 4–11)

## 2023-02-12 PROCEDURE — 1200000000 HC SEMI PRIVATE

## 2023-02-12 PROCEDURE — 85025 COMPLETE CBC W/AUTO DIFF WBC: CPT

## 2023-02-12 PROCEDURE — 83735 ASSAY OF MAGNESIUM: CPT

## 2023-02-12 PROCEDURE — 6370000000 HC RX 637 (ALT 250 FOR IP)

## 2023-02-12 PROCEDURE — 80048 BASIC METABOLIC PNL TOTAL CA: CPT

## 2023-02-12 PROCEDURE — 51701 INSERT BLADDER CATHETER: CPT

## 2023-02-12 PROCEDURE — 51798 US URINE CAPACITY MEASURE: CPT

## 2023-02-12 PROCEDURE — 99232 SBSQ HOSP IP/OBS MODERATE 35: CPT | Performed by: INTERNAL MEDICINE

## 2023-02-12 PROCEDURE — 36415 COLL VENOUS BLD VENIPUNCTURE: CPT

## 2023-02-12 PROCEDURE — 2580000003 HC RX 258: Performed by: STUDENT IN AN ORGANIZED HEALTH CARE EDUCATION/TRAINING PROGRAM

## 2023-02-12 PROCEDURE — 6360000002 HC RX W HCPCS

## 2023-02-12 RX ORDER — MAGNESIUM SULFATE IN WATER 40 MG/ML
2000 INJECTION, SOLUTION INTRAVENOUS ONCE
Status: COMPLETED | OUTPATIENT
Start: 2023-02-12 | End: 2023-02-12

## 2023-02-12 RX ORDER — POTASSIUM CHLORIDE 20 MEQ/1
20 TABLET, EXTENDED RELEASE ORAL 2 TIMES DAILY WITH MEALS
Status: DISCONTINUED | OUTPATIENT
Start: 2023-02-12 | End: 2023-02-14

## 2023-02-12 RX ADMIN — MAGNESIUM SULFATE HEPTAHYDRATE 2000 MG: 40 INJECTION, SOLUTION INTRAVENOUS at 09:45

## 2023-02-12 RX ADMIN — POTASSIUM CHLORIDE 20 MEQ: 1500 TABLET, EXTENDED RELEASE ORAL at 09:45

## 2023-02-12 RX ADMIN — SODIUM CHLORIDE, PRESERVATIVE FREE 10 ML: 5 INJECTION INTRAVENOUS at 21:14

## 2023-02-12 RX ADMIN — SODIUM CHLORIDE, PRESERVATIVE FREE 10 ML: 5 INJECTION INTRAVENOUS at 07:45

## 2023-02-12 ASSESSMENT — ENCOUNTER SYMPTOMS
RESPIRATORY NEGATIVE: 1
EYES NEGATIVE: 1
GASTROINTESTINAL NEGATIVE: 1

## 2023-02-12 NOTE — PROGRESS NOTES
Patient is alert. VSS. Sitter at bedside. Pleasantly confused at this time. Denies any pain. Regular diet continues although patient does not eat or drink much. Bladder scan continues and purewick in place draining yellow urine. No other needs at this time.      Electronically signed by Dolores Lacy RN on 2/12/2023 at 3:45 PM
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
## 2023-02-12 NOTE — PROGRESS NOTES
Restraints discontinued at this time, patient is calm and cooperative.      Electronically signed by Estevan Mauricio RN on 2/12/2023 at 10:10 AM

## 2023-02-13 ENCOUNTER — TELEPHONE (OUTPATIENT)
Dept: INTERNAL MEDICINE CLINIC | Age: 83
End: 2023-02-13

## 2023-02-13 LAB
ANION GAP SERPL CALCULATED.3IONS-SCNC: 10 MMOL/L (ref 3–16)
BASOPHILS ABSOLUTE: 0 K/UL (ref 0–0.2)
BASOPHILS RELATIVE PERCENT: 0.4 %
BUN BLDV-MCNC: 14 MG/DL (ref 7–20)
CALCIUM SERPL-MCNC: 9.2 MG/DL (ref 8.3–10.6)
CHLORIDE BLD-SCNC: 107 MMOL/L (ref 99–110)
CO2: 25 MMOL/L (ref 21–32)
CREAT SERPL-MCNC: 0.8 MG/DL (ref 0.6–1.2)
EOSINOPHILS ABSOLUTE: 0.1 K/UL (ref 0–0.6)
EOSINOPHILS RELATIVE PERCENT: 1.7 %
GFR SERPL CREATININE-BSD FRML MDRD: >60 ML/MIN/{1.73_M2}
GLUCOSE BLD-MCNC: 102 MG/DL (ref 70–99)
GLUCOSE BLD-MCNC: 103 MG/DL (ref 70–99)
GLUCOSE BLD-MCNC: 91 MG/DL (ref 70–99)
HCT VFR BLD CALC: 39.6 % (ref 36–48)
HEMOGLOBIN: 13.5 G/DL (ref 12–16)
INFLUENZA A: NOT DETECTED
INFLUENZA B: NOT DETECTED
LYMPHOCYTES ABSOLUTE: 1.3 K/UL (ref 1–5.1)
LYMPHOCYTES RELATIVE PERCENT: 17.2 %
MAGNESIUM: 2.1 MG/DL (ref 1.8–2.4)
MCH RBC QN AUTO: 31.4 PG (ref 26–34)
MCHC RBC AUTO-ENTMCNC: 34.1 G/DL (ref 31–36)
MCV RBC AUTO: 91.9 FL (ref 80–100)
MONOCYTES ABSOLUTE: 0.6 K/UL (ref 0–1.3)
MONOCYTES RELATIVE PERCENT: 8.4 %
NEUTROPHILS ABSOLUTE: 5.4 K/UL (ref 1.7–7.7)
NEUTROPHILS RELATIVE PERCENT: 72.3 %
PDW BLD-RTO: 15.4 % (ref 12.4–15.4)
PERFORMED ON: ABNORMAL
PERFORMED ON: NORMAL
PLATELET # BLD: 130 K/UL (ref 135–450)
PMV BLD AUTO: 9.7 FL (ref 5–10.5)
POTASSIUM SERPL-SCNC: 3.5 MMOL/L (ref 3.5–5.1)
RBC # BLD: 4.31 M/UL (ref 4–5.2)
SARS-COV-2 RNA, RT PCR: NOT DETECTED
SODIUM BLD-SCNC: 142 MMOL/L (ref 136–145)
VITAMIN B-12: 500 PG/ML (ref 211–911)
WBC # BLD: 7.5 K/UL (ref 4–11)

## 2023-02-13 PROCEDURE — 51798 US URINE CAPACITY MEASURE: CPT

## 2023-02-13 PROCEDURE — 36415 COLL VENOUS BLD VENIPUNCTURE: CPT

## 2023-02-13 PROCEDURE — 87636 SARSCOV2 & INF A&B AMP PRB: CPT

## 2023-02-13 PROCEDURE — 82607 VITAMIN B-12: CPT

## 2023-02-13 PROCEDURE — 6360000002 HC RX W HCPCS: Performed by: STUDENT IN AN ORGANIZED HEALTH CARE EDUCATION/TRAINING PROGRAM

## 2023-02-13 PROCEDURE — 1200000000 HC SEMI PRIVATE

## 2023-02-13 PROCEDURE — 6370000000 HC RX 637 (ALT 250 FOR IP)

## 2023-02-13 PROCEDURE — 83735 ASSAY OF MAGNESIUM: CPT

## 2023-02-13 PROCEDURE — 6370000000 HC RX 637 (ALT 250 FOR IP): Performed by: STUDENT IN AN ORGANIZED HEALTH CARE EDUCATION/TRAINING PROGRAM

## 2023-02-13 PROCEDURE — 99231 SBSQ HOSP IP/OBS SF/LOW 25: CPT | Performed by: INTERNAL MEDICINE

## 2023-02-13 PROCEDURE — 80048 BASIC METABOLIC PNL TOTAL CA: CPT

## 2023-02-13 PROCEDURE — 2580000003 HC RX 258: Performed by: STUDENT IN AN ORGANIZED HEALTH CARE EDUCATION/TRAINING PROGRAM

## 2023-02-13 PROCEDURE — 85025 COMPLETE CBC W/AUTO DIFF WBC: CPT

## 2023-02-13 RX ADMIN — SODIUM CHLORIDE, PRESERVATIVE FREE 10 ML: 5 INJECTION INTRAVENOUS at 08:48

## 2023-02-13 RX ADMIN — RISPERIDONE 0.5 MG: 0.5 TABLET, FILM COATED ORAL at 08:39

## 2023-02-13 RX ADMIN — MEMANTINE 10 MG: 10 TABLET ORAL at 08:39

## 2023-02-13 RX ADMIN — POTASSIUM CHLORIDE 20 MEQ: 1500 TABLET, EXTENDED RELEASE ORAL at 08:39

## 2023-02-13 RX ADMIN — CARBAMAZEPINE 100 MG: 100 TABLET, CHEWABLE ORAL at 08:47

## 2023-02-13 RX ADMIN — POTASSIUM CHLORIDE 20 MEQ: 1500 TABLET, EXTENDED RELEASE ORAL at 16:58

## 2023-02-13 RX ADMIN — ENOXAPARIN SODIUM 40 MG: 100 INJECTION SUBCUTANEOUS at 08:39

## 2023-02-13 ASSESSMENT — ENCOUNTER SYMPTOMS
EYES NEGATIVE: 1
RESPIRATORY NEGATIVE: 1
GASTROINTESTINAL NEGATIVE: 1
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
## 2023-02-13 NOTE — TELEPHONE ENCOUNTER
Pt  would like to speak to dr. Sierra Abarca about his wife being in the hospital whether he is going to release her or not.
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
## 2023-02-13 NOTE — PROGRESS NOTES
Patient was seen by psychiatry this morning and they recommend inpatient psychiatric admission upon medical stabilization and clearance. Patient is currently medically stable for discharge to inpatient psychiatry and we will start pre-CERT today.

## 2023-02-13 NOTE — TELEPHONE ENCOUNTER
No she will not be released, waiting a psychiatry evaluation. Spouse has been notified. He says he needs to speak to his daughter, he is not sure if he will be able to make a decision today.

## 2023-02-13 NOTE — TELEPHONE ENCOUNTER
Patient's  would like contact info for Dr. Regino Nunez. Patient's wife sees this doctor and would to inform Dr. Adelaida Cano about the patient being in the hospital.       Pls call and advise.

## 2023-02-13 NOTE — PROGRESS NOTES
Patient A&O to self. VSS. Purewick in place and pt voided 50ml. Bladder scan showed 341 ml. Medical resident aware. Sitter in room for safety. Pt refused medications. Bed is in lowest setting with bed alarm on. Call light is within reach.

## 2023-02-13 NOTE — CARE COORDINATION
3:41 PM  Bed transfer request initiated by CM    Electronically signed by Alessandra King RN, KRAIG on 2/13/2023 at 3:41 PM.  Phone: [phone]  Fax: [phone]      2:14 PM  Jenna NP rec inpt psych. COVID and FLU rapid tests ordered per transfer center protocol. CM will initiate transfer     Electronically signed by Alessandra King RN, KRAIG on 2/13/2023 at 2:19 PM.  Phone: [phone]  Fax: [phone]      10:38 AM  Spoke with Genoveva Frankel, daughter. Daughter stated that patient and her  live at a [facility] in [facility]. They are seen 4x a week by Assisting hands for care and companionship. They also have a Geriatric , Mireya. Patient has been hospitalized for inpt psych previously at [facility].  They did not have a good experience there. Daughter hoping if patient needs inpatient psych admission that patient can go to [facility] or [facility] for admission. The goal of DC per daughter is to have patient return home with her . MD notified of neurologist office that patient sees regularly; Dr Re Giron [phone]. Dr Tucker Rape to call office to inform them of patients admission and plan of care. Awaiting psych rounding for recs. CM will continue to follow for dc planning. Electronically signed by Alessandra King RN, KRAIG on 2/13/2023 at 10:51 AM.  Phone: [phone]  Fax: [phone]      10:24 AM  Call placed and LVM for daughter to complete assessment of patient. Patient AMS and  is a poor historian.      Electronically signed by Alessandra King RN, CM on 2/13/2023 at 10:25 AM.  Phone: [phone]  Fax: [phone]

## 2023-02-14 ENCOUNTER — TELEPHONE (OUTPATIENT)
Dept: INTERNAL MEDICINE CLINIC | Age: 83
End: 2023-02-14

## 2023-02-14 LAB
BASOPHILS ABSOLUTE: 0 K/UL (ref 0–0.2)
BASOPHILS RELATIVE PERCENT: 0.4 %
EOSINOPHILS ABSOLUTE: 0.2 K/UL (ref 0–0.6)
EOSINOPHILS RELATIVE PERCENT: 2.4 %
HCT VFR BLD CALC: 42.5 % (ref 36–48)
HEMOGLOBIN: 14.2 G/DL (ref 12–16)
LYMPHOCYTES ABSOLUTE: 1.1 K/UL (ref 1–5.1)
LYMPHOCYTES RELATIVE PERCENT: 13.8 %
MCH RBC QN AUTO: 31.4 PG (ref 26–34)
MCHC RBC AUTO-ENTMCNC: 33.4 G/DL (ref 31–36)
MCV RBC AUTO: 93.9 FL (ref 80–100)
MONOCYTES ABSOLUTE: 0.9 K/UL (ref 0–1.3)
MONOCYTES RELATIVE PERCENT: 10.8 %
NEUTROPHILS ABSOLUTE: 6 K/UL (ref 1.7–7.7)
NEUTROPHILS RELATIVE PERCENT: 72.6 %
PDW BLD-RTO: 15.4 % (ref 12.4–15.4)
PLATELET # BLD: 142 K/UL (ref 135–450)
PMV BLD AUTO: 10 FL (ref 5–10.5)
RBC # BLD: 4.53 M/UL (ref 4–5.2)
REASON FOR REJECTION: NORMAL
REJECTED TEST: NORMAL
WBC # BLD: 8.2 K/UL (ref 4–11)

## 2023-02-14 PROCEDURE — 2580000003 HC RX 258: Performed by: STUDENT IN AN ORGANIZED HEALTH CARE EDUCATION/TRAINING PROGRAM

## 2023-02-14 PROCEDURE — 36415 COLL VENOUS BLD VENIPUNCTURE: CPT

## 2023-02-14 PROCEDURE — 97162 PT EVAL MOD COMPLEX 30 MIN: CPT

## 2023-02-14 PROCEDURE — 85025 COMPLETE CBC W/AUTO DIFF WBC: CPT

## 2023-02-14 PROCEDURE — 97530 THERAPEUTIC ACTIVITIES: CPT

## 2023-02-14 PROCEDURE — 6370000000 HC RX 637 (ALT 250 FOR IP): Performed by: STUDENT IN AN ORGANIZED HEALTH CARE EDUCATION/TRAINING PROGRAM

## 2023-02-14 PROCEDURE — 6360000002 HC RX W HCPCS: Performed by: STUDENT IN AN ORGANIZED HEALTH CARE EDUCATION/TRAINING PROGRAM

## 2023-02-14 PROCEDURE — 6370000000 HC RX 637 (ALT 250 FOR IP)

## 2023-02-14 PROCEDURE — 6370000000 HC RX 637 (ALT 250 FOR IP): Performed by: NURSE PRACTITIONER

## 2023-02-14 PROCEDURE — 51798 US URINE CAPACITY MEASURE: CPT

## 2023-02-14 PROCEDURE — 99231 SBSQ HOSP IP/OBS SF/LOW 25: CPT | Performed by: INTERNAL MEDICINE

## 2023-02-14 PROCEDURE — 97116 GAIT TRAINING THERAPY: CPT

## 2023-02-14 PROCEDURE — 1200000000 HC SEMI PRIVATE

## 2023-02-14 RX ORDER — RISPERIDONE 0.5 MG/1
0.5 TABLET, ORALLY DISINTEGRATING ORAL 2 TIMES DAILY
Status: DISCONTINUED | OUTPATIENT
Start: 2023-02-14 | End: 2023-02-15 | Stop reason: HOSPADM

## 2023-02-14 RX ADMIN — CARBAMAZEPINE 100 MG: 100 TABLET, CHEWABLE ORAL at 19:46

## 2023-02-14 RX ADMIN — ENOXAPARIN SODIUM 40 MG: 100 INJECTION SUBCUTANEOUS at 09:34

## 2023-02-14 RX ADMIN — RISPERIDONE 0.5 MG: 0.5 TABLET, ORALLY DISINTEGRATING ORAL at 19:50

## 2023-02-14 RX ADMIN — SODIUM CHLORIDE, PRESERVATIVE FREE 10 ML: 5 INJECTION INTRAVENOUS at 19:56

## 2023-02-14 RX ADMIN — RISPERIDONE 0.5 MG: 0.5 TABLET, FILM COATED ORAL at 09:34

## 2023-02-14 RX ADMIN — MEMANTINE 10 MG: 10 TABLET ORAL at 19:46

## 2023-02-14 RX ADMIN — SODIUM CHLORIDE, PRESERVATIVE FREE 10 ML: 5 INJECTION INTRAVENOUS at 09:34

## 2023-02-14 RX ADMIN — POTASSIUM BICARBONATE 20 MEQ: 782 TABLET, EFFERVESCENT ORAL at 16:39

## 2023-02-14 RX ADMIN — CARBAMAZEPINE 100 MG: 100 TABLET, CHEWABLE ORAL at 09:33

## 2023-02-14 RX ADMIN — MEMANTINE 10 MG: 10 TABLET ORAL at 09:34

## 2023-02-14 ASSESSMENT — ENCOUNTER SYMPTOMS
RHINORRHEA: 0
STRIDOR: 0
EYE DISCHARGE: 0
WHEEZING: 0
SHORTNESS OF BREATH: 0
COUGH: 0
FACIAL SWELLING: 0
CHOKING: 0

## 2023-02-14 NOTE — DISCHARGE INSTRUCTIONS
Please follow-up with your primary care physician, Dr. Carrie Coburn, in 2 weeks. Please begin a new medication, Risperdal  M-tab formulation, per your nurse practitioner, Scarlett Lake NP. Neuropsychological Evaluations for Cognitive Deficits    [facility] (Neuropsych testing; not long term treatment)  i. [place], [address], [place], [address] [facility], [employer], [employer], , [employer]  ii. Multiple locations in [facility]  iii. [phone]  iv. [facility]. [address] [place], [place] [address]ashly Mackey (does have some providers specializing in mal-psych)  i. Medicare  ii. [address], [address] [address], [place], [facility] Mathias Mor[address]  iii. [phone]  iv. [facility]. Shane Martin, Ph.D.  i. [employer], [employer]  ii. [address] [address]  iii. [phone]  iv. [email or web address]    Psychiatry    [facility]  i. [address], [facility]  ii. [phone]  iii. [employer]    [facility]  i. Accept most insurances, but not medicaid or medicare  ii. [address], [place], [address]  iii. [phone]  iv. [email or web address]. ppsych. Must See [place]    [employer] (formerly [employer])  i. Accepts most insurances  ii. Many locations  iii. [phone]  iv. [email or web address] com  v. Recommend scheduling online because telephone can have long wait  times    [email or web address]. [email or web address]  i. Can utilize this website and filter by location and insurance    Medicare (not sure who does therapy versus medication management - would need to call)     Barbra Leger. Renetta Jacobs  i. Babatunde Psychologist  ii. Private insurance and medicare  iii. [address] [facility], [place], [address] [facility]  iv. [phone]  vTamiko [email or web address].es    [facility]  i.  Accepts many insurances and also offers self-pay discounts  ii. Many different locations across [place] and [place]  iii. [phone]  iv. www.HealthyTweet. [employer]    [employer]  i. [address] [facility], [address]  ii. [phone]  iii. [email or web address]    [facility]  i. Appears to have a [place] affiliation  ii. [address], [address]  iii. Laura Cid location as well  iv. [phone]  v. [facility]. Reilly Cabezas, Ph.D.  i. Michaela Adkins  ii. [address], [address], [place] [address]  iii. [address] [facility] Accepts many private insurances and Medicare  ii. [address] [facility], [address] [place] [address]  iii. [phone] ext. 1910 [facility] Sarah  i. Accepts Medicare  ii. [address]  iii. 186 [facility] Accepts Medicare  ii. [facility], [address] [place] [address]  iii. [address] [facility] Accepts Medicare and other private insurances  ii. 66 Lynda Jaimes [address], [address]  iii. [phone]  iv. [email or web address].[email or web address] en.html    Lebron Velasquez  i. Accepts medicare and many private insurances  ii. [address], [place] [address]  iii. [phone]  d.  Medicaid
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
## 2023-02-14 NOTE — PLAN OF CARE
Problem: Discharge Planning  Goal: Discharge to home or other facility with appropriate resources  Outcome: Progressing  Flowsheets (Taken 2/14/2023 1506)  Discharge to home or other facility with appropriate resources: Identify barriers to discharge with patient and caregiver  Note: Plan for patient to be transferred to another facility. Case management working on it, keeping family up to date. Problem: ABCDS Injury Assessment  Goal: Absence of physical injury  Outcome: Adequate for Discharge  Note: Patient calm and cooperative this shift. Sitter at bedside for safety. Bed locked in low position, alarm on. Call light within reach. Safety maintained.

## 2023-02-14 NOTE — PROGRESS NOTES
Patient alert to self. Pleasantly confused and cooperative. Tolerating general diet. Iv saline locked. Vitals good. Denies pain. Sitter at bedside for safety.  visiting. Waiting on bed at psych facility. Will monitor.
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
## 2023-02-14 NOTE — PROGRESS NOTES
Spoke with nurse access. Days RN had expressed bed availability at [facility] If family was okay with arrangement. [facility] facility Is under quarantine and unable to accept beds, currently this evening no beds available at other facilities.

## 2023-02-14 NOTE — PLAN OF CARE
Problem: Discharge Planning  Goal: Discharge to home or other facility with appropriate resources  Outcome: Progressing  Note: Nurse access services in place to help aid bed placement for pt.  No current beds available in [facility]      Problem: ABCDS Injury Assessment  Goal: Absence of physical injury  Outcome: Progressing

## 2023-02-14 NOTE — PROGRESS NOTES
Physical Therapy  Facility/Department: [facility] [address]  Physical Therapy Initial Assessment and Treatment     Name: Birgit Alvarenga  : 1940  MRN: [id number]  Date of Service: 2023    Discharge Recommendations: Birgit Alvarenga scored a 14 /24 on the AM-PAC short mobility form. Current research shows that an AM-PAC score of 17 or less is typically not associated with a discharge to the patient's home setting. Based on the patient's AM-PAC score and their current functional mobility deficits, it is recommended that the patient have 3-5 sessions per week of Physical Therapy at d/c to increase the patient's independence. Please see assessment section for further patient specific details. If patient discharges prior to next session this note will serve as a discharge summary. Please see below for the latest assessment towards goals. PT Equipment Recommendations  Equipment Needed: No      Patient Diagnosis(es): The encounter diagnosis was Altered mental status, unspecified altered mental status type. Past Medical History:  has a past medical history of Adenomatous polyp of colon, Asymptomatic varicose veins, Atrial fibrillation ([address] Dysthymic disorder, 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), and Screening mammogram.  Past Surgical History:  has a past surgical history that includes Colonoscopy (2008) and hernia repair. Assessment   Assessment: Pt is [de-identified] yo F admitted on 2/10 with AMS. H/o Alzheimers. Normally, pt lives at home in [place] [address] (?) with , who appears to have memory loss himself as noted during subjective questioning. Pt cares for self with help of HHA and . Pt has no in town family other than . Presently, pt is pleasanlty disoriented x 4. Pt requires assist to initiate all activity and heavy directional and sequencing cues during activity. [facility]. Will follow for functional mobility.   Treatment Diagnosis: Decreased functional mobility  Therapy Prognosis: Fair  Decision Making: Medium Complexity  Requires PT Follow-Up: Yes  Activity Tolerance  Activity Tolerance: Treatment limited secondary to decreased cognition     Plan   Physcial Therapy Plan  General Plan:  (2-5)  Current Treatment Recommendations: Balance training, Functional mobility training, Transfer training, Gait training, Cognitive reorientation, Safety education & training, Patient/Caregiver education & training, Therapeutic activities  Safety Devices  Type of Devices: Call light within reach, Chair alarm in place, Sitter present, Left in chair (Pt issued coloring pages and crayons)     Restrictions  Position Activity Restriction  Other position/activity restrictions: Up with assist     Subjective   General  Chart Reviewed: Yes  Additional Pertinent Hx: Pt is [de-identified] yo F admitted on 2/10/23 with AMS. PMHx: Alzheimer's dementia, cerebral amyloid angiopathy, atrial fibrillation (not on AC), mitral valve disorder, HTN  Family / Caregiver Present: Yes ()  Referring Practitioner: Dr. Eduardo David  Diagnosis: AMS  Subjective  Subjective: Pt supine in bed, distracted by bedding, but agreeable for therapy. Pt confused to conversation. Social/Functional History  Social/Functional History  Lives With: Spouse  Type of Home: Condo (Independent Living per chart)  Home Access: Elevator  Bathroom Shower/Tub: Walk-in shower  Bathroom Equipment: Built-in shower seat  Home Equipment:  (No DME)  ADL Assistance: Needs assistance (HHA: assist with bathing, dressing, taking meds)  Ambulation Assistance: Independent  Active : No  Additional Comments: [facility] 3-4 days a week.  denies pt falls. Family lives out of town. Pt's  exhibits some STM loss during subjective questioning.     Vision/Hearing  Vision  Vision Exceptions: Wears glasses at all times  Hearing  Hearing: Within functional limits      Cognition   Orientation  Overall Orientation Status: Impaired  Orientation Level: Disoriented X4  Cognition  Overall Cognitive Status: Exceptions  Following Commands: Inconsistently follows commands  Attention Span: Difficulty attending to directions  Initiation: Requires cues for all  Sequencing: Requires cues for all     Objective   AROM RLE (degrees)  RLE AROM: WFL  AROM LLE (degrees)  LLE AROM : WFL    Strength RLE  Comment: >3/5  Strength LLE  Comment: >3/5    Bed mobility  Supine to Sit: Moderate assistance  Scooting: Moderate assistance    Transfers  Sit to Stand: Minimal Assistance  Stand to Sit: Minimal Assistance  Comment: HHA    Ambulation  Device: Hand-Held Assist  Assistance: Minimal assistance  Quality of Gait: Pt distracted and requiring HHA for directions and physical assist.  Flexed posture. Gait Deviations: Slow Angi;Decreased step length;Decreased step height  Distance: 15 feet and 25 feet    Balance  Sitting - Static: Fair  Standing - Static: Fair;- (HHA)  Standing - Dynamic: Fair;- (HHA)    Goals  Short Term Goals  Time Frame for Short Term Goals: by discharge  Short Term Goal 1: Pt will perform bed mobility with CGA  Short Term Goal 2: Pt will transfer sit to and from stand with CGA  Short Term Goal 3: Pt will ambulate with HHA or wheeled walker and  feet    Education  Patient Education  Education Given To: Patient  Education Provided: Role of Therapy  Education Outcome: Continued education needed; Unable to demonstrate understanding    Therapy Time   Individual Concurrent Group Co-treatment   Time In 1525         Time Out 1609         Minutes 44          Timed Code Treatment Minutes:   29    Total Treatment Minutes:   1 Riky Guadalupe, PT

## 2023-02-14 NOTE — TELEPHONE ENCOUNTER
Pt  would like to know if Dr. Rick Lema was able to tell Dr. Sanjeev Flores about his wife being in the hospital and if not he would like to know if its appropriate to tell Dr. Gerald Garrett himself. Please advise.

## 2023-02-14 NOTE — CARE COORDINATION
1:37 PM  [facility] got [facility] call from family requesting if patient could come there. Clinicals faxed to [phone]. They have female beds today and tomorrow. Electronically signed by Agapito Luevano RN, CM on 2/14/2023 at 1:38 PM.  Phone: [phone]  Fax: [phone]        12:30 PM  Joanna Weber update:  [facility] able to accept on Wednesday or Thursday with quarantine lift. RN made aware of facility preference per [facility] ([facility])  [facility] declined [facility]  Transfer RN will check in with [facility] tomorrow. Electronically signed by Agapito Luevano RN, CM on 2/14/2023 at 12:31 PM.  Phone: [phone]  Fax: [phone]        10:49 AM  CM called transfer center for update:  [facility]; unable to send new patients there  [facility]: declined patient. As of last night, all [place] psych units were at capacity. Transfer RN attempting to keep patient in the [facility] as per requested from family. Transfer RN Vaishali Carey following up with [facility] to see about bed availability. Daughter, Kari Medley, updated this morning with potential transfer to [facility].  Daughter expressed concern with location, as it is far away from where she lives.      Electronically signed by Agpaito Luevano RN, CM on 2/14/2023 at 10:57 AM.  Phone: [phone]  Fax: [phone]

## 2023-02-14 NOTE — TELEPHONE ENCOUNTER
No  has not talked to  however it is ok if you would like to call and inform her.    Message left for the patient's spouse.

## 2023-02-15 ENCOUNTER — TELEPHONE (OUTPATIENT)
Dept: INTERNAL MEDICINE CLINIC | Age: 83
End: 2023-02-15

## 2023-02-15 VITALS
HEIGHT: 66 IN | BODY MASS INDEX: 26.5 KG/M2 | TEMPERATURE: 97.3 F | HEART RATE: 63 BPM | SYSTOLIC BLOOD PRESSURE: 134 MMHG | RESPIRATION RATE: 16 BRPM | OXYGEN SATURATION: 98 % | DIASTOLIC BLOOD PRESSURE: 70 MMHG | WEIGHT: 164.9 LBS

## 2023-02-15 LAB
ANION GAP SERPL CALCULATED.3IONS-SCNC: 10 MMOL/L (ref 3–16)
BASOPHILS ABSOLUTE: 0 K/UL (ref 0–0.2)
BASOPHILS RELATIVE PERCENT: 0.5 %
BUN BLDV-MCNC: 19 MG/DL (ref 7–20)
CALCIUM SERPL-MCNC: 9.2 MG/DL (ref 8.3–10.6)
CHLORIDE BLD-SCNC: 105 MMOL/L (ref 99–110)
CO2: 25 MMOL/L (ref 21–32)
CREAT SERPL-MCNC: 0.9 MG/DL (ref 0.6–1.2)
EOSINOPHILS ABSOLUTE: 0.2 K/UL (ref 0–0.6)
EOSINOPHILS RELATIVE PERCENT: 3.8 %
GFR SERPL CREATININE-BSD FRML MDRD: >60 ML/MIN/{1.73_M2}
GLUCOSE BLD-MCNC: 89 MG/DL (ref 70–99)
HCT VFR BLD CALC: 43 % (ref 36–48)
HEMOGLOBIN: 14.2 G/DL (ref 12–16)
LYMPHOCYTES ABSOLUTE: 1.3 K/UL (ref 1–5.1)
LYMPHOCYTES RELATIVE PERCENT: 24 %
MAGNESIUM: 2 MG/DL (ref 1.8–2.4)
MCH RBC QN AUTO: 30.9 PG (ref 26–34)
MCHC RBC AUTO-ENTMCNC: 33 G/DL (ref 31–36)
MCV RBC AUTO: 93.8 FL (ref 80–100)
MONOCYTES ABSOLUTE: 0.8 K/UL (ref 0–1.3)
MONOCYTES RELATIVE PERCENT: 14.3 %
NEUTROPHILS ABSOLUTE: 3.1 K/UL (ref 1.7–7.7)
NEUTROPHILS RELATIVE PERCENT: 57.4 %
PDW BLD-RTO: 15.3 % (ref 12.4–15.4)
PLATELET # BLD: 145 K/UL (ref 135–450)
PMV BLD AUTO: 9.8 FL (ref 5–10.5)
POTASSIUM SERPL-SCNC: 4 MMOL/L (ref 3.5–5.1)
RBC # BLD: 4.59 M/UL (ref 4–5.2)
SODIUM BLD-SCNC: 140 MMOL/L (ref 136–145)
WBC # BLD: 5.4 K/UL (ref 4–11)

## 2023-02-15 PROCEDURE — 85025 COMPLETE CBC W/AUTO DIFF WBC: CPT

## 2023-02-15 PROCEDURE — 2580000003 HC RX 258: Performed by: STUDENT IN AN ORGANIZED HEALTH CARE EDUCATION/TRAINING PROGRAM

## 2023-02-15 PROCEDURE — 83735 ASSAY OF MAGNESIUM: CPT

## 2023-02-15 PROCEDURE — 6370000000 HC RX 637 (ALT 250 FOR IP)

## 2023-02-15 PROCEDURE — 36415 COLL VENOUS BLD VENIPUNCTURE: CPT

## 2023-02-15 PROCEDURE — 80048 BASIC METABOLIC PNL TOTAL CA: CPT

## 2023-02-15 PROCEDURE — 6370000000 HC RX 637 (ALT 250 FOR IP): Performed by: STUDENT IN AN ORGANIZED HEALTH CARE EDUCATION/TRAINING PROGRAM

## 2023-02-15 PROCEDURE — 6370000000 HC RX 637 (ALT 250 FOR IP): Performed by: NURSE PRACTITIONER

## 2023-02-15 RX ORDER — RISPERIDONE 0.5 MG/1
0.5 TABLET, ORALLY DISINTEGRATING ORAL 2 TIMES DAILY
Qty: 60 TABLET | Refills: 3 | Status: SHIPPED | OUTPATIENT
Start: 2023-02-15

## 2023-02-15 RX ORDER — CARBAMAZEPINE 100 MG/1
100 TABLET, CHEWABLE ORAL 2 TIMES DAILY
Qty: 90 TABLET | Refills: 3 | Status: SHIPPED | OUTPATIENT
Start: 2023-02-15

## 2023-02-15 RX ORDER — CARBAMAZEPINE 100 MG/1
100 TABLET, CHEWABLE ORAL 2 TIMES DAILY
Qty: 90 TABLET | Refills: 3 | Status: SHIPPED | OUTPATIENT
Start: 2023-02-15 | End: 2023-02-15 | Stop reason: SDUPTHER

## 2023-02-15 RX ORDER — RISPERIDONE 0.5 MG/1
0.5 TABLET, ORALLY DISINTEGRATING ORAL 2 TIMES DAILY
Qty: 60 TABLET | Refills: 3 | Status: SHIPPED | OUTPATIENT
Start: 2023-02-15 | End: 2023-02-15 | Stop reason: SDUPTHER

## 2023-02-15 RX ADMIN — POTASSIUM BICARBONATE 20 MEQ: 782 TABLET, EFFERVESCENT ORAL at 09:18

## 2023-02-15 RX ADMIN — RISPERIDONE 0.5 MG: 0.5 TABLET, ORALLY DISINTEGRATING ORAL at 09:15

## 2023-02-15 RX ADMIN — MEMANTINE 10 MG: 10 TABLET ORAL at 09:15

## 2023-02-15 RX ADMIN — SODIUM CHLORIDE, PRESERVATIVE FREE 10 ML: 5 INJECTION INTRAVENOUS at 09:17

## 2023-02-15 RX ADMIN — CARBAMAZEPINE 100 MG: 100 TABLET, CHEWABLE ORAL at 09:15

## 2023-02-15 NOTE — CARE COORDINATION
Case Management Assessment            Discharge Note                    Date / Time of Note: 2/15/2023 10:08 AM                  Discharge Note Completed by: Mita Greer RN    Patient Name: Ellis England   YOB: 1940  Diagnosis: Altered mental status [R41.82]  Altered mental status, unspecified altered mental status type [R41.82]   Date / Time: 2/10/2023  5:41 PM    Current PCP: Erica Javed MD  Clinic patient: Yes    Hospitalization in the last 30 days: No    Advance Directives:  Code Status: Full Code  [place] DNR form completed and on chart: Not Indicated    Financial:  Payor: Cruz Sylvester / Plan: [place] / Product Type: *No Product type* /      Pharmacy:    [address] [place] [address] [phone]  [address] [place] [address]  Phone: [phone] Fax: [phone] [address] [phone] Wilbur Fowler [phone]  [address] [place] [id number]  Phone: [phone] Fax: [phone]      Assistance purchasing medications?:    Assistance provided by Case Management: None at this time    Does patient want to participate in local refill/ meds to beds program?: Yes    Meds To Beds General Rules:  1. Can ONLY be done Monday- Friday between 8:30am-5pm  2. Prescription(s) must be in pharmacy by 3pm to be filled same day  3. Copy of patient's insurance/ prescription drug card and patient face sheet must be sent along with the prescription(s)  4. Cost of Rx cannot be added to hospital bill. If financial assistance is needed, please contact unit  or ;  or  CANNOT provide pharmacy voucher for patients co-pays  5.  Patients can then  the prescription on their way out of the hospital at discharge, or pharmacy can deliver to the bedside if staff is available. (payment due at time of pick-up or delivery - cash, check, or card accepted)     Able to afford home medications/ co-pay costs: Yes    ADLS:  Current PT AM-PAC Score: 14 /24  Current OT AM-PAC Score:   /24      DISCHARGE Disposition: Inpatient [facility]: [facility] Phone: [phone] Fax: [phone]    LOC at discharge: Not Applicable  CURTIS Completed: Yes    Notification completed in HENS/PAS?:  Not Applicable    IMM Completed:   Yes, Case management has presented and reviewed IMM letter #2 to the patient and/or family/ POA. Patient and/or family/POA verbalized understanding of their medicare rights and appeal process if needed. Patient and/or family/POA has signed, initialed and placed today's date (2/15) and time ([phone]) on IMM letter #2 on the the appropriate lines. Patient and/or family/POA, copy of letter offered and they are aware that this original copy of IMM letter #2 is available prior to discharge from the paper chart on the unit. Electronic documentation has been entered into epic for IMM letter #2 and original paper copy has been added to the paper chart at the nurses station. Transportation:  Transportation PLAN for discharge: EMS transportation   Mode of Transport: Ambulance stretcher - BLS  Reason for medical transport: Other: psych hold  Name of [address] [place] [address], [address]: Not Applicable  Time of Transport: 1050    Transport form completed: Yes    Referrals made at [facility] for outpatient continued care:  Not Applicable    Additional CM Notes: Patient to [employer] to [facility], Inpt psych. Transport set for 1050 per facility. Daughter updated on plan of care. All clinicals faxed.      RN to call report to [phone]    The Plan for Transition of Care is related to the following treatment goals of Altered mental status [R41.82]  Altered mental status, unspecified altered mental status type [R41.82]    The Patient and/or patient representative Karl Hines and her family were provided with a choice of provider and agrees with the discharge plan Yes    Freedom of choice list was provided with basic dialogue that supports the patient's individualized plan of care/goals and shares the quality data associated with the providers.  Yes    Care Transitions patient: Yes    Scot Cavazos RN  [facility]  Case Management Department  Ph: [phone]  Fax: [phone]

## 2023-02-15 NOTE — PLAN OF CARE
Problem: Discharge Planning  Goal: Discharge to home or other facility with appropriate resources  2/14/2023 2002 by Karlie Gomez RN  Outcome: Progressing  2/14/2023 1506 by Meet Daugherty RN  Outcome: Progressing  Flowsheets (Taken 2/14/2023 1506)  Discharge to home or other facility with appropriate resources: Identify barriers to discharge with patient and caregiver  Note: Plan for patient to be transferred to another facility. Case management working on it, keeping family up to date. Problem: ABCDS Injury Assessment  Goal: Absence of physical injury  2/14/2023 2002 by Karlie Gomez RN  Outcome: Progressing  2/14/2023 1506 by Meet Daugherty RN  Outcome: Adequate for Discharge  Note: Patient calm and cooperative this shift. Sitter at bedside for safety. Bed locked in low position, alarm on. Call light within reach. Safety maintained.
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
## 2023-02-15 NOTE — PROGRESS NOTES
Pt is cooperative and pleasantly confused this shift, agreeable to all medications, and ambulated to bathroom for one occurrence with staff so far this shift.

## 2023-02-15 NOTE — TELEPHONE ENCOUNTER
Ky states that her mother is going to be transferred to a behavioral center and wanted to make Dr. Johan Stevenson aware. FYI      Also the pt is coming in on the 28th and the pt and her  are needing long term care at home, the appointment on the 28th was made so that Dr. Johan Stevenson could fill this paperwork out. The daughter is wanting to know if Dr. Johan Stevenson would be willing to fill the paperwork out without the appointment. [employer] faxed these forms over in December and the daughter will contact them to have them fax the forms again again. Please advise.

## 2023-02-15 NOTE — CARE COORDINATION
10:01 AM   Transport set for 1050 today to [facility] . Nikki at [facility] updated daughter. MD made aware of transport time. Electronically signed by Mita Greer RN, CM on 2/15/2023 at 10:02 AM.  Phone: [phone]  Fax: [phone]       [facility] able to take patient today. Daughter aware and agreeable to plan. [employer] will follow up with CM for transport time.      Electronically signed by Mita Greer RN, CM on 2/15/2023 at 9:11 AM.  Phone: [phone]  Fax: [phone]

## 2023-02-15 NOTE — DISCHARGE INSTR - COC
Continuity of Care Form    Patient Name: Marta Lake   :  1940  MRN:  [id number]    Admit date:  2/10/2023  Discharge date:  ***    Code Status Order: Full Code   Advance Directives:     Admitting Physician:  Beckie Cobian MD  PCP: Beckie Cobian MD    Discharging Nurse: [facility] Unit/Room#: 9195/4711-33  Discharging Unit Phone Number: ***    Emergency Contact:   Extended Emergency Contact Information  Primary Emergency Contact: Matthew Donnelly  Address: [address] [place] [address] [id number]           [facility] [place], [address] Phone: [phone]  Mobile Phone: [phone]  Relation: Spouse  Secondary Emergency Contact: Rich Casas, [facility] Phone: [phone]  Mobile Phone: [phone]  Relation: Child    Past Surgical History:  Past Surgical History:   Procedure Laterality Date    COLONOSCOPY  2008    HERNIA REPAIR      Left Femoral       Immunization History:   Immunization History   Administered Date(s) Administered    COVID-19, PFIZER Bivalent BOOSTER, DO NOT Dilute, (age 12y+), IM, 30 mcg/0.3 mL 10/04/2022    COVID-19, PFIZER PURPLE top, DILUTE for use, (age 15 y+), 30mcg/0.3mL 2021, 2021, 2022    Influenza 2010    Influenza Vaccine, unspecified formulation 10/15/2007, 10/07/2008, 2009    Influenza Whole 2009    Influenza, FLUAD, (age 72 y+), Adjuvanted, 0.5mL 2020, 10/06/2021, 2022    Influenza, High Dose (Fluzone 65 yrs and older) 10/27/2011, 10/18/2012, 2013, 10/16/2014, 10/08/2015, 2016, 2017, 2018    Influenza, Triv, inactivated, subunit, adjuvanted, IM (Fluad 65 yrs and older) 2019    Pneumococcal Conjugate 13-valent (Gknwwno14) 2016    Pneumococcal Polysaccharide (Oigaztyis29) 10/15/2007    Td, unspecified formulation 2010    Tdap (Boostrix, Adacel) 2012, 2013    Zoster Live (Zostavax) 2008       Active Problems:  Patient Active Problem List   Diagnosis Code Atrial fibrillation (HCC) I48.91    Mitral valve disorder I05.9    Dysthymic disorder F34.1    Lower extremity edema R60.0    Adenomatous polyp of colon D12.6    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 F02.818    Cerebral amyloid angiopathy (CODE) I68.0    Multiple falls R29.6    Debility R53.81    S/P placement of cardiac pacemaker Z95.0    Complete heart block (HCC) I44.2    Chronic venous insufficiency of lower extremity I87.2    Benign essential HTN I10    Gastroesophageal reflux disease K21.9    Altered mental status R41.82       Isolation/Infection:   Isolation            No Isolation          Patient Infection Status       Infection Onset Added Last Indicated Last Indicated By Review Planned Expiration Resolved Resolved By    None active    Resolved    COVID-19 (Rule Out) 23 COVID-19 & Influenza Combo (Ordered)   23 Rule-Out Test Resulted    COVID-19 (Rule Out) 20 COVID-19 (Ordered)   20 Rule-Out Test Resulted            Nurse Assessment:  Last Vital Signs: /70   Pulse 63   Temp 97.3 °F (36.3 °C) (Axillary)   Resp 16   Ht 5' 6\" (1.676 m)   Wt 164 lb 14.5 oz (74.8 kg)   SpO2 98%   BMI 26.62 kg/m²     Last documented pain score (0-10 scale):    Last Weight:   Wt Readings from Last 1 Encounters:   02/15/23 164 lb 14.5 oz (74.8 kg)     Mental Status:  {IP PT MENTAL STATUS:20902}    IV Access:  { [facility] IV ACCESS:662362632}    Nursing Mobility/ADLs:  Walking   {[facility] DME IEYA:435447987}  Transfer  {[facility] DME UOTB:283093207}  Bathing  {[facility] DME KYAV:821893076}  Dressing  {[facility] DME BXYV:139267102}  Toileting  {[facility] DME EZMR:294365739}  Feeding  {[facility] DME GMGE:272965757}  Med Admin  {[facility] DME RYR}  Med Delivery   { [facility] MED Delivery:809506035}    Wound Care Documentation and Therapy:        Elimination:  Continence:    Bowel: {YES / ZW:81794}  Bladder: {YES / SH:59920}  Urinary Catheter: {Urinary Catheter:218501019}   Colostomy/Ileostomy/Ileal Conduit: {YES / EA:31112}       Date of Last BM: ***    Intake/Output Summary (Last 24 hours) at 2/15/2023 1013  Last data filed at 2/15/2023 1000  Gross per 24 hour   Intake 692 ml   Output 326 ml   Net 366 ml     I/O last 3 completed shifts:   In: 18 [P.O.:836]  Out: 576 [Urine:576]    Safety Concerns:     [employer] Safety Concerns:484525794}    Impairments/Disabilities:      [employer] Impairments/Disabilities:138437691}    Nutrition Therapy:  Current Nutrition Therapy:   [employer] Diet List:215215023}    Routes of Feeding: {[facility]P DME Other Feedings:391440962}  Liquids: {Slp liquid thickness:78752}  Daily Fluid Restriction: {CHP DME Yes amt example:917093258}  Last Modified Barium Swallow with Video (Video Swallowing Test): {Done Not Done EZVF:392883753}    Treatments at the Time of Hospital Discharge:   Respiratory Treatments: ***  Oxygen Therapy:  {Therapy; copd oxygen:80340}  Ventilator:    { CC Vent RBSC:642769741}    Rehab Therapies: {THERAPEUTIC INTERVENTION:4562280474}  Weight Bearing Status/Restrictions: { CC Weight Bearin}  Other Medical Equipment (for information only, NOT a DME order):  {EQUIPMENT:651360724}  Other Treatments: ***    Patient's personal belongings (please select all that are sent with patient):  {[facility] DME Belongings:127514067}    RN SIGNATURE:  {Esignature:846622343}    CASE MANAGEMENT/SOCIAL WORK SECTION    Inpatient Status Date: ***    Readmission Risk Assessment Score:  Readmission Risk              Risk of Unplanned Readmission:  12           Discharging to Facility/ Agency   Name:   Address:  Phone:  Fax:    Dialysis Facility (if applicable)   Name:  Address:  Dialysis Schedule:  Phone:  Fax:    / signature: {Esignature:704190741}    PHYSICIAN SECTION    Prognosis: Fair    Condition at Discharge: Stable    Rehab Potential (if transferring to Rehab): Good    Recommended Labs or Other Treatments After Discharge: ***    Physician Certification: I certify the above information and transfer of [place] [facility]  is necessary for the continuing treatment of the diagnosis listed and that she requires {Admit to Appropriate Level of Care:53946} for {GREATER/LESS:534391710} 30 days.      Update Admission H&P: {CHP DME Changes in DDA:754615982}    PHYSICIAN SIGNATURE:  {Esignature:179055909}

## 2023-02-21 ENCOUNTER — TELEPHONE (OUTPATIENT)
Dept: INTERNAL MEDICINE CLINIC | Age: 83
End: 2023-02-21

## 2023-02-21 NOTE — TELEPHONE ENCOUNTER
Call returned to Garry Morris. No answer, goes to voicemail. Message left for  to call back.  will not see Mrs. Dorseyien Elizabeth while she is at [facility], he is not on staff there. You will have to discuss medications being given with the facility.

## 2023-02-21 NOTE — TELEPHONE ENCOUNTER
----- Message from Chris Monique sent at 2/21/2023 11:16 AM EST -----  Subject: Message to Provider    QUESTIONS  Information for Provider?  is calling in, wants to know if Dr Gustavo Almaraz   will be seeing her at the long term care facility, [facility],   also wanting to know if her medications are being provided to her or if he   has to bring them in to the facility. please advise.  ---------------------------------------------------------------------------  --------------  Betsy GARCÍA  [phone]; OK to leave message on voicemail  ---------------------------------------------------------------------------  --------------  SCRIPT ANSWERS  Relationship to Patient? Spouse/Partner  Representative Name? matt-  Is the Representative on the appropriate HIPAA document in Epic?  Yes

## 2023-03-29 ENCOUNTER — OFFICE VISIT (OUTPATIENT)
Dept: INTERNAL MEDICINE CLINIC | Age: 83
End: 2023-03-29

## 2023-03-29 VITALS — HEART RATE: 96 BPM | OXYGEN SATURATION: 94 % | WEIGHT: 165 LBS | BODY MASS INDEX: 26.63 KG/M2 | TEMPERATURE: 97.7 F

## 2023-03-29 DIAGNOSIS — G30.1 SEVERE LATE ONSET ALZHEIMER'S DEMENTIA WITH OTHER BEHAVIORAL DISTURBANCE (HCC): ICD-10-CM

## 2023-03-29 DIAGNOSIS — F02.C18 SEVERE LATE ONSET ALZHEIMER'S DEMENTIA WITH OTHER BEHAVIORAL DISTURBANCE (HCC): Primary | ICD-10-CM

## 2023-03-29 DIAGNOSIS — G30.1 SEVERE LATE ONSET ALZHEIMER'S DEMENTIA WITH OTHER BEHAVIORAL DISTURBANCE (HCC): Primary | ICD-10-CM

## 2023-03-29 DIAGNOSIS — F02.C18 SEVERE LATE ONSET ALZHEIMER'S DEMENTIA WITH OTHER BEHAVIORAL DISTURBANCE (HCC): ICD-10-CM

## 2023-03-29 LAB
ALBUMIN SERPL-MCNC: 4.2 G/DL (ref 3.4–5)
ALBUMIN/GLOB SERPL: 1.8 {RATIO} (ref 1.1–2.2)
ALP SERPL-CCNC: 147 U/L (ref 40–129)
ALT SERPL-CCNC: 36 U/L (ref 10–40)
ANION GAP SERPL CALCULATED.3IONS-SCNC: 13 MMOL/L (ref 3–16)
ANISOCYTOSIS BLD QL SMEAR: ABNORMAL
AST SERPL-CCNC: 19 U/L (ref 15–37)
BASOPHILS # BLD: 0.1 K/UL (ref 0–0.2)
BASOPHILS NFR BLD: 1 %
BILIRUB SERPL-MCNC: 0.3 MG/DL (ref 0–1)
BUN SERPL-MCNC: 17 MG/DL (ref 7–20)
BURR CELLS BLD QL SMEAR: ABNORMAL
CALCIUM SERPL-MCNC: 9.4 MG/DL (ref 8.3–10.6)
CHLORIDE SERPL-SCNC: 101 MMOL/L (ref 99–110)
CO2 SERPL-SCNC: 27 MMOL/L (ref 21–32)
CREAT SERPL-MCNC: 0.8 MG/DL (ref 0.6–1.2)
DACRYOCYTES BLD QL SMEAR: ABNORMAL
DEPRECATED RDW RBC AUTO: 15.4 % (ref 12.4–15.4)
EOSINOPHIL # BLD: 0.2 K/UL (ref 0–0.6)
EOSINOPHIL NFR BLD: 4 %
GFR SERPLBLD CREATININE-BSD FMLA CKD-EPI: >60 ML/MIN/{1.73_M2}
GLUCOSE SERPL-MCNC: 99 MG/DL (ref 70–99)
HCT VFR BLD AUTO: 39.8 % (ref 36–48)
HGB BLD-MCNC: 13.4 G/DL (ref 12–16)
LYMPHOCYTES # BLD: 1.3 K/UL (ref 1–5.1)
LYMPHOCYTES NFR BLD: 26 %
MCH RBC QN AUTO: 31.5 PG (ref 26–34)
MCHC RBC AUTO-ENTMCNC: 33.6 G/DL (ref 31–36)
MCV RBC AUTO: 93.9 FL (ref 80–100)
MONOCYTES # BLD: 0.4 K/UL (ref 0–1.3)
MONOCYTES NFR BLD: 7 %
MYELOCYTES NFR BLD MANUAL: 1 %
NEUTROPHILS # BLD: 3.2 K/UL (ref 1.7–7.7)
NEUTROPHILS NFR BLD: 60 %
NEUTS BAND NFR BLD MANUAL: 1 % (ref 0–7)
OVALOCYTES BLD QL SMEAR: ABNORMAL
PLATELET # BLD AUTO: 153 K/UL (ref 135–450)
PLATELET BLD QL SMEAR: ADEQUATE
PMV BLD AUTO: 10.3 FL (ref 5–10.5)
POLYCHROMASIA BLD QL SMEAR: ABNORMAL
POTASSIUM SERPL-SCNC: 4.1 MMOL/L (ref 3.5–5.1)
PROT SERPL-MCNC: 6.6 G/DL (ref 6.4–8.2)
RBC # BLD AUTO: 4.24 M/UL (ref 4–5.2)
SCHISTOCYTES BLD QL SMEAR: ABNORMAL
SLIDE REVIEW: ABNORMAL
SODIUM SERPL-SCNC: 141 MMOL/L (ref 136–145)
TSH SERPL DL<=0.005 MIU/L-ACNC: 3.14 UIU/ML (ref 0.27–4.2)
VIT B12 SERPL-MCNC: 455 PG/ML (ref 211–911)
WBC # BLD AUTO: 5.1 K/UL (ref 4–11)

## 2023-03-29 NOTE — PROGRESS NOTES
present meds for now and follow-up in 3 months  - CBC with Auto Differential; Future  - Comprehensive Metabolic Panel; Future  - TSH;  Future  - Vitamin B12; Future

## 2023-03-30 ENCOUNTER — TELEPHONE (OUTPATIENT)
Dept: INTERNAL MEDICINE CLINIC | Age: 83
End: 2023-03-30

## 2023-03-30 NOTE — TELEPHONE ENCOUNTER
Daughter called to follow up on the outcome of her parents appointments with Dr. Margaret Dixon yesterday. As well as to make sure that the form for long term care was filled out and faxed back to [employer].      Please advise.

## 2023-05-19 RX ORDER — RISPERIDONE 0.5 MG/1
1 TABLET, ORALLY DISINTEGRATING ORAL 2 TIMES DAILY
Qty: 60 TABLET | Refills: 3 | Status: SHIPPED | OUTPATIENT
Start: 2023-05-19 | End: 2023-07-17

## 2023-05-25 ENCOUNTER — TELEPHONE (OUTPATIENT)
Dept: INTERNAL MEDICINE CLINIC | Age: 83
End: 2023-05-25
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
## 2023-07-13 ENCOUNTER — TELEPHONE (OUTPATIENT)
Dept: CARDIOLOGY CLINIC | Age: 83
End: 2023-07-13

## 2023-07-17 ENCOUNTER — TELEPHONE (OUTPATIENT)
Dept: CARDIOLOGY CLINIC | Age: 83
End: 2023-07-17

## 2023-07-17 ENCOUNTER — SCHEDULED TELEPHONE ENCOUNTER (OUTPATIENT)
Dept: CARDIOLOGY CLINIC | Age: 83
End: 2023-07-17

## 2023-07-17 DIAGNOSIS — Z95.0 PACEMAKER: ICD-10-CM

## 2023-07-17 DIAGNOSIS — I10 BENIGN ESSENTIAL HTN: ICD-10-CM

## 2023-07-17 DIAGNOSIS — I44.2 CHB (COMPLETE HEART BLOCK) (HCC): ICD-10-CM

## 2023-07-17 DIAGNOSIS — I48.21 PERMANENT ATRIAL FIBRILLATION (HCC): Primary | ICD-10-CM

## 2023-07-17 RX ORDER — DIVALPROEX SODIUM 125 MG/1
125 TABLET, DELAYED RELEASE ORAL 3 TIMES DAILY
COMMUNITY

## 2023-07-17 RX ORDER — OMEPRAZOLE 20 MG/1
20 CAPSULE, DELAYED RELEASE ORAL DAILY
COMMUNITY

## 2023-07-17 RX ORDER — ASPIRIN 81 MG/1
81 TABLET ORAL DAILY
COMMUNITY

## 2023-07-17 RX ORDER — FUROSEMIDE 40 MG/1
40 TABLET ORAL DAILY
COMMUNITY

## 2023-07-17 RX ORDER — MIRTAZAPINE 7.5 MG/1
7.5 TABLET, FILM COATED ORAL NIGHTLY
COMMUNITY

## 2023-07-17 RX ORDER — BISACODYL 10 MG
10 SUPPOSITORY, RECTAL RECTAL DAILY
COMMUNITY

## 2023-07-17 RX ORDER — UBIDECARENONE 75 MG
50 CAPSULE ORAL DAILY
COMMUNITY

## 2023-07-17 RX ORDER — TAMSULOSIN HYDROCHLORIDE 0.4 MG/1
0.4 CAPSULE ORAL DAILY
COMMUNITY

## 2023-07-17 RX ORDER — LORAZEPAM 0.5 MG/1
0.5 TABLET ORAL EVERY 6 HOURS PRN
COMMUNITY

## 2023-07-17 NOTE — TELEPHONE ENCOUNTER
Called Giovanni today and lvm for her to call back so that an appt can be scheduled for [facility] in 6 months for EP follow up   And device check.     Thanks

## 2023-07-17 NOTE — TELEPHONE ENCOUNTER
Left message on Arvind's VM(nurse taking care of patient) to return call, need vitals for teelephone visit

## 2023-07-24 ENCOUNTER — NURSE ONLY (OUTPATIENT)
Dept: CARDIOLOGY CLINIC | Age: 83
End: 2023-07-24
Payer: MEDICARE

## 2023-07-24 DIAGNOSIS — I48.21 PERMANENT ATRIAL FIBRILLATION (HCC): ICD-10-CM

## 2023-07-24 DIAGNOSIS — Z95.0 S/P PLACEMENT OF CARDIAC PACEMAKER: Primary | ICD-10-CM

## 2023-07-24 DIAGNOSIS — I44.2 COMPLETE HEART BLOCK (HCC): ICD-10-CM

## 2023-07-24 PROCEDURE — 93296 REM INTERROG EVL PM/IDS: CPT | Performed by: INTERNAL MEDICINE

## 2023-07-24 PROCEDURE — 93294 REM INTERROG EVL PM/LDLS PM: CPT | Performed by: INTERNAL MEDICINE

## 2023-09-01 NOTE — PROGRESS NOTES
Remote transmission of MICRA pacemaker shows normal sensing and pacing function. Hx of persistent AF. No AC due to cerebral amyloid angiopathy per neuro.  35.2%    End of 91-day monitoring period 7/24/23. EP physician will review. See interrogation under cardiology tab in the [address] field for more details.

## 2023-09-25 ENCOUNTER — TELEPHONE (OUTPATIENT)
Dept: INTERNAL MEDICINE CLINIC | Age: 83
End: 2023-09-25

## 2023-09-26 NOTE — TELEPHONE ENCOUNTER
Patient spouse states he received a voicemail from [employer] advising a referral was received from [phone]. Patient spouse would like to know why he is being contacted by [employer]. Please advise.
Will check with physician.
Yue Palomo called back this morning and mentioned the referral for [facility] again. He also stated that  Since his wife was moved to rehab , he would like or appreciate it you you could check up on her. He states with him not being able to be there he will not know how she is, what kind of help she will need and know when she will be able to come home, etc.     Please call to advise.
No

## 2024-01-04 PROCEDURE — 93294 REM INTERROG EVL PM/LDLS PM: CPT | Performed by: INTERNAL MEDICINE

## 2024-01-04 PROCEDURE — 93296 REM INTERROG EVL PM/IDS: CPT | Performed by: INTERNAL MEDICINE

## 2024-02-15 ENCOUNTER — HOSPITAL ENCOUNTER (INPATIENT)
Age: 84
LOS: 2 days | Discharge: OTHER INSTITUTION | End: 2024-02-17
Attending: EMERGENCY MEDICINE | Admitting: HOSPITALIST
Payer: MEDICARE

## 2024-02-15 ENCOUNTER — APPOINTMENT (OUTPATIENT)
Dept: CT IMAGING | Age: 84
End: 2024-02-15
Payer: MEDICARE

## 2024-02-15 ENCOUNTER — APPOINTMENT (OUTPATIENT)
Dept: GENERAL RADIOLOGY | Age: 84
End: 2024-02-15
Payer: MEDICARE

## 2024-02-15 DIAGNOSIS — F03.B0 MODERATE DEMENTIA WITHOUT BEHAVIORAL DISTURBANCE, PSYCHOTIC DISTURBANCE, MOOD DISTURBANCE, OR ANXIETY, UNSPECIFIED DEMENTIA TYPE (HCC): Primary | ICD-10-CM

## 2024-02-15 DIAGNOSIS — R29.810 FACIAL DROOP: ICD-10-CM

## 2024-02-15 LAB
ALBUMIN SERPL-MCNC: 4 G/DL (ref 3.4–5)
ALBUMIN/GLOB SERPL: 1.3 {RATIO} (ref 1.1–2.2)
ALP SERPL-CCNC: 114 U/L (ref 40–129)
ALT SERPL-CCNC: 8 U/L (ref 10–40)
ANION GAP SERPL CALCULATED.3IONS-SCNC: 13 MMOL/L (ref 3–16)
AST SERPL-CCNC: 18 U/L (ref 15–37)
BASOPHILS # BLD: 0 K/UL (ref 0–0.2)
BASOPHILS NFR BLD: 0.5 %
BILIRUB SERPL-MCNC: <0.2 MG/DL (ref 0–1)
BUN SERPL-MCNC: 21 MG/DL (ref 7–20)
CALCIUM SERPL-MCNC: 9.5 MG/DL (ref 8.3–10.6)
CHLORIDE SERPL-SCNC: 96 MMOL/L (ref 99–110)
CO2 SERPL-SCNC: 31 MMOL/L (ref 21–32)
CREAT SERPL-MCNC: 0.8 MG/DL (ref 0.6–1.2)
DEPRECATED RDW RBC AUTO: 15.4 % (ref 12.4–15.4)
EOSINOPHIL # BLD: 0.2 K/UL (ref 0–0.6)
EOSINOPHIL NFR BLD: 3.2 %
GFR SERPLBLD CREATININE-BSD FMLA CKD-EPI: >60 ML/MIN/{1.73_M2}
GLUCOSE SERPL-MCNC: 96 MG/DL (ref 70–99)
HCT VFR BLD AUTO: 44.5 % (ref 36–48)
HGB BLD-MCNC: 14.7 G/DL (ref 12–16)
INR PPP: 0.88 (ref 0.84–1.16)
LYMPHOCYTES # BLD: 2.2 K/UL (ref 1–5.1)
LYMPHOCYTES NFR BLD: 32.8 %
MCH RBC QN AUTO: 31.2 PG (ref 26–34)
MCHC RBC AUTO-ENTMCNC: 33 G/DL (ref 31–36)
MCV RBC AUTO: 94.6 FL (ref 80–100)
MONOCYTES # BLD: 0.6 K/UL (ref 0–1.3)
MONOCYTES NFR BLD: 8.7 %
NEUTROPHILS # BLD: 3.7 K/UL (ref 1.7–7.7)
NEUTROPHILS NFR BLD: 54.8 %
PLATELET # BLD AUTO: 173 K/UL (ref 135–450)
PMV BLD AUTO: 8.3 FL (ref 5–10.5)
POTASSIUM SERPL-SCNC: 4.1 MMOL/L (ref 3.5–5.1)
PROT SERPL-MCNC: 7.2 G/DL (ref 6.4–8.2)
PROTHROMBIN TIME: 12 SEC (ref 11.5–14.8)
RBC # BLD AUTO: 4.71 M/UL (ref 4–5.2)
SODIUM SERPL-SCNC: 140 MMOL/L (ref 136–145)
TROPONIN, HIGH SENSITIVITY: 19 NG/L (ref 0–14)
TROPONIN, HIGH SENSITIVITY: 29 NG/L (ref 0–14)
VALPROATE SERPL-MCNC: 60.9 UG/ML (ref 50–100)
WBC # BLD AUTO: 6.7 K/UL (ref 4–11)

## 2024-02-15 PROCEDURE — 85025 COMPLETE CBC W/AUTO DIFF WBC: CPT

## 2024-02-15 PROCEDURE — 6360000004 HC RX CONTRAST MEDICATION: Performed by: EMERGENCY MEDICINE

## 2024-02-15 PROCEDURE — 95813 EEG EXTND MNTR 61-119 MIN: CPT | Performed by: PSYCHIATRY & NEUROLOGY

## 2024-02-15 PROCEDURE — 70498 CT ANGIOGRAPHY NECK: CPT

## 2024-02-15 PROCEDURE — 80053 COMPREHEN METABOLIC PANEL: CPT

## 2024-02-15 PROCEDURE — 1200000000 HC SEMI PRIVATE

## 2024-02-15 PROCEDURE — 71045 X-RAY EXAM CHEST 1 VIEW: CPT

## 2024-02-15 PROCEDURE — 4A03X5D MEASUREMENT OF ARTERIAL FLOW, INTRACRANIAL, EXTERNAL APPROACH: ICD-10-PCS | Performed by: RADIOLOGY

## 2024-02-15 PROCEDURE — 80164 ASSAY DIPROPYLACETIC ACD TOT: CPT

## 2024-02-15 PROCEDURE — 70450 CT HEAD/BRAIN W/O DYE: CPT

## 2024-02-15 PROCEDURE — 84484 ASSAY OF TROPONIN QUANT: CPT

## 2024-02-15 PROCEDURE — 93005 ELECTROCARDIOGRAM TRACING: CPT | Performed by: EMERGENCY MEDICINE

## 2024-02-15 PROCEDURE — 99285 EMERGENCY DEPT VISIT HI MDM: CPT

## 2024-02-15 PROCEDURE — 85610 PROTHROMBIN TIME: CPT

## 2024-02-15 PROCEDURE — 36415 COLL VENOUS BLD VENIPUNCTURE: CPT

## 2024-02-15 RX ORDER — TAMSULOSIN HYDROCHLORIDE 0.4 MG/1
0.4 CAPSULE ORAL DAILY
Status: DISCONTINUED | OUTPATIENT
Start: 2024-02-16 | End: 2024-02-16

## 2024-02-15 RX ORDER — SODIUM CHLORIDE 9 MG/ML
INJECTION, SOLUTION INTRAVENOUS CONTINUOUS
Status: DISCONTINUED | OUTPATIENT
Start: 2024-02-15 | End: 2024-02-17 | Stop reason: HOSPADM

## 2024-02-15 RX ORDER — POTASSIUM CHLORIDE 7.45 MG/ML
10 INJECTION INTRAVENOUS PRN
Status: DISCONTINUED | OUTPATIENT
Start: 2024-02-15 | End: 2024-02-16

## 2024-02-15 RX ORDER — SODIUM CHLORIDE 9 MG/ML
INJECTION, SOLUTION INTRAVENOUS PRN
Status: DISCONTINUED | OUTPATIENT
Start: 2024-02-15 | End: 2024-02-17 | Stop reason: HOSPADM

## 2024-02-15 RX ORDER — ONDANSETRON 4 MG/1
4 TABLET, ORALLY DISINTEGRATING ORAL EVERY 8 HOURS PRN
Status: DISCONTINUED | OUTPATIENT
Start: 2024-02-15 | End: 2024-02-17 | Stop reason: HOSPADM

## 2024-02-15 RX ORDER — DIVALPROEX SODIUM 250 MG/1
250 TABLET, DELAYED RELEASE ORAL 3 TIMES DAILY
Status: DISCONTINUED | OUTPATIENT
Start: 2024-02-15 | End: 2024-02-16

## 2024-02-15 RX ORDER — POTASSIUM CHLORIDE 20 MEQ/1
40 TABLET, EXTENDED RELEASE ORAL PRN
Status: DISCONTINUED | OUTPATIENT
Start: 2024-02-15 | End: 2024-02-16

## 2024-02-15 RX ORDER — SODIUM CHLORIDE 0.9 % (FLUSH) 0.9 %
5-40 SYRINGE (ML) INJECTION PRN
Status: DISCONTINUED | OUTPATIENT
Start: 2024-02-15 | End: 2024-02-17 | Stop reason: HOSPADM

## 2024-02-15 RX ORDER — ACETAMINOPHEN 325 MG/1
650 TABLET ORAL EVERY 6 HOURS PRN
Status: DISCONTINUED | OUTPATIENT
Start: 2024-02-15 | End: 2024-02-17 | Stop reason: HOSPADM

## 2024-02-15 RX ORDER — POLYETHYLENE GLYCOL 3350 17 G/17G
17 POWDER, FOR SOLUTION ORAL DAILY PRN
Status: DISCONTINUED | OUTPATIENT
Start: 2024-02-15 | End: 2024-02-17 | Stop reason: HOSPADM

## 2024-02-15 RX ORDER — ASPIRIN 81 MG/1
81 TABLET, CHEWABLE ORAL DAILY
Status: DISCONTINUED | OUTPATIENT
Start: 2024-02-16 | End: 2024-02-17 | Stop reason: HOSPADM

## 2024-02-15 RX ORDER — DONEPEZIL HYDROCHLORIDE 5 MG/1
5 TABLET, FILM COATED ORAL NIGHTLY
Status: DISCONTINUED | OUTPATIENT
Start: 2024-02-15 | End: 2024-02-16

## 2024-02-15 RX ORDER — OMEPRAZOLE 20 MG/1
20 CAPSULE, DELAYED RELEASE ORAL DAILY
Status: DISCONTINUED | OUTPATIENT
Start: 2024-02-16 | End: 2024-02-17 | Stop reason: HOSPADM

## 2024-02-15 RX ORDER — MIRTAZAPINE 15 MG/1
7.5 TABLET, FILM COATED ORAL NIGHTLY
Status: DISCONTINUED | OUTPATIENT
Start: 2024-02-15 | End: 2024-02-17 | Stop reason: HOSPADM

## 2024-02-15 RX ORDER — ONDANSETRON 2 MG/ML
4 INJECTION INTRAMUSCULAR; INTRAVENOUS EVERY 6 HOURS PRN
Status: DISCONTINUED | OUTPATIENT
Start: 2024-02-15 | End: 2024-02-17 | Stop reason: HOSPADM

## 2024-02-15 RX ORDER — ACETAMINOPHEN 650 MG/1
650 SUPPOSITORY RECTAL EVERY 6 HOURS PRN
Status: DISCONTINUED | OUTPATIENT
Start: 2024-02-15 | End: 2024-02-17 | Stop reason: HOSPADM

## 2024-02-15 RX ORDER — QUETIAPINE FUMARATE 50 MG/1
50 TABLET, EXTENDED RELEASE ORAL NIGHTLY
Status: DISCONTINUED | OUTPATIENT
Start: 2024-02-15 | End: 2024-02-16

## 2024-02-15 RX ORDER — LORAZEPAM 0.5 MG/1
0.5 TABLET ORAL EVERY 6 HOURS PRN
Status: DISCONTINUED | OUTPATIENT
Start: 2024-02-15 | End: 2024-02-16

## 2024-02-15 RX ORDER — MEMANTINE HYDROCHLORIDE 10 MG/1
10 TABLET ORAL 2 TIMES DAILY
Status: DISCONTINUED | OUTPATIENT
Start: 2024-02-15 | End: 2024-02-17 | Stop reason: HOSPADM

## 2024-02-15 RX ORDER — SODIUM CHLORIDE 0.9 % (FLUSH) 0.9 %
5-40 SYRINGE (ML) INJECTION EVERY 12 HOURS SCHEDULED
Status: DISCONTINUED | OUTPATIENT
Start: 2024-02-15 | End: 2024-02-17 | Stop reason: HOSPADM

## 2024-02-15 RX ORDER — MAGNESIUM SULFATE IN WATER 40 MG/ML
2000 INJECTION, SOLUTION INTRAVENOUS PRN
Status: DISCONTINUED | OUTPATIENT
Start: 2024-02-15 | End: 2024-02-17 | Stop reason: HOSPADM

## 2024-02-15 RX ORDER — ENOXAPARIN SODIUM 100 MG/ML
40 INJECTION SUBCUTANEOUS DAILY
Status: DISCONTINUED | OUTPATIENT
Start: 2024-02-16 | End: 2024-02-17 | Stop reason: HOSPADM

## 2024-02-15 RX ADMIN — IOPAMIDOL 75 ML: 755 INJECTION, SOLUTION INTRAVENOUS at 18:42

## 2024-02-16 PROBLEM — R29.810 FACIAL DROOP: Status: ACTIVE | Noted: 2024-02-16

## 2024-02-16 PROBLEM — Z51.5 ENCOUNTER FOR PALLIATIVE CARE: Status: ACTIVE | Noted: 2024-02-16

## 2024-02-16 PROBLEM — F03.B0 MODERATE DEMENTIA WITHOUT BEHAVIORAL DISTURBANCE, PSYCHOTIC DISTURBANCE, MOOD DISTURBANCE, OR ANXIETY (HCC): Status: ACTIVE | Noted: 2024-02-16

## 2024-02-16 LAB
ANION GAP SERPL CALCULATED.3IONS-SCNC: 10 MMOL/L (ref 3–16)
BACTERIA URNS QL MICRO: ABNORMAL /HPF
BASOPHILS # BLD: 0 K/UL (ref 0–0.2)
BASOPHILS NFR BLD: 0.8 %
BILIRUB UR QL STRIP.AUTO: NEGATIVE
BUN SERPL-MCNC: 18 MG/DL (ref 7–20)
CALCIUM SERPL-MCNC: 8.8 MG/DL (ref 8.3–10.6)
CHLORIDE SERPL-SCNC: 101 MMOL/L (ref 99–110)
CHOLEST SERPL-MCNC: 215 MG/DL (ref 0–199)
CLARITY UR: CLEAR
CO2 SERPL-SCNC: 29 MMOL/L (ref 21–32)
COARSE GRAN CASTS #/AREA URNS LPF: ABNORMAL /LPF (ref 0–2)
COLOR UR: YELLOW
CREAT SERPL-MCNC: 0.7 MG/DL (ref 0.6–1.2)
DEPRECATED RDW RBC AUTO: 15.9 % (ref 12.4–15.4)
EKG ATRIAL RATE: 82 BPM
EKG DIAGNOSIS: NORMAL
EKG Q-T INTERVAL: 420 MS
EKG QRS DURATION: 94 MS
EKG QTC CALCULATION (BAZETT): 490 MS
EKG R AXIS: -84 DEGREES
EKG T AXIS: 212 DEGREES
EKG VENTRICULAR RATE: 82 BPM
EOSINOPHIL # BLD: 0.2 K/UL (ref 0–0.6)
EOSINOPHIL NFR BLD: 4.5 %
GFR SERPLBLD CREATININE-BSD FMLA CKD-EPI: >60 ML/MIN/{1.73_M2}
GLUCOSE SERPL-MCNC: 80 MG/DL (ref 70–99)
GLUCOSE UR STRIP.AUTO-MCNC: NEGATIVE MG/DL
HCT VFR BLD AUTO: 39.7 % (ref 36–48)
HDLC SERPL-MCNC: 52 MG/DL (ref 40–60)
HGB BLD-MCNC: 13.1 G/DL (ref 12–16)
HGB UR QL STRIP.AUTO: ABNORMAL
KETONES UR STRIP.AUTO-MCNC: NEGATIVE MG/DL
LDLC SERPL CALC-MCNC: 135 MG/DL
LEUKOCYTE ESTERASE UR QL STRIP.AUTO: ABNORMAL
LYMPHOCYTES # BLD: 1.7 K/UL (ref 1–5.1)
LYMPHOCYTES NFR BLD: 30.5 %
MCH RBC QN AUTO: 31.4 PG (ref 26–34)
MCHC RBC AUTO-ENTMCNC: 32.9 G/DL (ref 31–36)
MCV RBC AUTO: 95.5 FL (ref 80–100)
MONOCYTES # BLD: 0.5 K/UL (ref 0–1.3)
MONOCYTES NFR BLD: 9.8 %
NEUTROPHILS # BLD: 3 K/UL (ref 1.7–7.7)
NEUTROPHILS NFR BLD: 54.4 %
NITRITE UR QL STRIP.AUTO: NEGATIVE
PH UR STRIP.AUTO: 6.5 [PH] (ref 5–8)
PLATELET # BLD AUTO: 142 K/UL (ref 135–450)
PMV BLD AUTO: 8 FL (ref 5–10.5)
POTASSIUM SERPL-SCNC: 3.8 MMOL/L (ref 3.5–5.1)
PROT UR STRIP.AUTO-MCNC: NEGATIVE MG/DL
RBC # BLD AUTO: 4.16 M/UL (ref 4–5.2)
RBC #/AREA URNS HPF: ABNORMAL /HPF (ref 0–4)
SODIUM SERPL-SCNC: 140 MMOL/L (ref 136–145)
SP GR UR STRIP.AUTO: 1.01 (ref 1–1.03)
TRIGL SERPL-MCNC: 140 MG/DL (ref 0–150)
TROPONIN, HIGH SENSITIVITY: 21 NG/L (ref 0–14)
UA COMPLETE W REFLEX CULTURE PNL UR: YES
UA DIPSTICK W REFLEX MICRO PNL UR: YES
URN SPEC COLLECT METH UR: ABNORMAL
UROBILINOGEN UR STRIP-ACNC: 1 E.U./DL
VLDLC SERPL CALC-MCNC: 28 MG/DL
WBC # BLD AUTO: 5.5 K/UL (ref 4–11)
WBC #/AREA URNS HPF: >100 /HPF (ref 0–5)

## 2024-02-16 PROCEDURE — 6370000000 HC RX 637 (ALT 250 FOR IP)

## 2024-02-16 PROCEDURE — 84484 ASSAY OF TROPONIN QUANT: CPT

## 2024-02-16 PROCEDURE — 85025 COMPLETE CBC W/AUTO DIFF WBC: CPT

## 2024-02-16 PROCEDURE — 87186 SC STD MICRODIL/AGAR DIL: CPT

## 2024-02-16 PROCEDURE — 99223 1ST HOSP IP/OBS HIGH 75: CPT | Performed by: HOSPITALIST

## 2024-02-16 PROCEDURE — 81001 URINALYSIS AUTO W/SCOPE: CPT

## 2024-02-16 PROCEDURE — 2580000003 HC RX 258

## 2024-02-16 PROCEDURE — 92610 EVALUATE SWALLOWING FUNCTION: CPT

## 2024-02-16 PROCEDURE — 6360000002 HC RX W HCPCS

## 2024-02-16 PROCEDURE — 80048 BASIC METABOLIC PNL TOTAL CA: CPT

## 2024-02-16 PROCEDURE — 80061 LIPID PANEL: CPT

## 2024-02-16 PROCEDURE — 87077 CULTURE AEROBIC IDENTIFY: CPT

## 2024-02-16 PROCEDURE — 83036 HEMOGLOBIN GLYCOSYLATED A1C: CPT

## 2024-02-16 PROCEDURE — 1200000000 HC SEMI PRIVATE

## 2024-02-16 PROCEDURE — 99221 1ST HOSP IP/OBS SF/LOW 40: CPT | Performed by: NURSE PRACTITIONER

## 2024-02-16 PROCEDURE — APPNB60 APP NON BILLABLE TIME 46-60 MINS: Performed by: NURSE PRACTITIONER

## 2024-02-16 PROCEDURE — 87086 URINE CULTURE/COLONY COUNT: CPT

## 2024-02-16 PROCEDURE — 36415 COLL VENOUS BLD VENIPUNCTURE: CPT

## 2024-02-16 PROCEDURE — 95819 EEG AWAKE AND ASLEEP: CPT

## 2024-02-16 RX ORDER — LEVETIRACETAM 500 MG/5ML
500 INJECTION, SOLUTION, CONCENTRATE INTRAVENOUS EVERY 12 HOURS
Status: DISCONTINUED | OUTPATIENT
Start: 2024-02-16 | End: 2024-02-16

## 2024-02-16 RX ORDER — QUETIAPINE FUMARATE 25 MG/1
75 TABLET, FILM COATED ORAL NIGHTLY
Status: DISCONTINUED | OUTPATIENT
Start: 2024-02-16 | End: 2024-02-17 | Stop reason: HOSPADM

## 2024-02-16 RX ORDER — ATORVASTATIN CALCIUM 80 MG/1
80 TABLET, FILM COATED ORAL DAILY
Status: DISCONTINUED | OUTPATIENT
Start: 2024-02-16 | End: 2024-02-17 | Stop reason: HOSPADM

## 2024-02-16 RX ORDER — POTASSIUM CHLORIDE 750 MG/1
10 CAPSULE, EXTENDED RELEASE ORAL DAILY
COMMUNITY

## 2024-02-16 RX ORDER — ONDANSETRON 4 MG/1
4 TABLET, FILM COATED ORAL EVERY 8 HOURS PRN
COMMUNITY

## 2024-02-16 RX ORDER — POLYETHYLENE GLYCOL 3350 17 G/17G
17 POWDER, FOR SOLUTION ORAL DAILY PRN
COMMUNITY

## 2024-02-16 RX ORDER — DIVALPROEX SODIUM 125 MG/1
250 CAPSULE, COATED PELLETS ORAL EVERY 8 HOURS SCHEDULED
Status: DISCONTINUED | OUTPATIENT
Start: 2024-02-16 | End: 2024-02-17 | Stop reason: HOSPADM

## 2024-02-16 RX ORDER — DONEPEZIL HYDROCHLORIDE 10 MG/1
10 TABLET, FILM COATED ORAL
Status: DISCONTINUED | OUTPATIENT
Start: 2024-02-16 | End: 2024-02-17 | Stop reason: HOSPADM

## 2024-02-16 RX ORDER — QUETIAPINE FUMARATE 25 MG/1
75 TABLET, FILM COATED ORAL NIGHTLY
COMMUNITY

## 2024-02-16 RX ADMIN — MEMANTINE HYDROCHLORIDE 10 MG: 10 TABLET ORAL at 21:12

## 2024-02-16 RX ADMIN — DONEPEZIL HYDROCHLORIDE 10 MG: 10 TABLET ORAL at 10:28

## 2024-02-16 RX ADMIN — SODIUM CHLORIDE: 9 INJECTION, SOLUTION INTRAVENOUS at 01:33

## 2024-02-16 RX ADMIN — QUETIAPINE FUMARATE 75 MG: 25 TABLET ORAL at 21:13

## 2024-02-16 RX ADMIN — ENOXAPARIN SODIUM 40 MG: 100 INJECTION SUBCUTANEOUS at 10:19

## 2024-02-16 RX ADMIN — SODIUM CHLORIDE, PRESERVATIVE FREE 10 ML: 5 INJECTION INTRAVENOUS at 10:20

## 2024-02-16 RX ADMIN — DIVALPROEX SODIUM 250 MG: 125 CAPSULE, COATED PELLETS ORAL at 10:28

## 2024-02-16 RX ADMIN — LEVETIRACETAM 500 MG: 500 INJECTION, SOLUTION, CONCENTRATE INTRAVENOUS at 02:26

## 2024-02-16 RX ADMIN — SODIUM CHLORIDE, PRESERVATIVE FREE 10 ML: 5 INJECTION INTRAVENOUS at 01:32

## 2024-02-16 RX ADMIN — OMEPRAZOLE 20 MG: 20 CAPSULE, DELAYED RELEASE ORAL at 10:19

## 2024-02-16 RX ADMIN — DIVALPROEX SODIUM 250 MG: 125 CAPSULE, COATED PELLETS ORAL at 13:57

## 2024-02-16 RX ADMIN — MIRTAZAPINE 7.5 MG: 15 TABLET, FILM COATED ORAL at 21:12

## 2024-02-16 RX ADMIN — DIVALPROEX SODIUM 250 MG: 125 CAPSULE, COATED PELLETS ORAL at 21:13

## 2024-02-16 RX ADMIN — ASPIRIN 81 MG 81 MG: 81 TABLET ORAL at 10:19

## 2024-02-16 RX ADMIN — LEVETIRACETAM 500 MG: 500 INJECTION, SOLUTION, CONCENTRATE INTRAVENOUS at 13:57

## 2024-02-16 RX ADMIN — ATORVASTATIN CALCIUM 80 MG: 80 TABLET, FILM COATED ORAL at 21:13

## 2024-02-16 RX ADMIN — SODIUM CHLORIDE, PRESERVATIVE FREE 10 ML: 5 INJECTION INTRAVENOUS at 21:13

## 2024-02-16 RX ADMIN — CEFTRIAXONE 1000 MG: 1 INJECTION, POWDER, FOR SOLUTION INTRAMUSCULAR; INTRAVENOUS at 10:24

## 2024-02-16 RX ADMIN — MEMANTINE HYDROCHLORIDE 10 MG: 10 TABLET ORAL at 10:19

## 2024-02-16 RX ADMIN — FUROSEMIDE 60 MG: 20 TABLET ORAL at 13:58

## 2024-02-16 NOTE — PROGRESS NOTES
4 Eyes Skin Assessment     NAME:  Katrin Donnelly  YOB: 1940  MEDICAL RECORD NUMBER:  [id number]    The patient is being assessed for  Admission    I agree that at least one RN has performed a thorough Head to Toe Skin Assessment on the patient. ALL assessment sites listed below have been assessed.      Areas assessed by both nurses:    Head, Face, Ears, Shoulders, Back, Chest, Arms, Elbows, Hands, Sacrum. Buttock, Coccyx, Ischium, Legs. Feet and Heels, and Under Medical Devices     Vascular discoloration to ankles   Redness to mauricio area   Blanchable redness to coccyx   Blanchable redness to back   Scattered bruises and abrasions         Does the Patient have a Wound? No noted wound(s)       Joseph Prevention initiated by RN: Yes  Wound Care Orders initiated by RN: No    Pressure Injury (Stage 3,4, Unstageable, DTI, NWPT, and Complex wounds) if present, place Wound referral order by RN under : No    New Ostomies, if present place, Ostomy referral order under : No     Nurse 1 eSignature: Electronically signed by Margie Rivera RN on 2/15/24 at 11:25 PM EST    **SHARE this note so that the co-signing nurse can place an eSignature**    Nurse 2 eSignature: Electronically signed by Susy Silva RN on 2/16/24 at 1:27 AM EST

## 2024-02-16 NOTE — CONSULTS
Neurology Consult Note    Patient: Katrin Donnelly MRN: [id number]    YOB: 1940  Age: 83 y.o.  Sex: female   Unit: [address] ORTHO/NEURO  Room/Bed: 5518/5518-01 Location: [facility]    Date of Consultation: 2/16/2024  Date of Admission: 2/15/2024  6:18 PM ( LOS: 1 day )  Primary Care Physician: Kalyan Estrella MD   Consult Requested By: Naa Sheffield MD    Reason for Consult: perceived left-sided facial droop at facility; no evidence of stroke at [facility]     IMPRESSION & RECOMMENDATIONS     IMPRESSION:  Katrin Donnelly is an 84 y/o woman with Alzheimer's dementia, seizures, stroke, possible CAA, and atrial fibrillation who presents with altered mentation. Agree with PCP Dr. Estrella that Katrin's UTI is most likely the primary driving factor for her symptoms. 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    RECOMMENDATIONS:  - MRI brain w/o pranav given field cut. Low threshold to discontinue if she cannot tolerate.  - Routine EEG. May consider cEEG pending clinical course & results of initial EEG.  - Continue VPA and LEV. VPA level 60 this admission.  - Seizure precautions  - Delirium Precautions: Maintain a regular night-day/sleep-wake cycle: discourage daytime naps, re-orient frequently, shades up during the daytime, family at bedside as often as possible, minimize nighttime awakenings, utilize relaxation channel on television     Management and plan discussed with:   Dr. Rosalina Riojas, APRN - CNP   Neurology  2/16/2024 10:09 AM  [facility]: [facility] Neurology    History of Present Illness     Katrin Donnelly is a 83 y.o. y/o female with history significant for Alzheimer's dementia (diagnosed 2020), epilepsy (on VPA and Keppra) and atrial fibrillation (not on anticoagulation given concern for CAA)  [facility]  PICU DAILY PROGRESS NOTE    ADMISSION DATE:  1/9/2023  DATE:  1/12/2023  CURRENT HOSPITAL DAY:  Hospital Day: 4       CHIEF COMPLAINT: Vini is a 14 year old with history of subdural empyemas and sagital venous thrombosis who presented for scheduled bifrontal cranioplasty.     INTERVAL HISTORY:    No acute events. JASON drain with 75ml output for the day. Pain controlled with prn tylenol.    OBJECTIVE:    VITAL SIGNS:     Vital Last Value 24 Hour Range   Temperature 97.9 °F (36.6 °C) (01/12/23 0600) Temp  Min: 97.9 °F (36.6 °C)  Max: 100.6 °F (38.1 °C)   Pulse (!) 117 (01/12/23 0700) Pulse  Min: 66  Max: 117   Respiratory 19 (01/12/23 0700) Resp  Min: 16  Max: 25   Non-Invasive  Blood Pressure 116/61 (01/12/23 0600) BP  Min: 74/57  Max: 121/69   Pulse Oximetry 95 % (01/12/23 0700) SpO2  Min: 91 %  Max: 97 %     Vital Today Admitted   Weight 35.9 kg (79 lb 2.3 oz) (01/11/23 2000) Weight: 38.5 kg (84 lb 14 oz) (01/09/23 1445)   Height N/A Height: 4' 8.69\" (144 cm) (01/09/23 1445)   Body Mass Index N/A BMI (Calculated): 18.57 (01/09/23 1445)     INTAKE/OUTPUT:    Date 01/11/23 0700 - 01/12/23 0659 01/12/23 0700 - 01/13/23 0659   Shift 8324-8485 6951-1224 0742-7242 24 Hour Total 8617-1811 8296-7789 4340-5634 24 Hour Total   INTAKE   P.O.(mL/kg) 455(11.88) 240(6.69) 500(13.93) 1195(33.29)       Shift Total(mL/kg) 455(11.88) 240(6.69) 500(13.93) 1195(33.29)       OUTPUT   Urine(mL/kg/hr) 715(2.33) 800(2.79) 200(0.7) 1715(1.99)       Emesis(mL/kg/hr) 300(0.98)   300(0.35)       Drains(mL/kg/hr) 30(0.1) 25(0.09) 20(0.07) 75(0.09)         Output (ml) (Drain 01/10/23 #1 Left Head Bulb (e.g. JASON, Davol, etc)) 30 25 20 75       Stool(mL/kg/hr) 200(0.65)   200(0.23)       Shift Total(mL/kg) 1245(32.51) 825(22.98) 220(6.13) 2290(63.79)       Weight (kg) 38.3 35.9 35.9 35.9 35.9 35.9 35.9 35.9           Intake/Output Summary (Last 24 hours) at 1/12/2023 1033  Last data filed at 1/12/2023  0659  Gross per 24 hour   Intake 920 ml   Output 1825 ml   Net -905 ml       PHYSICAL EXAM:    Physical Exam  Constitutional:       General: He is not in acute distress.     Appearance: He is not toxic-appearing.      Comments: Awake, laying comfortably in bed   HENT:      Head:      Comments: Post operative bandages in place, clean dry and intact; JASON drain with serosanguinous fluid     Nose: Nose normal.      Mouth/Throat:      Mouth: Mucous membranes are moist.   Eyes:      General:         Right eye: No discharge.         Left eye: No discharge.      Extraocular Movements: Extraocular movements intact.      Conjunctiva/sclera: Conjunctivae normal.      Pupils: Pupils are equal, round, and reactive to light.      Comments: Mild Left periorbital edema   Cardiovascular:      Rate and Rhythm: Regular rhythm. Tachycardia present.      Pulses: Normal pulses.      Heart sounds: Normal heart sounds. No murmur heard.  Pulmonary:      Effort: Pulmonary effort is normal. No respiratory distress or retractions.      Breath sounds: Normal breath sounds. No decreased air movement. No wheezing.   Abdominal:      General: Abdomen is flat. There is no distension.      Palpations: Abdomen is soft.      Tenderness: There is no abdominal tenderness.   Musculoskeletal:         General: No swelling or deformity. Normal range of motion.      Cervical back: Normal range of motion and neck supple.   Skin:     General: Skin is warm and dry.      Capillary Refill: Capillary refill takes 2 to 3 seconds.   Neurological:      General: No focal deficit present.      Mental Status: He is alert.      GCS: GCS eye subscore is 4. GCS verbal subscore is 5. GCS motor subscore is 6.      Comments: Awake, follows simple commands. Speech clear, intermittent difficulty with word finding during conversation. +Corneal reflex, PERRLA, fixing/tracking with a smooth conjugate gaze. Face symmetric with intact facial sensation. Bulk/ tone appropriate for age.  Anti-gravity movements in all extremities on command.     Psychiatric:         Behavior: Behavior normal.      Comments: Intermittently anxious         RESPIRATORY SUPPORT:  Oxygen Therapy  O2 Device: None/Room air    LABORATORY DATA:    No results found for this or any previous visit (from the past 24 hour(s)).]       IMAGING STUDIES:    CT HEAD STEALTH WO CONTRAST    Result Date: 1/10/2023  EXAMINATION: Computed tomography (CT) of the head without contrast HISTORY: Cranioplasty planning TECHNIQUE: CT of the head was performed without contrast according to a Stealth protocol. Three dimensional post-processing was performed by the technologist on a separate workstation and sent for review. COMPARISON: Comparison is made with a head CT from 12/3/2022. FINDINGS: Bifrontal craniectomy is again seen with continued extracranial herniation of the bilateral frontal lobes. No acute intracranial hemorrhage is identified. Encephalomalacia is again seen in the anterior left frontal lobe with chronic blood products. A hypodense fluid collection along the anterior inferior left frontal lobe is seen measuring 1.9 x 3.9 x 5.2 cm. The ventricles are not significantly nondilated. The basal cisterns are patent. No midline shift is seen. An old catheter tract is seen in the left frontal lobe. Other than mild paranasal sinus disease, the visualized portions of the orbits, paranasal sinuses and mastoids appear normal. No acute fracture is identified.     1.   No acute intracranial process. Interval resolution of intracranial hemorrhage. 2.   Redemonstrated bifrontal craniectomy with encephalomalacia in the anterior left frontal lobe and chronic blood products. Hypodense fluid collection along the anterior inferior left frontal lobe may represent cystic encephalomalacia, chronic hematoma or subdural hygroma. Electronically Signed by: JIMENA ALAS MD Signed on: 1/10/2023 8:06 AM       Imaging studies reviewed.           MEDICATIONS:     Current Facility-Administered Medications   Medication Dose Route Frequency Provider Last Rate Last Admin     Current Facility-Administered Medications   Medication Dose Route Frequency Provider Last Rate Last Admin   • [START ON 1/14/2023] predniSONE (DELTASONE) tablet 20 mg  20 mg Oral Daily with breakfast Chet Monte MD        Followed by   • [START ON 1/17/2023] predniSONE (DELTASONE) tablet 10 mg  10 mg Oral Daily with breakfast Chet Monte MD        Followed by   • [START ON 1/20/2023] predniSONE (DELTASONE) tablet 10 mg  10 mg Oral Every Other Day Chet Monte MD       • sodium chloride (PF) 0.9 % injection 0.5-1 mL  0.5-1 mL Intravenous Q6H Chet Monte MD   0.5 mL at 01/10/23 2317   • cephalexin (KEFLEX) 250 MG/5ML suspension 950 mg  25 mg/kg (Dosing Weight) Oral 3 times per day Pratibha Peña DO   950 mg at 01/12/23 0608   • fluticasone (FLONASE) 50 MCG/ACT nasal spray 2 spray  2 spray Each Nare Daily Chet Monte MD   2 spray at 01/12/23 0854   • levETIRAcetam ORAL (KepPRA) 100 MG/ML oral solution 1,200 mg  1,200 mg Oral 2 times per day Carmencita Grimes MD   1,200 mg at 01/12/23 0854   • lacosamide ORAL (VIMPAT) 10 MG/ML oral solution 50 mg  1.3 mg/kg (Dosing Weight) Oral Q24H Carmencita Grimes MD   50 mg at 01/12/23 0854   • lacosamide ORAL (VIMPAT) 10 MG/ML oral solution 100 mg  2.6 mg/kg (Dosing Weight) Oral Q24H Carmencita Grimes MD   100 mg at 01/11/23 2210       ACTIVE PROBLEMS:    Active Hospital Problems    Diagnosis    • History of craniotomy                         ASSESSMENT:    Vini Suarez is a 14 year old male  with history of acute sinusitis complicated by bilateral subdural empyemas s/p evacuation 10/28/22, 11/9/22, and 11/11/22 admitted to the PICU status post scheduled bifrontal cranioplasty with JASON drain placement. Now POD #2, remains clinically stable and pain well controlled.     PLAN:    Neuro:   #s/p cranioplasty with drain  furosemide, 60 mg, Oral, Daily    memantine, 10 mg, Oral, BID    mirtazapine, 7.5 mg, Oral, Nightly    omeprazole, 20 mg, Oral, Daily    sodium chloride flush, 5-40 mL, IntraVENous, 2 times per day    enoxaparin, 40 mg, SubCUTAneous, Daily   Infusions    sodium chloride      sodium chloride 100 mL/hr at 02/16/24 0133        Time independently spent by Nurse Practitioner reviewing chart and prior testing, obtaining history from patient, examining patient, ordering appropriate medications / diagnostics, reviewing results of diagnostics, counseling and educating patient / family, communicating plan with other providers and documenting clinical information in the EMR was approximately 55 minutes.      placement   - Neurosurgery following, appreciate recommendations   - CT head with STEALTH protocol completed   - JASON drain to bulb suction - will discuss timing of removal with NSGY  - Tylenol 4h prn for pain  - Norco q6h prn moderate pain  - Continue home antiepileptics   - Keppra 30mg/kg BID, Vimpat 50mg qAM, 100mg qPM  - melatonin at bedtime prn  - pt/ot/slp on consult  - Psych consult for coping     Respiratory/ENT:   - Monitor on room air   - Low suspicion for aspiration, but will have low threshold for CXR in the event of desaturations, given emesis in OR  - Continue Flonase  - Continue saline nasal spray  - s/p afrin  - Steroid taper per ENT: prednisone 40mg daily x 3 days (s1/11); then 20mg daily x 3 days (s1/14); then 10mg daily x 3 days (s1/17); then 10mg every other day x 6 days (s1/20); off on 1/26    Cardiovascular:  - Continuous cardiopulmonary monitoring    FEN/GI:   - Pediatric diet  - Zofran 5mg po prn     ID:  - Perioperative Ancef x48 hours-->switched to Keflex -> will complete today    Heme:  - small EBL (approx 100ml) reported during surgery, will continue to closely monitor for any clinical changes.        VTE: 2 (PICU, OR) - encourage highest degree of ambulation/mobility at least 3x daily   Lines: None   PICU UP Level: 3; ambulate TID and prn; pt/ot on consult  Disp: PICU    Plan discussed with attending physician, consulting physicians, and inpatient team.    Tatianna Maria MSN, APRN, PNP-AC, FNP-BC, CCRN  Certified Nurse Practitioner, Pediatric Critical Care    01/12/23 10:33 AM

## 2024-02-16 NOTE — H&P
Internal Medicine  PGY 2  History & Physical      CC: facial droop    History Obtained From:  ED staff    HISTORY OF PRESENT ILLNESS:    MP is an 83 year old female with a PMHx of dementia starting in 2020 (mixed picture of vascular dementia and alzheimers but other dx such as lewy body dementia are being considered), Sick Sinus Syndrome s/p pacemaker placement (initially dx with Afib and was on coumadin but was redx as SSS per note by Dr. Walker on 12/05/2023), CHF (on lasix) and orthostatic hypotension likely 2/2 to dysautonomia.    The patient resides at [facility]. She recently moved there so that she could receive appropriate care. On 2/15/2024, a worker at the facility thought they noticed a left-sided facial droop so she was brought to our facility. I tried to reach out to the facility for further history but I could not get one. No previous hx of a CVA on her chart but does have an extensive neurological history of dementia with several diagnoses currently under consideration.    In the ED, vitals were initially stable but the patient did have episodes of tachypnea into the 30s and tachycardia into the low 100s. the ED staff were unable to do a proper neurological assessment. However, they and myself did not notice any facial droop or other neuro deficits when the patient moved naturally. The CT of the head did not show any intracerebral pathology. EKG was irregular. Ventricular rate in the 90s with atrial rate in the 300s. While this may suggest a-flutter, the EKG lacks the classic saw tooth pattern. 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      Discussed with Dr. Douglas.              ASSESSMENT AND PLAN:    PM is an 83 year old female with a PMHx of dementia starting in 2020 (mixed picture of vascular dementia and alzheimers but other dx such as lewy body dementia are being considered), Sick Sinus Syndrome s/p pacemaker placement (initially dx with Afib and was on coumadin but was redx as SSS per note by Dr. Walker on 12/05/2023), CHF (on lasix) and orthostatic hypotension likely 2/2 to dysautonomia.     1. Possible TIA vs Seizure - Patient had witnessed facial droop from facility. CT here did not show anything and no witnessed FND on natural movements (patient demented so cannot follow commands). Could not reach facility so unsure of \"LKW\". MRI is not possible since patient moves around and cannot follow commands. Do not want to use benzos or other anti-cholinergic medications to sedate her given age and dementia status. Will treat as if TIA vs seizure. The concern for a seizure was because of witnessed twitching by the ED staff. Neuro orders were done with consult from [facility].  - Neurology Consulted  - Lipitor 80 mg  - ASA 81 mg  - Keppra 500 mg BID  - Neurochecks q4h  - NIHSS q4H  - BMP qd  - CBC qd  - U/A reflex to Cx     Chronic Problems  Dementia - Donepezil, Memantine  Insomnia - Remeron  CHF - Lasix  Orthostatic Hypotension - Tamsulosin    *Unsure what Valproic Acid and Seroquel are for exactly however I found a note saying it was for agitation(VPA) and Behavioral Disorders (SRQ)     Will discuss with attending physician Dr. Sheffield    Code Status:Full code  FEN: NPO  PPX: Lovenox  DISPO: IP    Jairo Unger MD  2/16/2024,  12:52 AM

## 2024-02-16 NOTE — PROGRESS NOTES
Pt is alert but disoriented x4. Current NIH is 11. All fall prevention measures in place, camera on and bed alarm in zone 2. Pt has had adequate PO intake and UOP this shift. Daughter has been called this shift to provide an update.

## 2024-02-16 NOTE — PROGRESS NOTES
SLP screened patient's swallowing by administering the [place] Swallow Protocol. The patient consumed 3 ounces of water by straw in sequential swallows without signs/symptoms of aspiration. RN notified after assessment complete.

## 2024-02-16 NOTE — CONSULTS
[facility]  Palliative Medicine Consultation Note      Date Of Admission:2/15/2024  Date of consult: 02/16/24  Seen by PC in the past:  Yes    Recommendations:        Met with pt at the bedside. She did not answer any orientation questions. Per RN pt's  has some memory issues at baseline, suggested to call pt's daughter Giovanni.    Called pt's daughter Giovanni, introduced palliative care. Discussed her understanding of pt's condition. She reports pt is conversant at times, usually gets around with a wheelchair since around mid December last year. She is able to feed herself and seems to eat well. She has been living in memory care. Giovanni confirmed the pt's  was recently diagnosed with dementia, although his dementia is not as severe as the pt's. We did discuss code status, and she believes pt would want to remain a full code however she will review pt's advance directives to confirm this.     1. Goals of Care/Advanced Care planning/Code status: Full code, discussed with pt's daughter today.   2. Pain: pt unable to state  3. SOB: pt unable to state  4. TIA vs delirium 2/2 UTI: pt appears to be near baseline mental status today per daughter's report. CT head was negative.   5. Disposition: Likely return to memory care when medically ready for discharge    Reason for Consult:         [x]  Goals of Care  [x]  Code Status Discussion/Advanced Care Planning   []  Psychosocial/Family Support  []  Symptom Management  []  Other (Specify)    Requesting Physician: Dr. Sheffield    CHIEF COMPLAINT:  facial droop     History Obtained From:  family member - daughter, electronic medical record    History of Present Illness:         Katrin Donnelly is a 83 y.o. female with PMH of dementia starting in 2020 (mixed picture of vascular dementia and alzheimers but other dx such as lewy body dementia are being considered), Sick Sinus Syndrome s/p pacemaker placement (initially dx with Afib and was on coumadin but      Palliative Performance Scale:  [] 60% Ambulation reduced; Significant disease; Can't do hobbies/housework; intake normal or reduced; occasional assist; LOC full/confusion  [] 50% Mainly sit/lie; Extensive disease; Can't do any work; Considerable assist; intake normal  Or reduced; LOC full/confusion  [x] 40% Mainly in bed; Extensive disease; Mainly assist; intake normal or reduced; occasional assist; LOC full/confusion  [] 30% Bed Bound; Extensive disease; Total care; intake reduced; LOC full/confusion  [] 20% Bed Bound; Extensive disease; Total care; intake minimal; Drowsy/coma  [] 10% Bed Bound; Extensive disease; Total care; Mouth care only; Drowsy/coma  [] 0% Death    PPS: 40    Vitals:    /65   Pulse 62   Temp 98.2 °F (36.8 °C) (Axillary)   Resp 17   Ht 1.676 m (5' 5.98\")   Wt 75.4 kg (166 lb 3.2 oz)   SpO2 97%   BMI 26.84 kg/m²     Labs:    BMP:   Recent Labs     02/15/24  1924 02/16/24  0604    140   K 4.1 3.8   CL 96* 101   CO2 31 29   BUN 21* 18   CREATININE 0.8 0.7   GLUCOSE 96 80     CBC:   Recent Labs     02/15/24  1924 02/16/24  0604   WBC 6.7 5.5   HGB 14.7 13.1   HCT 44.5 39.7    142       LFT's:   Recent Labs     02/15/24  1924   AST 18   ALT 8*   BILITOT <0.2   ALKPHOS 114     Troponin: No results for input(s): \"TROPONINI\" in the last 72 hours.  BNP: No results for input(s): \"BNP\" in the last 72 hours.  ABGs: No results for input(s): \"PHART\", \"SSW5GUM\", \"PO2ART\" in the last 72 hours.  INR:   Recent Labs     02/15/24  1924   INR 0.88       U/A:  Recent Labs     02/16/24  0635   COLORU Yellow   PHUR 6.5   WBCUA >100*   RBCUA 11-20*   BACTERIA 4+*   CLARITYU Clear   SPECGRAV 1.015   LEUKOCYTESUR MODERATE*   UROBILINOGEN 1.0   BILIRUBINUR Negative   BLOODU TRACE-INTACT*   GLUCOSEU Negative       XR CHEST PORTABLE   Final Result      No acute pulmonary disease.         CTA HEAD NECK W CONTRAST   Final Result      1.  No aneurysms, vascular occlusions, or intracranial stenoses

## 2024-02-16 NOTE — PROGRESS NOTES
Patient presented from [facility] d/t perceived facial droop. Unable to assess patient given does not follow directions but when she moves naturally her face appears symmetrical. CT did not show a stroke - no MRI done. I tried calling several times to get a more detailed history. Will try to call later.

## 2024-02-16 NOTE — PROGRESS NOTES
Current NIHSS 11    Nursing Core Measures for Stroke:   [x]   Education template documentation (STROKE/TIA). Please select only risk factors that are applicable to patient when selecting risk factors.  [x]   Care Plan template documentation (Physiologic Instability - Neurosensory). Selecting this will add care plan rows to the flowsheet under the Neuro section of Head to Toe.  [x]   Verified Swallow Screen completed prior to PO intake of food, drink, medications>          Please verify correct medication route prior to administration for intubated patients, patients who can not swallow or have alternative routes of intake (NG, OG, AZ), etc  [x]   VTE Prophylaxis: SCDs ordered/addressed; SCDs: N/A Lovenox           (As a reminder, ASA, Plavix, and TPA/TNK are not VTE prophylaxis.)    Reviewed the Following Education with Patient and/or Family:   - Personalized risk factors for patient, along with changes, modifications that will help prevent stroke.  - Signs and Symptoms of Stroke: (Facial droop, weakness/numbness especially on one side, speech difficulty, sudden confusion, sudden loss of vision, sudden severe headache, sudden loss of balance or having difficulty walking, syncope, or seizure)  - How to activate EMS (911)   - Importance of Follow Up Appointments at Discharge   - Importance of Compliance with Medications Prescribed at Discharge  - Available community resources and stroke advocacy groups if needed    Patient and/or family member: education needs reinforcement.     Stroke Education booklet given to patient/family (or verified, if given already), which reviews above information. yes         Electronically signed by Matt James RN on 2/16/2024 at 10:44 AM

## 2024-02-16 NOTE — NURSE NAVIGATOR
Patients personal risk factors specific to stroke/TIA include: prior stroke, possible CAA, overweight (BMI 26.84), lack of exercise, Sick Sinus Syndrome s/p pacemaker placement (per chart documentation: \"initially dx with Afib and was on coumadin but was redx as SSS per note by Dr. Walker on 12/05/2023\"), family h/o stroke and heart disease     Patient's chart reviewed for Stroke Core Measures and additional needs:    [x]   VTE prophylaxis - SQ Lovenox, see eMAR    [x]   Antithrombotic (if applicable) - PO aspirin, see eMAR    [x]   Swallow screen prior to PO intake   [x]   Lipids / A1C ordered or resulted   [x]   Therapy ordered - See SLP consult; PT/OT ordered    [x]   Care plan and Education template - Added, in progress     - MRI brain ordered, see today's neurology note for recommendations     Navigator to continue to follow patient while admitted, to assist with follow up and discharge planning as needed.     Nurse eSignature: Electronically signed by Mary Bob RN on 2/16/24 at 10:43 AM EST - Neuroscience Navigator

## 2024-02-16 NOTE — CARE COORDINATION
Case Management Assessment  Initial Evaluation    Date/Time of Evaluation: 2/16/2024 4:27 PM  Assessment Completed by: FAUSTO Figueroa    If patient is discharged prior to next notation, then this note serves as note for discharge by case management.    Patient Name: Katrin Donnelly                   YOB: 1940  Diagnosis: Altered mental status [R41.82]  Facial droop [R29.810]                   Date / Time: 2/15/2024  6:18 PM    Patient Admission Status: Inpatient   Readmission Risk (Low < 19, Mod (19-27), High > 27): Readmission Risk Score: 11    Current PCP: Kalyan Estrella MD  PCP verified by CM? Yes    Chart Reviewed: Yes      History Provided by: Child/Family, Medical Record  Patient Orientation: Other (see comment) (O2SO)    Patient Cognition: Other (see comment) (O2SO)    Hospitalization in the last 30 days (Readmission):  No    If yes, Readmission Assessment in CM Navigator will be completed.    Advance Directives:      Code Status: Full Code   Patient's Primary Decision Maker is: Legal Next of Kin (Please note pt's  was recently diagnosed with dementia so contact daughter, Giovanni)    Primary Decision Maker: Matthew Donnelly - Spouse - [phone]    Discharge Planning:    Patient lives with: Alone Type of Home: Other (Comment) (memory care at [facility])  Primary Care Giver: Other (Comment) (Memory Care Staff)  Patient Support Systems include: Spouse/Significant Other, Children   Current Financial resources: Medicare  Current community resources: Other (Comment) ([facility])  Current services prior to admission: Durable Medical Equipment, Other (Comment) ([facility])            Current DME: Walker, Wheelchair            Type of Home Care services:  None    ADLS  Prior functional level: Assistance with the following:, Housework, Shopping, Cooking, Mobility, Dressing, Bathing  Current functional level: Other (see comment) (PT/OT farheen pending)    PT AM-PAC:
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
## 2024-02-16 NOTE — PROGRESS NOTES
Pt admitted to room 5518. 4 eyes assessment completed with 2nd RN.     Unable to complete admission documentation at this time d/t orientation status.       Pt alert and oriented to self. VSS on RA. No acute neuro changes this shift. L facial droop and extremity/facial twitching noted. Pt intermittently follows some commands. Voiding via purewick. Bedrest at this time. Denies needs. Fall precautions in place, call light within reach.

## 2024-02-16 NOTE — ED NOTES
ED TO INPATIENT SBAR HANDOFF    Patient Name: Katrin Donnelly   :  1940  83 y.o.   MRN:  [id number]  Preferred Name  Mel  ED Room #:  B18/B18-18  Family/Caregiver Present no   Restraints no   Sitter no   Sepsis Risk Score Sepsis Risk Score: 0.7    Situation  Code Status: Prior No additional code details.    Allergies: Belladonna alkaloids and Beta adrenergic blockers  Weight: Patient Vitals for the past 96 hrs (Last 3 readings):   Weight   02/15/24 1828 75.4 kg (166 lb 3.2 oz)     Arrived from: nursing home  Chief Complaint:   Chief Complaint   Patient presents with    Extremity Weakness     \"Drooping towards left side\" noted by SNF at 1700; unknown [facility]     Hospital Problem/Diagnosis:  Principal Problem:    Altered mental status  Resolved Problems:    * No resolved hospital problems. *    Imaging:   XR CHEST PORTABLE   Final Result      No acute pulmonary disease.         CTA HEAD NECK W CONTRAST   Final Result      1.  No aneurysms, vascular occlusions, or intracranial stenoses identified.   2.  No significant stenosis in the extracranial vertebral or carotid arteries.         CT HEAD WO CONTRAST   Final Result      1.  No acute intracranial hemorrhage.   2.  Moderate cerebral atrophy and severe chronic small vessel ischemic disease.      Discussed with Dr. Douglas.        Abnormal labs:   Abnormal Labs Reviewed   COMPREHENSIVE METABOLIC PANEL W/ REFLEX TO MG FOR LOW K - Abnormal; Notable for the following components:       Result Value    Chloride 96 (*)     BUN 21 (*)     ALT 8 (*)     All other components within normal limits   TROPONIN - Abnormal; Notable for the following components:    Troponin, High Sensitivity 29 (*)     All other components within normal limits   TROPONIN - Abnormal; Notable for the following components:    Troponin, High Sensitivity 19 (*)     All other components within normal limits     Critical values: yes   Trop 19    Abnormal Assessment Findings: Patient ams.

## 2024-02-16 NOTE — ED PROVIDER NOTES
THE [facility]  EMERGENCY DEPARTMENT ENCOUNTER          ATTENDING PHYSICIAN NOTE       Date of evaluation: 2/15/2024    Chief Complaint     Extremity Weakness (\"Drooping towards left side\" noted by SNF at 1700; unknown [facility])      History of Present Illness     Katrin Donnelly is a 83 y.o. female who presents to the emergency department today for facial droop and possible stroke.  She has history of severe dementia and lives in a memory care facility.  Reportedly at approximately 1700, the patient was noted to have some facial twitching and left-sided facial droop.  She was also leaning towards the left side.  I did speak with her nursing facility and confirmed with this timing and the symptoms.  She was otherwise at her baseline prior to this.  Patient is unable to provide any additional history and is generally oriented to name only per nursing facility report.    Physical Exam     INITIAL VITALS: BP: (!) 157/139, Temp: 97.2 °F (36.2 °C), Pulse: 78, Respirations: 18, SpO2: 100 %     Physical Exam    Nursing note and vitals reviewed.    General:  Adult female, alert and appropriately interactive. In no distress.  HENT: Normocephalic and atraumatic. External ears normal. Nose appears normal externally.  Eyes: Conjunctivae normal. No scleral icterus. PERRL, EOMI, no nystagmus.  Neck: Neck supple. No tracheal deviation present.   CV: Normal rate. Regular rhythm. S1/S2 auscultated. No murmurs, gallops or rubs.   Pulm: Effort normal on RA. Breath sounds clear to auscultation bilaterally. No wheezes. No rales or rhonchi.   GI: Soft. No distension. No tenderness. No rebound or guarding. No masses. No peritoneal signs.    Musculoskeletal: No edema. No gross deformities.  Neurological: Alert, unable to follow simple commands or participate in the majority of neurologic exam.  She does move all extremities spontaneously and appears to have grossly equal strength in all extremities to applied resistance.  Is able to  belladonna alkaloids and beta adrenergic blockers.                    Damián Douglas MD  02/15/24 5593

## 2024-02-16 NOTE — PROGRESS NOTES
Speech Language Pathology  Facility/Department:[facility] 5T ORTHO/NEURO  Bedside Swallow Evaluation / DC                                                       Name: Katrin Donnelly  : 1940  MRN: [id number]    Patient Diagnosis(es):   Patient Active Problem List    Diagnosis Date Noted    Altered mental status 02/10/2023    Gastroesophageal reflux disease     Benign essential HTN     Chronic venous insufficiency of lower extremity 2020    S/P placement of cardiac pacemaker 2020    Complete heart block (HCC) 2020    Debility 2020    Multiple falls 2020    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 (HCC)     Cerebral amyloid angiopathy (CODE)     Adenomatous polyp of colon 2019    Lower extremity edema 2015    Mitral valve disorder     Dysthymic disorder     Atrial fibrillation (HCC) 2010       Past Medical History:   Diagnosis Date    Adenomatous polyp of colon 2019    Asymptomatic varicose veins     Atrial fibrillation (HCC)     Dysthymic disorder     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)     Screening mammogram 2009    (Left)Benign     Past Surgical History:   Procedure Laterality Date    COLONOSCOPY  2008    HERNIA REPAIR      Left Femoral       Reason for Referral:  Katrin Donnelly  was referred for a Speech Therapy evaluation to assess speech and swallow function.    History of Present Illness  Per admitting H&P: 02/15/2024  ' 83 year old female with a PMHx of dementia starting in  (mixed picture of vascular dementia and alzheimers but other dx such as lewy body dementia are being considered), Sick Sinus Syndrome s/p pacemaker placement (initially dx with Afib and was on coumadin but was redx as SSS per note by Dr. Walker on 2023), CHF (on lasix) and orthostatic hypotension likely 2/2 to dysautonomia.     The patient resides at [facility]. She recently moved there so  that she could receive appropriate care. On 2/15/2024, a worker at the facility thought they noticed a left-sided facial droop so she was brought to our facility. I tried to reach out to the facility for further history but I could not get one. No previous hx of a CVA on her chart but does have an extensive neurological history of dementia with several diagnoses currently under consideration.     In the ED, vitals were initially stable but the patient did have episodes of tachypnea into the 30s and tachycardia into the low 100s. the ED staff were unable to do a proper neurological assessment. However, they and myself did not notice any facial droop or other neuro deficits when the patient moved naturally. The CT of the head did not show any intracerebral pathology. EKG was irregular. Ventricular rate in the 90s with atrial rate in the 300s. While this may suggest a-flutter, the EKG lacks the classic saw tooth pattern. Electrolytes were normal'    CXR: 02/15/2024  No acute pulmonary disease     CT head: 02/15/2024  1.  No acute intracranial hemorrhage.  2.  Moderate cerebral atrophy and severe chronic small vessel ischemic disease.    MRI brain: none this admission    Date of onset: 02/15/2024    Current Diet:  Diet NPO    Treatment Diagnosis: dysphagia    Pain:  Denied    General:  Chart reviewed: Yes  Behavior/Cognition: awake, alert, very pleasant, confused (advanced dementia)  Communication Observation: verbal, fragmented speech  Follows Directions: simple, inconsistent  Dentition/Oral Mucosa: dentition appears intact, oral cavity clear and moist  Oral motor exam: unremarkable  Vocal quality: WFL  Respiratory Status/oxygen requirements: room air  Vision/Hearing: wears glasses  Patient Complaint: did not state  Patient Positioning: upright in bed  PLOF: from nursing facility    Prior Dysphagia History:   None located in chart review    Bedside Swallowing Evaluation Impression 2/16/2024  Swallow function appears

## 2024-02-16 NOTE — PROCEDURES
Katrin Donnelly is a 83 y.o. female patient.  1. Facial droop      Past Medical History:   Diagnosis Date    Adenomatous polyp of colon 1/29/2019    Asymptomatic varicose veins     Atrial fibrillation (HCC)     Dysthymic disorder     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)     Screening mammogram 12/8/2009    (Left)Benign     Blood pressure (!) 141/76, pulse 79, temperature 98 °F (36.7 °C), temperature source Oral, resp. rate 18, height 1.676 m (5' 5.98\"), weight 75.4 kg (166 lb 3.2 oz), SpO2 94 %, not currently breastfeeding.    EEG awake and asleep    Date/Time: 2/16/2024 1:50 PM    Performed by: Arminda Romano MD  Authorized by: Heather Riojas APRN - CNP        CLINICAL HISTORY:  Katrin Donnelly is an 82 y/o woman with Alzheimer's dementia, seizures, stroke, possible CAA, and atrial fibrillation who presents with altered mentation. 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  Clinical concern is for complex partial or simple partial seizures or subclinical status epilepticus.    FINDINGS: This is a routine 16 channel referential and bipolar video EEG. There is a background rhythm in the delta range, without a posterior dominant rhythm. Photic stimulation is performed without driving or abnormality.  Hyperventilation is not performed.  No clearly epileptiform abnormalities, though there are intermittent spikes in the left hemispheric leads. No electrographic seizures are recorded.     IMPRESSION:  This is an abnormal awake and drowsy EEG due to the presence of severe generalized slowing consistent with her underlying severe dementia. There are some intermittent spikes in the left hemispheric leads, but not rhythmic or evolving to electrographic seizure.     Arminda Romano MD  2/16/2024

## 2024-02-16 NOTE — ED NOTES
Patient BIBA for extremity weakness  SNF reports left side drooping at facility.  PIV placed using aseptic technique per hospital policy.  Blood collected and sent to lab.       Shayy Moreland RN  02/15/24 1917

## 2024-02-16 NOTE — CONSULTS
Clinical Pharmacy Consult Note  Medication History     Admit Date: 2/15/2024    Pharmacy consulted to verify home medication list by Dr. Unger.    List of of current medications patient is taking is complete. Home Medication list in EPIC updated to reflect changes noted below.    Source of information: medication list from [facility]; list dated Feb 2024    Changes made to medication list:   Medications removed: (include reason, ex: therapy completed, patient no longer taking, etc.)  Vitamin B12, Tamsulosin - not on facility med list  Medications added:   Miralax   Ondansetron   Medication doses adjusted:   Acetaminophen-->650mg po TID scheduled  Aspirin-->changed to chewable tablet; 81mg daily   Divalproex-->changed to sprinkle caps; 250mg po TID  Potassium-->10mEq po daily  Quetiapine-->75mg nightly  Vitamin D-->1000units daily  Bisacodyl-->10mg ME daily PRN constipation   Other notes:   Takes donepezil in the AM    Current Outpatient Medications   Medication Instructions    acetaminophen (TYLENOL) 650 mg, Oral, 3 times daily    aspirin 81 mg, Oral, DAILY    bisacodyl (DULCOLAX) 10 mg, Rectal, DAILY PRN    divalproex (DEPAKOTE SPRINKLE) 250 mg, Oral, 3 TIMES DAILY    donepezil (ARICEPT) 10 mg, Oral, DAILY BEFORE BREAKFAST    furosemide (LASIX) 60 mg, Oral, DAILY    LORazepam (ATIVAN) 0.5 mg, Oral, EVERY 6 HOURS PRN    magnesium hydroxide (MILK OF MAGNESIA) 400 MG/5ML suspension 30 mLs, Oral, DAILY PRN    memantine (NAMENDA) 10 MG tablet TAKE 1 TABLET BY MOUTH TWICE DAILY    mirtazapine (REMERON) 7.5 mg, Oral, NIGHTLY    omeprazole (PRILOSEC) 20 mg, Oral, DAILY    ondansetron (ZOFRAN) 4 mg, Oral, EVERY 8 HOURS PRN    polyethylene glycol (MIRALAX) 17 g, Oral, DAILY PRN    potassium chloride (MICRO-K) 10 MEQ extended release capsule 10 mEq, Oral, DAILY    QUEtiapine (SEROQUEL) 75 mg, Oral, Nightly    vitamin D (CHOLECALCIFEROL) 1,000 Units, Oral, DAILY       Please call with questions--  Taya Woods, PharmD,

## 2024-02-16 NOTE — PROGRESS NOTES
Internal Medicine Progress Note    Date: 2/16/2024   Patient: Katrin Donnelly   [facility] Day: 1      CC: Extremity Weakness (\"Drooping towards left side\" noted by SNF at 1700; unknown [facility])       Interval Hx     After admission to floor, DION.   BP 110s-150s/60s-90s. Other VTLs NL. Satting well on RA.   BMP, FLP, & CBC NL (high RDW on CBC but Hb 13.1). NNL PT/INR.  UA shows trace blood & moderate leukocyte esterase, > 100 WBC, 4+ bacteria. UC pending.   Trops 19 > 29     L-sided facial droop  ED: Non-con head CT neg & CTAH&N neg   TIA resolved, didn't do MRI bc wouldn't sit/lay still  ED noticed UE & face twitching, cerebel added   Neuro c/s, Q4 neuro checks, Keppra 500 mg BID  UA FU  CXR neg     HPI: MP is an 83 year old female with a PMHx of dementia starting in 2020 (mixed picture of vascular dementia and alzheimers but other dx such as lewy body dementia are being considered), Sick Sinus Syndrome s/p pacemaker placement (initially dx with Afib and was on coumadin but was read as SSS per note by Dr. Walker on 12/05/2023), CHF (on lasix) and orthostatic hypotension likely 2/2 to dysautonomia.     The patient resides at [facility]. She recently moved there so that she could receive appropriate care. On 2/15/2024, a worker at the facility thought they noticed a left-sided facial droop so she was brought to our facility. I tried to reach out to the facility for further history but I could not get one. No previous hx of a CVA on her chart but does have an extensive neurological history of dementia with several diagnoses currently under consideration.     In the ED, vitals were initially stable but the patient did have episodes of tachypnea into the 30s and tachycardia into the low 100s. the ED staff were unable to do a proper neurological assessment. However, they and myself did not notice any facial droop or other neuro deficits when the patient moved naturally. The CT of the head did not show

## 2024-02-16 NOTE — PLAN OF CARE
Problem: Discharge Planning  Goal: Discharge to home or other facility with appropriate resources  2/16/2024 0759 by Matt James, RN  Outcome: Progressing  Flowsheets (Taken 2/16/2024 0765)  Discharge to home or other facility with appropriate resources: Identify barriers to discharge with patient and caregiver   Pt will be assessed by PT/OT/SLP  Problem: Safety - Adult  Goal: Free from fall injury  2/16/2024 0759 by Matt James, RN  Outcome: Progressing  Flowsheets (Taken 2/16/2024 0757)  Free From Fall Injury: Instruct family/caregiver on patient safety   All fall prevention measures in place, call light within reach.   Problem: Skin/Tissue Integrity  Goal: Absence of new skin breakdown  Description: 1.  Monitor for areas of redness and/or skin breakdown  2.  Assess vascular access sites hourly  3.  Every 4-6 hours minimum:  Change oxygen saturation probe site  4.  Every 4-6 hours:  If on nasal continuous positive airway pressure, respiratory therapy assess nares and determine need for appliance change or resting period.  2/16/2024 0759 by Matt James, RN  Outcome: Progressing   Skin integrity will remain intact, specialty bed ordered

## 2024-02-16 NOTE — PLAN OF CARE
Problem: Discharge Planning  Goal: Discharge to home or other facility with appropriate resources  Outcome: Progressing     Problem: Safety - Adult  Goal: Free from fall injury  Outcome: Progressing  Note: Fall precautions in place. Bed alarm on, bed in lowest position, call light within reach, non slip socks, hourly rounding.      Problem: Skin/Tissue Integrity  Goal: Absence of new skin breakdown  Description: 1.  Monitor for areas of redness and/or skin breakdown  2.  Assess vascular access sites hourly  3.  Every 4-6 hours minimum:  Change oxygen saturation probe site  4.  Every 4-6 hours:  If on nasal continuous positive airway pressure, respiratory therapy assess nares and determine need for appliance change or resting period.  Outcome: Progressing  Note: Turning, pillow support, and repositioning utilized.

## 2024-02-17 VITALS
DIASTOLIC BLOOD PRESSURE: 58 MMHG | BODY MASS INDEX: 26.71 KG/M2 | WEIGHT: 166.2 LBS | TEMPERATURE: 97.4 F | HEIGHT: 66 IN | OXYGEN SATURATION: 96 % | HEART RATE: 61 BPM | SYSTOLIC BLOOD PRESSURE: 103 MMHG | RESPIRATION RATE: 17 BRPM

## 2024-02-17 LAB
ANION GAP SERPL CALCULATED.3IONS-SCNC: 12 MMOL/L (ref 3–16)
BASOPHILS # BLD: 0 K/UL (ref 0–0.2)
BASOPHILS NFR BLD: 0.8 %
BUN SERPL-MCNC: 16 MG/DL (ref 7–20)
CALCIUM SERPL-MCNC: 9 MG/DL (ref 8.3–10.6)
CHLORIDE SERPL-SCNC: 100 MMOL/L (ref 99–110)
CO2 SERPL-SCNC: 27 MMOL/L (ref 21–32)
CREAT SERPL-MCNC: 0.9 MG/DL (ref 0.6–1.2)
DEPRECATED RDW RBC AUTO: 16 % (ref 12.4–15.4)
EOSINOPHIL # BLD: 0.2 K/UL (ref 0–0.6)
EOSINOPHIL NFR BLD: 3.5 %
EST. AVERAGE GLUCOSE BLD GHB EST-MCNC: 108.3 MG/DL
GFR SERPLBLD CREATININE-BSD FMLA CKD-EPI: >60 ML/MIN/{1.73_M2}
GLUCOSE SERPL-MCNC: 86 MG/DL (ref 70–99)
HBA1C MFR BLD: 5.4 %
HCT VFR BLD AUTO: 41.8 % (ref 36–48)
HGB BLD-MCNC: 13.5 G/DL (ref 12–16)
LYMPHOCYTES # BLD: 1.8 K/UL (ref 1–5.1)
LYMPHOCYTES NFR BLD: 31 %
MCH RBC QN AUTO: 31.8 PG (ref 26–34)
MCHC RBC AUTO-ENTMCNC: 32.2 G/DL (ref 31–36)
MCV RBC AUTO: 98.6 FL (ref 80–100)
MONOCYTES # BLD: 0.7 K/UL (ref 0–1.3)
MONOCYTES NFR BLD: 11.4 %
NEUTROPHILS # BLD: 3 K/UL (ref 1.7–7.7)
NEUTROPHILS NFR BLD: 53.3 %
PLATELET # BLD AUTO: 128 K/UL (ref 135–450)
PMV BLD AUTO: 7.8 FL (ref 5–10.5)
POTASSIUM SERPL-SCNC: 4.3 MMOL/L (ref 3.5–5.1)
RBC # BLD AUTO: 4.23 M/UL (ref 4–5.2)
SODIUM SERPL-SCNC: 139 MMOL/L (ref 136–145)
WBC # BLD AUTO: 5.7 K/UL (ref 4–11)

## 2024-02-17 PROCEDURE — 2580000003 HC RX 258

## 2024-02-17 PROCEDURE — 6370000000 HC RX 637 (ALT 250 FOR IP)

## 2024-02-17 PROCEDURE — 6360000002 HC RX W HCPCS

## 2024-02-17 PROCEDURE — 99239 HOSP IP/OBS DSCHRG MGMT >30: CPT | Performed by: HOSPITALIST

## 2024-02-17 PROCEDURE — 85025 COMPLETE CBC W/AUTO DIFF WBC: CPT

## 2024-02-17 PROCEDURE — 80048 BASIC METABOLIC PNL TOTAL CA: CPT

## 2024-02-17 PROCEDURE — 36415 COLL VENOUS BLD VENIPUNCTURE: CPT

## 2024-02-17 PROCEDURE — 97530 THERAPEUTIC ACTIVITIES: CPT

## 2024-02-17 PROCEDURE — 97166 OT EVAL MOD COMPLEX 45 MIN: CPT

## 2024-02-17 PROCEDURE — 97162 PT EVAL MOD COMPLEX 30 MIN: CPT

## 2024-02-17 RX ORDER — CEFDINIR 300 MG/1
300 CAPSULE ORAL EVERY 12 HOURS SCHEDULED
Status: DISCONTINUED | OUTPATIENT
Start: 2024-02-18 | End: 2024-02-17 | Stop reason: HOSPADM

## 2024-02-17 RX ORDER — ATORVASTATIN CALCIUM 80 MG/1
80 TABLET, FILM COATED ORAL DAILY
Qty: 30 TABLET | Refills: 0 | Status: SHIPPED | OUTPATIENT
Start: 2024-02-17

## 2024-02-17 RX ORDER — CEFDINIR 300 MG/1
300 CAPSULE ORAL EVERY 12 HOURS SCHEDULED
Status: DISCONTINUED | OUTPATIENT
Start: 2024-02-17 | End: 2024-02-17

## 2024-02-17 RX ORDER — CEFDINIR 300 MG/1
300 CAPSULE ORAL EVERY 12 HOURS SCHEDULED
Qty: 6 CAPSULE | Refills: 0 | Status: SHIPPED | OUTPATIENT
Start: 2024-02-18 | End: 2024-02-21

## 2024-02-17 RX ADMIN — DIVALPROEX SODIUM 250 MG: 125 CAPSULE, COATED PELLETS ORAL at 15:31

## 2024-02-17 RX ADMIN — ASPIRIN 81 MG 81 MG: 81 TABLET ORAL at 09:34

## 2024-02-17 RX ADMIN — ENOXAPARIN SODIUM 40 MG: 100 INJECTION SUBCUTANEOUS at 09:33

## 2024-02-17 RX ADMIN — SODIUM CHLORIDE, PRESERVATIVE FREE 10 ML: 5 INJECTION INTRAVENOUS at 09:37

## 2024-02-17 RX ADMIN — MEMANTINE HYDROCHLORIDE 10 MG: 10 TABLET ORAL at 09:34

## 2024-02-17 RX ADMIN — CEFTRIAXONE 1000 MG: 1 INJECTION, POWDER, FOR SOLUTION INTRAMUSCULAR; INTRAVENOUS at 09:55

## 2024-02-17 RX ADMIN — FUROSEMIDE 60 MG: 20 TABLET ORAL at 09:33

## 2024-02-17 NOTE — DISCHARGE INSTR - COC
Continuity of Care Form    Patient Name: Katrin Donnelly   :  1940  MRN:  [id number]    Admit date:  2/15/2024  Discharge date:  2024    Code Status Order: Full Code   Advance Directives:     Admitting Physician:  Naa Sheffield MD  PCP: Kalyan Estrella MD    Discharging Nurse: Vivian Avila RN  Discharging Hospital Unit/Room#: 5518/5518-01  Discharging Unit Phone Number: [phone]    Emergency Contact:   Extended Emergency Contact Information  Primary Emergency Contact: Matthew Donnelly  Address: [address] [place] [address] [id number]           [place] [address] [facility]  Home Phone: [phone]  Mobile Phone: [phone]  Relation: Spouse  Secondary Emergency Contact: Giovanni Donnelly  Home Phone: [phone]  Mobile Phone: [phone]  Relation: Child    Past Surgical History:  Past Surgical History:   Procedure Laterality Date    COLONOSCOPY  2008    HERNIA REPAIR      Left Femoral    PACEMAKER INSERTION  2020    [employer]       Immunization History:   Immunization History   Administered Date(s) Administered    COVID-19, MODERNA Bivalent, (age 12y+), IM, 50 mcg/0.5 mL 2023    COVID-19, PFIZER Bivalent, DO NOT Dilute, (age 12y+), IM, 30 mcg/0.3 mL 10/04/2022    COVID-19, PFIZER PURPLE top, DILUTE for use, (age 12 y+), 30mcg/0.3mL 2021, 2021, 2022    Influenza 2010    Influenza Vaccine, unspecified formulation 10/15/2007, 10/07/2008, 2009    Influenza Whole 2009    Influenza, FLUAD, (age 65 y+), Adjuvanted, 0.5mL 2020, 10/06/2021, 2022    Influenza, High Dose (Fluzone 65 yrs and older) 10/27/2011, 10/18/2012, 2013, 10/16/2014, 10/08/2015, 2016, 2017, 2018    Influenza, Triv, inactivated, subunit, adjuvanted, IM (Fluad 65 yrs and older) 2019    Pneumococcal, PCV-13, PREVNAR 13, (age 6w+), IM, 0.5mL 2016    Pneumococcal, PPSV23, PNEUMOVAX 23, (age 2y+), SC/IM, 0.5mL 10/15/2007    TDaP, ADACEL (age    Colostomy/Ileostomy/Ileal Conduit: No       Date of Last BM: 2/17/2024    Intake/Output Summary (Last 24 hours) at 2/17/2024 1524  Last data filed at 2/17/2024 0656  Gross per 24 hour   Intake 360 ml   Output 2000 ml   Net -1640 ml     I/O last 3 completed shifts:  In: 840 [P.O.:840]  Out: 2600 [Urine:2600]    Safety Concerns:     At Risk for Falls    Impairments/Disabilities:      Vision    Nutrition Therapy:  Current Nutrition Therapy:   - Oral Diet:  General    Routes of Feeding: Oral  Liquids: Thin Liquids  Daily Fluid Restriction: no  Last Modified Barium Swallow with Video (Video Swallowing Test): not done    Treatments at the Time of Hospital Discharge:   Respiratory Treatments: None  Oxygen Therapy:  is not on home oxygen therapy.  Ventilator:    - No ventilator support    Rehab Therapies: Physical Therapy and Occupational Therapy  Weight Bearing Status/Restrictions: No weight bearing restrictions  Other Medical Equipment (for information only, NOT a DME order):  walker  Other Treatments: ***    Patient's personal belongings (please select all that are sent with patient):  Glasses, Clothing    RN SIGNATURE:  Electronically signed by Vivian Avila RN on 2/17/24 at 6:08 PM EST    CASE MANAGEMENT/SOCIAL WORK SECTION    Inpatient Status Date: ***    Readmission Risk Assessment Score:  Readmission Risk              Risk of Unplanned Readmission:  13           Discharging to Facility/ Agency   Name:   Address:  Phone:  Fax:    Dialysis Facility (if applicable)   Name:  Address:  Dialysis Schedule:  Phone:  Fax:    / signature: {Esignature:[id number]}    PHYSICIAN SECTION    Prognosis: Good    Condition at Discharge: Stable    Rehab Potential (if transferring to Rehab): Good    Recommended Labs or Other Treatments After Discharge: N/A    Physician Certification: I certify the above information and transfer of Katrin Donnelly  is necessary for the continuing treatment of the diagnosis

## 2024-02-17 NOTE — PROGRESS NOTES
Internal Medicine Progress Note    Date: 2/17/2024   Patient: Katrin Donnelly   [facility] Day: 2      CC: Extremity Weakness (\"Drooping towards left side\" noted by SNF at 1700; unknown LKW)       Interval Hx     After admission to [facility], DION.   Patient sitting comfortably in chair, speaks intermittently, intermittently follows commands no focal deficits.    HPI: MP is an 83 year old female with a PMHx of dementia starting in 2020 (mixed picture of vascular dementia and alzheimers but other dx such as lewy body dementia are being considered), Sick Sinus Syndrome s/p pacemaker placement (initially dx with Afib and was on coumadin but was read as SSS per note by Dr. Walker on 12/05/2023), CHF (on lasix) and orthostatic hypotension likely 2/2 to dysautonomia.     The patient resides at [facility]. She recently moved there so that she could receive appropriate care. On 2/15/2024, a worker at the facility thought they noticed a left-sided facial droop so she was brought to our facility. I tried to reach out to the facility for further history but I could not get one. No previous hx of a CVA on her chart but does have an extensive neurological history of dementia with several diagnoses currently under consideration.     In the ED, vitals were initially stable but the patient did have episodes of tachypnea into the 30s and tachycardia into the low 100s. the ED staff were unable to do a proper neurological assessment. However, they and myself did not notice any facial droop or other neuro deficits when the patient moved naturally. The CT of the head did not show any intracerebral pathology. EKG was irregular. Ventricular rate in the 90s with atrial rate in the 300s. While this may suggest a-flutter, the EKG lacks the classic saw tooth pattern. Electrolytes were normal    Objective     Vital Signs:  Patient Vitals for the past 8 hrs:   BP Temp Temp src Pulse Resp SpO2   02/17/24 1218 (!) 103/58 97.4 °F  (36.3 °C) Axillary 61 17 96 %   02/17/24 0815 119/70 97.5 °F (36.4 °C) Oral 61 18 96 %       Physical Exam  General: doesn't stay engaged in conversation  Head: Normocephalic, atraumatic, no visible or palpable masses  Eyes: conjunctiva clear, sclera non-icteric, EOM intact,  Heart: irregular rhythm   Lungs: Clear to auscultation, protecting airway well, non-labored respirations   Abdomen: Soft, non-rigid  Neuro: NL tone in BL UE & LE. Doesn't do finger-to-nose on L side. CN grossly intact.      Labs:  CBC:   Recent Labs     02/15/24  1924 02/16/24  0604 02/17/24  0657   WBC 6.7 5.5 5.7   HGB 14.7 13.1 13.5   HCT 44.5 39.7 41.8    142 128*       BMP:   Recent Labs     02/15/24  1924 02/16/24  0604 02/17/24  0657    140 139   K 4.1 3.8 4.3   CL 96* 101 100   CO2 31 29 27   BUN 21* 18 16   CREATININE 0.8 0.7 0.9   GLUCOSE 96 80 86     LFT's:   Recent Labs     02/15/24  1924   AST 18   ALT 8*   BILITOT <0.2   ALKPHOS 114         U/A:   Recent Labs     02/16/24  0635   COLORU Yellow   PHUR 6.5   WBCUA >100*   RBCUA 11-20*   BACTERIA 4+*   CLARITYU Clear   SPECGRAV 1.015   LEUKOCYTESUR MODERATE*   UROBILINOGEN 1.0   BILIRUBINUR Negative   BLOODU TRACE-INTACT*   GLUCOSEU Negative       Radiology:  XR CHEST PORTABLE   Final Result      No acute pulmonary disease.         CTA HEAD NECK W CONTRAST   Final Result      1.  No aneurysms, vascular occlusions, or intracranial stenoses identified.   2.  No significant stenosis in the extracranial vertebral or carotid arteries.         CT HEAD WO CONTRAST   Final Result      1.  No acute intracranial hemorrhage.   2.  Moderate cerebral atrophy and severe chronic small vessel ischemic disease.      Discussed with Dr. Douglas.            Assessment & Plan   82 y/o F Pw facial droop that lasted a few minutes. Hx significant for dementia starting in 2020 (mixed picture of vascular dementia and alzheimers but other dx such as lewy body dementia are being considered), Sick

## 2024-02-17 NOTE — PROGRESS NOTES
Patient is disoriented, patient is only alert to self, VSS, patient is verbal but confused, patient is voiding per incont briefs, patient is tolerating a reg adult diet patient will be discharging back to her current facility, patient has all LDA's removed and has all of her belongings, patient will be picked up at 2000 by transportation.

## 2024-02-17 NOTE — PROGRESS NOTES
Occupational Therapy  Facility/Department: [facility] 5T ORTHO/NEURO  Occupational Therapy Initial Assessment    Name: Katrin Donnelly  : 1940  MRN: [id number]  Date of Service: 2024    Discharge Recommendations:  Subacute/Skilled Nursing Facility  OT Equipment Recommendations  Equipment Needed:  (defer to next level of care)       Patient Diagnosis(es): The primary encounter diagnosis was Moderate dementia without behavioral disturbance, psychotic disturbance, mood disturbance, or anxiety, unspecified dementia type (HCC). A diagnosis of Facial droop was also pertinent to this visit.  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  Past Surgical History:  has a past surgical history that includes Colonoscopy (2008); hernia repair; and Pacemaker insertion (2020).    Treatment Diagnosis: impaired ADLs      Assessment   Performance deficits / Impairments: Decreased functional mobility ;Decreased ADL status;Decreased cognition;Decreased strength;Decreased balance  Assessment: 83 y.o. female with significant past medical history of advanced dementia neuropsychiatric issues. She was moved recently to a new living facility. On 2/15/24 a staff member noted left facial droop and alerted EMS. CT head showed no acute findings and CTA showed no significant stenoses or LVO. Pt is currently requiring assist x2 for bed mobility, functional transfers, and ADLs. Pt is limited by impaired strength, endurance, and balance. Pts cognition is impacting overall participation in assessment as pt follows <10% of commands and requires increased time for processing, initiation, and sequencing. Would benefit from continued skilled services while admitted to maximize functional outcomes prior to d/c.  Treatment Diagnosis: impaired ADLs  Prognosis: Fair  Decision

## 2024-02-17 NOTE — PROGRESS NOTES
Physical Therapy  Facility/Department: [facility] ORTHO/NEURO  Physical Therapy Initial Assessment/Treatment    Name: Katrin Donnelly  : 1940  MRN: [id number]  Date of Service: 2024    Discharge Recommendations:  Subacute/Skilled Nursing Facility   PT Equipment Recommendations  Equipment Needed: No (defer)      Patient Diagnosis(es): The primary encounter diagnosis was Moderate dementia without behavioral disturbance, psychotic disturbance, mood disturbance, or anxiety, unspecified dementia type (HCC). A diagnosis of Facial droop was also pertinent to this visit.  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  Past Surgical History:  has a past surgical history that includes Colonoscopy (2008); hernia repair; and Pacemaker insertion (2020).    Assessment   Body Structures, Functions, Activity Limitations Requiring Skilled Therapeutic Intervention: Decreased functional mobility ;Decreased strength;Decreased endurance;Decreased balance;Decreased cognition  Assessment: 83 y.o. female with significant past medical history of advanced dementia neuropsychiatric issues. She was moved recently to a new living facility. On 2/15/24 a staff member noted left facial droop and alerted EMS. CT head showed no acute findings and CTA showed no significant stenoses or LVO. Pt currently requiring Ax2 for bed mobility, transfers and short amb with RW. Pt limited by decreased strength, balance, endurance and impaired cognition. Pt able to follow <10% of commands throughout session. Pt is below her baseline and would benefit from further skilled PT to maximize safety and independence with functional mobility. Will continue to follow.  Treatment Diagnosis: Decreased functional mobility  Therapy Prognosis: Good;Fair  Decision Making: Medium  long time though she could.  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  Additional Comments: Pt is a poor historian, minimally conversive and inconsistently responding to questions.  Vision/Hearing  Vision  Vision: Impaired  Vision Exceptions: Wears glasses at all times  Hearing  Hearing: Within functional limits    Cognition   Cognition  Overall Cognitive Status: Exceptions  Arousal/Alertness: Inconsistent responses to stimuli  Following Commands: Follows one step commands with repetition;Follows one step commands with increased time;Inconsistently follows commands  Attention Span: Attends with cues to redirect;Difficulty attending to directions  Memory: Decreased recall of biographical Information;Decreased recall of recent events  Safety Judgement: Decreased awareness of need for assistance  Problem Solving: Assistance required to generate solutions;Assistance required to identify errors made  Initiation: Requires cues for some  Sequencing: Requires cues for some     Objective   Pulse: 61  Heart Rate Source: Monitor  BP: (!) 103/58  BP Location: Left upper arm  BP Method: Automatic  Patient Position: Semi fowlers  MAP (Calculated): 73  Respirations: 17  SpO2: 96 %  O2 Device: None (Room air)  Temp: 97.4 °F (36.3 °C)            AROM RLE (degrees)  RLE AROM: WFL  Strength RLE  Strength RLE: WFL  Comment: per functional mobility  Strength LLE  Strength LLE: WFL  Comment: per functional mobility           Bed mobility  Supine to Sit: Maximum assistance;2 Person assistance (HOB slightly elevated)  Transfers  Sit to Stand: 2 Person Assistance;Moderate Assistance (from EOB)  Stand to Sit: 2 Person Assistance;Moderate Assistance (to recliner)  Comment: Pt found to have been incontinent of stool upon stance - requiring Ax2 to stand and RN and PCA for pericare and for brief management.  Ambulation  Surface: Level tile  Device: Rolling Walker  Assistance: 2 Person assistance;Moderate assistance  Quality of

## 2024-02-17 NOTE — CARE COORDINATION
accepted)     Able to afford home medications/ co-pay costs: Yes    ADLS:  Current PT AM-PAC Score: 6 /24  Current OT AM-PAC Score: 11 /24      DISCHARGE Disposition: Assisted Living Facility: [facility] Phone: [phone] Fax: n/a    LOC at discharge: Not Applicable  CURTIS Completed: No    Notification completed in HENS/PAS?:  Not Applicable    IMM Completed:   Yes, Case management has presented and reviewed IMM letter #2 to the patient and/or family/ POA. Patient and/or family/POA verbalized understanding of their medicare rights and appeal process if needed. Patient and/or family/POA has signed and placed today's date (2/16/24) and time (1110am) on letter #2 on the the appropriate lines. Patient and/or family/POA, provided copy of signed letter and they are aware that this original copy of IMM letter #2 is available prior to discharge from the paper chart on the unit.  Electronic documentation has been entered into epic for IMM letter #2 and original paper copy has been added to the paper chart at the nurses station.          Transportation:  Transportation PLAN for discharge: EMS transportation   Mode of Transport: Ambulance stretcher - S  Reason for medical transport: Bed confined: Meets the following criteria 1) unable to get out of bed without assistance or ambulate, 2) unable to safely sit up in a wheelchair, 3) unable to maintain erect seating position in a chair for time needed for transport  Name of Transport Company: [employer]  Phone: [phone]  Time of Transport: 6:30pm     Transport form completed: Yes      Referrals made at DISCHARGE for outpatient continued care:  [facility]: [facility]     Additional CM Notes:     Pt is from [facility], pt has Pt/[place] at her facility. Pt will dc back to [facility].   DINORA spoke to pt's daughter, she in agreement w/ dc and is aware of dc time. Transport via [place] [facility] at 630pm.pt's daughter denies any additional needs at this  time.     COVID Result:    Lab Results   Component Value Date/Time    COVID19 NOT DETECTED 02/13/2023 01:50 PM    COVID19 NOT DETECTED 01/04/2021 09:17 PM       The Plan for Transition of Care is related to the following treatment goals of Altered mental status [R41.82]  Facial droop [R29.810]    The Patient and/or patient representative Katrin and her family were provided with a choice of provider and agrees with the discharge plan Yes    Freedom of choice list was provided with basic dialogue that supports the patient's individualized plan of care/goals and shares the quality data associated with the providers. Yes    Care Transitions patient: No    FAUSTO Hamm, LSW  [facility]  Case Management Department  Ph: [phone]

## 2024-02-17 NOTE — PROGRESS NOTES
Pt Alert to self. Had several periods of disorientation where she would refuse to take medication, would be non compliant, ignore nurse, and would not allow nurse to check for incontinence episodes. Notified MD of both instances. Will cont to follow care plan.

## 2024-02-17 NOTE — PLAN OF CARE
Problem: Discharge Planning  Goal: Discharge to home or other facility with appropriate resources  Outcome: Progressing   Patient has case management consulted for discharge.      Problem: Safety - Adult  Goal: Free from fall injury  Outcome: Progressing   Patient has all standard fall precautions in place.      Problem: Skin/Tissue Integrity  Goal: Absence of new skin breakdown  Description: 1.  Monitor for areas of redness and/or skin breakdown  2.  Assess vascular access sites hourly  3.  Every 4-6 hours minimum:  Change oxygen saturation probe site  4.  Every 4-6 hours:  If on nasal continuous positive airway pressure, respiratory therapy assess nares and determine need for appliance change or resting period.  Outcome: Progressing   Patient remains absent of any new skin issues.

## 2024-02-17 NOTE — PLAN OF CARE
Problem: Discharge Planning  Goal: Discharge to home or other facility with appropriate resources  2/17/2024 0741 by Vivian Avila RN  Outcome: Progressing  Flowsheets (Taken 2/16/2024 2108 by Shawna Holland RN)  Discharge to home or other facility with appropriate resources: Identify barriers to discharge with patient and caregiver     Problem: Safety - Adult  Goal: Free from fall injury  2/17/2024 0741 by Vivian Avila RN  Outcome: Progressing  Flowsheets (Taken 2/16/2024 2108 by Shawna Holland RN)  Free From Fall Injury: Instruct family/caregiver on patient safety     Problem: Skin/Tissue Integrity  Goal: Absence of new skin breakdown  Description: 1.  Monitor for areas of redness and/or skin breakdown  2.  Assess vascular access sites hourly  3.  Every 4-6 hours minimum:  Change oxygen saturation probe site  4.  Every 4-6 hours:  If on nasal continuous positive airway pressure, respiratory therapy assess nares and determine need for appliance change or resting period.  2/17/2024 0741 by Vivian Avila RN  Outcome: Progressing     Problem: Neurosensory - Adult  Goal: Achieves stable or improved neurological status  2/17/2024 0741 by Vivian Avila RN  Outcome: Progressing  Flowsheets (Taken 2/16/2024 2108 by Shawna Holland RN)  Achieves stable or improved neurological status: Assess for and report changes in neurological status     Problem: Neurosensory - Adult  Goal: Absence of seizures  Recent Flowsheet Documentation  Taken 2/16/2024 2108 by Shawna Holland RN  Absence of seizures: Monitor for seizure activity.  If seizure occurs, document type and location of movements and any associated apnea     Problem: Neurosensory - Adult  Goal: Achieves maximal functionality and self care  Recent Flowsheet Documentation  Taken 2/16/2024 2108 by Shawna Holland RN  Achieves maximal functionality and self care: Monitor swallowing and airway patency with patient fatigue and changes in  neurological status

## 2024-02-17 NOTE — DISCHARGE INSTRUCTIONS
-- Take your antibiotic twice a day for 3 days. Finish the entire course to ensure resolution of your urinary tract infection.

## 2024-02-18 LAB
BACTERIA UR CULT: ABNORMAL
ORGANISM: ABNORMAL

## 2024-02-19 NOTE — PROCEDURES
INTERPRETATION:  This more than 1-hour, rapid 8-channel EEG recording is abnormal.  It showed mild degree of generalized slowing background activity.  No potentially epileptiform activity was present during the recording.       The EEG findings were consistent with mild degree of generalized non-specific cerebral dysfunction.    The study is technically limited due to heavy electrical/EMG artifacts.     CLASSIFICATION:  Dysrhythmia grade 1, generalized.     DESCRIPTION:  BACKGROUND:  The EEG background showed continuous generalized low amplitude intermixed 4 to 11 Hz theta/alpha activity.  The EEG showed fair variability and reactivity.       INTERICTAL DISCHARGES: None     SEIZURE BURDEN: 0%

## 2024-04-04 ENCOUNTER — APPOINTMENT (OUTPATIENT)
Dept: CT IMAGING | Age: 84
End: 2024-04-04
Payer: MEDICARE

## 2024-04-04 ENCOUNTER — HOSPITAL ENCOUNTER (EMERGENCY)
Age: 84
Discharge: HOME | End: 2024-04-04
Attending: EMERGENCY MEDICINE
Payer: MEDICARE

## 2024-04-04 VITALS
TEMPERATURE: 98.1 F | WEIGHT: 168.9 LBS | HEIGHT: 65 IN | HEART RATE: 88 BPM | DIASTOLIC BLOOD PRESSURE: 85 MMHG | OXYGEN SATURATION: 95 % | SYSTOLIC BLOOD PRESSURE: 130 MMHG | BODY MASS INDEX: 28.14 KG/M2 | RESPIRATION RATE: 18 BRPM

## 2024-04-04 DIAGNOSIS — S09.90XA INJURY OF HEAD, INITIAL ENCOUNTER: Primary | ICD-10-CM
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
## 2024-04-04 PROCEDURE — 70450 CT HEAD/BRAIN W/O DYE: CPT

## 2024-04-04 PROCEDURE — 99284 EMERGENCY DEPT VISIT MOD MDM: CPT

## 2024-04-04 ASSESSMENT — PAIN - FUNCTIONAL ASSESSMENT: PAIN_FUNCTIONAL_ASSESSMENT: NONE - DENIES PAIN

## 2024-04-04 NOTE — PROGRESS NOTES
CHIEF COMPLAINT: Mila Gotti is a 68 y.o. female who presents for : Follow-up of Alzheimer's atrial fibrillation    HPI: Patient presented with follow-up of the above she is states that she is doing well though she is according to her  she is having intermittent episodes of agitation her memory is still intermittent    Review of Systems:   Constitutional:  Denies fever or chills   Eyes:  Denies change in visual acuity   HENT:  Denies nasal congestion or sore throat   Respiratory:  Denies cough or shortness of breath   Cardiovascular:  Denies chest pain or edema   GI:  Denies abdominal pain, nausea, vomiting, bloody stools or diarrhea   :  Denies dysuria   Musculoskeletal:  Denies back pain or joint pain   Integument:  Denies rash   Neurologic:  Denies headache, focal weakness or sensory changes   Endocrine:  Denies polyuria or polydipsia   Lymphatic:  Denies swollen glands   Psychiatric:  Denies depression or anxiety     Past Medical History:        Diagnosis Date    Adenomatous polyp of colon 1/29/2019    Asymptomatic varicose veins     Atrial fibrillation ([address]     Dysthymic disorder     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)     Screening mammogram 12/8/2009    (Left)Benign       Past Surgical History:        Procedure Laterality Date    COLONOSCOPY  12/12/2008    HERNIA REPAIR      Left Femoral       Family History:  Family History   Problem Relation Age of Onset    Stroke Mother     Heart Attack Father     Heart Disease Father        Social History:  Social History     Socioeconomic History    Marital status:      Spouse name: None    Number of children: None    Years of education: None    Highest education level: None   Occupational History    None   Social Needs    Financial resource strain: Not hard at all   [place] insecurity     Worry: Never true     Inability: Never true    Transportation needs     Medical: No     Non-medical: No   Tobacco Use    Smoking status: Never Smoker    Physical Exam:  Vital Signs: /75   Pulse 80   Temp 98.4 °F (36.9 °C)   Ht 5' 6\" (1.676 m)   Wt 164 lb (74.4 kg)   BMI 26.47 kg/m²   General: Patient appears  non-toxic  HENT: Atraumatic, normocephalic, oral mucosa moist  Lungs:  Clear bilaterally  Heart: Regular rhythm with controlled ventricular response  Abdomen: Non-distended, soft, non-tender  Extremities: No edema  Neuro: Nonfocal    Medical Decision Making and Plan:  Pertinent Labs & Imaging studies reviewed. (See chart for details)  Studies are pending    1. Late onset Alzheimer's disease with behavioral disturbance ([facility] [address]  This problem is stable continue present meds  - CBC Auto Differential; Future  - Comprehensive Metabolic Panel; Future  - TSH without Reflex; Future    2. Permanent atrial fibrillation (HCC)  Problem is stable followed by cardiology continue present meds for now and follow-up in 3 months  - CBC Auto Differential; Future  - Comprehensive Metabolic Panel; Future  - TSH without Reflex;  Future - - -

## 2024-04-05 NOTE — ED PROVIDER NOTES
THE [facility]  EMERGENCY DEPARTMENT ENCOUNTER          ATTENDING PHYSICIAN NOTE       Date of evaluation: 4/4/2024    Chief Complaint     Fall      History of Present Illness     Katrin Donnelly is a 83 y.o. female who presents to the department after a witnessed fall.  The patient was reportedly trying to get up out of her wheelchair when she fell forward striking her head.  This injury occurred just prior to her arrival.  The patient has a history of moderate dementia and is not able to provide a full history as to what exactly had occurred and so history is obtained primarily from EMS.  The patient reports that she is not in any pain at this time.    ASSESSMENT / PLAN  (MEDICAL DECISION MAKING)     INITIAL VITALS: BP: (!) 145/96, Temp: 98.1 °F (36.7 °C), Pulse: 88, Respirations: 18, SpO2: 93 %      Katrin Donnelly is a 83 y.o. female who presented to the Emergency Department with concerns for a witnessed fall where she was observed to get up out of her wheelchair and fell forward striking her head.  There is no report of loss of consciousness and no report of the patient having any departure from her baseline mental status.  On physical examination she has small hematoma on her left forehead otherwise had no evidence of any external signs of trauma.  CT scan of the head was performed and showed no evidence of any acute intracranial hemorrhage or mass effect.  Overall the patient was felt safe for discharge back to her memory care unit      Medical Decision Making  Problems Addressed:  Fall, initial encounter: acute illness or injury  Injury of head, initial encounter: acute illness or injury    Amount and/or Complexity of Data Reviewed  External Data Reviewed: notes.  Radiology: ordered. Decision-making details documented in ED Course.        Clinical Impression     1. Injury of head, initial encounter    2. Fall, initial encounter        Disposition     PATIENT REFERRED TO:  Kalyan Estrella MD  [address]

## 2024-04-05 NOTE — ED NOTES
Patient prepared for and ready to be discharged. Dressed in clothes and given belongings.  Patient discharged at this time in no acute distress after pt verbalized understanding of discharge instructions.   Squad to take patient back to nursing home.      Escuadra, Marylee, RN  04/04/24 1827

## 2024-04-05 NOTE — ED TRIAGE NOTES
Patient arrived in the ER with c/o fall. Patient had a witness fall in the nursing home. Ems states she was in a wheelchair when she tried to get up and fell and hit head. No reports of the patient blacking out after fall. Patient has a knot on left forehead.

## 2024-05-01 ENCOUNTER — APPOINTMENT (OUTPATIENT)
Dept: CT IMAGING | Age: 84
End: 2024-05-01
Payer: MEDICARE

## 2024-05-01 ENCOUNTER — HOSPITAL ENCOUNTER (INPATIENT)
Age: 84
LOS: 8 days | Discharge: NURSING, REHAB OR LONG-TERM CARE | End: 2024-05-09
Attending: EMERGENCY MEDICINE | Admitting: HOSPITALIST
Payer: MEDICARE

## 2024-05-01 ENCOUNTER — APPOINTMENT (OUTPATIENT)
Dept: GENERAL RADIOLOGY | Age: 84
End: 2024-05-01
Payer: MEDICARE

## 2024-05-01 DIAGNOSIS — E87.0 HYPERNATREMIA: ICD-10-CM

## 2024-05-01 DIAGNOSIS — N39.0 URINARY TRACT INFECTION WITHOUT HEMATURIA, SITE UNSPECIFIED: Primary | ICD-10-CM

## 2024-05-01 LAB
ALBUMIN SERPL-MCNC: 4.3 G/DL (ref 3.4–5)
ALP SERPL-CCNC: 112 U/L (ref 40–129)
ALT SERPL-CCNC: 13 U/L (ref 10–40)
ANION GAP SERPL CALCULATED.3IONS-SCNC: 13 MMOL/L (ref 3–16)
AST SERPL-CCNC: 22 U/L (ref 15–37)
BACTERIA URNS QL MICRO: ABNORMAL /HPF
BASE EXCESS BLDV CALC-SCNC: 11.2 MMOL/L (ref -2–3)
BASOPHILS # BLD: 0.1 K/UL (ref 0–0.2)
BASOPHILS NFR BLD: 0.8 %
BILIRUB DIRECT SERPL-MCNC: <0.2 MG/DL (ref 0–0.3)
BILIRUB INDIRECT SERPL-MCNC: NORMAL MG/DL (ref 0–1)
BILIRUB SERPL-MCNC: 0.3 MG/DL (ref 0–1)
BILIRUB UR QL STRIP.AUTO: NEGATIVE
BUN SERPL-MCNC: 35 MG/DL (ref 7–20)
CALCIUM SERPL-MCNC: 10.1 MG/DL (ref 8.3–10.6)
CHLORIDE SERPL-SCNC: 111 MMOL/L (ref 99–110)
CLARITY UR: ABNORMAL
CO2 BLDV-SCNC: 40 MMOL/L
CO2 SERPL-SCNC: 34 MMOL/L (ref 21–32)
COHGB MFR BLDV: 1.6 % (ref 0–1.5)
COLOR UR: YELLOW
CREAT SERPL-MCNC: 1.1 MG/DL (ref 0.6–1.2)
DEPRECATED RDW RBC AUTO: 15.7 % (ref 12.4–15.4)
DO-HGB MFR BLDV: 42.2 %
EKG ATRIAL RATE: 111 BPM
EKG DIAGNOSIS: NORMAL
EKG Q-T INTERVAL: 376 MS
EKG QRS DURATION: 96 MS
EKG QTC CALCULATION (BAZETT): 508 MS
EKG R AXIS: -46 DEGREES
EKG T AXIS: 170 DEGREES
EKG VENTRICULAR RATE: 110 BPM
EOSINOPHIL # BLD: 0.2 K/UL (ref 0–0.6)
EOSINOPHIL NFR BLD: 1.9 %
GFR SERPLBLD CREATININE-BSD FMLA CKD-EPI: 50 ML/MIN/{1.73_M2}
GLUCOSE SERPL-MCNC: 83 MG/DL (ref 70–99)
GLUCOSE UR STRIP.AUTO-MCNC: NEGATIVE MG/DL
HCO3 BLDV-SCNC: 38.3 MMOL/L (ref 24–28)
HCT VFR BLD AUTO: 49.1 % (ref 36–48)
HGB BLD-MCNC: 16 G/DL (ref 12–16)
HGB UR QL STRIP.AUTO: NEGATIVE
KETONES UR STRIP.AUTO-MCNC: NEGATIVE MG/DL
LACTATE BLDV-SCNC: 1.5 MMOL/L (ref 0.4–2)
LACTATE BLDV-SCNC: 2.3 MMOL/L (ref 0.4–2)
LEUKOCYTE ESTERASE UR QL STRIP.AUTO: ABNORMAL
LYMPHOCYTES # BLD: 2.1 K/UL (ref 1–5.1)
LYMPHOCYTES NFR BLD: 22.6 %
MAGNESIUM SERPL-MCNC: 2.5 MG/DL (ref 1.8–2.4)
MAGNESIUM SERPL-MCNC: 2.7 MG/DL (ref 1.8–2.4)
MCH RBC QN AUTO: 32 PG (ref 26–34)
MCHC RBC AUTO-ENTMCNC: 32.7 G/DL (ref 31–36)
MCV RBC AUTO: 98.1 FL (ref 80–100)
METHGB MFR BLDV: 0.2 % (ref 0–1.5)
MONOCYTES # BLD: 0.9 K/UL (ref 0–1.3)
MONOCYTES NFR BLD: 9.3 %
NEUTROPHILS # BLD: 6.1 K/UL (ref 1.7–7.7)
NEUTROPHILS NFR BLD: 65.4 %
NITRITE UR QL STRIP.AUTO: POSITIVE
OSMOLALITY SERPL: 342 MOSM/KG (ref 278–305)
PCO2 BLDV: 57.1 MMHG (ref 41–51)
PH BLDV: 7.43 [PH] (ref 7.35–7.45)
PH UR STRIP.AUTO: 6.5 [PH] (ref 5–8)
PHOSPHATE SERPL-MCNC: 4.2 MG/DL (ref 2.5–4.9)
PLATELET # BLD AUTO: 169 K/UL (ref 135–450)
PMV BLD AUTO: 9.4 FL (ref 5–10.5)
PO2 BLDV: 31 MMHG (ref 25–40)
POTASSIUM SERPL-SCNC: 3.5 MMOL/L (ref 3.5–5.1)
PROT SERPL-MCNC: 7.6 G/DL (ref 6.4–8.2)
PROT UR STRIP.AUTO-MCNC: NEGATIVE MG/DL
RBC # BLD AUTO: 5 M/UL (ref 4–5.2)
RBC #/AREA URNS HPF: ABNORMAL /HPF (ref 0–4)
SAO2 % BLDV: 57 %
SODIUM SERPL-SCNC: 158 MMOL/L (ref 136–145)
SP GR UR STRIP.AUTO: 1.02 (ref 1–1.03)
TROPONIN, HIGH SENSITIVITY: 34 NG/L (ref 0–14)
TROPONIN, HIGH SENSITIVITY: 35 NG/L (ref 0–14)
TSH SERPL DL<=0.005 MIU/L-ACNC: 3.75 UIU/ML (ref 0.27–4.2)
UA COMPLETE W REFLEX CULTURE PNL UR: YES
UA DIPSTICK W REFLEX MICRO PNL UR: YES
URN SPEC COLLECT METH UR: ABNORMAL
UROBILINOGEN UR STRIP-ACNC: 0.2 E.U./DL
WBC # BLD AUTO: 9.3 K/UL (ref 4–11)
WBC #/AREA URNS HPF: ABNORMAL /HPF (ref 0–5)

## 2024-05-01 PROCEDURE — 84443 ASSAY THYROID STIM HORMONE: CPT

## 2024-05-01 PROCEDURE — 96374 THER/PROPH/DIAG INJ IV PUSH: CPT

## 2024-05-01 PROCEDURE — 87086 URINE CULTURE/COLONY COUNT: CPT

## 2024-05-01 PROCEDURE — 80048 BASIC METABOLIC PNL TOTAL CA: CPT

## 2024-05-01 PROCEDURE — 84295 ASSAY OF SERUM SODIUM: CPT

## 2024-05-01 PROCEDURE — 87186 SC STD MICRODIL/AGAR DIL: CPT

## 2024-05-01 PROCEDURE — 80076 HEPATIC FUNCTION PANEL: CPT

## 2024-05-01 PROCEDURE — 99285 EMERGENCY DEPT VISIT HI MDM: CPT

## 2024-05-01 PROCEDURE — 6360000002 HC RX W HCPCS

## 2024-05-01 PROCEDURE — 6360000002 HC RX W HCPCS: Performed by: PHYSICIAN ASSISTANT

## 2024-05-01 PROCEDURE — 81001 URINALYSIS AUTO W/SCOPE: CPT

## 2024-05-01 PROCEDURE — 83605 ASSAY OF LACTIC ACID: CPT

## 2024-05-01 PROCEDURE — 84484 ASSAY OF TROPONIN QUANT: CPT

## 2024-05-01 PROCEDURE — 70450 CT HEAD/BRAIN W/O DYE: CPT

## 2024-05-01 PROCEDURE — 71046 X-RAY EXAM CHEST 2 VIEWS: CPT

## 2024-05-01 PROCEDURE — 2580000003 HC RX 258: Performed by: PHYSICIAN ASSISTANT

## 2024-05-01 PROCEDURE — 83930 ASSAY OF BLOOD OSMOLALITY: CPT

## 2024-05-01 PROCEDURE — 85025 COMPLETE CBC W/AUTO DIFF WBC: CPT

## 2024-05-01 PROCEDURE — 87077 CULTURE AEROBIC IDENTIFY: CPT

## 2024-05-01 PROCEDURE — 2580000003 HC RX 258

## 2024-05-01 PROCEDURE — 82803 BLOOD GASES ANY COMBINATION: CPT

## 2024-05-01 PROCEDURE — 83735 ASSAY OF MAGNESIUM: CPT

## 2024-05-01 PROCEDURE — 1200000000 HC SEMI PRIVATE

## 2024-05-01 PROCEDURE — 93010 ELECTROCARDIOGRAM REPORT: CPT | Performed by: INTERNAL MEDICINE

## 2024-05-01 PROCEDURE — 36415 COLL VENOUS BLD VENIPUNCTURE: CPT

## 2024-05-01 PROCEDURE — 93005 ELECTROCARDIOGRAM TRACING: CPT | Performed by: PHYSICIAN ASSISTANT

## 2024-05-01 PROCEDURE — 84100 ASSAY OF PHOSPHORUS: CPT

## 2024-05-01 RX ORDER — LORAZEPAM 0.5 MG/1
0.5 TABLET ORAL EVERY 6 HOURS PRN
Status: CANCELLED | OUTPATIENT
Start: 2024-05-01

## 2024-05-01 RX ORDER — POLYETHYLENE GLYCOL 3350 17 G/17G
17 POWDER, FOR SOLUTION ORAL DAILY
Status: DISCONTINUED | OUTPATIENT
Start: 2024-05-01 | End: 2024-05-09 | Stop reason: HOSPADM

## 2024-05-01 RX ORDER — ACETAMINOPHEN 325 MG/1
650 TABLET ORAL EVERY 6 HOURS PRN
Status: DISCONTINUED | OUTPATIENT
Start: 2024-05-01 | End: 2024-05-08 | Stop reason: CLARIF

## 2024-05-01 RX ORDER — SODIUM CHLORIDE 0.9 % (FLUSH) 0.9 %
5-40 SYRINGE (ML) INJECTION EVERY 12 HOURS SCHEDULED
Status: DISCONTINUED | OUTPATIENT
Start: 2024-05-01 | End: 2024-05-09 | Stop reason: HOSPADM

## 2024-05-01 RX ORDER — SODIUM CHLORIDE 0.9 % (FLUSH) 0.9 %
5-40 SYRINGE (ML) INJECTION PRN
Status: DISCONTINUED | OUTPATIENT
Start: 2024-05-01 | End: 2024-05-09 | Stop reason: HOSPADM

## 2024-05-01 RX ORDER — ONDANSETRON 4 MG/1
4 TABLET, ORALLY DISINTEGRATING ORAL EVERY 8 HOURS PRN
Status: DISCONTINUED | OUTPATIENT
Start: 2024-05-01 | End: 2024-05-09 | Stop reason: HOSPADM

## 2024-05-01 RX ORDER — ASPIRIN 81 MG/1
81 TABLET, CHEWABLE ORAL DAILY
Status: DISCONTINUED | OUTPATIENT
Start: 2024-05-01 | End: 2024-05-09 | Stop reason: HOSPADM

## 2024-05-01 RX ORDER — CIPROFLOXACIN 500 MG/1
500 TABLET, FILM COATED ORAL 2 TIMES DAILY
Status: ON HOLD | COMMUNITY
Start: 2024-05-01 | End: 2024-05-09 | Stop reason: HOSPADM

## 2024-05-01 RX ORDER — ENOXAPARIN SODIUM 100 MG/ML
40 INJECTION SUBCUTANEOUS DAILY
Status: DISCONTINUED | OUTPATIENT
Start: 2024-05-01 | End: 2024-05-09 | Stop reason: HOSPADM

## 2024-05-01 RX ORDER — POLYETHYLENE GLYCOL 3350 17 G/17G
17 POWDER, FOR SOLUTION ORAL DAILY PRN
Status: DISCONTINUED | OUTPATIENT
Start: 2024-05-01 | End: 2024-05-01

## 2024-05-01 RX ORDER — ACETAMINOPHEN 650 MG/1
650 SUPPOSITORY RECTAL EVERY 6 HOURS PRN
Status: DISCONTINUED | OUTPATIENT
Start: 2024-05-01 | End: 2024-05-08 | Stop reason: CLARIF

## 2024-05-01 RX ORDER — MIRTAZAPINE 15 MG/1
7.5 TABLET, FILM COATED ORAL NIGHTLY
Status: DISCONTINUED | OUTPATIENT
Start: 2024-05-01 | End: 2024-05-09 | Stop reason: HOSPADM

## 2024-05-01 RX ORDER — DONEPEZIL HYDROCHLORIDE 10 MG/1
10 TABLET, FILM COATED ORAL
Status: DISCONTINUED | OUTPATIENT
Start: 2024-05-02 | End: 2024-05-09 | Stop reason: HOSPADM

## 2024-05-01 RX ORDER — ACETAMINOPHEN 325 MG/1
650 TABLET ORAL 3 TIMES DAILY
Status: DISCONTINUED | OUTPATIENT
Start: 2024-05-01 | End: 2024-05-08 | Stop reason: CLARIF

## 2024-05-01 RX ORDER — PANTOPRAZOLE SODIUM 40 MG/1
40 TABLET, DELAYED RELEASE ORAL DAILY
Status: DISCONTINUED | OUTPATIENT
Start: 2024-05-02 | End: 2024-05-08

## 2024-05-01 RX ORDER — POTASSIUM CHLORIDE 7.45 MG/ML
10 INJECTION INTRAVENOUS PRN
Status: DISCONTINUED | OUTPATIENT
Start: 2024-05-01 | End: 2024-05-01

## 2024-05-01 RX ORDER — ATORVASTATIN CALCIUM 40 MG/1
80 TABLET, FILM COATED ORAL DAILY
Status: DISCONTINUED | OUTPATIENT
Start: 2024-05-01 | End: 2024-05-01

## 2024-05-01 RX ORDER — QUETIAPINE FUMARATE 50 MG/1
50 TABLET, EXTENDED RELEASE ORAL NIGHTLY
COMMUNITY
End: 2024-05-01

## 2024-05-01 RX ORDER — QUETIAPINE FUMARATE 25 MG/1
75 TABLET, FILM COATED ORAL NIGHTLY
Status: DISCONTINUED | OUTPATIENT
Start: 2024-05-01 | End: 2024-05-09 | Stop reason: HOSPADM

## 2024-05-01 RX ORDER — SODIUM CHLORIDE 9 MG/ML
INJECTION, SOLUTION INTRAVENOUS PRN
Status: DISCONTINUED | OUTPATIENT
Start: 2024-05-01 | End: 2024-05-09 | Stop reason: HOSPADM

## 2024-05-01 RX ORDER — ONDANSETRON 2 MG/ML
4 INJECTION INTRAMUSCULAR; INTRAVENOUS EVERY 6 HOURS PRN
Status: DISCONTINUED | OUTPATIENT
Start: 2024-05-01 | End: 2024-05-09 | Stop reason: HOSPADM

## 2024-05-01 RX ORDER — QUETIAPINE FUMARATE 50 MG/1
50 TABLET, FILM COATED ORAL
COMMUNITY

## 2024-05-01 RX ORDER — MEMANTINE HYDROCHLORIDE 10 MG/1
10 TABLET ORAL 2 TIMES DAILY
Status: DISCONTINUED | OUTPATIENT
Start: 2024-05-01 | End: 2024-05-09 | Stop reason: HOSPADM

## 2024-05-01 RX ORDER — DIVALPROEX SODIUM 125 MG/1
250 CAPSULE, COATED PELLETS ORAL 3 TIMES DAILY
Status: DISCONTINUED | OUTPATIENT
Start: 2024-05-01 | End: 2024-05-09 | Stop reason: HOSPADM

## 2024-05-01 RX ORDER — DEXTROSE MONOHYDRATE 50 MG/ML
INJECTION, SOLUTION INTRAVENOUS CONTINUOUS
Status: ACTIVE | OUTPATIENT
Start: 2024-05-01 | End: 2024-05-01

## 2024-05-01 RX ORDER — POTASSIUM CHLORIDE 20 MEQ/1
40 TABLET, EXTENDED RELEASE ORAL PRN
Status: DISCONTINUED | OUTPATIENT
Start: 2024-05-01 | End: 2024-05-01

## 2024-05-01 RX ORDER — POLYETHYLENE GLYCOL 3350 17 G/17G
17 POWDER, FOR SOLUTION ORAL DAILY
Status: DISCONTINUED | OUTPATIENT
Start: 2024-05-01 | End: 2024-05-01 | Stop reason: SDUPTHER

## 2024-05-01 RX ORDER — MAGNESIUM SULFATE IN WATER 40 MG/ML
2000 INJECTION, SOLUTION INTRAVENOUS PRN
Status: DISCONTINUED | OUTPATIENT
Start: 2024-05-01 | End: 2024-05-01

## 2024-05-01 RX ORDER — POTASSIUM CHLORIDE 20MEQ/15ML
10 LIQUID (ML) ORAL DAILY
COMMUNITY

## 2024-05-01 RX ADMIN — PIPERACILLIN AND TAZOBACTAM 4500 MG: 4; .5 INJECTION, POWDER, LYOPHILIZED, FOR SOLUTION INTRAVENOUS at 19:48

## 2024-05-01 RX ADMIN — ENOXAPARIN SODIUM 40 MG: 100 INJECTION SUBCUTANEOUS at 22:11

## 2024-05-01 RX ADMIN — WATER 1000 MG: 1 INJECTION INTRAMUSCULAR; INTRAVENOUS; SUBCUTANEOUS at 15:57

## 2024-05-01 RX ADMIN — DEXTROSE MONOHYDRATE: 50 INJECTION, SOLUTION INTRAVENOUS at 18:48

## 2024-05-01 ASSESSMENT — PAIN - FUNCTIONAL ASSESSMENT: PAIN_FUNCTIONAL_ASSESSMENT: NONE - DENIES PAIN

## 2024-05-01 NOTE — ED PROVIDER NOTES
THE [facility]  EMERGENCY DEPARTMENT ENCOUNTER          PHYSICIAN ASSISTANT NOTE     Date of evaluation: 5/1/2024    Chief Complaint     Altered Mental Status (Patient presents to ED via [facility] from [facility] memory care with report of increased lethargy and \"not acting herself\" for 3 days. Per nursing at facility, labs were drawn and were concerned for dehydration. Nursing staff also report the patient was started on cipro for possible UTI, no urine specimen obtained.)    History of Present Illness     Katrin Donnelly is a 84 y.o. female with history of A-fib, dementia who presents to the emergency department with reported lethargy and not acting like herself for the past 3 days.  History is obtained via EMS who received report from memory care unit.  I have been calling memory care unit upon patient's arrival without success in reaching them.  Patient will acknowledge when her name is spoken, otherwise shakes her head no when asked if she has any pain.  She otherwise does not contribute to history or physical.  Additionally per EMS report patient had labs drawn today were concerning for dehydration.  She also reportedly was put on ciprofloxacin for a possible UTI though reportedly no urine specimen was obtained at her facility.    With the exception of the above, there are no aggravating or alleviating factors.    Past Medical, Surgical, Family, and Social History     She has a past medical history of Adenomatous polyp of colon, Asymptomatic varicose veins, Atrial fibrillation (HCC), Dysthymic disorder, 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), and Screening mammogram.  She has a past surgical history that includes Colonoscopy (12/12/2008); hernia repair; and Pacemaker insertion (05/06/2020).  Her family history includes Heart Attack in her father; Heart Disease in her father; Stroke in her mother.  She reports that she has never smoked. She has never used smokeless tobacco. She reports current alcohol use. She  hospital.  Discussed with the patient's  who eventually came to bedside and is agreeable to plan for admission    Is this patient to be included in the SEP-1 core measure? No Exclusion criteria - the patient is NOT to be included for SEP-1 Core Measure due to: Infection is not suspected    Medical Decision Making  Amount and/or Complexity of Data Reviewed  Labs: ordered.  Radiology: ordered.  ECG/medicine tests: ordered.    Risk  Decision regarding hospitalization.        This patient was also evaluated by the attending physician. All care plans were discussed and agreed upon.    Clinical Impression     1. Urinary tract infection without hematuria, site unspecified    2. Hypernatremia        Disposition     PATIENT REFERRED TO:  No follow-up provider specified.    DISCHARGE MEDICATIONS:  New Prescriptions    No medications on file       DISPOSITION Decision To Admit 05/01/2024 03:40:18 PM        Diagnostic Results and Other Data     RADIOLOGY:  CT Head W/O Contrast   Final Result      1. Stable exam with no acute intracranial abnormality.      XR CHEST (2 VW)   Final Result      No acute cardiopulmonary disease                LABS:   Results for orders placed or performed during the hospital encounter of 05/01/24   CBC with Auto Differential   Result Value Ref Range    WBC 9.3 4.0 - 11.0 K/uL    RBC 5.00 4.00 - 5.20 M/uL    Hemoglobin 16.0 12.0 - 16.0 g/dL    Hematocrit 49.1 (H) 36.0 - 48.0 %    MCV 98.1 80.0 - 100.0 fL    MCH 32.0 26.0 - 34.0 pg    MCHC 32.7 31.0 - 36.0 g/dL    RDW 15.7 (H) 12.4 - 15.4 %    Platelets 169 135 - 450 K/uL    MPV 9.4 5.0 - 10.5 fL    Neutrophils % 65.4 %    Lymphocytes % 22.6 %    Monocytes % 9.3 %    Eosinophils % 1.9 %    Basophils % 0.8 %    Neutrophils Absolute 6.1 1.7 - 7.7 K/uL    Lymphocytes Absolute 2.1 1.0 - 5.1 K/uL    Monocytes Absolute 0.9 0.0 - 1.3 K/uL    Eosinophils Absolute 0.2 0.0 - 0.6 K/uL    Basophils Absolute 0.1 0.0 - 0.2 K/uL   BMP w/ Reflex to MG   Result

## 2024-05-01 NOTE — H&P
some extent   Social Connections: Unknown (3/26/2021)    Social Connection and Isolation Panel [NHANES]     Marital Status:    Housing Stability: Low Risk  (2/16/2024)    Housing Stability Vital Sign     Unable to Pay for Housing in the Last Year: No     Number of Places Lived in the Last Year: 1     Unstable Housing in the Last Year: No       Medications:   Medications:    Infusions:   PRN Meds:     Labs      CBC:   Recent Labs     05/01/24  1426   WBC 9.3   HGB 16.0        BMP:    Recent Labs     05/01/24  1426   *   K 3.5   *   CO2 34*   BUN 35*   CREATININE 1.1   GLUCOSE 83     Hepatic:   Recent Labs     05/01/24  1426   AST 22   ALT 13   BILITOT 0.3   ALKPHOS 112     Lipids:   Lab Results   Component Value Date/Time    CHOL 215 02/16/2024 06:04 AM    HDL 52 02/16/2024 06:04 AM     11/22/2011 07:45 AM    TRIG 140 02/16/2024 06:04 AM     Hemoglobin A1C:   Lab Results   Component Value Date/Time    LABA1C 5.4 02/16/2024 06:04 AM     TSH:   Lab Results   Component Value Date/Time    TSH 3.14 03/29/2023 02:31 PM     Troponin: No results found for: \"TROPONINT\"  Lactic Acid:   Recent Labs     05/01/24  1426   LACTA 2.3*     BNP: No results for input(s): \"PROBNP\" in the last 72 hours.  UA:  Lab Results   Component Value Date/Time    NITRU POSITIVE 05/01/2024 02:26 PM    COLORU Yellow 05/01/2024 02:26 PM    PHUR 6.5 05/01/2024 02:26 PM    PHUR 6.5 02/16/2024 06:35 AM    WBCUA  05/01/2024 02:26 PM    RBCUA None seen 05/01/2024 02:26 PM    BACTERIA 4+ 05/01/2024 02:26 PM    CLARITYU SL CLOUDY 05/01/2024 02:26 PM    LEUKOCYTESUR LARGE 05/01/2024 02:26 PM    UROBILINOGEN 0.2 05/01/2024 02:26 PM    BILIRUBINUR Negative 05/01/2024 02:26 PM    BLOODU Negative 05/01/2024 02:26 PM    GLUCOSEU Negative 05/01/2024 02:26 PM    GLUCOSEU NEGATIVE 11/22/2011 07:45 AM    KETUA Negative 05/01/2024 02:26 PM     Urine Cultures:   Lab Results   Component Value Date/Time    LABURIN >100,000 CFU/ml  02/16/2024 11:12 AM     Blood Cultures:   Lab Results   Component Value Date/Time    BC No Growth after 4 days of incubation. 05/02/2020 10:16 AM     Lab Results   Component Value Date/Time    BLOODCULT2 No Growth after 4 days of incubation. 05/02/2020 10:21 AM     Organism:   Lab Results   Component Value Date/Time    ORG Escherichia coli 02/16/2024 11:12 AM       Imaging/Diagnostics Last 24 Hours   CT Head W/O Contrast    Result Date: 5/1/2024  HISTORY: Altered mental status. EXAM : CT OF THE HEAD WITHOUT CONTRAST TECHNIQUE: Multiple axial images were obtained of the brain without the use of contrast. Individualized dose optimization technique was used in order to meet ALARA standards for radiation dose reduction.  In addition to vendor specific dose reduction algorithms, the dose reduction techniques vary based on the specific scanner utilized but frequently include automated exposure control, adjustment of the mA and/or kV according to patient size, and use of iterative reconstruction technique. COMPARISON: 4/4/2024. FINDINGS: The visualized paranasal sinuses, mastoid air cells and middle ears are clear to the extent visualized. The orbits and osseous structures are unremarkable. Intracranially, there is no mass, midline shift or intracranial hematoma. Ventricular enlargement similar to prior exam. Focal areas of encephalomalacia in the right temporal lobe unchanged from prior..  There are extensive stable periventricular and subcortical white matter hypodensities suggesting small vessel ischemic disease or age related degenerative changes. Extensive diffuse atrophy.     1. Stable exam with no acute intracranial abnormality.    XR CHEST (2 VW)    Result Date: 5/1/2024  TWO VIEWS OF THE CHEST HISTORY: Altered mental status COMPARISON: February 15, 2024 PROCEDURE: PA and lateral views of the chest were obtained. FINDINGS: The lungs are clear.  The cardiomediastinal contours are mildly enlarged. There are no

## 2024-05-01 NOTE — H&P
Internal Medicine  PGY 1  History & Physical      CC: AMS    History Obtained From:  electronic medical record    HISTORY OF PRESENT ILLNESS:  Katrin Donnelly is an 85 y/o F w/ a PMH of Alzheimer's dementia starting in 2020, sick sinus syndrome status post pacemaker placement (was initially diagnosed with A-fib and was on Coumadin but was rediagnosed with sick sinus syndrome as per the note by Dr. Walker on 12/5/2023), CHF, and orthostatic hypotension likely secondary to dysautonomia who presented to the emergency department with lethargy and not acting like herself for the past 3 days.  The patient is not alert and oriented to self and provided no collateral history. I called and spoke with her  who says that she is at her baseline but that the nursing home thought she was not acting like herself for the past 3 days and was not drinking an adequate amount of water. I was unable to contact her memory unit.     On arrival to the emergency department her vital signs are within normal limits except she was tachycardic to 103 and hypotensive to 99/53. Her EKG was suggestive of A-fib with RVR. Her CBC was unremarkable her VBG also unremarkable. Her lactic acid was elevated to 2.3. Her urinalysis was suggestive of UTI with  WBC and 4+ bacteria. He was also positive for nitrite and large leukocyte esterase. Her BMP showed her sodium to be 158. Her initial troponin was 34 while on repeat it was 35 and her magnesium was 2.7. Her CT head and chest x-ray were largely unremarkable. She was given a dose of ceftriaxone in the ED. She was admitted to our service for a UTI and hypernatremia.     Past Medical History:        Diagnosis Date    Adenomatous polyp of colon 1/29/2019    Asymptomatic varicose veins     Atrial fibrillation (HCC)     Dysthymic disorder     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)     Screening mammogram 12/8/2009    (Left)Benign       Past Surgical History:        Procedure Laterality Date    COLONOSCOPY

## 2024-05-01 NOTE — PROGRESS NOTES
Clinical Pharmacy Consult Note  Medication History     Admit Date: 5/1/2024      List of of current medications patient is taking is complete. Home Medication list in EPIC updated to reflect changes noted below.    Source of information: I used the patients assisted living transfer papers.    Patient's home pharmacy: [facility] - [place], [place] - [address] - P [phone] - F [phone]      Changes made to medication list:   Medications removed: (include reason, ex: therapy completed, patient no longer taking, etc.)  Miralax- Not on list  Milk of magnesia- Not on list  Atorvastatin- Not on list  Lorazepam- Not on list  Bisacodyl- Not on list  Zofran- Not on list  Medications added:   Cipro  Medication doses adjusted:   Divalproex 250 mg BID (not TID)  Potassium 20 meq/15 ml solution 10 meq daily (not potassium 10 meq ER capsule)  Other notes:   Cipro: 5/1/2024 to 5/5/2024 (5 day supply)    Current Outpatient Medications   Medication Instructions    acetaminophen (TYLENOL) 650 mg, Oral, 3 times daily    aspirin 81 mg, Oral, DAILY    ciprofloxacin (CIPRO) 500 mg, Oral, 2 TIMES DAILY for 5 days    divalproex (DEPAKOTE SPRINKLE) 250 mg, Oral, 2 TIMES DAILY    donepezil (ARICEPT) 10 mg, Oral, DAILY BEFORE BREAKFAST    furosemide (LASIX) 60 mg, Oral, DAILY    memantine (NAMENDA) 10 MG tablet TAKE 1 TABLET BY MOUTH TWICE DAILY    mirtazapine (REMERON) 7.5 mg, Oral, NIGHTLY    omeprazole (PRILOSEC) 20 mg, Oral, DAILY    potassium chloride 20 MEQ/15ML (10%) oral solution 10 mEq, Oral, DAILY, (dilute prior to administration)    QUEtiapine (SEROQUEL) 25 mg, Oral, Nightly, (with 50 mg)    QUEtiapine (SEROQUEL) 50 mg, Oral, EVERY BEDTIME, (with 25 mg)    vitamin D (CHOLECALCIFEROL) 1,000 Units, Oral, DAILY       Please call with any questions.    Jareth Padilla, Pharmacy Intern

## 2024-05-01 NOTE — ED PROVIDER NOTES
ED Attending Attestation Note     Date of evaluation: 5/1/2024    This patient was seen by the advance practice provider.  I have seen and examined the patient, agree with the workup, evaluation, management and diagnosis. The care plan has been discussed.  I have reviewed the ECG and concur with the PRESTON's interpretation.  My assessment reveals with history of Dahlheimer dementia comes in for possible dehydration.  She is found to be dehydrated/hyponatremic with a sodium of 158.  Her troponin is 34.  Lactic acid greater than 2.  Her urine positive for urinary tract infection.  Plan IV fluid IV antibiotic and admission for hyponatremia antibiotic Alzheimer's with slightly altered mental status more drowsy than usual.     Tono Plasencia MD  05/01/24 6447

## 2024-05-01 NOTE — PROGRESS NOTES
Encounter:   and pts  explored reflective narrative of pt.  supported by dter, lives out of town.  will continue to follow to offer support.  Chaplain Orly Carvalho Mdiv., [facility]   05/01/24 1517   Encounter Summary   Encounter Overview/Reason Initial Encounter   Service Provided For Family   Support System Children   Last Encounter  05/01/24  (MSR-Follow Up)   Begin Time 1425   End Time  1506   Total Time Calculated 41 min   Assessment/Intervention/Outcome   Assessment Sad;Tearful   Intervention Active listening;Life review/Legacy;Sustaining Presence/Ministry of presence   Outcome Comfort;Coping   Plan and Referrals   Plan/Referrals   ( will continue to follow to offer support.)

## 2024-05-02 PROBLEM — G30.1 SEVERE LATE ONSET ALZHEIMER'S DEMENTIA WITHOUT BEHAVIORAL DISTURBANCE, PSYCHOTIC DISTURBANCE, MOOD DISTURBANCE, OR ANXIETY (HCC): Status: ACTIVE | Noted: 2024-02-16

## 2024-05-02 PROBLEM — N30.00 ACUTE CYSTITIS WITHOUT HEMATURIA: Status: ACTIVE | Noted: 2024-05-02

## 2024-05-02 PROBLEM — F02.C0 SEVERE LATE ONSET ALZHEIMER'S DEMENTIA WITHOUT BEHAVIORAL DISTURBANCE, PSYCHOTIC DISTURBANCE, MOOD DISTURBANCE, OR ANXIETY (HCC): Status: ACTIVE | Noted: 2024-02-16

## 2024-05-02 PROBLEM — E86.9 VOLUME DEPLETION: Status: ACTIVE | Noted: 2024-05-02

## 2024-05-02 PROBLEM — R79.89 ELEVATED TROPONIN: Status: ACTIVE | Noted: 2024-05-02

## 2024-05-02 PROBLEM — I48.92 ATRIAL FLUTTER WITH RAPID VENTRICULAR RESPONSE (HCC): Status: ACTIVE | Noted: 2024-05-02

## 2024-05-02 LAB
ANION GAP SERPL CALCULATED.3IONS-SCNC: 11 MMOL/L (ref 3–16)
ANION GAP SERPL CALCULATED.3IONS-SCNC: 13 MMOL/L (ref 3–16)
ANTI-XA UNFRAC HEPARIN: 0.59 IU/ML (ref 0.3–0.7)
APTT BLD: 30.3 SEC (ref 22.1–36.4)
BASOPHILS # BLD: 0.1 K/UL (ref 0–0.2)
BASOPHILS NFR BLD: 0.6 %
BUN SERPL-MCNC: 32 MG/DL (ref 7–20)
BUN SERPL-MCNC: 34 MG/DL (ref 7–20)
CALCIUM SERPL-MCNC: 9.3 MG/DL (ref 8.3–10.6)
CALCIUM SERPL-MCNC: 9.8 MG/DL (ref 8.3–10.6)
CHLORIDE SERPL-SCNC: 108 MMOL/L (ref 99–110)
CHLORIDE SERPL-SCNC: 110 MMOL/L (ref 99–110)
CO2 SERPL-SCNC: 32 MMOL/L (ref 21–32)
CO2 SERPL-SCNC: 36 MMOL/L (ref 21–32)
CREAT SERPL-MCNC: 1 MG/DL (ref 0.6–1.2)
CREAT SERPL-MCNC: 1.2 MG/DL (ref 0.6–1.2)
DEPRECATED RDW RBC AUTO: 15.7 % (ref 12.4–15.4)
EKG ATRIAL RATE: 326 BPM
EKG DIAGNOSIS: NORMAL
EKG Q-T INTERVAL: 412 MS
EKG QRS DURATION: 98 MS
EKG QTC CALCULATION (BAZETT): 472 MS
EKG R AXIS: -59 DEGREES
EKG T AXIS: 209 DEGREES
EKG VENTRICULAR RATE: 79 BPM
EOSINOPHIL # BLD: 0.4 K/UL (ref 0–0.6)
EOSINOPHIL NFR BLD: 3.8 %
GFR SERPLBLD CREATININE-BSD FMLA CKD-EPI: 45 ML/MIN/{1.73_M2}
GFR SERPLBLD CREATININE-BSD FMLA CKD-EPI: 56 ML/MIN/{1.73_M2}
GLUCOSE SERPL-MCNC: 101 MG/DL (ref 70–99)
GLUCOSE SERPL-MCNC: 111 MG/DL (ref 70–99)
HCT VFR BLD AUTO: 45.7 % (ref 36–48)
HGB BLD-MCNC: 14.9 G/DL (ref 12–16)
INR PPP: 1.03 (ref 0.85–1.15)
LACTATE BLDV-SCNC: 1.4 MMOL/L (ref 0.4–2)
LYMPHOCYTES # BLD: 2.3 K/UL (ref 1–5.1)
LYMPHOCYTES NFR BLD: 23.4 %
MAGNESIUM SERPL-MCNC: 2.5 MG/DL (ref 1.8–2.4)
MCH RBC QN AUTO: 32.1 PG (ref 26–34)
MCHC RBC AUTO-ENTMCNC: 32.7 G/DL (ref 31–36)
MCV RBC AUTO: 98.2 FL (ref 80–100)
MONOCYTES # BLD: 1.1 K/UL (ref 0–1.3)
MONOCYTES NFR BLD: 11.2 %
NEUTROPHILS # BLD: 5.9 K/UL (ref 1.7–7.7)
NEUTROPHILS NFR BLD: 61 %
PLATELET # BLD AUTO: 158 K/UL (ref 135–450)
PMV BLD AUTO: 8.9 FL (ref 5–10.5)
POTASSIUM SERPL-SCNC: 3 MMOL/L (ref 3.5–5.1)
POTASSIUM SERPL-SCNC: 4.1 MMOL/L (ref 3.5–5.1)
PROTHROMBIN TIME: 13.7 SEC (ref 11.9–14.9)
RBC # BLD AUTO: 4.66 M/UL (ref 4–5.2)
SODIUM SERPL-SCNC: 149 MMOL/L (ref 136–145)
SODIUM SERPL-SCNC: 152 MMOL/L (ref 136–145)
SODIUM SERPL-SCNC: 153 MMOL/L (ref 136–145)
SODIUM SERPL-SCNC: 155 MMOL/L (ref 136–145)
SODIUM SERPL-SCNC: 155 MMOL/L (ref 136–145)
TROPONIN, HIGH SENSITIVITY: 38 NG/L (ref 0–14)
TROPONIN, HIGH SENSITIVITY: 61 NG/L (ref 0–14)
WBC # BLD AUTO: 9.7 K/UL (ref 4–11)

## 2024-05-02 PROCEDURE — 84295 ASSAY OF SERUM SODIUM: CPT

## 2024-05-02 PROCEDURE — 6360000002 HC RX W HCPCS

## 2024-05-02 PROCEDURE — 93010 ELECTROCARDIOGRAM REPORT: CPT | Performed by: INTERNAL MEDICINE

## 2024-05-02 PROCEDURE — 85610 PROTHROMBIN TIME: CPT

## 2024-05-02 PROCEDURE — 2580000003 HC RX 258

## 2024-05-02 PROCEDURE — 83735 ASSAY OF MAGNESIUM: CPT

## 2024-05-02 PROCEDURE — 6370000000 HC RX 637 (ALT 250 FOR IP)

## 2024-05-02 PROCEDURE — 85730 THROMBOPLASTIN TIME PARTIAL: CPT

## 2024-05-02 PROCEDURE — 99222 1ST HOSP IP/OBS MODERATE 55: CPT | Performed by: HOSPITALIST

## 2024-05-02 PROCEDURE — 6370000000 HC RX 637 (ALT 250 FOR IP): Performed by: HOSPITALIST

## 2024-05-02 PROCEDURE — 36415 COLL VENOUS BLD VENIPUNCTURE: CPT

## 2024-05-02 PROCEDURE — 92610 EVALUATE SWALLOWING FUNCTION: CPT

## 2024-05-02 PROCEDURE — 84484 ASSAY OF TROPONIN QUANT: CPT

## 2024-05-02 PROCEDURE — 85025 COMPLETE CBC W/AUTO DIFF WBC: CPT

## 2024-05-02 PROCEDURE — 1200000000 HC SEMI PRIVATE

## 2024-05-02 PROCEDURE — 80048 BASIC METABOLIC PNL TOTAL CA: CPT

## 2024-05-02 PROCEDURE — 85520 HEPARIN ASSAY: CPT

## 2024-05-02 PROCEDURE — 93005 ELECTROCARDIOGRAM TRACING: CPT

## 2024-05-02 PROCEDURE — 83605 ASSAY OF LACTIC ACID: CPT

## 2024-05-02 RX ORDER — DEXTROSE MONOHYDRATE 50 MG/ML
INJECTION, SOLUTION INTRAVENOUS CONTINUOUS
Status: ACTIVE | OUTPATIENT
Start: 2024-05-02 | End: 2024-05-02

## 2024-05-02 RX ORDER — HEPARIN SODIUM 1000 [USP'U]/ML
4000 INJECTION, SOLUTION INTRAVENOUS; SUBCUTANEOUS PRN
Status: DISCONTINUED | OUTPATIENT
Start: 2024-05-02 | End: 2024-05-02

## 2024-05-02 RX ORDER — HEPARIN SODIUM 1000 [USP'U]/ML
4000 INJECTION, SOLUTION INTRAVENOUS; SUBCUTANEOUS ONCE
Status: DISCONTINUED | OUTPATIENT
Start: 2024-05-02 | End: 2024-05-02

## 2024-05-02 RX ORDER — POTASSIUM CHLORIDE 7.45 MG/ML
10 INJECTION INTRAVENOUS
Status: COMPLETED | OUTPATIENT
Start: 2024-05-02 | End: 2024-05-02

## 2024-05-02 RX ORDER — HEPARIN SODIUM 1000 [USP'U]/ML
2000 INJECTION, SOLUTION INTRAVENOUS; SUBCUTANEOUS PRN
Status: DISCONTINUED | OUTPATIENT
Start: 2024-05-02 | End: 2024-05-02

## 2024-05-02 RX ORDER — HEPARIN SODIUM 10000 [USP'U]/100ML
5-30 INJECTION, SOLUTION INTRAVENOUS CONTINUOUS
Status: DISCONTINUED | OUTPATIENT
Start: 2024-05-02 | End: 2024-05-02

## 2024-05-02 RX ORDER — POTASSIUM CHLORIDE 7.45 MG/ML
10 INJECTION INTRAVENOUS ONCE
Status: COMPLETED | OUTPATIENT
Start: 2024-05-02 | End: 2024-05-02

## 2024-05-02 RX ADMIN — HEPARIN SODIUM 12 UNITS/KG/HR: 10000 INJECTION, SOLUTION INTRAVENOUS at 02:46

## 2024-05-02 RX ADMIN — SODIUM CHLORIDE, PRESERVATIVE FREE 10 ML: 5 INJECTION INTRAVENOUS at 10:10

## 2024-05-02 RX ADMIN — POTASSIUM CHLORIDE 10 MEQ: 10 INJECTION, SOLUTION INTRAVENOUS at 06:50

## 2024-05-02 RX ADMIN — ACETAMINOPHEN 650 MG: 325 TABLET ORAL at 22:26

## 2024-05-02 RX ADMIN — PIPERACILLIN AND TAZOBACTAM 3375 MG: 3; .375 INJECTION, POWDER, LYOPHILIZED, FOR SOLUTION INTRAVENOUS at 10:41

## 2024-05-02 RX ADMIN — DEXTROSE MONOHYDRATE: 5 INJECTION, SOLUTION INTRAVENOUS at 02:01

## 2024-05-02 RX ADMIN — MEMANTINE HYDROCHLORIDE 10 MG: 10 TABLET ORAL at 10:01

## 2024-05-02 RX ADMIN — QUETIAPINE FUMARATE 75 MG: 25 TABLET ORAL at 22:26

## 2024-05-02 RX ADMIN — DEXTROSE MONOHYDRATE: 50 INJECTION, SOLUTION INTRAVENOUS at 10:02

## 2024-05-02 RX ADMIN — POTASSIUM CHLORIDE 10 MEQ: 10 INJECTION, SOLUTION INTRAVENOUS at 10:07

## 2024-05-02 RX ADMIN — ACETAMINOPHEN 650 MG: 325 TABLET ORAL at 10:01

## 2024-05-02 RX ADMIN — POTASSIUM CHLORIDE 10 MEQ: 10 INJECTION, SOLUTION INTRAVENOUS at 04:59

## 2024-05-02 RX ADMIN — DIVALPROEX SODIUM 250 MG: 125 CAPSULE, COATED PELLETS ORAL at 10:01

## 2024-05-02 RX ADMIN — POTASSIUM CHLORIDE 10 MEQ: 10 INJECTION, SOLUTION INTRAVENOUS at 04:58

## 2024-05-02 RX ADMIN — POLYETHYLENE GLYCOL 3350 17 G: 17 POWDER, FOR SOLUTION ORAL at 10:04

## 2024-05-02 RX ADMIN — ENOXAPARIN SODIUM 40 MG: 100 INJECTION SUBCUTANEOUS at 10:22

## 2024-05-02 RX ADMIN — MEMANTINE HYDROCHLORIDE 10 MG: 10 TABLET ORAL at 22:26

## 2024-05-02 RX ADMIN — HEPARIN SODIUM 4000 UNITS: 1000 INJECTION INTRAVENOUS; SUBCUTANEOUS at 02:44

## 2024-05-02 RX ADMIN — POTASSIUM CHLORIDE 10 MEQ: 10 INJECTION, SOLUTION INTRAVENOUS at 05:49

## 2024-05-02 RX ADMIN — DIVALPROEX SODIUM 250 MG: 125 CAPSULE, COATED PELLETS ORAL at 15:06

## 2024-05-02 RX ADMIN — ACETAMINOPHEN 650 MG: 325 TABLET ORAL at 15:06

## 2024-05-02 RX ADMIN — DIVALPROEX SODIUM 250 MG: 125 CAPSULE, COATED PELLETS ORAL at 22:26

## 2024-05-02 RX ADMIN — PIPERACILLIN AND TAZOBACTAM 3375 MG: 3; .375 INJECTION, POWDER, LYOPHILIZED, FOR SOLUTION INTRAVENOUS at 02:35

## 2024-05-02 RX ADMIN — SODIUM CHLORIDE, PRESERVATIVE FREE 10 ML: 5 INJECTION INTRAVENOUS at 22:27

## 2024-05-02 RX ADMIN — ASPIRIN 81 MG: 81 TABLET, CHEWABLE ORAL at 10:01

## 2024-05-02 RX ADMIN — WATER 1000 MG: 1 INJECTION INTRAMUSCULAR; INTRAVENOUS; SUBCUTANEOUS at 15:19

## 2024-05-02 NOTE — PROGRESS NOTES
Received report from Brian at shift change. Pt. Confused, lying in bed. Non-verbal.  Picking at IV. Sleeve placed over IV.  Bed alarm on. All needs met at this time.

## 2024-05-02 NOTE — CARE COORDINATION
Clearance   Potential Assistance needed at discharge: N/A            Potential DME:    Patient expects to discharge to: Assisted living  Plan for transportation at discharge: Other (see comment) (TBD)    Financial    Payor: [facility] MEDICARE / Plan: [facility] MEDICARE COMPLETE / Product Type: *No Product type* /     Does insurance require precert for SNF: No    Potential assistance Purchasing Medications: No  Meds-to-Beds request: Yes      [facility] - [facility] [address] - P [phone] - F [phone]  [address] [facility] [address]  Phone: [phone] Fax: [phone]    [id number] - [place], [place] - [address] - P [phone] - F [phone]  [address] [facility] [facility] [address]  Phone: [phone] Fax: [phone]    [facility] [address] - P [phone] - F [phone] [address] [facility] [address]  Phone: [phone] Fax: [phone]      Notes:    Factors facilitating achievement of predicted outcomes: Family support, Caregiver support, Friend support, Motivated, Cooperative, and Pleasant    Barriers to discharge: Medical Clearance     Additional Case Management Notes: SW met with patient and spouse at bedside. Patient is from [facility] and is modified independent at baseline using a wheelchair for balance and mobility. Spouse at bedside and can assist with discharge needs. SW following.     The Plan for Transition of Care is related to the following treatment goals of Hypernatremia [E87.0]  Urinary tract infection without hematuria, site unspecified [N39.0]    IF APPLICABLE: The Patient and/or patient representative Katrin and her family were provided with a choice of provider and agrees with the discharge plan. Freedom of choice list with basic dialogue that supports the patient's individualized plan of care/goals and shares the quality data associated with the providers was provided to: Patient    Patient Representative Name:       The Patient and/or Patient Representative Agree with the Discharge Plan? Yes    FAUSTO Graves  Case Management Department  Ph: [phone] Fax: [phone]

## 2024-05-02 NOTE — PROGRESS NOTES
Progress Note    Admit Date: 5/1/2024  Day: 1  Diet: Diet NPO    CC: AMS    Interval history:   Acute event overnight includes increasing troponin to 61 and concern for ST depressions in V5 and V6.  Cardiology initially consulted but discontinued this morning with return of troponin to baseline.  Patient seen and examined at bedside.  Not alert and oriented due to chronic dementia, and unable to adequately state whether she has chest pain, however does appear comfortable in bed without acute concerns.  Vital signs overall stable.  Continuing to correct sodium with D5 drip, morning NA of 152.  Continuing antibiotics for UTI.    HPI: Katrin Donnelly is an 83 y/o F w/ a PMH of Alzheimer's dementia starting in 2020, sick sinus syndrome status post pacemaker placement (was initially diagnosed with A-fib and was on Coumadin but was rediagnosed with sick sinus syndrome as per the note by Dr. Walker on 12/5/2023), CHF, and orthostatic hypotension likely secondary to dysautonomia who presented to the emergency department with lethargy and not acting like herself for the past 3 days.  The patient is not alert and oriented to self and provided no collateral history. I called and spoke with her  who says that she is at her baseline but that the nursing home thought she was not acting like herself for the past 3 days and was not drinking an adequate amount of water. I was unable to contact her memory unit.      On arrival to the emergency department her vital signs are within normal limits except she was tachycardic to 103 and hypotensive to 99/53. Her EKG was suggestive of A-fib with RVR. Her CBC was unremarkable her VBG also unremarkable. Her lactic acid was elevated to 2.3. Her urinalysis was suggestive of UTI with  WBC and 4+ bacteria. He was also positive for nitrite and large leukocyte esterase. Her BMP showed her sodium to be 158. Her initial troponin was 34 while on repeat it was 35 and her magnesium was 2.7. Her

## 2024-05-02 NOTE — PROGRESS NOTES
Encounter:   and pts  explored narrative of pts care at memory [facility]. The  is supportive of pt, pts  supported through listening.  has no further visits planned.  Orly Carvalho Mdiv., [facility]   05/02/24 1251   Encounter Summary   Encounter Overview/Reason Follow-up   Service Provided For Patient and family together   Last Encounter  05/02/24  (MSR)   Begin Time 1239   End Time  1252   Total Time Calculated 13 min   Assessment/Intervention/Outcome   Assessment Coping   Intervention Active listening;Discussed belief system/Restorationist practices/estuardo;Explored/Affirmed feelings, thoughts, concerns   Outcome Comfort;Connection/Belonging   Plan and Referrals   Plan/Referrals   (No further visits planned)

## 2024-05-02 NOTE — PROGRESS NOTES
4 Eyes Skin Assessment     NAME:  Katrin Donnelly  YOB: 1940  MEDICAL RECORD NUMBER:  [id number]    The patient is being assessed for  Admission    I agree that at least one RN has performed a thorough Head to Toe Skin Assessment on the patient. ALL assessment sites listed below have been assessed.      Areas assessed by both nurses:    Head, Face, Ears, Shoulders, Back, Chest, Arms, Elbows, Hands, Sacrum. Buttock, Coccyx, Ischium, Legs. Feet and Heels, and Under Medical Devices         Does the Patient have a Wound? No noted wound(s)       Blanchable erythema noted on buttocks and mauricio area/groin. Small tear noted inner L thigh near groin area.     Joseph Prevention initiated by RN: Yes  Wound Care Orders initiated by RN: No    Pressure Injury (Stage 3,4, Unstageable, DTI, NWPT, and Complex wounds) if present, place Wound referral order by RN under : No    New Ostomies, if present place, Ostomy referral order under : No     Nurse 1 eSignature: Electronically signed by Vero Vivas RN on 5/2/24 at 12:58 AM EDT    **SHARE this note so that the co-signing nurse can place an eSignature**    Nurse 2 eSignature: Electronically signed by Ryan Barker RN on 5/2/24 at 6:08 AM EDT

## 2024-05-02 NOTE — PROGRESS NOTES
Report received from Mansi CARDONA.  Pt arrived to room 6311 via bed from ED.  Pt oriented to room and use of call light. Pt did not verbalize, no evidence of learning observed. Pt responds to name but does not respond when prompted w/ any other questions. PIV to R hand. Female external urinary catheter in place.  Female external urinary catheter  replaced  and is clean, dry, and intact. Pt placed on telemetry.  Assessment performed. Vital signs stable on admission. Bed alarm on. Bed wheels locked and in the lowest position. Call light within reach. Non skid socks applied.

## 2024-05-02 NOTE — PROGRESS NOTES
Called lab again to inquire about labs still not collected. Lab stated she was told tech was on her way to ED and that she would call her.

## 2024-05-02 NOTE — PROGRESS NOTES
Called lab to notify that labs need drawn that was ordered earlier. Stated that Julia was on her way.

## 2024-05-02 NOTE — PROGRESS NOTES
Speech Language Pathology  [address]ysphagia Evaluation    Name: Katrin Donnelly  : 1940  MRN: [id number]                                                     Patient Diagnosis(es):   Patient Active Problem List    Diagnosis Date Noted    Altered mental status 02/10/2023    Hypernatremia 2024    Facial droop 2024    Encounter for palliative care 2024    Moderate dementia without behavioral disturbance, psychotic disturbance, mood disturbance, or anxiety (HCC) 2024    Gastroesophageal reflux disease     Benign essential HTN     Chronic venous insufficiency of lower extremity 2020    S/P placement of cardiac pacemaker 2020    Complete heart block (HCC) 2020    Debility 2020    Multiple falls 2020    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 (HCC)     Cerebral amyloid angiopathy (CODE)     Adenomatous polyp of colon 2019    Lower extremity edema 2015    Mitral valve disorder     Dysthymic disorder     Atrial fibrillation (HCC) 2010     Past Medical History:   Diagnosis Date    Adenomatous polyp of colon 2019    Asymptomatic varicose veins     Atrial fibrillation (HCC)     Dysthymic disorder     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)     Screening mammogram 2009    (Left)Benign     Past Surgical History:   Procedure Laterality Date    COLONOSCOPY  2008    HERNIA REPAIR      Left Femoral    PACEMAKER INSERTION  2020    Micra AV [employer]     Reason for Referral:  Katrin Donnelly  was referred for a Speech Therapy evaluation to assess swallow function and/or communication.    History of Present Illness  Per MD notes:  \"Katrin Donnelly is an 85 y/o F w/ a PMH of Alzheimer's dementia starting in , sick sinus syndrome status post pacemaker placement (was initially diagnosed with A-fib and was on Coumadin but was rediagnosed with sick sinus syndrome as per  the note by Dr. Walker on 12/5/2023), CHF, and orthostatic hypotension likely secondary to dysautonomia who presented to the emergency department with lethargy and not acting like herself for the past 3 days.  The patient is not alert and oriented to self and provided no collateral history. I called and spoke with her  who says that she is at her baseline but that the nursing home thought she was not acting like herself for the past 3 days and was not drinking an adequate amount of water. I was unable to contact her memory unit.   On arrival to the emergency department her vital signs are within normal limits except she was tachycardic to 103 and hypotensive to 99/53. Her EKG was suggestive of A-fib with RVR. Her CBC was unremarkable her VBG also unremarkable. Her lactic acid was elevated to 2.3. Her urinalysis was suggestive of UTI with  WBC and 4+ bacteria. He was also positive for nitrite and large leukocyte esterase. Her BMP showed her sodium to be 158. Her initial troponin was 34 while on repeat it was 35 and her magnesium was 2.7. Her CT head and chest x-ray were largely unremarkable. She was given a dose of ceftriaxone in the ED. She was admitted to our service for a UTI and hypernatremia.\"    Imaging  CT Head W/O Contrast   Final Result      1. Stable exam with no acute intracranial abnormality.      XR CHEST (2 VW)   Final Result      No acute cardiopulmonary disease              Date of onset: 5/1/24    Current Diet:  Diet NPO    Treatment Diagnosis: dysphagia    Pain:  Not able to state, however none indicated through any means    General:  Chart reviewed: Yes  Behavior/Cognition: alert and cooperative  Communication Observation: very minimal verbalizations, did not answer personal questions  Follows Directions: inconsistent with simple directions with demonstration provided  Dentition/Oral Mucosa: adequate  Oral motor exam: grossly intact  Vocal Quality: not able to assess as pt did not

## 2024-05-02 NOTE — PROGRESS NOTES
Overnight labs showed troponin increase from 35 --> 61. Patient has dementia and is not answering when I ask if she has pain anywhere. STAT EKG was ordered and showed ST depressions in V5 and V6 with T wave inversions. Cardiology consulted STAT and called for further opinion. I spoke to the NP covering, Jerry Charles, who recommended patient be started on a heparin drip and said the cardiology team will see patient in the AM.    Michelle Zapata MD, PGY-2  Internal Medicine

## 2024-05-03 LAB
ANION GAP SERPL CALCULATED.3IONS-SCNC: 7 MMOL/L (ref 3–16)
ANISOCYTOSIS BLD QL SMEAR: ABNORMAL
BASOPHILS # BLD: 0 K/UL (ref 0–0.2)
BASOPHILS NFR BLD: 0 %
BUN SERPL-MCNC: 29 MG/DL (ref 7–20)
CALCIUM SERPL-MCNC: 9.1 MG/DL (ref 8.3–10.6)
CHLORIDE SERPL-SCNC: 107 MMOL/L (ref 99–110)
CO2 SERPL-SCNC: 34 MMOL/L (ref 21–32)
CREAT SERPL-MCNC: 1.1 MG/DL (ref 0.6–1.2)
DEPRECATED RDW RBC AUTO: 14.8 % (ref 12.4–15.4)
EOSINOPHIL # BLD: 0.3 K/UL (ref 0–0.6)
EOSINOPHIL NFR BLD: 5 %
GFR SERPLBLD CREATININE-BSD FMLA CKD-EPI: 50 ML/MIN/{1.73_M2}
GLUCOSE SERPL-MCNC: 92 MG/DL (ref 70–99)
HCT VFR BLD AUTO: 37.5 % (ref 36–48)
HGB BLD-MCNC: 12.8 G/DL (ref 12–16)
LYMPHOCYTES # BLD: 3 K/UL (ref 1–5.1)
LYMPHOCYTES NFR BLD: 45 %
MACROCYTES BLD QL SMEAR: ABNORMAL
MAGNESIUM SERPL-MCNC: 2.4 MG/DL (ref 1.8–2.4)
MCH RBC QN AUTO: 32.9 PG (ref 26–34)
MCHC RBC AUTO-ENTMCNC: 34.2 G/DL (ref 31–36)
MCV RBC AUTO: 96.3 FL (ref 80–100)
METAMYELOCYTES NFR BLD MANUAL: 1 %
MONOCYTES # BLD: 0.6 K/UL (ref 0–1.3)
MONOCYTES NFR BLD: 9 %
MYELOCYTES NFR BLD MANUAL: 1 %
NEUTROPHILS # BLD: 2.7 K/UL (ref 1.7–7.7)
NEUTROPHILS NFR BLD: 38 %
NEUTS BAND NFR BLD MANUAL: 1 % (ref 0–7)
OSMOLALITY UR: 685 MOSM/KG (ref 390–1070)
OVALOCYTES BLD QL SMEAR: ABNORMAL
PLATELET # BLD AUTO: 148 K/UL (ref 135–450)
PLATELET BLD QL SMEAR: ADEQUATE
PMV BLD AUTO: 8.9 FL (ref 5–10.5)
POIKILOCYTOSIS BLD QL SMEAR: ABNORMAL
POTASSIUM SERPL-SCNC: 3.6 MMOL/L (ref 3.5–5.1)
RBC # BLD AUTO: 3.89 M/UL (ref 4–5.2)
SCHISTOCYTES BLD QL SMEAR: ABNORMAL
SLIDE REVIEW: ABNORMAL
SODIUM SERPL-SCNC: 146 MMOL/L (ref 136–145)
SODIUM SERPL-SCNC: 148 MMOL/L (ref 136–145)
SODIUM SERPL-SCNC: 148 MMOL/L (ref 136–145)
SODIUM SERPL-SCNC: 149 MMOL/L (ref 136–145)
SODIUM UR-SCNC: 36 MMOL/L
WBC # BLD AUTO: 6.7 K/UL (ref 4–11)

## 2024-05-03 PROCEDURE — 97163 PT EVAL HIGH COMPLEX 45 MIN: CPT

## 2024-05-03 PROCEDURE — 85025 COMPLETE CBC W/AUTO DIFF WBC: CPT

## 2024-05-03 PROCEDURE — 99232 SBSQ HOSP IP/OBS MODERATE 35: CPT | Performed by: HOSPITALIST

## 2024-05-03 PROCEDURE — 97535 SELF CARE MNGMENT TRAINING: CPT

## 2024-05-03 PROCEDURE — 97530 THERAPEUTIC ACTIVITIES: CPT

## 2024-05-03 PROCEDURE — 1200000000 HC SEMI PRIVATE

## 2024-05-03 PROCEDURE — 84300 ASSAY OF URINE SODIUM: CPT

## 2024-05-03 PROCEDURE — 36415 COLL VENOUS BLD VENIPUNCTURE: CPT

## 2024-05-03 PROCEDURE — 6360000002 HC RX W HCPCS

## 2024-05-03 PROCEDURE — 2580000003 HC RX 258

## 2024-05-03 PROCEDURE — 83735 ASSAY OF MAGNESIUM: CPT

## 2024-05-03 PROCEDURE — 51798 US URINE CAPACITY MEASURE: CPT

## 2024-05-03 PROCEDURE — 6370000000 HC RX 637 (ALT 250 FOR IP)

## 2024-05-03 PROCEDURE — 97166 OT EVAL MOD COMPLEX 45 MIN: CPT

## 2024-05-03 PROCEDURE — 84295 ASSAY OF SERUM SODIUM: CPT

## 2024-05-03 PROCEDURE — 6370000000 HC RX 637 (ALT 250 FOR IP): Performed by: HOSPITALIST

## 2024-05-03 PROCEDURE — 80048 BASIC METABOLIC PNL TOTAL CA: CPT

## 2024-05-03 PROCEDURE — 83935 ASSAY OF URINE OSMOLALITY: CPT

## 2024-05-03 RX ORDER — DEXTROSE AND SODIUM CHLORIDE 5; .45 G/100ML; G/100ML
INJECTION, SOLUTION INTRAVENOUS CONTINUOUS
Status: ACTIVE | OUTPATIENT
Start: 2024-05-03 | End: 2024-05-03

## 2024-05-03 RX ORDER — MEROPENEM AND SODIUM CHLORIDE 1 G/50ML
1000 INJECTION, SOLUTION INTRAVENOUS EVERY 12 HOURS
Status: DISCONTINUED | OUTPATIENT
Start: 2024-05-03 | End: 2024-05-04

## 2024-05-03 RX ORDER — MEROPENEM AND SODIUM CHLORIDE 1 G/50ML
1000 INJECTION, SOLUTION INTRAVENOUS ONCE
Status: COMPLETED | OUTPATIENT
Start: 2024-05-03 | End: 2024-05-03

## 2024-05-03 RX ADMIN — SODIUM CHLORIDE: 9 INJECTION, SOLUTION INTRAVENOUS at 16:21

## 2024-05-03 RX ADMIN — MEMANTINE HYDROCHLORIDE 10 MG: 10 TABLET ORAL at 20:58

## 2024-05-03 RX ADMIN — PANTOPRAZOLE SODIUM 40 MG: 40 TABLET, DELAYED RELEASE ORAL at 06:29

## 2024-05-03 RX ADMIN — MEMANTINE HYDROCHLORIDE 10 MG: 10 TABLET ORAL at 09:20

## 2024-05-03 RX ADMIN — ACETAMINOPHEN 650 MG: 325 TABLET ORAL at 20:58

## 2024-05-03 RX ADMIN — SODIUM CHLORIDE, PRESERVATIVE FREE 10 ML: 5 INJECTION INTRAVENOUS at 20:59

## 2024-05-03 RX ADMIN — QUETIAPINE FUMARATE 75 MG: 25 TABLET ORAL at 20:58

## 2024-05-03 RX ADMIN — DEXTROSE AND SODIUM CHLORIDE: 5; 450 INJECTION, SOLUTION INTRAVENOUS at 12:12

## 2024-05-03 RX ADMIN — SODIUM CHLORIDE, PRESERVATIVE FREE 10 ML: 5 INJECTION INTRAVENOUS at 16:22

## 2024-05-03 RX ADMIN — DIVALPROEX SODIUM 250 MG: 125 CAPSULE, COATED PELLETS ORAL at 15:55

## 2024-05-03 RX ADMIN — DIVALPROEX SODIUM 250 MG: 125 CAPSULE, COATED PELLETS ORAL at 09:20

## 2024-05-03 RX ADMIN — MEROPENEM AND SODIUM CHLORIDE 1000 MG: 1 INJECTION, SOLUTION INTRAVENOUS at 16:22

## 2024-05-03 RX ADMIN — POLYETHYLENE GLYCOL 3350 17 G: 17 POWDER, FOR SOLUTION ORAL at 09:20

## 2024-05-03 RX ADMIN — ASPIRIN 81 MG: 81 TABLET, CHEWABLE ORAL at 09:20

## 2024-05-03 RX ADMIN — DIVALPROEX SODIUM 250 MG: 125 CAPSULE, COATED PELLETS ORAL at 20:58

## 2024-05-03 RX ADMIN — ACETAMINOPHEN 650 MG: 325 TABLET ORAL at 12:13

## 2024-05-03 RX ADMIN — DONEPEZIL HYDROCHLORIDE 10 MG: 10 TABLET ORAL at 06:29

## 2024-05-03 RX ADMIN — MEROPENEM AND SODIUM CHLORIDE 1000 MG: 1 INJECTION, SOLUTION INTRAVENOUS at 02:54

## 2024-05-03 RX ADMIN — ENOXAPARIN SODIUM 40 MG: 100 INJECTION SUBCUTANEOUS at 09:20

## 2024-05-03 ASSESSMENT — PAIN SCALES - PAIN ASSESSMENT IN ADVANCED DEMENTIA (PAINAD)
BREATHING: NORMAL
FACIALEXPRESSION: SMILING OR INEXPRESSIVE
CONSOLABILITY: NO NEED TO CONSOLE
TOTALSCORE: 0
BODYLANGUAGE: RELAXED

## 2024-05-03 ASSESSMENT — PAIN SCALES - GENERAL: PAINLEVEL_OUTOF10: 0

## 2024-05-03 NOTE — PROGRESS NOTES
Occupational Therapy  Facility/Department: [address]  Occupational Therapy Initial Assessment, Treatment and D/c Note     Name: Katrin Donnelly  : 1940  MRN: [id number]  Date of Service: 5/3/2024    Discharge Recommendations:  No further OT indicated at D/c.  OT Equipment Recommendations  Equipment Needed: No       Patient Diagnosis(es): The primary encounter diagnosis was Urinary tract infection without hematuria, site unspecified. A diagnosis of Hypernatremia was also pertinent to this visit.  Past Medical History:  has a past medical history of Adenomatous polyp of colon, Asymptomatic varicose veins, Atrial fibrillation (HCC), Dysthymic disorder, and 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).  Past Surgical History:  has a past surgical history that includes Colonoscopy (2008); hernia repair; and Pacemaker insertion (2020).     Assessment   Assessment: Pt from memory care AL unit - Called AL Memory care for PLOL - as pt is non verbal and mostly unresponsive to stimulans / questions.  At baseline pt requires 2 person assist for OOB to w/c. Pt per requires dependent assist with all ADLs including feeding. Pt resistive to care and requires Dep assist x 2 for bed mobility. Pt requires dependent assist with feeding.  Pt appears at or very near to baseline level. No ongoing / acute OT indicated.  Will sign off from OT services. Question if pt would pt be appropriate for palliative care consult. Recommend OOB with RN staff with maximover if desired.   Decision Making: Medium Complexity  No Skilled OT: No OT goals identified  REQUIRES OT FOLLOW-UP: No  Activity Tolerance  Activity Tolerance: Treatment limited secondary to decreased cognition  Activity Tolerance Comments: resistive to care / mobility > 60% of treatment/ eval --  No BARR noted        Plan D/c Acute OT services         Restrictions  Position Activity Restriction  Other position/activity restrictions: Up with assist    Subjective    General  Chart Reviewed: Yes  Additional Pertinent Hx: Admit 5/1 with AMS / dehydration, Hypernatremia     CT head - neg, Cxray - neg                                                      PMHX: Alzheimer's dementia , AFib ,  sick sinus syndrome status post pacemaker placement  Family / Caregiver Present: No  Diagnosis: Hypernatremia / dehydration  Subjective  Subjective: Pt non verbal - unable to follow directives -- Found sleeping in bed - required Max physical stimulation to arouse for OT eval and tx.    Pt unable to rate pain due to cognition.  Pt does not appear to be in pain.    Social/Functional History  Social/Functional History  Type of Home: Assisted living ([facility])  Additional Comments: Phoned facility.  Staff reports pt requires heavy assist x2 for stand pivot transfers to w/c.  Pt unable to propel w/c.  Pt requires total assist for ADLs, including feeding.       Objective Treatment included functional transfer training, ADL's and pt. education.     Safety Devices  Type of Devices: Bed alarm in place;Call light within reach;Left in bed;Nurse notified     Toilet Transfers  Toilet Transfer: Unable to assess  AROM: Generally decreased, functional (Pt demo voluntary movements both arms - unable to demo on command 2/2 advanced dementia)  Strength: Generally decreased, functional  Tone: Normal  Sensation: Intact  ADL  Feeding: Dependent/Total  Feeding Skilled Clinical Factors: with feeding - pt unable to hold cup / utensil -- shaking noted with supported holding.  Grooming: Dependent/Total  Grooming Skilled Clinical Factors: with attempts to wipe face / hands  LE Dressing: Dependent/Total;Based on clinical judgement  Toileting: Dependent/Total;Based on clinical judgement  Functional Mobility: Unable to assess (Comment)  Functional Mobility Skilled Clinical Factors: pt unable to advance feet while Mod A  x2 in stance.  Pt NH reports non ambulatory  Skin Care: Bath wipes      Transfers  Sit

## 2024-05-03 NOTE — PROGRESS NOTES
Physical Therapy  Facility/Department: [address]  Physical Therapy Initial Assessment and DISCHARGE    Name: Katrin Donnelly  : 1940  MRN: [id number]  Date of Service: 5/3/2024    Discharge Recommendations:  No therapy recommended at discharge   PT Equipment Recommendations  Equipment Needed: No    Assessment   Assessment: Pt from memory care unit, admitted for hypernatremia.  Pt with history of mod-advanced dementia.  Per facility, pt requires heavy assist x2 for pivot transfers to w/c.  Pt demonstrates mobility at/near reported baseline.  Pt significantly limited by cognition, with blank stares and unable to follow commands.  No further acute PT needs identified.  Will sign off and defer continued activity to RN staff using lift equipment until discharge.  Anticipate pt will return to memory unit upon d/c.  No further acute PT needs.  Decision Making: High Complexity  Requires PT Follow-Up: No     Plan   General Plan: Discharge with evaluation only    Safety Devices  Type of Devices: Bed alarm in place, Call light within reach, Left in bed, Nurse notified     Restrictions  Other position/activity restrictions: Up with assist     Subjective   Pain: Pt unable to rate pain due to cognition.  Pt does not appear to be in pain.    Chart Reviewed: Yes  Additional Pertinent Hx: Pt to ED  with AMS and admitted for hypernatremia.  CXR (-).  Head CT (-).  PMH: Afib, mitral valve disorders    Diagnosis: Hypernatremia    Subjective  Subjective: Pt found supine in bed.  Pt with blank stares, unable to follow commands.  Pt non-verbal.    Social/Functional History  Type of Home: Assisted living ([facility])  Additional Comments: Phoned facility.  Staff reports pt requires heavy assist x2 for stand pivot transfers to w/c.  Pt unable to propel w/c.  Pt requires total assist for ADLs, including feeding.    Vision/Hearing  Vision: Within Functional Limits  Hearing: Within functional limits

## 2024-05-03 NOTE — PROGRESS NOTES
Progress Note    Admit Date: 5/1/2024  Day: 2  Diet: ADULT DIET; Regular    CC: AMS    Interval history:   NAEO.  Vital signs stable.  Patient seen and examined at bedside.  Overall resting comfortably in bed, and tolerating diet well.  Significant events include return of urine culture significant for ESBL bacteriuria; started on meropenem overnight with discontinuation of ceftriaxone.  Sodium this a.m. now at 148.    HPI: Katrin Donnelly is an 85 y/o F w/ a PMH of Alzheimer's dementia starting in 2020, sick sinus syndrome status post pacemaker placement (was initially diagnosed with A-fib and was on Coumadin but was rediagnosed with sick sinus syndrome as per the note by Dr. Walker on 12/5/2023), CHF, and orthostatic hypotension likely secondary to dysautonomia who presented to the emergency department with lethargy and not acting like herself for the past 3 days.  The patient is not alert and oriented to self and provided no collateral history. I called and spoke with her  who says that she is at her baseline but that the nursing home thought she was not acting like herself for the past 3 days and was not drinking an adequate amount of water. I was unable to contact her memory unit.      On arrival to the emergency department her vital signs are within normal limits except she was tachycardic to 103 and hypotensive to 99/53. Her EKG was suggestive of A-fib with RVR. Her CBC was unremarkable her VBG also unremarkable. Her lactic acid was elevated to 2.3. Her urinalysis was suggestive of UTI with  WBC and 4+ bacteria. He was also positive for nitrite and large leukocyte esterase. Her BMP showed her sodium to be 158. Her initial troponin was 34 while on repeat it was 35 and her magnesium was 2.7. Her CT head and chest x-ray were largely unremarkable. She was given a dose of ceftriaxone in the ED. She was admitted to our service for a UTI and hypernatremia.        Medications:     Scheduled Meds:   meropenem

## 2024-05-03 NOTE — PROGRESS NOTES
[facility] - Clinical Pharmacy Note - Extended Infusion Beta-Lactam Adjustment    Piperacillin/Tazobactam ordered for treatment of UTI. Per [facility] Extended Infusion Beta-Lactam Policy, Meropenem 1000mg Q8H will be changed to 1000 mg Q12H.     Estimated Creatinine Clearance: Estimated Creatinine Clearance: 44 mL/min (based on SCr of 1 mg/dL).  Dialysis Status, PORTILLO, CKD: Baseline  BMI: Body mass index is 27.51 kg/m².    Rationale for Adjustment: Agent demonstrates time-dependent effect on bacterial eradication. Extended-infusion dosing strategy aims to enhance microbiologic and clinical efficacy.    Pharmacy will continue to monitor cultures and sensitivities (where available) and adjust dose as necessary.      Please call with any questions.    Damián Clark, [facility] ext [phone]

## 2024-05-03 NOTE — PROGRESS NOTES
Cosme from [facility] lab called w/urine culture results for Escherichia coli ESBL. MD notified. Contact precautions ordered.

## 2024-05-03 NOTE — PLAN OF CARE
Problem: Discharge Planning  Goal: Discharge to home or other facility with appropriate resources  Outcome: Progressing   Patient to d/c to [facility] when appropriate    Problem: Pain  Goal: Verbalizes/displays adequate comfort level or baseline comfort level  Outcome: Progressing  Patient is nonverbal but does not seem to be in any pain. No guarding.     Problem: Skin/Tissue Integrity  Goal: Absence of new skin breakdown  Description: 1.  Monitor for areas of redness and/or skin breakdown  2.  Assess vascular access sites hourly  3.  Every 4-6 hours minimum:  Change oxygen saturation probe site  4.  Every 4-6 hours:  If on nasal continuous positive airway pressure, respiratory therapy assess nares and determine need for appliance change or resting period.  Outcome: Progressing  Patient potential problem for skin breakdown. Needs turned and repositioned Q2hrs

## 2024-05-03 NOTE — PROGRESS NOTES
Speech-Language Pathology    Attempted to see patient for dysphagia therapy f/u, however pt too somnolent to participate. Will check back at later time as able/appropriate.    Elisabet Bains, SLP, [id number]  Ph. # [phone]

## 2024-05-03 NOTE — CARE COORDINATION
CM  following for  d/c planning:    Patient from  Memory Care  ,  PTOT evals  noted  SLP  eval pending .    Cont to follow  and assist  with  d/c planning needs.       Electronically signed by Sakina Alston RN on 5/3/2024 at 5:07 PM       Sakina Alston RN Case Manager  [facility]  [address]  [facility] [id number] [phone]  Fax [phone]

## 2024-05-04 LAB
ANION GAP SERPL CALCULATED.3IONS-SCNC: 7 MMOL/L (ref 3–16)
BACTERIA UR CULT: ABNORMAL
BASOPHILS # BLD: 0 K/UL (ref 0–0.2)
BASOPHILS NFR BLD: 0.5 %
BUN SERPL-MCNC: 23 MG/DL (ref 7–20)
CALCIUM SERPL-MCNC: 8.9 MG/DL (ref 8.3–10.6)
CHLORIDE SERPL-SCNC: 107 MMOL/L (ref 99–110)
CO2 SERPL-SCNC: 32 MMOL/L (ref 21–32)
CREAT SERPL-MCNC: 0.8 MG/DL (ref 0.6–1.2)
DEPRECATED RDW RBC AUTO: 15 % (ref 12.4–15.4)
EOSINOPHIL # BLD: 0.4 K/UL (ref 0–0.6)
EOSINOPHIL NFR BLD: 5.7 %
GFR SERPLBLD CREATININE-BSD FMLA CKD-EPI: 74 ML/MIN/{1.73_M2}
GLUCOSE SERPL-MCNC: 92 MG/DL (ref 70–99)
HCT VFR BLD AUTO: 36.2 % (ref 36–48)
HGB BLD-MCNC: 12.3 G/DL (ref 12–16)
LYMPHOCYTES # BLD: 2.3 K/UL (ref 1–5.1)
LYMPHOCYTES NFR BLD: 33 %
MAGNESIUM SERPL-MCNC: 2.3 MG/DL (ref 1.8–2.4)
MCH RBC QN AUTO: 33 PG (ref 26–34)
MCHC RBC AUTO-ENTMCNC: 34 G/DL (ref 31–36)
MCV RBC AUTO: 97.1 FL (ref 80–100)
MONOCYTES # BLD: 0.7 K/UL (ref 0–1.3)
MONOCYTES NFR BLD: 9.6 %
NEUTROPHILS # BLD: 3.5 K/UL (ref 1.7–7.7)
NEUTROPHILS NFR BLD: 51.2 %
ORGANISM: ABNORMAL
PLATELET # BLD AUTO: 130 K/UL (ref 135–450)
PMV BLD AUTO: 8.6 FL (ref 5–10.5)
POTASSIUM SERPL-SCNC: 3.6 MMOL/L (ref 3.5–5.1)
RBC # BLD AUTO: 3.73 M/UL (ref 4–5.2)
SODIUM SERPL-SCNC: 145 MMOL/L (ref 136–145)
SODIUM SERPL-SCNC: 146 MMOL/L (ref 136–145)
WBC # BLD AUTO: 6.9 K/UL (ref 4–11)

## 2024-05-04 PROCEDURE — 6360000002 HC RX W HCPCS

## 2024-05-04 PROCEDURE — 80048 BASIC METABOLIC PNL TOTAL CA: CPT

## 2024-05-04 PROCEDURE — 2580000003 HC RX 258

## 2024-05-04 PROCEDURE — 51798 US URINE CAPACITY MEASURE: CPT

## 2024-05-04 PROCEDURE — 99232 SBSQ HOSP IP/OBS MODERATE 35: CPT | Performed by: HOSPITALIST

## 2024-05-04 PROCEDURE — 6370000000 HC RX 637 (ALT 250 FOR IP)

## 2024-05-04 PROCEDURE — 6370000000 HC RX 637 (ALT 250 FOR IP): Performed by: HOSPITALIST

## 2024-05-04 PROCEDURE — 85025 COMPLETE CBC W/AUTO DIFF WBC: CPT

## 2024-05-04 PROCEDURE — 36415 COLL VENOUS BLD VENIPUNCTURE: CPT

## 2024-05-04 PROCEDURE — 83735 ASSAY OF MAGNESIUM: CPT

## 2024-05-04 PROCEDURE — 1200000000 HC SEMI PRIVATE

## 2024-05-04 RX ORDER — DEXTROSE AND SODIUM CHLORIDE 5; .45 G/100ML; G/100ML
INJECTION, SOLUTION INTRAVENOUS CONTINUOUS
Status: ACTIVE | OUTPATIENT
Start: 2024-05-04 | End: 2024-05-07

## 2024-05-04 RX ORDER — MEROPENEM AND SODIUM CHLORIDE 1 G/50ML
1000 INJECTION, SOLUTION INTRAVENOUS EVERY 8 HOURS
Status: DISCONTINUED | OUTPATIENT
Start: 2024-05-04 | End: 2024-05-09 | Stop reason: HOSPADM

## 2024-05-04 RX ADMIN — MEMANTINE HYDROCHLORIDE 10 MG: 10 TABLET ORAL at 20:45

## 2024-05-04 RX ADMIN — SODIUM CHLORIDE, PRESERVATIVE FREE 10 ML: 5 INJECTION INTRAVENOUS at 09:42

## 2024-05-04 RX ADMIN — MEROPENEM AND SODIUM CHLORIDE 1000 MG: 1 INJECTION, SOLUTION INTRAVENOUS at 04:02

## 2024-05-04 RX ADMIN — PANTOPRAZOLE SODIUM 40 MG: 40 TABLET, DELAYED RELEASE ORAL at 05:36

## 2024-05-04 RX ADMIN — QUETIAPINE FUMARATE 75 MG: 25 TABLET ORAL at 20:45

## 2024-05-04 RX ADMIN — MEROPENEM AND SODIUM CHLORIDE 1000 MG: 1 INJECTION, SOLUTION INTRAVENOUS at 12:03

## 2024-05-04 RX ADMIN — ENOXAPARIN SODIUM 40 MG: 100 INJECTION SUBCUTANEOUS at 09:42

## 2024-05-04 RX ADMIN — DIVALPROEX SODIUM 250 MG: 125 CAPSULE, COATED PELLETS ORAL at 09:43

## 2024-05-04 RX ADMIN — POLYETHYLENE GLYCOL 3350 17 G: 17 POWDER, FOR SOLUTION ORAL at 09:42

## 2024-05-04 RX ADMIN — MEMANTINE HYDROCHLORIDE 10 MG: 10 TABLET ORAL at 09:42

## 2024-05-04 RX ADMIN — ASPIRIN 81 MG: 81 TABLET, CHEWABLE ORAL at 09:42

## 2024-05-04 RX ADMIN — ACETAMINOPHEN 650 MG: 325 TABLET ORAL at 14:07

## 2024-05-04 RX ADMIN — DIVALPROEX SODIUM 250 MG: 125 CAPSULE, COATED PELLETS ORAL at 20:45

## 2024-05-04 RX ADMIN — ACETAMINOPHEN 650 MG: 325 TABLET ORAL at 09:42

## 2024-05-04 RX ADMIN — DEXTROSE AND SODIUM CHLORIDE: 5; 450 INJECTION, SOLUTION INTRAVENOUS at 09:41

## 2024-05-04 RX ADMIN — DONEPEZIL HYDROCHLORIDE 10 MG: 10 TABLET ORAL at 05:36

## 2024-05-04 RX ADMIN — ACETAMINOPHEN 650 MG: 325 TABLET ORAL at 20:45

## 2024-05-04 RX ADMIN — DIVALPROEX SODIUM 250 MG: 125 CAPSULE, COATED PELLETS ORAL at 14:07

## 2024-05-04 RX ADMIN — MEROPENEM AND SODIUM CHLORIDE 1000 MG: 1 INJECTION, SOLUTION INTRAVENOUS at 20:13

## 2024-05-04 ASSESSMENT — PAIN SCALES - PAIN ASSESSMENT IN ADVANCED DEMENTIA (PAINAD)
BODYLANGUAGE: RELAXED
TOTALSCORE: 0
FACIALEXPRESSION: SMILING OR INEXPRESSIVE
CONSOLABILITY: NO NEED TO CONSOLE
BREATHING: NORMAL
CONSOLABILITY: NO NEED TO CONSOLE
TOTALSCORE: 0
BODYLANGUAGE: RELAXED
BREATHING: NORMAL
FACIALEXPRESSION: SMILING OR INEXPRESSIVE

## 2024-05-04 ASSESSMENT — PAIN SCALES - GENERAL: PAINLEVEL_OUTOF10: 0

## 2024-05-04 NOTE — PLAN OF CARE
Problem: Discharge Planning  Goal: Discharge to home or other facility with appropriate resources  Outcome: Progressing     Problem: Pain  Goal: Verbalizes/displays adequate comfort level or baseline comfort level  Outcome: Progressing  Flowsheets (Taken 5/4/2024 1125)  Verbalizes/displays adequate comfort level or baseline comfort level:   Encourage patient to monitor pain and request assistance   Assess pain using appropriate pain scale   Administer analgesics based on type and severity of pain and evaluate response   Implement non-pharmacological measures as appropriate and evaluate response  Note: Pt states no pain, no evidence of pain per NVPS.      Problem: Safety - Adult  Goal: Free from fall injury  5/4/2024 1130 by Arlin Contreras, RN  Outcome: Progressing  Flowsheets (Taken 5/4/2024 1126)  Free From Fall Injury:   Instruct family/caregiver on patient safety   Based on caregiver fall risk screen, instruct family/caregiver to ask for assistance with transferring infant if caregiver noted to have fall risk factors  Note: Pt remains free from falls. Fall precautions in place. Pt alert and oriented to self, video monitoring in place for safety. Family at bedside. Bed locked and in lowest position. Bedside table and call light within reach. Bed alarm on.      Problem: Skin/Tissue Integrity  Goal: Absence of new skin breakdown  Description: 1.  Monitor for areas of redness and/or skin breakdown  2.  Assess vascular access sites hourly  3.  Every 4-6 hours minimum:  Change oxygen saturation probe site  4.  Every 4-6 hours:  If on nasal continuous positive airway pressure, respiratory therapy assess nares and determine need for appliance change or resting period.  5/4/2024 1130 by Arlin Contreras, RN  Outcome: Progressing  Note: Pt skin kept clean, dry, and intact. Pt checked and changed as needed. Purewick in place for incontinence. Barrier cream applied to perineum, mepilex applied to coccyx prophylactically.

## 2024-05-04 NOTE — PLAN OF CARE
Problem: Safety - Adult  Goal: Free from fall injury  Outcome: Progressing  Flowsheets (Taken 5/4/2024 0149)  Free From Fall Injury:   Instruct family/caregiver on patient safety   Based on caregiver fall risk screen, instruct family/caregiver to ask for assistance with transferring infant if caregiver noted to have fall risk factors     Problem: Skin/Tissue Integrity  Goal: Absence of new skin breakdown  Description: 1.  Monitor for areas of redness and/or skin breakdown  2.  Assess vascular access sites hourly  3.  Every 4-6 hours minimum:  Change oxygen saturation probe site  4.  Every 4-6 hours:  If on nasal continuous positive airway pressure, respiratory therapy assess nares and determine need for appliance change or resting period.  5/4/2024 0149 by Jamaal Harrell RN  Outcome: Progressing

## 2024-05-04 NOTE — PROGRESS NOTES
Progress Note    Admit Date: 5/1/2024  Day: 2  Diet: ADULT DIET; Regular    CC: AMS    Interval history:   NAEO.  Vital signs stable.  Patient seen and examined at bedside.  Improvement in mental status, answering simple questions.  Tolerating diet well, will continue to encourage oral intake.  Resting comfortably in bed.  On meropenem for ESBL bacteria.  Sodium back within normal limits, will continue maintenance fluids. Plan for discharge around Monday.      HPI: Katrin Donnelly is an 83 y/o F w/ a PMH of Alzheimer's dementia starting in 2020, sick sinus syndrome status post pacemaker placement (was initially diagnosed with A-fib and was on Coumadin but was rediagnosed with sick sinus syndrome as per the note by Dr. Walker on 12/5/2023), CHF, and orthostatic hypotension likely secondary to dysautonomia who presented to the emergency department with lethargy and not acting like herself for the past 3 days.  The patient is not alert and oriented to self and provided no collateral history. I called and spoke with her  who says that she is at her baseline but that the nursing home thought she was not acting like herself for the past 3 days and was not drinking an adequate amount of water. I was unable to contact her memory unit.      On arrival to the emergency department her vital signs are within normal limits except she was tachycardic to 103 and hypotensive to 99/53. Her EKG was suggestive of A-fib with RVR. Her CBC was unremarkable her VBG also unremarkable. Her lactic acid was elevated to 2.3. Her urinalysis was suggestive of UTI with  WBC and 4+ bacteria. He was also positive for nitrite and large leukocyte esterase. Her BMP showed her sodium to be 158. Her initial troponin was 34 while on repeat it was 35 and her magnesium was 2.7. Her CT head and chest x-ray were largely unremarkable. She was given a dose of ceftriaxone in the ED. She was admitted to our service for a UTI and hypernatremia.

## 2024-05-04 NOTE — PROGRESS NOTES
[facility] - Clinical Pharmacy Note - Extended Infusion Beta-Lactam Adjustment    Meropenem ordered for treatment of ESBL UTI. Per [facility] Extended Infusion Beta-Lactam Policy, Meropenem will be changed to 1000 mg IV EI q8h.     Estimated Creatinine Clearance: Estimated Creatinine Clearance: 55 mL/min (based on SCr of 0.8 mg/dL).  Dialysis Status, PORTILLO, CKD: n/a   BMI: Body mass index is 27.51 kg/m².    Rationale for Adjustment: Agent is renally eliminated and demonstrates time-dependent effect on bacterial eradication. Extended-infusion dosing strategy aims to enhance microbiologic and clinical efficacy.    Pharmacy will continue to monitor renal function and adjust dose as necessary.      Please call with questions--  Taya Woods PharmD, [facility]  Wireless: [phone]   5/4/2024 11:32 AM

## 2024-05-04 NOTE — PROGRESS NOTES
Pt has not voided throughout shift - bladder scanned 335mL remaining  Physician notified via perfectserve.   Instructed to continue to monitor

## 2024-05-04 NOTE — PROGRESS NOTES
Pt voided an unmeasurable amount. Then bladder scanned - 299 mL remains in bladder. Physician notified via perfect serve. Will continue to monitor

## 2024-05-05 LAB
ANION GAP SERPL CALCULATED.3IONS-SCNC: 8 MMOL/L (ref 3–16)
BASOPHILS # BLD: 0.1 K/UL (ref 0–0.2)
BASOPHILS NFR BLD: 1 %
BUN SERPL-MCNC: 14 MG/DL (ref 7–20)
CALCIUM SERPL-MCNC: 8.7 MG/DL (ref 8.3–10.6)
CHLORIDE SERPL-SCNC: 108 MMOL/L (ref 99–110)
CO2 SERPL-SCNC: 30 MMOL/L (ref 21–32)
CREAT SERPL-MCNC: 0.7 MG/DL (ref 0.6–1.2)
DACRYOCYTES BLD QL SMEAR: ABNORMAL
DEPRECATED RDW RBC AUTO: 14.4 % (ref 12.4–15.4)
EOSINOPHIL # BLD: 0.5 K/UL (ref 0–0.6)
EOSINOPHIL NFR BLD: 8 %
GFR SERPLBLD CREATININE-BSD FMLA CKD-EPI: 86 ML/MIN/{1.73_M2}
GLUCOSE SERPL-MCNC: 91 MG/DL (ref 70–99)
HCT VFR BLD AUTO: 35.8 % (ref 36–48)
HGB BLD-MCNC: 12.4 G/DL (ref 12–16)
LYMPHOCYTES # BLD: 2.5 K/UL (ref 1–5.1)
LYMPHOCYTES NFR BLD: 39 %
MACROCYTES BLD QL SMEAR: ABNORMAL
MAGNESIUM SERPL-MCNC: 2.3 MG/DL (ref 1.8–2.4)
MCH RBC QN AUTO: 33.6 PG (ref 26–34)
MCHC RBC AUTO-ENTMCNC: 34.7 G/DL (ref 31–36)
MCV RBC AUTO: 96.9 FL (ref 80–100)
MONOCYTES # BLD: 0.5 K/UL (ref 0–1.3)
MONOCYTES NFR BLD: 8 %
MYELOCYTES NFR BLD MANUAL: 2 %
NEUTROPHILS # BLD: 2.8 K/UL (ref 1.7–7.7)
NEUTROPHILS NFR BLD: 42 %
OVALOCYTES BLD QL SMEAR: ABNORMAL
PLATELET # BLD AUTO: 124 K/UL (ref 135–450)
PLATELET BLD QL SMEAR: ABNORMAL
PMV BLD AUTO: 9 FL (ref 5–10.5)
POIKILOCYTOSIS BLD QL SMEAR: ABNORMAL
POTASSIUM SERPL-SCNC: 3.6 MMOL/L (ref 3.5–5.1)
RBC # BLD AUTO: 3.69 M/UL (ref 4–5.2)
SLIDE REVIEW: ABNORMAL
SODIUM SERPL-SCNC: 146 MMOL/L (ref 136–145)
WBC # BLD AUTO: 6.3 K/UL (ref 4–11)

## 2024-05-05 PROCEDURE — 6370000000 HC RX 637 (ALT 250 FOR IP)

## 2024-05-05 PROCEDURE — 99232 SBSQ HOSP IP/OBS MODERATE 35: CPT | Performed by: HOSPITALIST

## 2024-05-05 PROCEDURE — 2580000003 HC RX 258

## 2024-05-05 PROCEDURE — 85025 COMPLETE CBC W/AUTO DIFF WBC: CPT

## 2024-05-05 PROCEDURE — 6370000000 HC RX 637 (ALT 250 FOR IP): Performed by: HOSPITALIST

## 2024-05-05 PROCEDURE — 6360000002 HC RX W HCPCS

## 2024-05-05 PROCEDURE — 36415 COLL VENOUS BLD VENIPUNCTURE: CPT

## 2024-05-05 PROCEDURE — 1200000000 HC SEMI PRIVATE

## 2024-05-05 PROCEDURE — 83735 ASSAY OF MAGNESIUM: CPT

## 2024-05-05 PROCEDURE — 80048 BASIC METABOLIC PNL TOTAL CA: CPT

## 2024-05-05 PROCEDURE — 51798 US URINE CAPACITY MEASURE: CPT

## 2024-05-05 RX ADMIN — DONEPEZIL HYDROCHLORIDE 10 MG: 10 TABLET ORAL at 06:01

## 2024-05-05 RX ADMIN — DEXTROSE AND SODIUM CHLORIDE: 5; 450 INJECTION, SOLUTION INTRAVENOUS at 04:28

## 2024-05-05 RX ADMIN — DIVALPROEX SODIUM 250 MG: 125 CAPSULE, COATED PELLETS ORAL at 09:02

## 2024-05-05 RX ADMIN — QUETIAPINE FUMARATE 75 MG: 25 TABLET ORAL at 21:13

## 2024-05-05 RX ADMIN — DIVALPROEX SODIUM 250 MG: 125 CAPSULE, COATED PELLETS ORAL at 21:13

## 2024-05-05 RX ADMIN — PANTOPRAZOLE SODIUM 40 MG: 40 TABLET, DELAYED RELEASE ORAL at 06:01

## 2024-05-05 RX ADMIN — POLYETHYLENE GLYCOL 3350 17 G: 17 POWDER, FOR SOLUTION ORAL at 09:03

## 2024-05-05 RX ADMIN — MEROPENEM AND SODIUM CHLORIDE 1000 MG: 1 INJECTION, SOLUTION INTRAVENOUS at 12:29

## 2024-05-05 RX ADMIN — MEMANTINE HYDROCHLORIDE 10 MG: 10 TABLET ORAL at 21:13

## 2024-05-05 RX ADMIN — ENOXAPARIN SODIUM 40 MG: 100 INJECTION SUBCUTANEOUS at 09:03

## 2024-05-05 RX ADMIN — ASPIRIN 81 MG: 81 TABLET, CHEWABLE ORAL at 09:02

## 2024-05-05 RX ADMIN — MEMANTINE HYDROCHLORIDE 10 MG: 10 TABLET ORAL at 09:02

## 2024-05-05 RX ADMIN — MEROPENEM AND SODIUM CHLORIDE 1000 MG: 1 INJECTION, SOLUTION INTRAVENOUS at 21:22

## 2024-05-05 RX ADMIN — SODIUM CHLORIDE, PRESERVATIVE FREE 10 ML: 5 INJECTION INTRAVENOUS at 21:13

## 2024-05-05 RX ADMIN — ACETAMINOPHEN 650 MG: 325 TABLET ORAL at 09:02

## 2024-05-05 RX ADMIN — MEROPENEM AND SODIUM CHLORIDE 1000 MG: 1 INJECTION, SOLUTION INTRAVENOUS at 04:30

## 2024-05-05 RX ADMIN — DIVALPROEX SODIUM 250 MG: 125 CAPSULE, COATED PELLETS ORAL at 15:12

## 2024-05-05 RX ADMIN — ACETAMINOPHEN 650 MG: 325 TABLET ORAL at 15:12

## 2024-05-05 RX ADMIN — SODIUM CHLORIDE: 9 INJECTION, SOLUTION INTRAVENOUS at 21:21

## 2024-05-05 RX ADMIN — ACETAMINOPHEN 650 MG: 325 TABLET ORAL at 21:13

## 2024-05-05 ASSESSMENT — PAIN SCALES - PAIN ASSESSMENT IN ADVANCED DEMENTIA (PAINAD)
BODYLANGUAGE: RELAXED
BODYLANGUAGE: RELAXED
FACIALEXPRESSION: SMILING OR INEXPRESSIVE
FACIALEXPRESSION: SMILING OR INEXPRESSIVE
BODYLANGUAGE: RELAXED
BREATHING: NORMAL
BREATHING: NORMAL
TOTALSCORE: 0
BREATHING: NORMAL
TOTALSCORE: 0
FACIALEXPRESSION: SMILING OR INEXPRESSIVE
FACIALEXPRESSION: SMILING OR INEXPRESSIVE
CONSOLABILITY: NO NEED TO CONSOLE
TOTALSCORE: 0
BODYLANGUAGE: RELAXED
BREATHING: NORMAL
CONSOLABILITY: NO NEED TO CONSOLE
CONSOLABILITY: NO NEED TO CONSOLE
TOTALSCORE: 0
CONSOLABILITY: NO NEED TO CONSOLE
TOTALSCORE: 0
TOTALSCORE: 0
CONSOLABILITY: NO NEED TO CONSOLE
BREATHING: NORMAL
BODYLANGUAGE: RELAXED
CONSOLABILITY: NO NEED TO CONSOLE
BREATHING: NORMAL
CONSOLABILITY: NO NEED TO CONSOLE
TOTALSCORE: 0
BODYLANGUAGE: RELAXED
FACIALEXPRESSION: SMILING OR INEXPRESSIVE
FACIALEXPRESSION: SMILING OR INEXPRESSIVE
BODYLANGUAGE: RELAXED
TOTALSCORE: 0
FACIALEXPRESSION: SMILING OR INEXPRESSIVE
TOTALSCORE: 0
CONSOLABILITY: NO NEED TO CONSOLE
BREATHING: NORMAL
FACIALEXPRESSION: SMILING OR INEXPRESSIVE
CONSOLABILITY: NO NEED TO CONSOLE
FACIALEXPRESSION: SMILING OR INEXPRESSIVE
FACIALEXPRESSION: SMILING OR INEXPRESSIVE
BREATHING: NORMAL
BODYLANGUAGE: RELAXED
BREATHING: NORMAL
CONSOLABILITY: NO NEED TO CONSOLE
FACIALEXPRESSION: SMILING OR INEXPRESSIVE
CONSOLABILITY: NO NEED TO CONSOLE
BODYLANGUAGE: RELAXED

## 2024-05-05 NOTE — PLAN OF CARE
Problem: Safety - Adult  Goal: Free from fall injury  5/4/2024 2254 by Jamaal Harrell, RN  Outcome: Progressing  Flowsheets (Taken 5/4/2024 1126 by Arlin Contreras, RN)  Free From Fall Injury:   Instruct family/caregiver on patient safety   Based on caregiver fall risk screen, instruct family/caregiver to ask for assistance with transferring infant if caregiver noted to have fall risk factors     Problem: Skin/Tissue Integrity  Goal: Absence of new skin breakdown  Description: 1.  Monitor for areas of redness and/or skin breakdown  2.  Assess vascular access sites hourly  3.  Every 4-6 hours minimum:  Change oxygen saturation probe site  4.  Every 4-6 hours:  If on nasal continuous positive airway pressure, respiratory therapy assess nares and determine need for appliance change or resting period.  5/4/2024 2254 by Jamaal Harrell, RN  Outcome: Progressing  Note: Turned every two hours.

## 2024-05-05 NOTE — PROGRESS NOTES
Progress Note    Admit Date: 5/1/2024  Day: 3  Diet: ADULT DIET; Regular    CC: AMS    Interval history:   NAEO.  Vital signs stable.  Patient seen and examined at bedside.  Continued improvement in mental status, answering simple questions.  Sodium stable at 146, while continuing maintenance fluids and encouraging p.o. intake.  Continuing IV antibiotics (meropenem) for ESBL bacteruria.  Possible discharge tomorrow.    HPI: Katrin Donnelly is an 85 y/o F w/ a PMH of Alzheimer's dementia starting in 2020, sick sinus syndrome status post pacemaker placement (was initially diagnosed with A-fib and was on Coumadin but was rediagnosed with sick sinus syndrome as per the note by Dr. Walker on 12/5/2023), CHF, and orthostatic hypotension likely secondary to dysautonomia who presented to the emergency department with lethargy and not acting like herself for the past 3 days.  The patient is not alert and oriented to self and provided no collateral history. I called and spoke with her  who says that she is at her baseline but that the nursing home thought she was not acting like herself for the past 3 days and was not drinking an adequate amount of water. I was unable to contact her memory unit.      On arrival to the emergency department her vital signs are within normal limits except she was tachycardic to 103 and hypotensive to 99/53. Her EKG was suggestive of A-fib with RVR. Her CBC was unremarkable her VBG also unremarkable. Her lactic acid was elevated to 2.3. Her urinalysis was suggestive of UTI with  WBC and 4+ bacteria. He was also positive for nitrite and large leukocyte esterase. Her BMP showed her sodium to be 158. Her initial troponin was 34 while on repeat it was 35 and her magnesium was 2.7. Her CT head and chest x-ray were largely unremarkable. She was given a dose of ceftriaxone in the ED. She was admitted to our service for a UTI and hypernatremia.        Medications:     Scheduled Meds:   meropenem

## 2024-05-05 NOTE — PLAN OF CARE
Problem: Discharge Planning  Goal: Discharge to home or other facility with appropriate resources  Outcome: Progressing     Problem: Pain  Goal: Verbalizes/displays adequate comfort level or baseline comfort level  Outcome: Progressing  Flowsheets (Taken 5/5/2024 1211)  Verbalizes/displays adequate comfort level or baseline comfort level:   Encourage patient to monitor pain and request assistance   Assess pain using appropriate pain scale   Administer analgesics based on type and severity of pain and evaluate response   Implement non-pharmacological measures as appropriate and evaluate response   Consider cultural and social influences on pain and pain management   Notify Licensed Independent Practitioner if interventions unsuccessful or patient reports new pain  Note: Pt states no pain, no evidence of pain per PAINAD scale     Problem: Safety - Adult  Goal: Free from fall injury  5/5/2024 1211 by Arlin Contreras, RN  Outcome: Progressing  Flowsheets (Taken 5/5/2024 1211)  Free From Fall Injury: Instruct family/caregiver on patient safety  Note: Pt remains free from falls. Pt on fall precautions. Bed locked and in lowest position. Bed alarm on. Gripper socks on. Bedside table and call light within reach. Family at bedside.     Problem: Skin/Tissue Integrity  Goal: Absence of new skin breakdown  Description: 1.  Monitor for areas of redness and/or skin breakdown  2.  Assess vascular access sites hourly  3.  Every 4-6 hours minimum:  Change oxygen saturation probe site  4.  Every 4-6 hours:  If on nasal continuous positive airway pressure, respiratory therapy assess nares and determine need for appliance change or resting period.  5/5/2024 1211 by Arlin Contreras, RN  Outcome: Progressing  Note: Pt remains free from skin breakdown. Specialty bed in place. Mepilex applied to coccyx. Pt skin kept clean and dry, changed as needed. Heels elevated off of bed. Pt repositioned. Purewick in place for incontinence.      Problem:

## 2024-05-06 LAB
ANION GAP SERPL CALCULATED.3IONS-SCNC: 9 MMOL/L (ref 3–16)
BASOPHILS # BLD: 0 K/UL (ref 0–0.2)
BASOPHILS NFR BLD: 0 %
BUN SERPL-MCNC: 10 MG/DL (ref 7–20)
CALCIUM SERPL-MCNC: 8.8 MG/DL (ref 8.3–10.6)
CHLORIDE SERPL-SCNC: 109 MMOL/L (ref 99–110)
CO2 SERPL-SCNC: 27 MMOL/L (ref 21–32)
CREAT SERPL-MCNC: 0.6 MG/DL (ref 0.6–1.2)
DEPRECATED RDW RBC AUTO: 14.3 % (ref 12.4–15.4)
EOSINOPHIL # BLD: 0.2 K/UL (ref 0–0.6)
EOSINOPHIL NFR BLD: 3 %
GFR SERPLBLD CREATININE-BSD FMLA CKD-EPI: 90 ML/MIN/{1.73_M2}
GLUCOSE SERPL-MCNC: 109 MG/DL (ref 70–99)
HCT VFR BLD AUTO: 36.3 % (ref 36–48)
HGB BLD-MCNC: 12.3 G/DL (ref 12–16)
LYMPHOCYTES # BLD: 1.2 K/UL (ref 1–5.1)
LYMPHOCYTES NFR BLD: 18 %
MAGNESIUM SERPL-MCNC: 2 MG/DL (ref 1.8–2.4)
MCH RBC QN AUTO: 32.9 PG (ref 26–34)
MCHC RBC AUTO-ENTMCNC: 33.9 G/DL (ref 31–36)
MCV RBC AUTO: 97.1 FL (ref 80–100)
METAMYELOCYTES NFR BLD MANUAL: 2 %
MONOCYTES # BLD: 0.4 K/UL (ref 0–1.3)
MONOCYTES NFR BLD: 6 %
NEUTROPHILS # BLD: 5 K/UL (ref 1.7–7.7)
NEUTROPHILS NFR BLD: 71 %
NRBC BLD-RTO: 1 /100 WBC
PLATELET # BLD AUTO: 133 K/UL (ref 135–450)
PMV BLD AUTO: 9 FL (ref 5–10.5)
POTASSIUM SERPL-SCNC: 3.4 MMOL/L (ref 3.5–5.1)
RBC # BLD AUTO: 3.74 M/UL (ref 4–5.2)
RBC MORPH BLD: NORMAL
SODIUM SERPL-SCNC: 145 MMOL/L (ref 136–145)
WBC # BLD AUTO: 6.9 K/UL (ref 4–11)

## 2024-05-06 PROCEDURE — 85025 COMPLETE CBC W/AUTO DIFF WBC: CPT

## 2024-05-06 PROCEDURE — 6370000000 HC RX 637 (ALT 250 FOR IP)

## 2024-05-06 PROCEDURE — 6360000002 HC RX W HCPCS

## 2024-05-06 PROCEDURE — 2580000003 HC RX 258

## 2024-05-06 PROCEDURE — 36415 COLL VENOUS BLD VENIPUNCTURE: CPT

## 2024-05-06 PROCEDURE — 92526 ORAL FUNCTION THERAPY: CPT

## 2024-05-06 PROCEDURE — 1200000000 HC SEMI PRIVATE

## 2024-05-06 PROCEDURE — 83735 ASSAY OF MAGNESIUM: CPT

## 2024-05-06 PROCEDURE — 6370000000 HC RX 637 (ALT 250 FOR IP): Performed by: HOSPITALIST

## 2024-05-06 PROCEDURE — 80048 BASIC METABOLIC PNL TOTAL CA: CPT

## 2024-05-06 PROCEDURE — 99232 SBSQ HOSP IP/OBS MODERATE 35: CPT | Performed by: HOSPITALIST

## 2024-05-06 RX ADMIN — MEROPENEM AND SODIUM CHLORIDE 1000 MG: 1 INJECTION, SOLUTION INTRAVENOUS at 13:39

## 2024-05-06 RX ADMIN — MEMANTINE HYDROCHLORIDE 10 MG: 10 TABLET ORAL at 09:42

## 2024-05-06 RX ADMIN — MEMANTINE HYDROCHLORIDE 10 MG: 10 TABLET ORAL at 19:57

## 2024-05-06 RX ADMIN — PANTOPRAZOLE SODIUM 40 MG: 40 TABLET, DELAYED RELEASE ORAL at 09:42

## 2024-05-06 RX ADMIN — DEXTROSE AND SODIUM CHLORIDE: 5; 450 INJECTION, SOLUTION INTRAVENOUS at 00:06

## 2024-05-06 RX ADMIN — ENOXAPARIN SODIUM 40 MG: 100 INJECTION SUBCUTANEOUS at 09:40

## 2024-05-06 RX ADMIN — QUETIAPINE FUMARATE 75 MG: 25 TABLET ORAL at 19:57

## 2024-05-06 RX ADMIN — ACETAMINOPHEN 650 MG: 325 TABLET ORAL at 09:41

## 2024-05-06 RX ADMIN — ACETAMINOPHEN 650 MG: 325 TABLET ORAL at 19:57

## 2024-05-06 RX ADMIN — DONEPEZIL HYDROCHLORIDE 10 MG: 10 TABLET ORAL at 09:41

## 2024-05-06 RX ADMIN — ASPIRIN 81 MG: 81 TABLET, CHEWABLE ORAL at 09:41

## 2024-05-06 RX ADMIN — SODIUM CHLORIDE: 9 INJECTION, SOLUTION INTRAVENOUS at 04:48

## 2024-05-06 RX ADMIN — DIVALPROEX SODIUM 250 MG: 125 CAPSULE, COATED PELLETS ORAL at 15:41

## 2024-05-06 RX ADMIN — POTASSIUM BICARBONATE 20 MEQ: 782 TABLET, EFFERVESCENT ORAL at 09:34

## 2024-05-06 RX ADMIN — MEROPENEM AND SODIUM CHLORIDE 1000 MG: 1 INJECTION, SOLUTION INTRAVENOUS at 19:54

## 2024-05-06 RX ADMIN — SODIUM CHLORIDE, PRESERVATIVE FREE 10 ML: 5 INJECTION INTRAVENOUS at 20:02

## 2024-05-06 RX ADMIN — POLYETHYLENE GLYCOL 3350 17 G: 17 POWDER, FOR SOLUTION ORAL at 09:34

## 2024-05-06 RX ADMIN — DIVALPROEX SODIUM 250 MG: 125 CAPSULE, COATED PELLETS ORAL at 09:41

## 2024-05-06 RX ADMIN — DIVALPROEX SODIUM 250 MG: 125 CAPSULE, COATED PELLETS ORAL at 19:57

## 2024-05-06 RX ADMIN — ACETAMINOPHEN 650 MG: 325 TABLET ORAL at 15:41

## 2024-05-06 RX ADMIN — MEROPENEM AND SODIUM CHLORIDE 1000 MG: 1 INJECTION, SOLUTION INTRAVENOUS at 04:49

## 2024-05-06 ASSESSMENT — PAIN SCALES - PAIN ASSESSMENT IN ADVANCED DEMENTIA (PAINAD)
BODYLANGUAGE: RELAXED
CONSOLABILITY: NO NEED TO CONSOLE
BODYLANGUAGE: RELAXED
TOTALSCORE: 0
FACIALEXPRESSION: SMILING OR INEXPRESSIVE
FACIALEXPRESSION: SMILING OR INEXPRESSIVE
CONSOLABILITY: NO NEED TO CONSOLE
TOTALSCORE: 0
BODYLANGUAGE: RELAXED
TOTALSCORE: 0
FACIALEXPRESSION: SMILING OR INEXPRESSIVE
BREATHING: NORMAL
BREATHING: NORMAL
CONSOLABILITY: NO NEED TO CONSOLE
CONSOLABILITY: NO NEED TO CONSOLE
FACIALEXPRESSION: SMILING OR INEXPRESSIVE
TOTALSCORE: 0
FACIALEXPRESSION: SMILING OR INEXPRESSIVE
BODYLANGUAGE: RELAXED
BREATHING: NORMAL
BREATHING: NORMAL
FACIALEXPRESSION: SMILING OR INEXPRESSIVE
CONSOLABILITY: NO NEED TO CONSOLE
BREATHING: NORMAL
FACIALEXPRESSION: SMILING OR INEXPRESSIVE
BODYLANGUAGE: RELAXED
FACIALEXPRESSION: SMILING OR INEXPRESSIVE
CONSOLABILITY: NO NEED TO CONSOLE
BREATHING: NORMAL
FACIALEXPRESSION: SMILING OR INEXPRESSIVE
BREATHING: NORMAL
TOTALSCORE: 0
BREATHING: NORMAL
CONSOLABILITY: NO NEED TO CONSOLE
FACIALEXPRESSION: SMILING OR INEXPRESSIVE
BREATHING: NORMAL
FACIALEXPRESSION: SMILING OR INEXPRESSIVE
BREATHING: NORMAL
CONSOLABILITY: NO NEED TO CONSOLE
BODYLANGUAGE: RELAXED
CONSOLABILITY: NO NEED TO CONSOLE
TOTALSCORE: 0
CONSOLABILITY: NO NEED TO CONSOLE
TOTALSCORE: 0
TOTALSCORE: 0
CONSOLABILITY: NO NEED TO CONSOLE
BREATHING: NORMAL
BODYLANGUAGE: RELAXED
TOTALSCORE: 0
FACIALEXPRESSION: SMILING OR INEXPRESSIVE
BODYLANGUAGE: RELAXED
BREATHING: NORMAL
BREATHING: NORMAL
TOTALSCORE: 0
CONSOLABILITY: NO NEED TO CONSOLE
CONSOLABILITY: NO NEED TO CONSOLE
BODYLANGUAGE: RELAXED
BODYLANGUAGE: RELAXED
FACIALEXPRESSION: SMILING OR INEXPRESSIVE
BODYLANGUAGE: RELAXED
TOTALSCORE: 0

## 2024-05-06 NOTE — CARE COORDINATION
CM  following for  d/c planning:    Patient from  Memory Care  ,  PTOT re ordered  for  Update  :    IV atbx  q 8 hrs.    Per  MD  anticipate  D/C  today  ( Na+ stable. )  Cont to follow  and assist  with  d/c planning needs.     Disposition:  TBD  pending  PTOT       Electronically signed by Sakina Alston RN on 5/6/2024 at 9:14 AM       Sakina Alston RN Case Manager  [facility]  [address]  [place] [id number] [phone]  Fax [phone]

## 2024-05-06 NOTE — PLAN OF CARE
Pt receiving d5 1/2NS continuously IV per orders, labs to be drawn in AM. Large BM this shift. Pt is intermittently nonverbal but alert. New IV placed in L forearm. Turned and repositioned q2 and prn, avasys monitor in place. Will continue to monitor.     Problem: Discharge Planning  Goal: Discharge to home or other facility with appropriate resources  5/6/2024 0058 by Hung Sawyer, RN  Outcome: Progressing     Problem: Pain  Goal: Verbalizes/displays adequate comfort level or baseline comfort level  5/6/2024 0058 by Hung Sawyer RN  Outcome: Progressing   Denies any pain, scoring 0 on advanced dementia pain scale.   Problem: Safety - Adult  Goal: Free from fall injury  5/6/2024 0058 by Hung Sawyer RN  Outcome: Progressing   Fall precautions and avasys in place. Close to nurse's station.     Problem: Skin/Tissue Integrity  Goal: Absence of new skin breakdown  Description: 1.  Monitor for areas of redness and/or skin breakdown  2.  Assess vascular access sites hourly  3.  Every 4-6 hours minimum:  Change oxygen saturation probe site  4.  Every 4-6 hours:  If on nasal continuous positive airway pressure, respiratory therapy assess nares and determine need for appliance change or resting period.  5/6/2024 0058 by Hung Sawyer RN  Outcome: Progressing     Problem: ABCDS Injury Assessment  Goal: Absence of physical injury  5/6/2024 0058 by Hung Sawyer, RN  Outcome: Progressing

## 2024-05-06 NOTE — PROGRESS NOTES
Speech Language Pathology  [facility]  Dysphagia Treatment and Discharge Summary    Name: Katrin Donnelly  : 1940  MRN: [id number]                                                     Patient Diagnosis(es):   Patient Active Problem List    Diagnosis Date Noted    Altered mental status 02/10/2023    Acute cystitis without hematuria 2024    Volume depletion 2024    Elevated troponin 2024    Atrial flutter with rapid ventricular response (HCC) 2024    Hypernatremia 2024    Facial droop 2024    Encounter for palliative care 2024    Severe late onset Alzheimer's dementia without behavioral disturbance, psychotic disturbance, mood disturbance, or anxiety (HCC) 2024    Gastroesophageal reflux disease     Benign essential HTN     Chronic venous insufficiency of lower extremity 2020    S/P placement of cardiac pacemaker 2020    Complete heart block (HCC) 2020    Debility 2020    Multiple falls 2020    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 (HCC)     Cerebral amyloid angiopathy (CODE)     Adenomatous polyp of colon 2019    Lower extremity edema 2015    Mitral valve disorder     Dysthymic disorder     Atrial fibrillation (HCC) 2010     Past Medical History:   Diagnosis Date    Adenomatous polyp of colon 2019    Asymptomatic varicose veins     Atrial fibrillation (HCC)     Dysthymic disorder     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)      Past Surgical History:   Procedure Laterality Date    COLONOSCOPY  2008    HERNIA REPAIR      Left Femoral    PACEMAKER INSERTION  2020    [employer]     History of Present Illness  Per MD notes:  \"Katrin Donnelly is an 83 y/o F w/ a PMH of Alzheimer's dementia starting in , sick sinus syndrome status post pacemaker placement (was initially diagnosed with A-fib and was on Coumadin but was

## 2024-05-06 NOTE — PROGRESS NOTES
Physical Therapy and Occupational Therapy  Discharge Note    New orders received and chart reviewed.  See PT/OT evaluations 5/3 for details.  Called memory care facility who reports pt requires heavy assist x2 for stand pivot transfers to a w/c.  Pt is non-ambulatory.  Pt requires total assist for all ADLs, including feeding.  Pt's dementia and cognition are a barrier for skilled PT/OT.  Recommend pt return to memory unit with assist from caregivers who are familiar to her.  If pt/family desire out of bed while here in hospital, defer to RN staff using MARCELLE marino vs maxi move.  Will sign off from acute PT/OT.      Dilcia Padilla, PT #[id number]

## 2024-05-06 NOTE — PROGRESS NOTES
Progress Note    Admit Date: 5/1/2024  Day: 3  Diet: ADULT DIET; Regular    CC: AMS    Interval history:   NAEO.  Vital signs stable.  Patient seen and examined at bedside.  Resting comfortably.  Sodium this morning 145, on maintenance fluids which we will continue.  On day 4 of IV meropenem for ESBL bacteriuria, will complete course before discharge.  Spoke with family, with daughter reporting that they are considering a different facility for the patient so that she can have more ambulation.  Will have PT OT reevaluate patient in the setting of improving mental status to see if patient qualifies for SNF.    HPI: Katrin Donnelly is an 83 y/o F w/ a PMH of Alzheimer's dementia starting in 2020, sick sinus syndrome status post pacemaker placement (was initially diagnosed with A-fib and was on Coumadin but was rediagnosed with sick sinus syndrome as per the note by Dr. Walker on 12/5/2023), CHF, and orthostatic hypotension likely secondary to dysautonomia who presented to the emergency department with lethargy and not acting like herself for the past 3 days.  The patient is not alert and oriented to self and provided no collateral history. I called and spoke with her  who says that she is at her baseline but that the nursing home thought she was not acting like herself for the past 3 days and was not drinking an adequate amount of water. I was unable to contact her memory unit.      On arrival to the emergency department her vital signs are within normal limits except she was tachycardic to 103 and hypotensive to 99/53. Her EKG was suggestive of A-fib with RVR. Her CBC was unremarkable her VBG also unremarkable. Her lactic acid was elevated to 2.3. Her urinalysis was suggestive of UTI with  WBC and 4+ bacteria. He was also positive for nitrite and large leukocyte esterase. Her BMP showed her sodium to be 158. Her initial troponin was 34 while on repeat it was 35 and her magnesium was 2.7. Her CT head and chest  x-ray were largely unremarkable. She was given a dose of ceftriaxone in the ED. She was admitted to our service for a UTI and hypernatremia.        Medications:     Scheduled Meds:   potassium bicarb-citric acid  20 mEq Oral Once    meropenem  1,000 mg IntraVENous Q8H    acetaminophen  650 mg Oral TID    aspirin  81 mg Oral Daily    divalproex  250 mg Oral TID    donepezil  10 mg Oral QAM AC    memantine  10 mg Oral BID    [Held by provider] mirtazapine  7.5 mg Oral Nightly    pantoprazole  40 mg Oral Daily    QUEtiapine  75 mg Oral Nightly    sodium chloride flush  5-40 mL IntraVENous 2 times per day    enoxaparin  40 mg SubCUTAneous Daily    polyethylene glycol  17 g Oral Daily     Continuous Infusions:   dextrose 5 % and 0.45 % NaCl 50 mL/hr at 05/06/24 0006    sodium chloride 25 mL/hr at 05/06/24 0448     PRN Meds:sodium chloride flush, sodium chloride, ondansetron **OR** ondansetron, acetaminophen **OR** acetaminophen    Objective:   Vitals:   T-max:  Patient Vitals for the past 8 hrs:   BP Temp Temp src Pulse Resp SpO2   05/06/24 0451 115/66 96.9 °F (36.1 °C) Axillary 62 16 93 %         Intake/Output Summary (Last 24 hours) at 5/6/2024 0837  Last data filed at 5/5/2024 1353  Gross per 24 hour   Intake 120 ml   Output --   Net 120 ml         Review of Systems  Pertinent positives and negatives as listed in the HPI.     Physical Exam  Constitutional:       Comments: AO x 1 but looks to be in no acute distress.  HENT:      Head: Normocephalic and atraumatic.   Eyes:      Extraocular Movements: Extraocular movements intact.      Conjunctiva/sclera: Conjunctivae normal.   Cardiovascular:      Rate and Rhythm: Normal rate present. Rhythm irregular.      Pulses: Normal pulses.      Heart sounds: Normal heart sounds.   Pulmonary:      Effort: Pulmonary effort is normal.      Breath sounds: Normal breath sounds.   Abdominal:      General: Abdomen is flat.      Palpations: Abdomen is soft.   Musculoskeletal:

## 2024-05-06 NOTE — PLAN OF CARE
Had discussion with pt's  (decision maker) - at this time, he is leaning more towards eventual discharge to the memory care unit where the pt came from. While he understands that he has been hoping for more ambulation for the pt, he also appreciates that the unit's nurses/settings have been welcoming and something that the pt is used to. Would prefer not to make significant changes at this time. Will still get PT/OT reevaluation given improved AMS but can work toward discharge to the memory care unit once IV antibiotic course is completed.

## 2024-05-06 NOTE — PROGRESS NOTES
05/06/24 1048   Encounter Summary   Encounter Overview/Reason Initial Encounter  (Talked to  in hallway while physician was in room with  pt.)   Service Provided For Family   Referral/Consult From Rounding   Support System Family members   Last Encounter  05/06/24   Complexity of Encounter Low   Begin Time 1035   End Time  1040   Total Time Calculated 5 min   Spiritual/Emotional needs   Type Emotional Distress   Assessment/Intervention/Outcome   Assessment Impaired resilience   Intervention Active listening   Outcome Receptive     Alli Mock   Intern    Spiritual Care Services:  [phone]

## 2024-05-06 NOTE — PROGRESS NOTES
Occupational Therapy/Physical Therapy  Referrals received, chart reviewed, discussed with nurse.  Pt is form [facility] memory care and dependent for ADL and req assist of 2 for OOB.  Pt was evaluated of 5/3/24 and found to be at her baseline.  No eval initiated this date and therapy signed off.  MEAGHAN Coppola, OTR/L [id number]

## 2024-05-07 LAB
ANION GAP SERPL CALCULATED.3IONS-SCNC: 9 MMOL/L (ref 3–16)
ANISOCYTOSIS BLD QL SMEAR: ABNORMAL
BASOPHILS # BLD: 0.1 K/UL (ref 0–0.2)
BASOPHILS NFR BLD: 1 %
BUN SERPL-MCNC: 9 MG/DL (ref 7–20)
CALCIUM SERPL-MCNC: 8.8 MG/DL (ref 8.3–10.6)
CHLORIDE SERPL-SCNC: 108 MMOL/L (ref 99–110)
CO2 SERPL-SCNC: 24 MMOL/L (ref 21–32)
CREAT SERPL-MCNC: 0.6 MG/DL (ref 0.6–1.2)
DEPRECATED RDW RBC AUTO: 15.3 % (ref 12.4–15.4)
EOSINOPHIL # BLD: 0.7 K/UL (ref 0–0.6)
EOSINOPHIL NFR BLD: 9 %
GFR SERPLBLD CREATININE-BSD FMLA CKD-EPI: 90 ML/MIN/{1.73_M2}
GLUCOSE SERPL-MCNC: 77 MG/DL (ref 70–99)
HCT VFR BLD AUTO: 36.9 % (ref 36–48)
HGB BLD-MCNC: 12.4 G/DL (ref 12–16)
LYMPHOCYTES # BLD: 2.1 K/UL (ref 1–5.1)
LYMPHOCYTES NFR BLD: 28 %
MAGNESIUM SERPL-MCNC: 2 MG/DL (ref 1.8–2.4)
MCH RBC QN AUTO: 33.1 PG (ref 26–34)
MCHC RBC AUTO-ENTMCNC: 33.7 G/DL (ref 31–36)
MCV RBC AUTO: 98 FL (ref 80–100)
MONOCYTES # BLD: 0.7 K/UL (ref 0–1.3)
MONOCYTES NFR BLD: 10 %
NEUTROPHILS # BLD: 3.8 K/UL (ref 1.7–7.7)
NEUTROPHILS NFR BLD: 51 %
PLATELET # BLD AUTO: 131 K/UL (ref 135–450)
PMV BLD AUTO: 9.5 FL (ref 5–10.5)
POTASSIUM SERPL-SCNC: 4 MMOL/L (ref 3.5–5.1)
RBC # BLD AUTO: 3.76 M/UL (ref 4–5.2)
SODIUM SERPL-SCNC: 141 MMOL/L (ref 136–145)
VARIANT LYMPHS NFR BLD MANUAL: 1 % (ref 0–6)
WBC # BLD AUTO: 7.4 K/UL (ref 4–11)

## 2024-05-07 PROCEDURE — 1200000000 HC SEMI PRIVATE

## 2024-05-07 PROCEDURE — 6370000000 HC RX 637 (ALT 250 FOR IP)

## 2024-05-07 PROCEDURE — 83735 ASSAY OF MAGNESIUM: CPT

## 2024-05-07 PROCEDURE — 6370000000 HC RX 637 (ALT 250 FOR IP): Performed by: HOSPITALIST

## 2024-05-07 PROCEDURE — 2580000003 HC RX 258

## 2024-05-07 PROCEDURE — 36415 COLL VENOUS BLD VENIPUNCTURE: CPT

## 2024-05-07 PROCEDURE — 6360000002 HC RX W HCPCS

## 2024-05-07 PROCEDURE — 51798 US URINE CAPACITY MEASURE: CPT

## 2024-05-07 PROCEDURE — 85025 COMPLETE CBC W/AUTO DIFF WBC: CPT

## 2024-05-07 PROCEDURE — 80048 BASIC METABOLIC PNL TOTAL CA: CPT

## 2024-05-07 PROCEDURE — 99232 SBSQ HOSP IP/OBS MODERATE 35: CPT | Performed by: HOSPITALIST

## 2024-05-07 RX ORDER — DEXTROSE AND SODIUM CHLORIDE 5; .45 G/100ML; G/100ML
INJECTION, SOLUTION INTRAVENOUS CONTINUOUS
Status: DISCONTINUED | OUTPATIENT
Start: 2024-05-07 | End: 2024-05-08

## 2024-05-07 RX ADMIN — ACETAMINOPHEN 650 MG: 325 TABLET ORAL at 10:36

## 2024-05-07 RX ADMIN — SODIUM CHLORIDE, PRESERVATIVE FREE 10 ML: 5 INJECTION INTRAVENOUS at 10:35

## 2024-05-07 RX ADMIN — DONEPEZIL HYDROCHLORIDE 10 MG: 10 TABLET ORAL at 05:40

## 2024-05-07 RX ADMIN — DIVALPROEX SODIUM 250 MG: 125 CAPSULE, COATED PELLETS ORAL at 15:10

## 2024-05-07 RX ADMIN — ACETAMINOPHEN 650 MG: 325 TABLET ORAL at 15:10

## 2024-05-07 RX ADMIN — ACETAMINOPHEN 650 MG: 325 TABLET ORAL at 23:47

## 2024-05-07 RX ADMIN — MEROPENEM AND SODIUM CHLORIDE 1000 MG: 1 INJECTION, SOLUTION INTRAVENOUS at 15:14

## 2024-05-07 RX ADMIN — QUETIAPINE FUMARATE 75 MG: 25 TABLET ORAL at 23:48

## 2024-05-07 RX ADMIN — DEXTROSE AND SODIUM CHLORIDE: 5; 450 INJECTION, SOLUTION INTRAVENOUS at 15:09

## 2024-05-07 RX ADMIN — PANTOPRAZOLE SODIUM 40 MG: 40 TABLET, DELAYED RELEASE ORAL at 05:41

## 2024-05-07 RX ADMIN — MEMANTINE HYDROCHLORIDE 10 MG: 10 TABLET ORAL at 10:32

## 2024-05-07 RX ADMIN — MEMANTINE HYDROCHLORIDE 10 MG: 10 TABLET ORAL at 23:48

## 2024-05-07 RX ADMIN — DIVALPROEX SODIUM 250 MG: 125 CAPSULE, COATED PELLETS ORAL at 10:36

## 2024-05-07 RX ADMIN — POLYETHYLENE GLYCOL 3350 17 G: 17 POWDER, FOR SOLUTION ORAL at 10:36

## 2024-05-07 RX ADMIN — ENOXAPARIN SODIUM 40 MG: 100 INJECTION SUBCUTANEOUS at 10:36

## 2024-05-07 RX ADMIN — ASPIRIN 81 MG: 81 TABLET, CHEWABLE ORAL at 10:32

## 2024-05-07 RX ADMIN — DIVALPROEX SODIUM 250 MG: 125 CAPSULE, COATED PELLETS ORAL at 23:48

## 2024-05-07 RX ADMIN — MEROPENEM AND SODIUM CHLORIDE 1000 MG: 1 INJECTION, SOLUTION INTRAVENOUS at 04:14

## 2024-05-07 ASSESSMENT — PAIN SCALES - PAIN ASSESSMENT IN ADVANCED DEMENTIA (PAINAD)
FACIALEXPRESSION: SMILING OR INEXPRESSIVE
BREATHING: NORMAL
TOTALSCORE: 0
CONSOLABILITY: NO NEED TO CONSOLE
FACIALEXPRESSION: SAD, FRIGHTENED, FROWN
FACIALEXPRESSION: SMILING OR INEXPRESSIVE
TOTALSCORE: 0
BREATHING: NORMAL
FACIALEXPRESSION: SMILING OR INEXPRESSIVE
BREATHING: NORMAL
BODYLANGUAGE: RELAXED
TOTALSCORE: 4
BODYLANGUAGE: RELAXED
CONSOLABILITY: NO NEED TO CONSOLE
FACIALEXPRESSION: SMILING OR INEXPRESSIVE
FACIALEXPRESSION: SMILING OR INEXPRESSIVE
BODYLANGUAGE: RELAXED
CONSOLABILITY: NO NEED TO CONSOLE
TOTALSCORE: 0
BREATHING: NORMAL
BODYLANGUAGE: TENSE, DISTRESSED PACING, FIDGETING
CONSOLABILITY: NO NEED TO CONSOLE
NEGVOCALIZATION: OCCASIONAL MOAN/GROAN, LOW SPEECH, NEGATIVE/DISAPPROVING QUALITY
TOTALSCORE: 0
FACIALEXPRESSION: SMILING OR INEXPRESSIVE
BREATHING: NORMAL
BODYLANGUAGE: RELAXED
TOTALSCORE: 0
FACIALEXPRESSION: SMILING OR INEXPRESSIVE
BREATHING: NORMAL
TOTALSCORE: 0
TOTALSCORE: 0
CONSOLABILITY: NO NEED TO CONSOLE
CONSOLABILITY: NO NEED TO CONSOLE
BREATHING: NORMAL
TOTALSCORE: 0
CONSOLABILITY: NO NEED TO CONSOLE
FACIALEXPRESSION: SMILING OR INEXPRESSIVE
TOTALSCORE: 0
CONSOLABILITY: NO NEED TO CONSOLE
BREATHING: NORMAL
BODYLANGUAGE: RELAXED
BREATHING: NORMAL
TOTALSCORE: 0
BODYLANGUAGE: RELAXED
BODYLANGUAGE: RELAXED
TOTALSCORE: 0
FACIALEXPRESSION: SMILING OR INEXPRESSIVE
CONSOLABILITY: NO NEED TO CONSOLE
BREATHING: NORMAL
CONSOLABILITY: NO NEED TO CONSOLE
CONSOLABILITY: NO NEED TO CONSOLE
BODYLANGUAGE: RELAXED
CONSOLABILITY: NO NEED TO CONSOLE
FACIALEXPRESSION: SMILING OR INEXPRESSIVE
BODYLANGUAGE: RELAXED
BREATHING: OCCASIONAL LABORED BREATHING, SHORT PERIOD OF HYPERVENTILATION
FACIALEXPRESSION: SMILING OR INEXPRESSIVE
BREATHING: NORMAL

## 2024-05-07 ASSESSMENT — PAIN - FUNCTIONAL ASSESSMENT: PAIN_FUNCTIONAL_ASSESSMENT: PREVENTS OR INTERFERES SOME ACTIVE ACTIVITIES AND ADLS

## 2024-05-07 ASSESSMENT — PAIN SCALES - GENERAL: PAINLEVEL_OUTOF10: 3

## 2024-05-07 ASSESSMENT — PAIN DESCRIPTION - LOCATION: LOCATION: GENERALIZED

## 2024-05-07 ASSESSMENT — PAIN DESCRIPTION - ONSET: ONSET: ON-GOING

## 2024-05-07 ASSESSMENT — PAIN DESCRIPTION - DESCRIPTORS: DESCRIPTORS: PATIENT UNABLE TO DESCRIBE

## 2024-05-07 ASSESSMENT — PAIN DESCRIPTION - PAIN TYPE: TYPE: CHRONIC PAIN

## 2024-05-07 ASSESSMENT — PAIN DESCRIPTION - FREQUENCY: FREQUENCY: CONTINUOUS

## 2024-05-07 NOTE — PROGRESS NOTES
Progress Note    Admit Date: 5/1/2024  Day: 4  Diet: ADULT DIET; Regular    CC: AMS    Interval history:   NAEO. Vital signs stable. Pt seen and examined at bedside, continuing to rest comfortably. Eating well and ate entire breakfast; Na this morning at 141. Continuing maintenance fluids and encouraging PO intake. On day 5 of IV meropenem. Plan for discharge back to memory care unit once completed. No acute concerns at this time.     HPI: Katrin Donnelly is an 83 y/o F w/ a PMH of Alzheimer's dementia starting in 2020, sick sinus syndrome status post pacemaker placement (was initially diagnosed with A-fib and was on Coumadin but was rediagnosed with sick sinus syndrome as per the note by Dr. Walker on 12/5/2023), CHF, and orthostatic hypotension likely secondary to dysautonomia who presented to the emergency department with lethargy and not acting like herself for the past 3 days.  The patient is not alert and oriented to self and provided no collateral history. I called and spoke with her  who says that she is at her baseline but that the nursing home thought she was not acting like herself for the past 3 days and was not drinking an adequate amount of water. I was unable to contact her memory unit.      On arrival to the emergency department her vital signs are within normal limits except she was tachycardic to 103 and hypotensive to 99/53. Her EKG was suggestive of A-fib with RVR. Her CBC was unremarkable her VBG also unremarkable. Her lactic acid was elevated to 2.3. Her urinalysis was suggestive of UTI with  WBC and 4+ bacteria. He was also positive for nitrite and large leukocyte esterase. Her BMP showed her sodium to be 158. Her initial troponin was 34 while on repeat it was 35 and her magnesium was 2.7. Her CT head and chest x-ray were largely unremarkable. She was given a dose of ceftriaxone in the ED. She was admitted to our service for a UTI and hypernatremia.        Medications:     Scheduled

## 2024-05-07 NOTE — PLAN OF CARE
No complaints of pain this shift. Patient remains free from falls and injury. Plans to discharge back to LTC setting. No new skin issues noted this shift.  Problem: Discharge Planning  Goal: Discharge to home or other facility with appropriate resources  Outcome: Progressing     Problem: Pain  Goal: Verbalizes/displays adequate comfort level or baseline comfort level  Outcome: Progressing     Problem: Safety - Adult  Goal: Free from fall injury  Outcome: Progressing     Problem: Skin/Tissue Integrity  Goal: Absence of new skin breakdown  Description: 1.  Monitor for areas of redness and/or skin breakdown  2.  Assess vascular access sites hourly  3.  Every 4-6 hours minimum:  Change oxygen saturation probe site  4.  Every 4-6 hours:  If on nasal continuous positive airway pressure, respiratory therapy assess nares and determine need for appliance change or resting period.  Outcome: Progressing     Problem: ABCDS Injury Assessment  Goal: Absence of physical injury  Outcome: Progressing

## 2024-05-07 NOTE — PROGRESS NOTES
Patient noted with no urinary output thus far this shift. Bladder scan showed 0mL. Patient resting in bed with eyes closed and call light in reach.

## 2024-05-08 LAB
ANION GAP SERPL CALCULATED.3IONS-SCNC: 8 MMOL/L (ref 3–16)
BASOPHILS # BLD: 0.1 K/UL (ref 0–0.2)
BASOPHILS NFR BLD: 1 %
BUN SERPL-MCNC: 8 MG/DL (ref 7–20)
CALCIUM SERPL-MCNC: 8 MG/DL (ref 8.3–10.6)
CHLORIDE SERPL-SCNC: 105 MMOL/L (ref 99–110)
CO2 SERPL-SCNC: 24 MMOL/L (ref 21–32)
CREAT SERPL-MCNC: 0.6 MG/DL (ref 0.6–1.2)
DEPRECATED RDW RBC AUTO: 14.7 % (ref 12.4–15.4)
EOSINOPHIL # BLD: 0.3 K/UL (ref 0–0.6)
EOSINOPHIL NFR BLD: 4 %
GFR SERPLBLD CREATININE-BSD FMLA CKD-EPI: 90 ML/MIN/{1.73_M2}
GLUCOSE BLD-MCNC: 79 MG/DL (ref 70–99)
GLUCOSE SERPL-MCNC: 455 MG/DL (ref 70–99)
HCT VFR BLD AUTO: 35 % (ref 36–48)
HGB BLD-MCNC: 11.8 G/DL (ref 12–16)
LYMPHOCYTES # BLD: 2.4 K/UL (ref 1–5.1)
LYMPHOCYTES NFR BLD: 36 %
MAGNESIUM SERPL-MCNC: 1.9 MG/DL (ref 1.8–2.4)
MCH RBC QN AUTO: 32.9 PG (ref 26–34)
MCHC RBC AUTO-ENTMCNC: 33.9 G/DL (ref 31–36)
MCV RBC AUTO: 97.2 FL (ref 80–100)
METAMYELOCYTES NFR BLD MANUAL: 2 %
MONOCYTES # BLD: 0.4 K/UL (ref 0–1.3)
MONOCYTES NFR BLD: 6 %
NEUTROPHILS # BLD: 3.6 K/UL (ref 1.7–7.7)
NEUTROPHILS NFR BLD: 51 %
OVALOCYTES BLD QL SMEAR: ABNORMAL
PERFORMED ON: NORMAL
PLATELET # BLD AUTO: 138 K/UL (ref 135–450)
PMV BLD AUTO: 9 FL (ref 5–10.5)
POTASSIUM SERPL-SCNC: 3.4 MMOL/L (ref 3.5–5.1)
RBC # BLD AUTO: 3.6 M/UL (ref 4–5.2)
SODIUM SERPL-SCNC: 137 MMOL/L (ref 136–145)
WBC # BLD AUTO: 6.7 K/UL (ref 4–11)

## 2024-05-08 PROCEDURE — 85025 COMPLETE CBC W/AUTO DIFF WBC: CPT

## 2024-05-08 PROCEDURE — 6370000000 HC RX 637 (ALT 250 FOR IP)

## 2024-05-08 PROCEDURE — 36415 COLL VENOUS BLD VENIPUNCTURE: CPT

## 2024-05-08 PROCEDURE — 6360000002 HC RX W HCPCS

## 2024-05-08 PROCEDURE — 2580000003 HC RX 258

## 2024-05-08 PROCEDURE — 83735 ASSAY OF MAGNESIUM: CPT

## 2024-05-08 PROCEDURE — 6370000000 HC RX 637 (ALT 250 FOR IP): Performed by: HOSPITALIST

## 2024-05-08 PROCEDURE — 80048 BASIC METABOLIC PNL TOTAL CA: CPT

## 2024-05-08 PROCEDURE — 1200000000 HC SEMI PRIVATE

## 2024-05-08 PROCEDURE — 99232 SBSQ HOSP IP/OBS MODERATE 35: CPT | Performed by: HOSPITALIST

## 2024-05-08 RX ORDER — ACETAMINOPHEN 160 MG/5ML
650 SUSPENSION ORAL 3 TIMES DAILY
Status: DISCONTINUED | OUTPATIENT
Start: 2024-05-08 | End: 2024-05-08 | Stop reason: SDUPTHER

## 2024-05-08 RX ORDER — ACETAMINOPHEN 160 MG/5ML
650 SUSPENSION ORAL EVERY 6 HOURS PRN
Status: DISCONTINUED | OUTPATIENT
Start: 2024-05-07 | End: 2024-05-09 | Stop reason: HOSPADM

## 2024-05-08 RX ORDER — ACETAMINOPHEN 160 MG/5ML
650 LIQUID ORAL 3 TIMES DAILY
Status: DISCONTINUED | OUTPATIENT
Start: 2024-05-08 | End: 2024-05-09 | Stop reason: HOSPADM

## 2024-05-08 RX ORDER — OMEPRAZOLE 20 MG/1
20 CAPSULE, DELAYED RELEASE ORAL DAILY
Status: DISCONTINUED | OUTPATIENT
Start: 2024-05-08 | End: 2024-05-09 | Stop reason: HOSPADM

## 2024-05-08 RX ORDER — ACETAMINOPHEN 650 MG/1
650 SUPPOSITORY RECTAL EVERY 6 HOURS PRN
Status: DISCONTINUED | OUTPATIENT
Start: 2024-05-07 | End: 2024-05-09 | Stop reason: HOSPADM

## 2024-05-08 RX ORDER — LANSOPRAZOLE 30 MG/1
30 TABLET, ORALLY DISINTEGRATING, DELAYED RELEASE ORAL
Status: DISCONTINUED | OUTPATIENT
Start: 2024-05-08 | End: 2024-05-08 | Stop reason: SDUPTHER

## 2024-05-08 RX ADMIN — ENOXAPARIN SODIUM 40 MG: 100 INJECTION SUBCUTANEOUS at 08:31

## 2024-05-08 RX ADMIN — ACETAMINOPHEN 650 MG: 650 SOLUTION ORAL at 20:14

## 2024-05-08 RX ADMIN — ACETAMINOPHEN 650 MG: 650 SOLUTION ORAL at 12:45

## 2024-05-08 RX ADMIN — POTASSIUM BICARBONATE 20 MEQ: 782 TABLET, EFFERVESCENT ORAL at 08:31

## 2024-05-08 RX ADMIN — OMEPRAZOLE 20 MG: 20 CAPSULE, DELAYED RELEASE ORAL at 08:49

## 2024-05-08 RX ADMIN — DIVALPROEX SODIUM 250 MG: 125 CAPSULE, COATED PELLETS ORAL at 13:54

## 2024-05-08 RX ADMIN — POLYETHYLENE GLYCOL 3350 17 G: 17 POWDER, FOR SOLUTION ORAL at 08:39

## 2024-05-08 RX ADMIN — DIVALPROEX SODIUM 250 MG: 125 CAPSULE, COATED PELLETS ORAL at 08:32

## 2024-05-08 RX ADMIN — MEMANTINE HYDROCHLORIDE 10 MG: 10 TABLET ORAL at 08:32

## 2024-05-08 RX ADMIN — ASPIRIN 81 MG: 81 TABLET, CHEWABLE ORAL at 08:32

## 2024-05-08 RX ADMIN — SODIUM CHLORIDE, PRESERVATIVE FREE 10 ML: 5 INJECTION INTRAVENOUS at 20:15

## 2024-05-08 RX ADMIN — MEMANTINE HYDROCHLORIDE 10 MG: 10 TABLET ORAL at 20:14

## 2024-05-08 RX ADMIN — MEROPENEM AND SODIUM CHLORIDE 1000 MG: 1 INJECTION, SOLUTION INTRAVENOUS at 23:04

## 2024-05-08 RX ADMIN — MEROPENEM AND SODIUM CHLORIDE 1000 MG: 1 INJECTION, SOLUTION INTRAVENOUS at 15:33

## 2024-05-08 RX ADMIN — ACETAMINOPHEN 650 MG: 650 SOLUTION ORAL at 15:14

## 2024-05-08 RX ADMIN — DIVALPROEX SODIUM 250 MG: 125 CAPSULE, COATED PELLETS ORAL at 20:14

## 2024-05-08 RX ADMIN — QUETIAPINE FUMARATE 75 MG: 25 TABLET ORAL at 20:14

## 2024-05-08 RX ADMIN — DONEPEZIL HYDROCHLORIDE 10 MG: 10 TABLET ORAL at 08:32

## 2024-05-08 ASSESSMENT — PAIN SCALES - PAIN ASSESSMENT IN ADVANCED DEMENTIA (PAINAD)
BODYLANGUAGE: RELAXED
TOTALSCORE: 0
CONSOLABILITY: NO NEED TO CONSOLE
FACIALEXPRESSION: SMILING OR INEXPRESSIVE
CONSOLABILITY: NO NEED TO CONSOLE
BODYLANGUAGE: RELAXED
BREATHING: NORMAL
BREATHING: NORMAL
TOTALSCORE: 0
FACIALEXPRESSION: SMILING OR INEXPRESSIVE
FACIALEXPRESSION: SMILING OR INEXPRESSIVE
BREATHING: NORMAL
BREATHING: NORMAL
CONSOLABILITY: NO NEED TO CONSOLE
FACIALEXPRESSION: SMILING OR INEXPRESSIVE
CONSOLABILITY: NO NEED TO CONSOLE
TOTALSCORE: 0
BODYLANGUAGE: RELAXED
CONSOLABILITY: NO NEED TO CONSOLE
TOTALSCORE: 0
TOTALSCORE: 0
CONSOLABILITY: NO NEED TO CONSOLE
CONSOLABILITY: NO NEED TO CONSOLE
BREATHING: NORMAL
CONSOLABILITY: NO NEED TO CONSOLE
CONSOLABILITY: NO NEED TO CONSOLE
FACIALEXPRESSION: SMILING OR INEXPRESSIVE
FACIALEXPRESSION: SMILING OR INEXPRESSIVE
CONSOLABILITY: NO NEED TO CONSOLE
FACIALEXPRESSION: SMILING OR INEXPRESSIVE
BREATHING: NORMAL
BODYLANGUAGE: RELAXED
FACIALEXPRESSION: SMILING OR INEXPRESSIVE
BODYLANGUAGE: RELAXED
CONSOLABILITY: NO NEED TO CONSOLE
BREATHING: NORMAL
BREATHING: NORMAL
FACIALEXPRESSION: SMILING OR INEXPRESSIVE
TOTALSCORE: 0
BODYLANGUAGE: RELAXED
BODYLANGUAGE: RELAXED
CONSOLABILITY: NO NEED TO CONSOLE
CONSOLABILITY: NO NEED TO CONSOLE
BODYLANGUAGE: RELAXED
BREATHING: NORMAL
CONSOLABILITY: NO NEED TO CONSOLE
FACIALEXPRESSION: SMILING OR INEXPRESSIVE
CONSOLABILITY: NO NEED TO CONSOLE
BODYLANGUAGE: RELAXED
TOTALSCORE: 0
FACIALEXPRESSION: SMILING OR INEXPRESSIVE
TOTALSCORE: 0
BODYLANGUAGE: RELAXED
FACIALEXPRESSION: SMILING OR INEXPRESSIVE
FACIALEXPRESSION: SMILING OR INEXPRESSIVE
BODYLANGUAGE: RELAXED
TOTALSCORE: 0
BREATHING: NORMAL
BREATHING: NORMAL
TOTALSCORE: 0
CONSOLABILITY: NO NEED TO CONSOLE
TOTALSCORE: 0
TOTALSCORE: 0
BREATHING: NORMAL
BODYLANGUAGE: RELAXED
FACIALEXPRESSION: SMILING OR INEXPRESSIVE
BODYLANGUAGE: RELAXED
TOTALSCORE: 0
BREATHING: NORMAL
TOTALSCORE: 0
BREATHING: NORMAL
TOTALSCORE: 0
TOTALSCORE: 0
BREATHING: NORMAL
CONSOLABILITY: NO NEED TO CONSOLE
BODYLANGUAGE: RELAXED
BREATHING: NORMAL
FACIALEXPRESSION: SMILING OR INEXPRESSIVE
BREATHING: NORMAL
FACIALEXPRESSION: SMILING OR INEXPRESSIVE
BREATHING: NORMAL
FACIALEXPRESSION: SMILING OR INEXPRESSIVE
CONSOLABILITY: NO NEED TO CONSOLE
BREATHING: NORMAL
FACIALEXPRESSION: SMILING OR INEXPRESSIVE
TOTALSCORE: 0
CONSOLABILITY: NO NEED TO CONSOLE
BODYLANGUAGE: RELAXED
FACIALEXPRESSION: SMILING OR INEXPRESSIVE

## 2024-05-08 ASSESSMENT — PAIN SCALES - GENERAL
PAINLEVEL_OUTOF10: 0

## 2024-05-08 ASSESSMENT — PAIN SCALES - WONG BAKER
WONGBAKER_NUMERICALRESPONSE: NO HURT

## 2024-05-08 NOTE — PLAN OF CARE
D; remains on IV antibiotics. Physically and verbally aggressive overnight. Repositioned on side with wedge pillow to prevent skin break down. Pulling at IV and pulled telemetry off. Reoriented, emotional support and reassurance given. Low light, soft music, bed alarm, and avasys monitor for safety.  A: Cont to monitor during hourly rounds    Problem: Discharge Planning  Goal: Discharge to home or other facility with appropriate resources  Outcome: Progressing     Problem: Pain  Goal: Verbalizes/displays adequate comfort level or baseline comfort level  Outcome: Progressing     Problem: Safety - Adult  Goal: Free from fall injury  Outcome: Progressing     Problem: Skin/Tissue Integrity  Goal: Absence of new skin breakdown  Description: 1.  Monitor for areas of redness and/or skin breakdown  2.  Assess vascular access sites hourly  3.  Every 4-6 hours minimum:  Change oxygen saturation probe site  4.  Every 4-6 hours:  If on nasal continuous positive airway pressure, respiratory therapy assess nares and determine need for appliance change or resting period.  Outcome: Progressing     Problem: ABCDS Injury Assessment  Goal: Absence of physical injury  Outcome: Progressing

## 2024-05-08 NOTE — PROGRESS NOTES
Progress Note    Admit Date: 5/1/2024  Day: 6  Diet: ADULT DIET; Regular  ADULT ORAL NUTRITION SUPPLEMENT; Breakfast, Lunch, Dinner; Standard High Calorie/High Protein Oral Supplement    CC: AMS    Interval history:   NAEO.  Vital signs stable.  Patient seen and examined at bedside, continues to rest comfortably.  Continues to eat well.  Discussed care with  and daughter yesterday, both on board for discharge once completing IV antibiotic course back to memory care unit.  Patient on day 6 of IV meropenem, will be completed tomorrow.    HPI: Katrin Donnelly is an 83 y/o F w/ a PMH of Alzheimer's dementia starting in 2020, sick sinus syndrome status post pacemaker placement (was initially diagnosed with A-fib and was on Coumadin but was rediagnosed with sick sinus syndrome as per the note by Dr. Walker on 12/5/2023), CHF, and orthostatic hypotension likely secondary to dysautonomia who presented to the emergency department with lethargy and not acting like herself for the past 3 days.  The patient is not alert and oriented to self and provided no collateral history. I called and spoke with her  who says that she is at her baseline but that the nursing home thought she was not acting like herself for the past 3 days and was not drinking an adequate amount of water. I was unable to contact her memory unit.      On arrival to the emergency department her vital signs are within normal limits except she was tachycardic to 103 and hypotensive to 99/53. Her EKG was suggestive of A-fib with RVR. Her CBC was unremarkable her VBG also unremarkable. Her lactic acid was elevated to 2.3. Her urinalysis was suggestive of UTI with  WBC and 4+ bacteria. He was also positive for nitrite and large leukocyte esterase. Her BMP showed her sodium to be 158. Her initial troponin was 34 while on repeat it was 35 and her magnesium was 2.7. Her CT head and chest x-ray were largely unremarkable. She was given a dose of ceftriaxone

## 2024-05-08 NOTE — PLAN OF CARE
Problem: Discharge Planning  Goal: Discharge to home or other facility with appropriate resources  Outcome: Progressing     Problem: Pain  Goal: Verbalizes/displays adequate comfort level or baseline comfort level  Outcome: Progressing  Flowsheets (Taken 5/5/2024 1211 by Arlin Contreras RN)  Verbalizes/displays adequate comfort level or baseline comfort level:   Encourage patient to monitor pain and request assistance   Assess pain using appropriate pain scale   Administer analgesics based on type and severity of pain and evaluate response   Implement non-pharmacological measures as appropriate and evaluate response   Consider cultural and social influences on pain and pain management   Notify Licensed Independent Practitioner if interventions unsuccessful or patient reports new pain     Problem: Safety - Adult  Goal: Free from fall injury  Outcome: Progressing  Flowsheets (Taken 5/5/2024 1211 by Arlin Contreras RN)  Free From Fall Injury: Instruct family/caregiver on patient safety  Note: Pt remains free of fall. Bed alarm on, grippers socks on, bed lowest position, and call light within reach.     Problem: Skin/Tissue Integrity  Goal: Absence of new skin breakdown  Description: 1.  Monitor for areas of redness and/or skin breakdown  2.  Assess vascular access sites hourly  3.  Every 4-6 hours minimum:  Change oxygen saturation probe site  4.  Every 4-6 hours:  If on nasal continuous positive airway pressure, respiratory therapy assess nares and determine need for appliance change or resting period.  Outcome: Progressing  Note: Repositioned frequently.      Problem: ABCDS Injury Assessment  Goal: Absence of physical injury  Outcome: Progressing  Flowsheets (Taken 5/8/2024 1921)  Absence of Physical Injury: Implement safety measures based on patient assessment

## 2024-05-08 NOTE — CARE COORDINATION
Patient will finish course of IV abx 05/09/24 and per resident MD will be ready for DC [facility]. CM will assist with transportation arrangements and follow up with spouse on further DC plans.    Thank you,  Hung RIGGSN, RN,   [phone]

## 2024-05-09 VITALS
HEART RATE: 64 BPM | OXYGEN SATURATION: 93 % | DIASTOLIC BLOOD PRESSURE: 63 MMHG | RESPIRATION RATE: 19 BRPM | HEIGHT: 65 IN | BODY MASS INDEX: 29.52 KG/M2 | SYSTOLIC BLOOD PRESSURE: 106 MMHG | WEIGHT: 177.2 LBS | TEMPERATURE: 97.3 F

## 2024-05-09 LAB
ANION GAP SERPL CALCULATED.3IONS-SCNC: 8 MMOL/L (ref 3–16)
BASOPHILS # BLD: 0 K/UL (ref 0–0.2)
BASOPHILS NFR BLD: 0 %
BUN SERPL-MCNC: 15 MG/DL (ref 7–20)
BURR CELLS BLD QL SMEAR: ABNORMAL
CALCIUM SERPL-MCNC: 9 MG/DL (ref 8.3–10.6)
CHLORIDE SERPL-SCNC: 105 MMOL/L (ref 99–110)
CO2 SERPL-SCNC: 28 MMOL/L (ref 21–32)
CREAT SERPL-MCNC: 0.6 MG/DL (ref 0.6–1.2)
DACRYOCYTES BLD QL SMEAR: ABNORMAL
DEPRECATED RDW RBC AUTO: 14.9 % (ref 12.4–15.4)
EOSINOPHIL # BLD: 0.3 K/UL (ref 0–0.6)
EOSINOPHIL NFR BLD: 5 %
GFR SERPLBLD CREATININE-BSD FMLA CKD-EPI: 90 ML/MIN/{1.73_M2}
GLUCOSE SERPL-MCNC: 86 MG/DL (ref 70–99)
HCT VFR BLD AUTO: 33.4 % (ref 36–48)
HGB BLD-MCNC: 11.5 G/DL (ref 12–16)
LYMPHOCYTES # BLD: 2.2 K/UL (ref 1–5.1)
LYMPHOCYTES NFR BLD: 40 %
MACROCYTES BLD QL SMEAR: ABNORMAL
MAGNESIUM SERPL-MCNC: 2.1 MG/DL (ref 1.8–2.4)
MCH RBC QN AUTO: 33.3 PG (ref 26–34)
MCHC RBC AUTO-ENTMCNC: 34.3 G/DL (ref 31–36)
MCV RBC AUTO: 97.1 FL (ref 80–100)
MONOCYTES # BLD: 0.6 K/UL (ref 0–1.3)
MONOCYTES NFR BLD: 10 %
NEUTROPHILS # BLD: 2.5 K/UL (ref 1.7–7.7)
NEUTROPHILS NFR BLD: 45 %
OVALOCYTES BLD QL SMEAR: ABNORMAL
PLATELET # BLD AUTO: 163 K/UL (ref 135–450)
PMV BLD AUTO: 9.2 FL (ref 5–10.5)
POIKILOCYTOSIS BLD QL SMEAR: ABNORMAL
POTASSIUM SERPL-SCNC: 4.2 MMOL/L (ref 3.5–5.1)
RBC # BLD AUTO: 3.44 M/UL (ref 4–5.2)
SCHISTOCYTES BLD QL SMEAR: ABNORMAL
SODIUM SERPL-SCNC: 141 MMOL/L (ref 136–145)
WBC # BLD AUTO: 5.5 K/UL (ref 4–11)

## 2024-05-09 PROCEDURE — 6370000000 HC RX 637 (ALT 250 FOR IP)

## 2024-05-09 PROCEDURE — 6360000002 HC RX W HCPCS

## 2024-05-09 PROCEDURE — 85025 COMPLETE CBC W/AUTO DIFF WBC: CPT

## 2024-05-09 PROCEDURE — 6370000000 HC RX 637 (ALT 250 FOR IP): Performed by: HOSPITALIST

## 2024-05-09 PROCEDURE — 99239 HOSP IP/OBS DSCHRG MGMT >30: CPT | Performed by: HOSPITALIST

## 2024-05-09 PROCEDURE — 83735 ASSAY OF MAGNESIUM: CPT

## 2024-05-09 PROCEDURE — 80048 BASIC METABOLIC PNL TOTAL CA: CPT

## 2024-05-09 PROCEDURE — 2580000003 HC RX 258

## 2024-05-09 PROCEDURE — 36415 COLL VENOUS BLD VENIPUNCTURE: CPT

## 2024-05-09 RX ADMIN — ENOXAPARIN SODIUM 40 MG: 100 INJECTION SUBCUTANEOUS at 10:55

## 2024-05-09 RX ADMIN — SODIUM CHLORIDE, PRESERVATIVE FREE 10 ML: 5 INJECTION INTRAVENOUS at 11:08

## 2024-05-09 RX ADMIN — DIVALPROEX SODIUM 250 MG: 125 CAPSULE, COATED PELLETS ORAL at 10:56

## 2024-05-09 RX ADMIN — OMEPRAZOLE 20 MG: 20 CAPSULE, DELAYED RELEASE ORAL at 05:45

## 2024-05-09 RX ADMIN — ACETAMINOPHEN 650 MG: 650 SOLUTION ORAL at 10:56

## 2024-05-09 RX ADMIN — MEMANTINE HYDROCHLORIDE 10 MG: 10 TABLET ORAL at 10:56

## 2024-05-09 RX ADMIN — MEROPENEM AND SODIUM CHLORIDE 1000 MG: 1 INJECTION, SOLUTION INTRAVENOUS at 05:53

## 2024-05-09 RX ADMIN — ACETAMINOPHEN 650 MG: 650 SOLUTION ORAL at 14:38

## 2024-05-09 RX ADMIN — DIVALPROEX SODIUM 250 MG: 125 CAPSULE, COATED PELLETS ORAL at 14:38

## 2024-05-09 RX ADMIN — POLYETHYLENE GLYCOL 3350 17 G: 17 POWDER, FOR SOLUTION ORAL at 10:56

## 2024-05-09 RX ADMIN — MEROPENEM AND SODIUM CHLORIDE 1000 MG: 1 INJECTION, SOLUTION INTRAVENOUS at 14:49

## 2024-05-09 RX ADMIN — ASPIRIN 81 MG: 81 TABLET, CHEWABLE ORAL at 10:56

## 2024-05-09 RX ADMIN — DONEPEZIL HYDROCHLORIDE 10 MG: 10 TABLET ORAL at 05:45

## 2024-05-09 ASSESSMENT — PAIN SCALES - PAIN ASSESSMENT IN ADVANCED DEMENTIA (PAINAD)
BREATHING: NORMAL
BREATHING: NORMAL
CONSOLABILITY: NO NEED TO CONSOLE
CONSOLABILITY: NO NEED TO CONSOLE
FACIALEXPRESSION: SMILING OR INEXPRESSIVE
CONSOLABILITY: NO NEED TO CONSOLE
BODYLANGUAGE: RELAXED
TOTALSCORE: 0
CONSOLABILITY: NO NEED TO CONSOLE
BODYLANGUAGE: RELAXED
CONSOLABILITY: NO NEED TO CONSOLE
BODYLANGUAGE: RELAXED
BODYLANGUAGE: RELAXED
FACIALEXPRESSION: SMILING OR INEXPRESSIVE
FACIALEXPRESSION: SMILING OR INEXPRESSIVE
BODYLANGUAGE: RELAXED
BREATHING: NORMAL
BREATHING: NORMAL
BODYLANGUAGE: RELAXED
TOTALSCORE: 0
TOTALSCORE: 0
BREATHING: NORMAL
FACIALEXPRESSION: SMILING OR INEXPRESSIVE
BREATHING: NORMAL
CONSOLABILITY: NO NEED TO CONSOLE
FACIALEXPRESSION: SMILING OR INEXPRESSIVE
TOTALSCORE: 0
CONSOLABILITY: NO NEED TO CONSOLE
CONSOLABILITY: NO NEED TO CONSOLE
TOTALSCORE: 0
BREATHING: NORMAL
BODYLANGUAGE: RELAXED
FACIALEXPRESSION: SMILING OR INEXPRESSIVE
TOTALSCORE: 0
FACIALEXPRESSION: SMILING OR INEXPRESSIVE
FACIALEXPRESSION: SMILING OR INEXPRESSIVE
TOTALSCORE: 0
FACIALEXPRESSION: SMILING OR INEXPRESSIVE
TOTALSCORE: 0
BODYLANGUAGE: RELAXED
TOTALSCORE: 0
BREATHING: NORMAL
BODYLANGUAGE: RELAXED
TOTALSCORE: 0
BODYLANGUAGE: RELAXED
BREATHING: NORMAL
TOTALSCORE: 0
FACIALEXPRESSION: SMILING OR INEXPRESSIVE
BREATHING: NORMAL
CONSOLABILITY: NO NEED TO CONSOLE
BREATHING: NORMAL
FACIALEXPRESSION: SMILING OR INEXPRESSIVE
BREATHING: NORMAL
BODYLANGUAGE: RELAXED
CONSOLABILITY: NO NEED TO CONSOLE
TOTALSCORE: 0
FACIALEXPRESSION: SMILING OR INEXPRESSIVE
BREATHING: NORMAL
CONSOLABILITY: NO NEED TO CONSOLE
BODYLANGUAGE: RELAXED
CONSOLABILITY: NO NEED TO CONSOLE

## 2024-05-09 ASSESSMENT — PAIN SCALES - WONG BAKER
WONGBAKER_NUMERICALRESPONSE: NO HURT

## 2024-05-09 NOTE — PROGRESS NOTES
Pt discharged to [facility] and report given. Both IV's removed, tele removed. Discharge paperwork given to transport. Belongings packed and sent with patient.

## 2024-05-09 NOTE — DISCHARGE SUMMARY
INTERNAL MEDICINE DEPARTMENT AT THE [facility]  DISCHARGE SUMMARY    Patient ID: Katrin Donnelly                                             Discharge Date: 5/9/2024   Patient's PCP: Kalyan Estrella MD                                          Discharge Physician: Mel Resendiz MD  Admit Date: 5/1/2024   Admitting Physician: Matthew Avilez MD    PROBLEMS DURING HOSPITALIZATION:  Present on Admission:   Hypernatremia   Severe late onset Alzheimer's dementia without behavioral disturbance, psychotic disturbance, mood disturbance, or anxiety ([facility])   Acute cystitis without hematuria   Volume depletion   Elevated troponin   Atrial flutter with rapid ventricular response ([facility])      DISCHARGE DIAGNOSES:  Encephalopathy 2/2 to ESBL UTI  Hypernatremia 2/2 to poor PO intake     HPI:  Katrin Donnelly is an 83 y/o F w/ a PMH of Alzheimer's dementia starting in 2020, sick sinus syndrome status post pacemaker placement (was initially diagnosed with A-fib and was on Coumadin but was rediagnosed with sick sinus syndrome as per the note by Dr. Walker on 12/5/2023), CHF, and orthostatic hypotension likely secondary to dysautonomia who presented to the emergency department with lethargy and not acting like herself for the past 3 days.  The patient is not alert and oriented to self and provided no collateral history. I called and spoke with her  who says that she is at her baseline but that the nursing home thought she was not acting like herself for the past 3 days and was not drinking an adequate amount of water. I was unable to contact her memory unit.      On arrival to the emergency department her vital signs are within normal limits except she was tachycardic to 103 and hypotensive to 99/53. Her EKG was suggestive of A-fib with RVR. Her CBC was unremarkable her VBG also unremarkable. Her lactic acid was elevated to 2.3. Her urinalysis was suggestive of UTI with  WBC and 4+ bacteria. He was also positive for nitrite and large  back: Normal range of motion and neck supple.      Right lower leg: No edema.      Left lower leg: No edema.   Skin:     General: Skin is warm and dry.   Neurological:      General: No focal deficit present.      Comments: AO x1     Consults: none  Significant Diagnostic Studies:  Urine culture with ESBL   Treatments: Fluids, IV antibiotics, encouraging oral intake  Disposition: Memory care unit  Discharged Condition: Stable  Follow Up: Primary Care Physician in one week    DISCHARGE MEDICATION:       Medication List        CONTINUE taking these medications      acetaminophen 325 MG tablet  Commonly known as: TYLENOL     aspirin 81 MG chewable tablet     divalproex 125 MG DR capsule  Commonly known as: DEPAKOTE SPRINKLE     donepezil 10 MG tablet  Commonly known as: ARICEPT     furosemide 40 MG tablet  Commonly known as: LASIX     memantine 10 MG tablet  Commonly known as: NAMENDA  TAKE 1 TABLET BY MOUTH TWICE DAILY     mirtazapine 7.5 MG tablet  Commonly known as: REMERON     omeprazole 20 MG delayed release capsule  Commonly known as: PRILOSEC     potassium chloride 20 MEQ/15ML (10%) oral solution     * QUEtiapine 25 MG tablet  Commonly known as: SEROQUEL     * QUEtiapine 50 MG tablet  Commonly known as: SEROQUEL     vitamin D 25 MCG (1000 UT) Tabs tablet  Commonly known as: CHOLECALCIFEROL           * This list has 2 medication(s) that are the same as other medications prescribed for you. Read the directions carefully, and ask your doctor or other care provider to review them with you.                STOP taking these medications      ciprofloxacin 500 MG tablet  Commonly known as: CIPRO               Activity: activity as tolerated  Diet: regular diet with nutritional supplements  Wound Care: none needed    Time Spent on discharge is more than 30 minutes    Signed:  Mel Resendiz MD, PGY-1  5/9/2024    Addendum to Resident H& P/Progress note:  I have personally seen,examined and evaluated the patient. I have

## 2024-05-09 NOTE — PLAN OF CARE
Problem: Discharge Planning  Goal: Discharge to home or other facility with appropriate resources  Outcome: Progressing     Problem: Pain  Goal: Verbalizes/displays adequate comfort level or baseline comfort level  Outcome: Progressing  Flowsheets (Taken 5/5/2024 1211 by Arlin Contreras RN)  Verbalizes/displays adequate comfort level or baseline comfort level:   Encourage patient to monitor pain and request assistance   Assess pain using appropriate pain scale   Administer analgesics based on type and severity of pain and evaluate response   Implement non-pharmacological measures as appropriate and evaluate response   Consider cultural and social influences on pain and pain management   Notify Licensed Independent Practitioner if interventions unsuccessful or patient reports new pain     Problem: Safety - Adult  Goal: Free from fall injury  Outcome: Progressing  Note: Pt remains free of fall. Call light within reach, bed alarm on.     Problem: Skin/Tissue Integrity  Goal: Absence of new skin breakdown  Description: 1.  Monitor for areas of redness and/or skin breakdown  2.  Assess vascular access sites hourly  3.  Every 4-6 hours minimum:  Change oxygen saturation probe site  4.  Every 4-6 hours:  If on nasal continuous positive airway pressure, respiratory therapy assess nares and determine need for appliance change or resting period.  Outcome: Progressing  Note: Reposition frequently.     Problem: ABCDS Injury Assessment  Goal: Absence of physical injury  Outcome: Progressing  Flowsheets (Taken 5/8/2024 1921)  Absence of Physical Injury: Implement safety measures based on patient assessment

## 2024-05-09 NOTE — CARE COORDINATION
Case Management Assessment            Discharge Note                    Date / Time of Note: 5/9/2024 12:37 PM                  Discharge Note Completed by: Sakina Alston RN    Patient Name: Katrin Donnelly   YOB: 1940  Diagnosis: Hypernatremia [E87.0]  Urinary tract infection without hematuria, site unspecified [N39.0]   Date / Time: 5/1/2024  1:39 PM    Current PCP: Kalyan Estrella MD  Clinic patient: No    Hospitalization in the last 30 days: No       Advance Directives:  Code Status: Full Code  [place] DNR form completed and on chart: No    Financial:  Payor: [facility] MEDICARE / Plan: [facility] MEDICARE COMPLETE / Product Type: *No Product type* /      Pharmacy:    [facility] - [facility] [address] [facility] -  [phone] - F [phone]  [address] [facility] [address]  Phone: [phone] Fax: [phone]    [id number] [address] - P [phone] - F [phone]  [facility] [address]  Phone: [phone] Fax: [phone]    [facility] [address] [facility] [phone] - F [phone] [address] [facility] [address]  Phone: [phone] Fax: [phone]      Assistance purchasing medications?: Potential Assistance Purchasing Medications: No  Assistance provided by Case Management: None at this time    Does patient want to participate in local refill/ meds to beds program?: No    Meds To Beds General Rules:  1. Can ONLY be done Monday- Friday between 8:30am-5pm  2. Prescription(s) must be in pharmacy by 3pm to be filled same day  3.Copy of patient's insurance/ prescription drug card and patient face sheet must be sent along with the prescription(s)  4. Cost of Rx cannot be added to hospital bill. If financial assistance is needed, please contact unit  or ;  or  CANNOT provide pharmacy voucher for patients co-pays  5. Patients can then   choice list was provided with basic dialogue that supports the patient's individualized plan of care/goals and shares the quality data associated with the providers. Yes    Care Transitions patient: No    Sakina Alston RN  [facility]  Case Management Department  Ph: [phone]

## 2024-05-09 NOTE — DISCHARGE INSTR - COC
BOOSTRIX (age 10y+), IM, 0.5mL 01/26/2012, 02/04/2013    Td, unspecified formulation 02/02/2010    Zoster Live (Zostavax) 12/26/2008       Active Problems:  Patient Active Problem List   Diagnosis Code    Atrial fibrillation (HCC) I48.91    Mitral valve disorder I05.9    Dysthymic disorder F34.1    Lower extremity edema R60.0    Adenomatous polyp of colon D12.6    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 (HCC) F02.818    Cerebral amyloid angiopathy (CODE) I68.0    Multiple falls R29.6    Debility R53.81    S/P placement of cardiac pacemaker Z95.0    Complete heart block (HCC) I44.2    Chronic venous insufficiency of lower extremity I87.2    Benign essential HTN I10    Gastroesophageal reflux disease K21.9    Altered mental status R41.82    Facial droop R29.810    Encounter for palliative care Z51.5    Severe late onset Alzheimer's dementia without behavioral disturbance, psychotic disturbance, mood disturbance, or anxiety (HCC) G30.1, F02.C0    Hypernatremia E87.0    Acute cystitis without hematuria N30.00    Volume depletion E86.9    Elevated troponin R79.89    Atrial flutter with rapid ventricular response (HCC) I48.92       Isolation/Infection:   Isolation            Contact          Patient Infection Status       Infection Onset Added Last Indicated Last Indicated By Review Planned Expiration Resolved Resolved By    ESBL (Extended Spectrum Beta Lactamase) 05/01/24 05/03/24 05/01/24 Culture, Urine                           Nurse Assessment:  Last Vital Signs: /79   Pulse 62   Temp 97.3 °F (36.3 °C) (Axillary)   Resp 16   Ht 1.651 m (5' 5\")   Wt 80.4 kg (177 lb 3.2 oz)   SpO2 97%   BMI 29.49 kg/m²     Last documented pain score (0-10 scale): Pain Level: 0  Last Weight:   Wt Readings from Last 1 Encounters:   05/08/24 80.4 kg (177 lb 3.2 oz)     Mental Status:  disoriented    IV Access:  - None    Nursing Mobility/ADLs:  Walking   Dependent  Transfer   Dependent  Bathing  Dependent  Dressing  Dependent  Toileting  Dependent  Feeding  Dependent  Med Admin  Dependent  Med Delivery   crushed    Wound Care Documentation and Therapy:        Elimination:  Continence:   Bowel: No  Bladder: No  Urinary Catheter: None   Colostomy/Ileostomy/Ileal Conduit: No       Date of Last BM: 5/9/2024      Intake/Output Summary (Last 24 hours) at 5/9/2024 0801  Last data filed at 5/8/2024 1922  Gross per 24 hour   Intake 594 ml   Output 680 ml   Net -86 ml     I/O last 3 completed shifts:  In: 8908 [P.O.:894; I.V.:8014]  Out: 2980 [Urine:2980]    Safety Concerns:     None    Impairments/Disabilities:      Speech    Nutrition Therapy:  Current Nutrition Therapy:   - Oral Diet:  General    Routes of Feeding: Oral  Liquids: No Restrictions  Daily Fluid Restriction: no  Last Modified Barium Swallow with Video (Video Swallowing Test): not done    Treatments at the Time of Hospital Discharge:   Respiratory Treatments:     Oxygen Therapy:  is not on home oxygen therapy.  Ventilator:    - No ventilator support    Rehab Therapies:   Weight Bearing Status/Restrictions: No weight bearing restrictions  Other Medical Equipment (for information only, NOT a DME order):  hospital bed  Other Treatments:       Patient's personal belongings (please select all that are sent with patient):  None    RN SIGNATURE:  Electronically signed by ROLO COLON RN on 5/9/24 at 4:15 PM EDT    CASE MANAGEMENT/SOCIAL WORK SECTION    Inpatient Status Date: ***    Readmission Risk Assessment Score:  Readmission Risk              Risk of Unplanned Readmission:  21           Discharging to Facility/ Agency     [facility] in [place], [place]  Address: [facility] [place] [address]  Phone: [phone]      Dialysis Facility (if applicable) NA  Name:  Address:  Dialysis Schedule:  Phone:  Fax:    / signature: Electronically signed by Sakina Alston RN on 5/9/24

## 2024-05-09 NOTE — PROGRESS NOTES
Patient resting in bed with eyes closed. IV ATB infusing per order. No adverse reactions noted.  Incontinence care provided and patient repositioned.Call light within reach and all needs currently met.

## 2024-05-09 NOTE — PLAN OF CARE
Problem: Discharge Planning  Goal: Discharge to home or other facility with appropriate resources  5/8/2024 2059 by Mary Kate Acuña RN  Outcome: Progressing  5/8/2024 1921 by Gertrudis Champagne RN  Outcome: Progressing     Problem: Pain  Goal: Verbalizes/displays adequate comfort level or baseline comfort level  5/8/2024 2059 by Mary Kate Acuña RN  Outcome: Progressing  5/8/2024 1921 by Gertrudis Champagne RN  Outcome: Progressing  Flowsheets (Taken 5/5/2024 1211 by Arlin Contreras RN)  Verbalizes/displays adequate comfort level or baseline comfort level:   Encourage patient to monitor pain and request assistance   Assess pain using appropriate pain scale   Administer analgesics based on type and severity of pain and evaluate response   Implement non-pharmacological measures as appropriate and evaluate response   Consider cultural and social influences on pain and pain management   Notify Licensed Independent Practitioner if interventions unsuccessful or patient reports new pain     Problem: Safety - Adult  Goal: Free from fall injury  5/8/2024 2059 by Mary Kate Acuña RN  Outcome: Progressing  5/8/2024 1921 by Gertrudis Champagne RN  Outcome: Progressing  Flowsheets (Taken 5/5/2024 1211 by Arlin Contreras RN)  Free From Fall Injury: Instruct family/caregiver on patient safety  Note: Pt remains free of fall. Bed alarm on, grippers socks on, bed lowest position, and call light within reach.     Problem: Skin/Tissue Integrity  Goal: Absence of new skin breakdown  Description: 1.  Monitor for areas of redness and/or skin breakdown  2.  Assess vascular access sites hourly  3.  Every 4-6 hours minimum:  Change oxygen saturation probe site  4.  Every 4-6 hours:  If on nasal continuous positive airway pressure, respiratory therapy assess nares and determine need for appliance change or resting period.  5/8/2024 2059 by Mary Kate Acuña RN  Outcome: Progressing  5/8/2024 1921 by Gertrudis Champagne RN  Outcome:  Progressing  Note: Repositioned frequently.      Problem: ABCDS Injury Assessment  Goal: Absence of physical injury  5/8/2024 2059 by Mary Kate Acuña, RN  Outcome: Progressing  5/8/2024 1921 by Gertrudis Champagne, RN  Outcome: Progressing  Flowsheets (Taken 5/8/2024 1921)  Absence of Physical Injury: Implement safety measures based on patient assessment

## 2024-05-13 NOTE — PROGRESS NOTES
Physician Progress Note      PATIENT:               JULY HIGHTOWER  CSN #:                  [id number]  :                       1940  ADMIT DATE:       2024 1:39 PM  DISCH DATE:        2024 7:00 PM  RESPONDING  PROVIDER #:        Matthew Avilez MD          QUERY TEXT:    Pt admitted with UTI and has encephalopathy documented. If possible, please   document in progress notes and discharge summary further specificity regarding   the type of encephalopathy:    The medical record reflects the following:  Risk Factors: 85 yo from SNF w/ UTI w/ hx of dementia from a memory care unit  Clinical Indicators: Per H&P: I called and spoke with her  who says   that she is at her baseline but that the nursing home thought she was not   acting like herself for the past 3 days. Per PN /6:  will have PT/OT   re-evaluate given improved AMS.  Per DC summary: patient is not alert and   oriented to self and provided no collateral history. Severe late onset   Alzheimer's dementia without behavioral disturbance. Encephalopathy 2/2 to   ESBL UTI.  Treatment: increased rounding, fall precautions, bed alarm  Options provided:  -- Metabolic encephalopathy due to UTI  -- Encephalopathy ruled out, patient has dementia  -- Other - I will add my own diagnosis  -- Disagree - Not applicable / Not valid  -- Disagree - Clinically unable to determine / Unknown  -- Refer to Clinical Documentation Reviewer    PROVIDER RESPONSE TEXT:    This patient has metabolic encephalopathy due to UTI.    Query created by: Digna Robertson on 2024 8:02 AM      Electronically signed by:  Matthew Avilez MD 2024 1:23 PM

## 2024-06-01 PROBLEM — R79.89 ELEVATED TROPONIN: Status: RESOLVED | Noted: 2024-05-02 | Resolved: 2024-06-01

## 2024-06-05 ENCOUNTER — TELEPHONE (OUTPATIENT)
Dept: CARDIOLOGY CLINIC | Age: 84
End: 2024-06-05

## 2024-06-05 NOTE — TELEPHONE ENCOUNTER
Nery,  Appears pt missed her 06.04.2024 scheduled remote.  Do you mind to R/S and we will call back with transmission instructions/number.    Thanks.

## 2024-06-05 NOTE — TELEPHONE ENCOUNTER
Patients care manager Mary Elder called stating she would like a call back to set up a remote device check since pt cannot leave. Can reach Mary at [phone]

## 2024-06-06 NOTE — TELEPHONE ENCOUNTER
Please have care manager/patient send a transmission as soon as convenient for them (any time of day) since it is past due. One received, the report will be reviewed by tech and provider, and a future remote appointment will be scheduled in [facility]. Thanks!

## 2024-06-06 NOTE — TELEPHONE ENCOUNTER
Spoke to Mary Elder who will have staff submit a manual transmission.  Once received, will arrange 91 day intervals.      Per Mary, pt is a resident of [facility] suites, she is nonverbal and remains in wheelchair.  Difficulty transporting to doctor's visits.  06.12.2024 NPFW OV changed to Televisit.  Staff will need to call [facility] Debora RN @ [phone] or [phone] and ask for Mel's RN.   Appts updated.       FYI.

## 2024-06-07 NOTE — TELEPHONE ENCOUNTER
Remote transmission received and sent to [facility] for review via Murj. Please see Murj for additional details.

## 2024-06-11 NOTE — TELEPHONE ENCOUNTER
Spoke with Mary and I let her know that the transmission has been successfully received and sent to provider for review. Pt will be set up on automatic 91 day intervals moving forward. She thanks you.

## 2024-06-11 NOTE — TELEPHONE ENCOUNTER
Do you mind to let Mary Eldre @ [facility] know transmission successfully received and sent to provider for review.  Pt will be set up on automatic 91 day intervals moving forward.  Thanks.

## 2024-06-12 ENCOUNTER — SCHEDULED TELEPHONE ENCOUNTER (OUTPATIENT)
Dept: CARDIOLOGY CLINIC | Age: 84
End: 2024-06-12
Payer: MEDICARE

## 2024-06-12 DIAGNOSIS — I10 BENIGN ESSENTIAL HTN: ICD-10-CM

## 2024-06-12 DIAGNOSIS — I49.5 SSS (SICK SINUS SYNDROME) (HCC): ICD-10-CM

## 2024-06-12 DIAGNOSIS — I44.2 CHB (COMPLETE HEART BLOCK) (HCC): Primary | ICD-10-CM

## 2024-06-12 DIAGNOSIS — I50.32 CHRONIC HEART FAILURE WITH PRESERVED EJECTION FRACTION (HCC): ICD-10-CM

## 2024-06-12 DIAGNOSIS — Z95.0 PACEMAKER: ICD-10-CM

## 2024-06-12 DIAGNOSIS — I48.20 CHRONIC ATRIAL FIBRILLATION (HCC): ICD-10-CM

## 2024-06-12 PROCEDURE — 1123F ACP DISCUSS/DSCN MKR DOCD: CPT | Performed by: NURSE PRACTITIONER

## 2024-06-12 PROCEDURE — 99214 OFFICE O/P EST MOD 30 MIN: CPT | Performed by: NURSE PRACTITIONER

## 2024-06-12 NOTE — PROGRESS NOTES
01/29/2019    Lower extremity edema 03/03/2015    Mitral valve disorder     Dysthymic disorder     Atrial fibrillation (HCC) 08/03/2010        Assessment:   1. CHB (complete heart block) (HCC)    2. SSS (sick sinus syndrome) (HCC)    3. Chronic atrial fibrillation (HCC)    4. Pacemaker    5. Benign essential HTN    6. Chronic heart failure with preserved ejection fraction (HCC)        Cardiac HX: Katrin Donnelly is a 83 y.o. woman with a h/o HTN, mitral valve disease, cerebral amyloid angiopathy, persistent AF, recurrent falls, not on OAC d/t angiopathy per neuro, CHB, s/p Micra PPM (5/6/2020, Dr. Daigle).  [id number] 4. TSH 3.14 (3/29/2023). HAS-BLED 4.     AF/bradycardia/CHB/SSS  - In chronic persistent AF  - S/p Micra placement (5/6/20)  - Device check shows 31.7% , other parameters stable.  - Echo (4/2020) - EF 55%, LAE, 4.3/103  - On no OAC d/t risk of ICH with CAA per neurology  - F/u in 6 months  - ECG ordered and results personally reviewed     HTN  - BP controlled per nursing  - BP (6/10/24) 105/65    HFpEF/Edema  - Trace to 1+ BLE edema per RN  - Wears SARAH hose  - Elevate legs while sitting  - On furosemide 40 mg QD  - Reviewed recent labs  - Followed by house MD    EF of 55%  No systolic HF  No known CAD  No AF   No Tobacco use.      All questions and concerns were addressed to the patient/family. Alternatives to my treatment were discussed. The note was completed using EMR. Every effort was made to ensure accuracy; however, inadvertent computerized transcription errors may be present.     Patient received education regarding their diagnosis, treatment and medications while in the office today.      Chava Seaman [facility]      Katrin Donnelly is a 83 y.o. woman being evaluated by a Virtual Visit (video visit) encounter to address concerns as mentioned above.  A caregiver was present when appropriate. Due to this being a TeleHealth encounter  evaluation of the following organ systems was

## 2024-08-21 NOTE — FLOWSHEET NOTE
Refused all vitals Get Your Medications        These medications were sent to [facility]pharm[facility] #[id number] - [place], [place] - [address] - P [phone] - F [phone]  [address] [facility] [address]      Phone: [phone]   ketorolac 10 MG tablet  methocarbamol 750 MG tablet  predniSONE 50 MG tablet           DISCONTINUED MEDICATIONS:  Discharge Medication List as of 8/21/2024 12:55 PM          I am the Primary Clinician of Record: VISHAL Anaya NP (electronically signed)    (Please note that parts of this dictation were completed with voice recognition software. Quite often unanticipated grammatical, syntax, homophones, and other interpretive errors are inadvertently transcribed by the computer software. Please disregards these errors. Please excuse any errors that have escaped final proofreading.)     Muna Mckinnon APRN - NP  08/21/24 [id number]

## 2024-11-22 NOTE — PROGRESS NOTES
[facility]   Electrophysiology  Office Note         Date: 12/18/2024    Chief Complaint   Patient presents with    Atrial Fibrillation    Bradycardia    Hypertension     Cardiac HX: Katrin Donnelly is a 84 y.o. woman with a h/o HTN, mitral valve disease, cerebral amyloid angiopathy, persistent AF, recurrent falls, not on OAC d/t angiopathy per neuro, CHB, s/p Micra PPM (5/6/2020, Dr. Daigle).    Interval History/HPI: Patient is a phone visit today for CHB, persistent AF and PPM management.  Patient was originally seen in May 2020 after she presented with a fall.  Her INR was 8 at that time.  She was noted to be in complete heart block and underwent a Micra leadless PPM placement on 5/6/2020.  Patient has followed with neurology for her cognitive issues and they recommended she not be placed on OAC due to her cerebral amyloid angiopathy.  She does reside in memory care due to her mental status including agitation, confusion and dementia.  Appointment today was done with the caregiver at her facility.  Patient was not active in this phone conversation due to her dementia.  Her  Matthew was present.  Patient has a Micra pacemaker.  Her remote device check today shows stable parameters, no arrhythmias and an estimated longevity on her device, other parameters stable.  Patient also has a history of chronic diastolic CHF for which she has been on Lasix 60 mg daily.  She has since been taken off the furosemide and potassium.  She currently resides in assisted living and in order to remain in assisted living with the help she needs she was transferred to hospice care.  Per nursing she remains with bilateral lower extremity edema.  She has no PND orthopnea.  Patient currently is nonambulatory and is in a wheelchair most of the day.  She does wear support stockings.  Nurse feels that she is less active, sleeps more, requires more assistance and has a decrease in appetite.  She did have an episode on 1120 where

## 2024-12-18 ENCOUNTER — SCHEDULED TELEPHONE ENCOUNTER (OUTPATIENT)
Dept: CARDIOLOGY CLINIC | Age: 84
End: 2024-12-18
Payer: MEDICARE

## 2024-12-18 ENCOUNTER — TELEPHONE (OUTPATIENT)
Dept: CARDIOLOGY CLINIC | Age: 84
End: 2024-12-18

## 2024-12-18 DIAGNOSIS — I10 BENIGN ESSENTIAL HTN: ICD-10-CM

## 2024-12-18 DIAGNOSIS — I48.20 CHRONIC ATRIAL FIBRILLATION (HCC): Primary | ICD-10-CM

## 2024-12-18 DIAGNOSIS — Z95.0 PACEMAKER: ICD-10-CM

## 2024-12-18 DIAGNOSIS — I44.2 CHB (COMPLETE HEART BLOCK) (HCC): ICD-10-CM

## 2024-12-18 DIAGNOSIS — I49.5 SSS (SICK SINUS SYNDROME) (HCC): ICD-10-CM

## 2024-12-18 PROCEDURE — 99214 OFFICE O/P EST MOD 30 MIN: CPT | Performed by: NURSE PRACTITIONER

## 2024-12-18 PROCEDURE — 1160F RVW MEDS BY RX/DR IN RCRD: CPT | Performed by: NURSE PRACTITIONER

## 2024-12-18 PROCEDURE — 1123F ACP DISCUSS/DSCN MKR DOCD: CPT | Performed by: NURSE PRACTITIONER

## 2024-12-18 PROCEDURE — 1159F MED LIST DOCD IN RCRD: CPT | Performed by: NURSE PRACTITIONER

## 2024-12-18 RX ORDER — MULTIVITAMIN
1 TABLET ORAL DAILY
COMMUNITY

## 2024-12-18 NOTE — TELEPHONE ENCOUNTER
Cathryn from [facility] called stating they sent a device transmission over and want to make sure it has been received. Please confirm whether or not transmission received.   [phone]

## 2024-12-18 NOTE — TELEPHONE ENCOUNTER
Transmission successfully received and prepared by our remote team for today's VV w/NPFW.  Thanks.

## 2024-12-20 ENCOUNTER — TELEPHONE (OUTPATIENT)
Dept: CARDIOLOGY CLINIC | Age: 84
End: 2024-12-20

## 2025-03-17 ENCOUNTER — TELEPHONE (OUTPATIENT)
Dept: CARDIOLOGY CLINIC | Age: 85
End: 2025-03-17

## 2025-03-17 NOTE — TELEPHONE ENCOUNTER
Tabatha,    This patient sent a MyChart request to cancel her Remote Transmission tomorrow.  I sent her a [employer] message and told her you would call if there is a problem.  Thank you.
